# Patient Record
Sex: MALE | Race: WHITE | Employment: OTHER | URBAN - NONMETROPOLITAN AREA
[De-identification: names, ages, dates, MRNs, and addresses within clinical notes are randomized per-mention and may not be internally consistent; named-entity substitution may affect disease eponyms.]

---

## 2017-01-26 RX ORDER — OXYCODONE AND ACETAMINOPHEN 10; 325 MG/1; MG/1
1 TABLET ORAL 4 TIMES DAILY PRN
Qty: 120 TABLET | Refills: 0 | Status: SHIPPED | OUTPATIENT
Start: 2017-01-29 | End: 2017-02-21 | Stop reason: SDUPTHER

## 2017-02-22 RX ORDER — OXYCODONE AND ACETAMINOPHEN 10; 325 MG/1; MG/1
1 TABLET ORAL 4 TIMES DAILY PRN
Qty: 120 TABLET | Refills: 0 | Status: SHIPPED | OUTPATIENT
Start: 2017-02-28 | End: 2017-03-24 | Stop reason: SDUPTHER

## 2017-03-24 ENCOUNTER — HOSPITAL ENCOUNTER (OUTPATIENT)
Dept: PAIN MANAGEMENT | Age: 67
Discharge: HOME | End: 2017-03-24
Payer: MEDICARE

## 2017-03-24 VITALS
DIASTOLIC BLOOD PRESSURE: 81 MMHG | OXYGEN SATURATION: 94 % | BODY MASS INDEX: 37.38 KG/M2 | TEMPERATURE: 97.3 F | SYSTOLIC BLOOD PRESSURE: 135 MMHG | HEIGHT: 72 IN | HEART RATE: 65 BPM | RESPIRATION RATE: 20 BRPM | WEIGHT: 276 LBS

## 2017-03-24 DIAGNOSIS — G89.29 CHRONIC LEFT-SIDED LOW BACK PAIN WITHOUT SCIATICA: ICD-10-CM

## 2017-03-24 DIAGNOSIS — M54.50 CHRONIC LEFT-SIDED LOW BACK PAIN WITHOUT SCIATICA: ICD-10-CM

## 2017-03-24 PROCEDURE — 99214 OFFICE O/P EST MOD 30 MIN: CPT

## 2017-03-24 RX ORDER — OXYCODONE AND ACETAMINOPHEN 10; 325 MG/1; MG/1
1 TABLET ORAL 4 TIMES DAILY PRN
Qty: 120 TABLET | Refills: 0 | Status: SHIPPED | OUTPATIENT
Start: 2017-03-30 | End: 2017-04-26 | Stop reason: SDUPTHER

## 2017-03-24 RX ORDER — PREGABALIN 200 MG/1
200 CAPSULE ORAL 3 TIMES DAILY
Qty: 90 CAPSULE | Refills: 2 | Status: SHIPPED | OUTPATIENT
Start: 2017-04-13 | End: 2017-06-30 | Stop reason: SDUPTHER

## 2017-03-24 ASSESSMENT — PAIN DESCRIPTION - LOCATION: LOCATION: BACK

## 2017-03-24 ASSESSMENT — PAIN SCALES - GENERAL: PAINLEVEL_OUTOF10: 2

## 2017-03-24 ASSESSMENT — PAIN DESCRIPTION - PAIN TYPE: TYPE: CHRONIC PAIN

## 2017-03-24 ASSESSMENT — PAIN DESCRIPTION - DESCRIPTORS: DESCRIPTORS: ACHING;CONSTANT;THROBBING

## 2017-03-24 ASSESSMENT — PAIN DESCRIPTION - ONSET: ONSET: ON-GOING

## 2017-03-24 ASSESSMENT — PAIN DESCRIPTION - PROGRESSION: CLINICAL_PROGRESSION: NOT CHANGED

## 2017-03-24 ASSESSMENT — ACTIVITIES OF DAILY LIVING (ADL): EFFECT OF PAIN ON DAILY ACTIVITIES: DAILY ACTIVITY

## 2017-03-24 ASSESSMENT — PAIN DESCRIPTION - FREQUENCY: FREQUENCY: CONTINUOUS

## 2017-03-24 ASSESSMENT — PAIN DESCRIPTION - ORIENTATION: ORIENTATION: LOWER

## 2017-03-24 ASSESSMENT — PAIN DESCRIPTION - DIRECTION: RADIATING_TOWARDS: RADIATES INTO LEFT HIP

## 2017-03-29 ENCOUNTER — HOSPITAL ENCOUNTER (OUTPATIENT)
Dept: GENERAL RADIOLOGY | Age: 67
Discharge: HOME | End: 2017-03-29
Payer: MEDICARE

## 2017-03-29 DIAGNOSIS — M25.561 RIGHT KNEE PAIN, UNSPECIFIED CHRONICITY: ICD-10-CM

## 2017-03-29 PROCEDURE — 73562 X-RAY EXAM OF KNEE 3: CPT

## 2017-05-02 RX ORDER — OXYCODONE AND ACETAMINOPHEN 10; 325 MG/1; MG/1
1 TABLET ORAL 4 TIMES DAILY PRN
Qty: 120 TABLET | Refills: 0
Start: 2017-05-02 | End: 2017-05-25 | Stop reason: SDUPTHER

## 2017-05-25 RX ORDER — OXYCODONE AND ACETAMINOPHEN 10; 325 MG/1; MG/1
1 TABLET ORAL 4 TIMES DAILY PRN
Qty: 120 TABLET | Refills: 0 | Status: SHIPPED | OUTPATIENT
Start: 2017-06-01 | End: 2017-05-26 | Stop reason: SDUPTHER

## 2017-05-31 RX ORDER — OXYCODONE AND ACETAMINOPHEN 10; 325 MG/1; MG/1
1 TABLET ORAL 4 TIMES DAILY PRN
Qty: 120 TABLET | Refills: 0
Start: 2017-05-31 | End: 2017-06-23 | Stop reason: SDUPTHER

## 2017-06-26 RX ORDER — OXYCODONE AND ACETAMINOPHEN 10; 325 MG/1; MG/1
1 TABLET ORAL 4 TIMES DAILY PRN
Qty: 8 TABLET | Refills: 0 | Status: SHIPPED | OUTPATIENT
Start: 2017-06-28 | End: 2017-06-30 | Stop reason: SDUPTHER

## 2017-06-30 ENCOUNTER — HOSPITAL ENCOUNTER (OUTPATIENT)
Dept: PAIN MANAGEMENT | Age: 67
Discharge: HOME | End: 2017-06-30
Payer: MEDICARE

## 2017-06-30 VITALS
SYSTOLIC BLOOD PRESSURE: 139 MMHG | WEIGHT: 278 LBS | DIASTOLIC BLOOD PRESSURE: 80 MMHG | BODY MASS INDEX: 37.65 KG/M2 | HEART RATE: 68 BPM | HEIGHT: 72 IN | OXYGEN SATURATION: 95 % | TEMPERATURE: 97.4 F | RESPIRATION RATE: 18 BRPM

## 2017-06-30 DIAGNOSIS — M54.50 CHRONIC LEFT-SIDED LOW BACK PAIN WITHOUT SCIATICA: ICD-10-CM

## 2017-06-30 DIAGNOSIS — G89.29 CHRONIC LEFT-SIDED LOW BACK PAIN WITHOUT SCIATICA: ICD-10-CM

## 2017-06-30 PROCEDURE — 99213 OFFICE O/P EST LOW 20 MIN: CPT

## 2017-06-30 RX ORDER — OXYCODONE AND ACETAMINOPHEN 10; 325 MG/1; MG/1
1 TABLET ORAL 4 TIMES DAILY PRN
Qty: 120 TABLET | Refills: 0
Start: 2017-06-30 | End: 2017-07-26 | Stop reason: SDUPTHER

## 2017-06-30 RX ORDER — PREGABALIN 200 MG/1
200 CAPSULE ORAL 3 TIMES DAILY
Qty: 90 CAPSULE | Refills: 2 | Status: CANCELLED | OUTPATIENT
Start: 2017-06-30

## 2017-06-30 RX ORDER — PREGABALIN 200 MG/1
200 CAPSULE ORAL 3 TIMES DAILY
Qty: 90 CAPSULE | Refills: 2
Start: 2017-06-30 | End: 2017-09-29 | Stop reason: SDUPTHER

## 2017-06-30 ASSESSMENT — PAIN DESCRIPTION - PAIN TYPE: TYPE: CHRONIC PAIN

## 2017-06-30 ASSESSMENT — PAIN DESCRIPTION - FREQUENCY: FREQUENCY: CONTINUOUS

## 2017-06-30 ASSESSMENT — PAIN DESCRIPTION - LOCATION: LOCATION: BACK

## 2017-06-30 ASSESSMENT — PAIN DESCRIPTION - ORIENTATION: ORIENTATION: LOWER

## 2017-06-30 ASSESSMENT — PAIN SCALES - GENERAL: PAINLEVEL_OUTOF10: 1

## 2017-06-30 ASSESSMENT — PAIN DESCRIPTION - ONSET: ONSET: ON-GOING

## 2017-06-30 ASSESSMENT — ACTIVITIES OF DAILY LIVING (ADL): EFFECT OF PAIN ON DAILY ACTIVITIES: LIMITS ACTIVITY

## 2017-06-30 ASSESSMENT — PAIN DESCRIPTION - PROGRESSION: CLINICAL_PROGRESSION: NOT CHANGED

## 2017-06-30 ASSESSMENT — PAIN DESCRIPTION - DESCRIPTORS: DESCRIPTORS: ACHING;CONSTANT;THROBBING

## 2017-06-30 ASSESSMENT — PAIN DESCRIPTION - DIRECTION: RADIATING_TOWARDS: INTO LEFT HIP

## 2017-07-26 RX ORDER — OXYCODONE AND ACETAMINOPHEN 10; 325 MG/1; MG/1
1 TABLET ORAL 4 TIMES DAILY PRN
Qty: 120 TABLET | Refills: 0 | Status: SHIPPED | OUTPATIENT
Start: 2017-07-30 | End: 2017-08-23 | Stop reason: SDUPTHER

## 2017-08-24 RX ORDER — OXYCODONE AND ACETAMINOPHEN 10; 325 MG/1; MG/1
1 TABLET ORAL 4 TIMES DAILY PRN
Qty: 120 TABLET | Refills: 0 | Status: SHIPPED | OUTPATIENT
Start: 2017-08-29 | End: 2017-09-25 | Stop reason: SDUPTHER

## 2017-09-26 RX ORDER — OXYCODONE AND ACETAMINOPHEN 10; 325 MG/1; MG/1
1 TABLET ORAL 4 TIMES DAILY PRN
Qty: 120 TABLET | Refills: 0 | Status: SHIPPED | OUTPATIENT
Start: 2017-09-28 | End: 2017-10-04 | Stop reason: SDUPTHER

## 2017-09-29 RX ORDER — PREGABALIN 200 MG/1
200 CAPSULE ORAL 3 TIMES DAILY
Qty: 90 CAPSULE | Refills: 2 | Status: SHIPPED | OUTPATIENT
Start: 2017-09-29 | End: 2018-01-02 | Stop reason: SDUPTHER

## 2017-10-04 ENCOUNTER — HOSPITAL ENCOUNTER (OUTPATIENT)
Dept: PAIN MANAGEMENT | Age: 67
Discharge: HOME | End: 2017-10-04
Payer: MEDICARE

## 2017-10-04 VITALS
HEART RATE: 62 BPM | BODY MASS INDEX: 37.11 KG/M2 | HEIGHT: 72 IN | WEIGHT: 274 LBS | SYSTOLIC BLOOD PRESSURE: 129 MMHG | TEMPERATURE: 97 F | RESPIRATION RATE: 20 BRPM | DIASTOLIC BLOOD PRESSURE: 83 MMHG

## 2017-10-04 DIAGNOSIS — G89.29 CHRONIC LEFT-SIDED LOW BACK PAIN WITHOUT SCIATICA: ICD-10-CM

## 2017-10-04 DIAGNOSIS — M54.50 CHRONIC LEFT-SIDED LOW BACK PAIN WITHOUT SCIATICA: ICD-10-CM

## 2017-10-04 PROCEDURE — 80307 DRUG TEST PRSMV CHEM ANLYZR: CPT

## 2017-10-04 PROCEDURE — 99214 OFFICE O/P EST MOD 30 MIN: CPT

## 2017-10-04 RX ORDER — OXYCODONE AND ACETAMINOPHEN 10; 325 MG/1; MG/1
1 TABLET ORAL 4 TIMES DAILY PRN
Qty: 120 TABLET | Refills: 0 | Status: SHIPPED | OUTPATIENT
Start: 2017-10-28 | End: 2017-11-16 | Stop reason: SDUPTHER

## 2017-10-04 RX ORDER — DICLOFENAC SODIUM 75 MG/1
1 TABLET, DELAYED RELEASE ORAL 2 TIMES DAILY
Refills: 3 | COMMUNITY
Start: 2017-07-21 | End: 2018-01-05 | Stop reason: SDUPTHER

## 2017-10-04 ASSESSMENT — PAIN SCALES - GENERAL: PAINLEVEL_OUTOF10: 2

## 2017-10-04 ASSESSMENT — PAIN DESCRIPTION - PROGRESSION: CLINICAL_PROGRESSION: NOT CHANGED

## 2017-10-04 ASSESSMENT — PAIN DESCRIPTION - PAIN TYPE: TYPE: CHRONIC PAIN

## 2017-10-04 ASSESSMENT — PAIN DESCRIPTION - FREQUENCY: FREQUENCY: INTERMITTENT

## 2017-10-04 ASSESSMENT — ACTIVITIES OF DAILY LIVING (ADL): EFFECT OF PAIN ON DAILY ACTIVITIES: LIMITS ACTIVIITES

## 2017-10-04 ASSESSMENT — PAIN DESCRIPTION - ORIENTATION: ORIENTATION: LOWER;RIGHT;LEFT

## 2017-10-04 ASSESSMENT — PAIN DESCRIPTION - DESCRIPTORS: DESCRIPTORS: ACHING

## 2017-10-04 ASSESSMENT — PAIN DESCRIPTION - LOCATION: LOCATION: BACK;FOOT

## 2017-10-04 ASSESSMENT — PAIN DESCRIPTION - ONSET: ONSET: ON-GOING

## 2017-10-04 NOTE — PROGRESS NOTES
 zolpidem (AMBIEN CR) 12.5 MG CR tablet Take 12.5 mg by mouth nightly as needed. No current facility-administered medications for this encounter. UDS:    Lab Results   Component Value Date    AMPHETUR Negative 12/22/2016    BARBITURATES Negative 12/22/2016    BENZODIAZURI Negative 12/22/2016    CANNANBINURI Negative 12/22/2016    COCAINEMETUR Negative 12/22/2016    OPIAU Negative 12/22/2016    OXYCOOXYMO Positive * (Percocet per med list) 12/22/2016    PHENCYCU Negative 12/22/2016    LABMETH Negative 12/22/2016    PPXUR Negative 12/22/2016    MEPERIDU Negative 12/22/2016    FENTU Negative 12/22/2016    ETHUQN Negative 12/22/2016          Vitals:  /83  Pulse 62  Temp 97 °F (36.1 °C) (Temporal)   Resp 20  Ht 6' (1.829 m)  Wt 274 lb (124.3 kg)  BMI 37.16 kg/m2      Physical Exam:  General appearance: no acute distress  Head: NCAT, EOMI  Skin: Warm, Dry   Musculoskeletal: ambulatory per self, steady gait  Neurologic: alert and oriented X 3, speech clear  Mood and affect: appropriate, no SI or HI    Assessment:    *      Lumbar DDD  *      Lumbar Spinal Stenosis    Plan:   [x]  Patient is to call with any questions or concerns which may arise prior to the next office visit    [x]  Continue current medications per our office, see medication tabARTURO reviewed   []  Add   []  Imaging order given to patient   []  PT order given to patient   []  Procedure scheduled for next visit, see encounter details   [x]  Routine UDS done today   []  UDS next visit   []  . .. Controlled Substance Monitoring: Discussed with patient possible medication side effects, risk of tolerance and/or dependence, and alternative treatments. Discussed the effects of long term narcotic use. Patient encouraged to set daily goals of exercising and decreasing daily narcotic intake.       Electronically signed by BROOKE Givens on 10/4/2017

## 2017-10-13 LAB
AMPHETAMINES, URINE: NEGATIVE NG/ML
BARBITURATES, URINE: NEGATIVE NG/ML
BENZODIAZEPINES, URINE: NEGATIVE NG/ML
CANNABINOIDS, URINE: NEGATIVE NG/ML
COCAINE METABOLITE, URINE: NEGATIVE NG/ML
CREATININE, URINE: 187.1 MG/DL (ref 20–300)
ETHANOL U, QUAN: NEGATIVE %
FENTANYL URINE: NEGATIVE PG/ML
MEPERIDINE, UR: NEGATIVE NG/ML
METHADONE SCREEN, URINE: NEGATIVE NG/ML
OPIATES, URINE: ABNORMAL NG/ML
OPIATES, URINE: NEGATIVE NG/ML
OXYCODONE URINE: POSITIVE
OXYCODONE, UR GC/MS: 2076 NG/ML
OXYCODONE/OXYMORPH, UR: POSITIVE
OXYCODONE/OXYMORPHONE, UR: ABNORMAL NG/ML
OXYMORPHONE, UR GC/MS: 4174 NG/ML
OXYMORPHONE, URINE: POSITIVE
PH, URINE: 5.5 (ref 4.5–8.9)
PHENCYCLIDINE, URINE: NEGATIVE NG/ML
PROPOXYPHENE, URINE: NEGATIVE NG/ML

## 2017-11-06 ENCOUNTER — OFFICE VISIT (OUTPATIENT)
Dept: UROLOGY | Facility: CLINIC | Age: 67
End: 2017-11-06

## 2017-11-06 VITALS
DIASTOLIC BLOOD PRESSURE: 84 MMHG | HEIGHT: 72 IN | BODY MASS INDEX: 37 KG/M2 | WEIGHT: 273.2 LBS | SYSTOLIC BLOOD PRESSURE: 134 MMHG | TEMPERATURE: 97.4 F

## 2017-11-06 DIAGNOSIS — N43.3 HYDROCELE, UNSPECIFIED HYDROCELE TYPE: Primary | ICD-10-CM

## 2017-11-06 LAB
BILIRUB BLD-MCNC: NEGATIVE MG/DL
CLARITY, POC: CLEAR
COLOR UR: YELLOW
GLUCOSE UR STRIP-MCNC: ABNORMAL MG/DL
KETONES UR QL: NEGATIVE
LEUKOCYTE EST, POC: NEGATIVE
NITRITE UR-MCNC: NEGATIVE MG/ML
PH UR: 5 [PH] (ref 5–8)
PROT UR STRIP-MCNC: NEGATIVE MG/DL
RBC # UR STRIP: ABNORMAL /UL
SP GR UR: 1.02 (ref 1–1.03)
UROBILINOGEN UR QL: NORMAL

## 2017-11-06 PROCEDURE — 81003 URINALYSIS AUTO W/O SCOPE: CPT | Performed by: UROLOGY

## 2017-11-06 PROCEDURE — 99203 OFFICE O/P NEW LOW 30 MIN: CPT | Performed by: UROLOGY

## 2017-11-06 RX ORDER — DICLOFENAC SODIUM 75 MG/1
TABLET, DELAYED RELEASE ORAL
Refills: 3 | Status: ON HOLD | COMMUNITY
Start: 2017-10-16 | End: 2020-01-24 | Stop reason: SDDI

## 2017-11-06 RX ORDER — LOSARTAN POTASSIUM AND HYDROCHLOROTHIAZIDE 12.5; 5 MG/1; MG/1
TABLET ORAL
Refills: 3 | COMMUNITY
Start: 2017-08-11 | End: 2017-11-13 | Stop reason: DRUGHIGH

## 2017-11-06 RX ORDER — OXYCODONE AND ACETAMINOPHEN 10; 325 MG/1; MG/1
TABLET ORAL
Refills: 0 | Status: ON HOLD | COMMUNITY
Start: 2017-10-28 | End: 2020-01-24

## 2017-11-06 RX ORDER — ZOLPIDEM TARTRATE 10 MG/1
TABLET ORAL
Refills: 1 | Status: ON HOLD | COMMUNITY
Start: 2017-09-28 | End: 2020-01-24

## 2017-11-06 RX ORDER — CITALOPRAM 20 MG/1
TABLET ORAL
Refills: 3 | Status: ON HOLD | COMMUNITY
Start: 2017-09-15 | End: 2020-01-24

## 2017-11-06 RX ORDER — OMEPRAZOLE 20 MG/1
CAPSULE, DELAYED RELEASE ORAL
Refills: 3 | Status: ON HOLD | COMMUNITY
Start: 2017-10-10 | End: 2020-01-24

## 2017-11-06 RX ORDER — BUSPIRONE HYDROCHLORIDE 10 MG/1
10 TABLET ORAL 2 TIMES DAILY
Status: ON HOLD | COMMUNITY
Start: 2015-11-28 | End: 2021-12-16

## 2017-11-06 RX ORDER — SIMVASTATIN 40 MG
TABLET ORAL
Refills: 3 | Status: ON HOLD | COMMUNITY
Start: 2017-10-22 | End: 2020-01-24

## 2017-11-06 RX ORDER — SODIUM CHLORIDE 9 MG/ML
100 INJECTION, SOLUTION INTRAVENOUS CONTINUOUS
Status: CANCELLED | OUTPATIENT
Start: 2017-11-06

## 2017-11-06 NOTE — PROGRESS NOTES
Subjective    Mr. Rodriguez is 67 y.o. male    Chief Complaint: Scrotal Swelling    History of Present Illness     Genital Lesion  Patient is here for a genital lesion.  The location of the lesion is left scrotum.  The lesion has been present for 9month(s). The description is swelling.  The courseworsening.  Treatment response is none.  Associated symptoms include pain.     The following portions of the patient's history were reviewed and updated as appropriate: allergies, current medications, past family history, past medical history, past social history, past surgical history and problem list.    Review of Systems   Constitutional: Negative for appetite change and fever.   HENT: Negative for hearing loss and sore throat.    Eyes: Negative for pain and redness.   Respiratory: Negative for cough and shortness of breath.    Cardiovascular: Negative for chest pain and leg swelling.   Gastrointestinal: Negative for anal bleeding, nausea and vomiting.   Endocrine: Negative for cold intolerance and heat intolerance.   Genitourinary: Positive for scrotal swelling (left) and testicular pain (left). Negative for dysuria, flank pain and hematuria.   Musculoskeletal: Negative for joint swelling and myalgias.   Skin: Negative for color change and rash.   Allergic/Immunologic: Negative for immunocompromised state.   Neurological: Negative for dizziness and speech difficulty.   Hematological: Negative for adenopathy. Does not bruise/bleed easily.   Psychiatric/Behavioral: Negative for dysphoric mood and suicidal ideas.         Current Outpatient Prescriptions:   •  busPIRone (BUSPAR) 10 MG tablet, Take 10 mg by mouth., Disp: , Rfl:   •  citalopram (CeleXA) 20 MG tablet, TAKE 1 TABLET EVERY DAY, Disp: , Rfl: 3  •  diclofenac (VOLTAREN) 75 MG EC tablet, TAKE 1 TABLET BY MOUTH TWICE A DAY, Disp: , Rfl: 3  •  losartan-hydrochlorothiazide (HYZAAR) 50-12.5 MG per tablet, TAKE 1 TABLET EVERY DAY, Disp: , Rfl: 3  •  LYRICA 200 MG capsule,  "TAKE 1 CAPSULE BY MOUTH 3 TIMES DAILY, Disp: , Rfl: 2  •  metFORMIN (GLUCOPHAGE) 500 MG tablet, TAKE 1 TABLET BY MOUTH TWICE A DAY *12-15-16*, Disp: , Rfl: 5  •  omeprazole (priLOSEC) 20 MG capsule, TAKE 1 CAPSULE EVERY DAY, Disp: , Rfl: 3  •  oxyCODONE-acetaminophen (PERCOCET)  MG per tablet, TAKE 1 TABLET BY MOUTH 4 TIMES A DAY AS NEEDED FOR PAIN, Disp: , Rfl: 0  •  simvastatin (ZOCOR) 40 MG tablet, TAKE 1 TABLET BY MOUTH EVERY DAY, Disp: , Rfl: 3  •  zolpidem (AMBIEN) 10 MG tablet, TAKE 1 TABLET BY MOUTH AT BEDTIME, Disp: , Rfl: 1    Past Medical History:   Diagnosis Date   • Hyperlipidemia    • Hypertension        Past Surgical History:   Procedure Laterality Date   • HERNIA REPAIR         Social History     Social History   • Marital status: Unknown     Spouse name: N/A   • Number of children: N/A   • Years of education: N/A     Social History Main Topics   • Smoking status: Current Every Day Smoker     Types: Cigarettes   • Smokeless tobacco: Never Used   • Alcohol use No   • Drug use: None   • Sexual activity: Not Asked     Other Topics Concern   • None     Social History Narrative   • None       Family History   Problem Relation Age of Onset   • No Known Problems Father    • No Known Problems Mother        Objective    /84  Temp 97.4 °F (36.3 °C)  Ht 72\" (182.9 cm)  Wt 273 lb 3.2 oz (124 kg)  BMI 37.05 kg/m2    Physical Exam   Constitutional: He is oriented to person, place, and time. He appears well-developed and well-nourished. No distress.   HENT:   Nose: Nose normal.   Neck: Normal range of motion. Neck supple. No tracheal deviation present. No thyromegaly present.   Cardiovascular: Normal rate, regular rhythm and intact distal pulses.    No significant edema or tenderness    Pulmonary/Chest: Breath sounds normal. No accessory muscle usage. No respiratory distress.   Abdominal: Soft. Bowel sounds are normal. He exhibits no distension, no ascites and no mass. There is no " hepatosplenomegaly. There is no tenderness. There is no rebound, no guarding and no CVA tenderness. No hernia.   Stool specimen is not indicated for my portion of the exam   Genitourinary: Testes normal and penis normal. Rectal exam shows no mass, no tenderness, anal tone normal and guaiac negative stool. Tender: no nodules. Right testis shows no mass, no swelling and no tenderness. Left testis shows no mass, no swelling and no tenderness. No penile tenderness (no lesion or deformities).   Genitourinary Comments:  The urethral meatus normal in position without evidence of stricture. Epididymis without mass or tenderness. Vas Deferens is palpably normal. Large left sided hydrocele.    Lymphadenopathy:     He has no cervical adenopathy. No inguinal adenopathy noted on the right or left side.        Right: No inguinal adenopathy present.        Left: No inguinal adenopathy present.   Neurological: He is alert and oriented to person, place, and time.   Skin: Skin is warm and dry. No rash noted. He is not diaphoretic. No pallor.   Psychiatric: He has a normal mood and affect. His behavior is normal.   Vitals reviewed.          Results for orders placed or performed in visit on 11/06/17   POC Urinalysis Dipstick, Automated   Result Value Ref Range    Color Yellow Yellow, Straw, Dark Yellow, Lora    Clarity, UA Clear Clear    Glucose, UA >=1000 mg/dL (3+) (A) Negative, 1000 mg/dL (3+) mg/dL    Bilirubin Negative Negative    Ketones, UA Negative Negative    Specific Gravity  1.020 1.005 - 1.030    Blood, UA Trace (A) Negative    pH, Urine 5.0 5.0 - 8.0    Protein, POC Negative Negative mg/dL    Urobilinogen, UA Normal Normal    Leukocytes Negative Negative    Nitrite, UA Negative Negative     Assessment and Plan    Diagnoses and all orders for this visit:    Hydrocele, unspecified hydrocele type  -     POC Urinalysis Dipstick, Automated  -     sodium chloride 0.9 % infusion; Infuse 100 mL/hr into a venous catheter  Continuous.  -     ceFAZolin (ANCEF) 2 g in sodium chloride 0.9 % 100 mL IVPB; Infuse 2 g into a venous catheter 1 (One) Time.  -     Case Request; Standing  -     Case Request    Other orders  -     Follow Anesthesia Guidelines / Standing Orders; Future  -     Obtain informed consent  -     Follow Anesthesia Guidelines / Standing Orders; Standing  -     Verify NPO Status; Standing  -     RA hose- To be placed on patient in pre-op; Standing  -     SCD (sequential compression device)- to be placed on patient in Pre-op; Standing            Patient with left-sided hydrocele of moderate size.  This is causing symptoms of pain.  We will proceed with left sided hydrocelectomy.  I did discuss with him today with significant alternatives of the procedure as well as success rate of 85%.    This gentleman has a hydrocele of moderate to large size that warrants consideration for repair.  We discussed that if the hydrocele increases much in size, it will likely be more difficult to repair surgically with an increasing complication and recovery time.  It is explained that aspiration is an option with or without sclerosing therapy, but the risk of recurrence is quoted to be between 35-65% and most studies.  It is explained that this could lead to an infection also.  The surgical risks including recurrence of hydrocele, (about 15-20%), infection, scrotal abscess, hematoma, damage to testis particularly ischemic that could cause atrophy, infertility, postoperative discomfort that persists beyond the expected time, need for temporary internal drainage and persistent pain areexplained to the patient.  All questions are answered.  He wishes to proceed with the procedure.

## 2017-11-13 ENCOUNTER — APPOINTMENT (OUTPATIENT)
Dept: PREADMISSION TESTING | Facility: HOSPITAL | Age: 67
End: 2017-11-13

## 2017-11-13 VITALS
HEIGHT: 71 IN | WEIGHT: 270 LBS | BODY MASS INDEX: 37.8 KG/M2 | OXYGEN SATURATION: 96 % | DIASTOLIC BLOOD PRESSURE: 74 MMHG | SYSTOLIC BLOOD PRESSURE: 135 MMHG | RESPIRATION RATE: 18 BRPM | HEART RATE: 84 BPM

## 2017-11-13 LAB
ANION GAP SERPL CALCULATED.3IONS-SCNC: 8 MMOL/L (ref 4–13)
BILIRUB UR QL STRIP: NEGATIVE
BUN BLD-MCNC: 15 MG/DL (ref 5–21)
BUN/CREAT SERPL: 16.3 (ref 7–25)
CALCIUM SPEC-SCNC: 9.5 MG/DL (ref 8.4–10.4)
CHLORIDE SERPL-SCNC: 95 MMOL/L (ref 98–110)
CLARITY UR: CLEAR
CO2 SERPL-SCNC: 33 MMOL/L (ref 24–31)
COLOR UR: YELLOW
CREAT BLD-MCNC: 0.92 MG/DL (ref 0.5–1.4)
DEPRECATED RDW RBC AUTO: 42.3 FL (ref 40–54)
ERYTHROCYTE [DISTWIDTH] IN BLOOD BY AUTOMATED COUNT: 12.9 % (ref 12–15)
GFR SERPL CREATININE-BSD FRML MDRD: 82 ML/MIN/1.73
GLUCOSE BLD-MCNC: 528 MG/DL (ref 70–100)
GLUCOSE UR STRIP-MCNC: ABNORMAL MG/DL
HCT VFR BLD AUTO: 48.7 % (ref 40–52)
HGB BLD-MCNC: 16.5 G/DL (ref 14–18)
HGB UR QL STRIP.AUTO: NEGATIVE
KETONES UR QL STRIP: NEGATIVE
LEUKOCYTE ESTERASE UR QL STRIP.AUTO: NEGATIVE
MCH RBC QN AUTO: 30.8 PG (ref 28–32)
MCHC RBC AUTO-ENTMCNC: 33.9 G/DL (ref 33–36)
MCV RBC AUTO: 91 FL (ref 82–95)
NITRITE UR QL STRIP: NEGATIVE
PH UR STRIP.AUTO: 6.5 [PH] (ref 5–8)
PLATELET # BLD AUTO: 234 10*3/MM3 (ref 130–400)
PMV BLD AUTO: 11.5 FL (ref 6–12)
POTASSIUM BLD-SCNC: 5 MMOL/L (ref 3.5–5.3)
PROT UR QL STRIP: NEGATIVE
RBC # BLD AUTO: 5.35 10*6/MM3 (ref 4.8–5.9)
SODIUM BLD-SCNC: 136 MMOL/L (ref 135–145)
SP GR UR STRIP: 1.03 (ref 1–1.03)
UROBILINOGEN UR QL STRIP: ABNORMAL
WBC NRBC COR # BLD: 9.78 10*3/MM3 (ref 4.8–10.8)

## 2017-11-13 PROCEDURE — 85027 COMPLETE CBC AUTOMATED: CPT | Performed by: UROLOGY

## 2017-11-13 PROCEDURE — 80048 BASIC METABOLIC PNL TOTAL CA: CPT | Performed by: UROLOGY

## 2017-11-13 PROCEDURE — 93010 ELECTROCARDIOGRAM REPORT: CPT | Performed by: INTERNAL MEDICINE

## 2017-11-13 PROCEDURE — 93005 ELECTROCARDIOGRAM TRACING: CPT

## 2017-11-13 PROCEDURE — 81003 URINALYSIS AUTO W/O SCOPE: CPT | Performed by: UROLOGY

## 2017-11-13 RX ORDER — LOSARTAN POTASSIUM 25 MG/1
25 TABLET ORAL DAILY
COMMUNITY
End: 2019-12-11

## 2017-11-13 NOTE — DISCHARGE INSTRUCTIONS
DAY OF SURGERY INSTRUCTIONS        YOUR SURGEON: dr fernandez    PROCEDURE: ***HYDROCELECTOMY    DATE OF SURGERY: ***11/20/2017    ARRIVAL TIME: AS DIRECTED BY OFFICE    DAY OF SURGERY TAKE ONLY THESE MEDICATIONS: ***none            BEFORE YOU COME TO THE HOSPITAL  (Pre-op instructions)  • Do not eat, drink, smoke or chew gum after midnight the night before surgery.  This also includes no mints.  • Morning of surgery take only the medicines you have been instructed with a sip of water unless otherwise instructed  by your physician.  • Do not shave, wear makeup or dark nail polish.  • Remove all jewelry including rings.  • Leave anything you consider valuable at home.  • Leave your suitcase in the car until after your surgery.  • Bring the following with you if applicable:  o Picture ID and insurance, Medicare or Medicaid cards  o Co-pay/deductible required by insurance (cash, check, credit card)  o Copy of advance directive, living will or power-of- documents if not brought to PAT  o CPAP or BIPAP mask and tubing  o Relaxation aids (MP3 player, book, magazine)  • On the day of surgery check in at registration located at the main entrance of the hospital.       Outpatient Surgery Guidelines, Adult  Outpatient procedures are those for which the person having the procedure is allowed to go home the same day as the procedure. Various procedures are done on an outpatient basis. You should follow some general guidelines if you will be having an outpatient procedure.  LET YOUR HEALTH CARE PROVIDER KNOW ABOUT:  · Any allergies you have.  · All medicines you are taking, including vitamins, herbs, eye drops, creams, and over-the-counter medicines.  · Previous problems you or members of your family have had with the use of anesthetics.  · Any blood disorders you have.  · Previous surgeries you have had.  · Medical conditions you have.  RISKS AND COMPLICATIONS  Your health care provider will discuss possible risks and  complications with you before surgery. Common risks and complications include:    · Problems due to the use of anesthetics.  · Blood loss and replacement (does not apply to minor surgical procedures).  · Temporary increase in pain due to surgery.  · Uncorrected pain or problems that the surgery was meant to correct.  · Infection.  · New damage.  BEFORE THE PROCEDURE  · Ask your health care provider about changing or stopping your regular medicines. You may need to stop taking certain medicines in the days or weeks before the procedure.  · Stop smoking at least 2 weeks before surgery. This lowers your risk for complications during and after surgery. Ask your health care provider for help with this if needed.  · Eat your usual meals and a light supper the day before surgery. Continue fluid intake. Do not drink alcohol.  · Do not eat or drink after midnight the night before your surgery.   · Arrange for someone to take you home and to stay with you for 24 hours after the procedure. Medicine given for your procedure may affect your ability to drive or to care for yourself.  · Call your health care provider's office if you develop an illness or problem that may prevent you from safely having your procedure.  AFTER THE PROCEDURE  After surgery, you will be taken to a recovery area, where your progress will be monitored. If there are no complications, you will be allowed to go home when you are awake, stable, and taking fluids well. You may have numbness around the surgical site. Healing will take some time. You will have tenderness at the surgical site and may have some swelling and bruising. You may also have some nausea.  HOME CARE INSTRUCTIONS  · Do not drive for 24 hours, or as directed by your health care provider. Do not drive while taking prescription pain medicines.  · Do not drink alcohol for 24 hours.  · Do not make important decisions or sign legal documents for 24 hours.  · You may resume a normal diet and  activities as directed.  · Do not lift anything heavier than 10 pounds (4.5 kg) or play contact sports until your health care provider says it is okay.  · Change your bandages (dressings) as directed.  · Only take over-the-counter or prescription medicines as directed by your health care provider.  · Follow up with your health care provider as directed.  SEEK MEDICAL CARE IF:  · You have increased bleeding (more than a small spot) from the surgical site.  · You have redness, swelling, or increasing pain in the wound.  · You see pus coming from the wound.  · You have a fever.  · You notice a bad smell coming from the wound or dressing.  · You feel lightheaded or faint.  · You develop a rash.  · You have trouble breathing.  · You develop allergies.  MAKE SURE YOU:  · Understand these instructions.  · Will watch your condition.  · Will get help right away if you are not doing well or get worse.     This information is not intended to replace advice given to you by your health care provider. Make sure you discuss any questions you have with your health care provider.     Document Released: 09/12/2002 Document Revised: 05/03/2016 Document Reviewed: 05/22/2014  [employer] Interactive Patient Education ©2016 [employer]       Fall Prevention in Hospitals, Adult  As a hospital patient, your condition and the treatments you receive can increase your risk for falls. Some additional risk factors for falls in a hospital include:  · Being in an unfamiliar environment.  · Being on bed rest.  · Your surgery.  · Taking certain medicines.  · Your tubing requirements, such as intravenous (IV) therapy or catheters.  It is important that you learn how to decrease fall risks while at the hospital. Below are important tips that can help prevent falls.  SAFETY TIPS FOR PREVENTING FALLS  Talk about your risk of falling.  · Ask your health care provider why you are at risk for falling. Is it your medicine, illness, tubing placement, or  something else?  · Make a plan with your health care provider to keep you safe from falls.  · Ask your health care provider or pharmacist about side effects of your medicines. Some medicines can make you dizzy or affect your coordination.  Ask for help.  · Ask for help before getting out of bed. You may need to press your call button.  · Ask for assistance in getting safely to the toilet.  · Ask for a walker or cane to be put at your bedside. Ask that most of the side rails on your bed be placed up before your health care provider leaves the room.  · Ask family or friends to sit with you.  · Ask for things that are out of your reach, such as your glasses, hearing aids, telephone, bedside table, or call button.  Follow these tips to avoid falling:  · Stay lying or seated, rather than standing, while waiting for help.  · Wear rubber-soled slippers or shoes whenever you walk in the hospital.  · Avoid quick, sudden movements.  ¨ Change positions slowly.  ¨ Sit on the side of your bed before standing.  ¨ Stand up slowly and wait before you start to walk.  · Let your health care provider know if there is a spill on the floor.  · Pay careful attention to the medical equipment, electrical cords, and tubes around you.  · When you need help, use your call button by your bed or in the bathroom. Wait for one of your health care providers to help you.  · If you feel dizzy or unsure of your footing, return to bed and wait for assistance.  · Avoid being distracted by the TV, telephone, or another person in your room.  · Do not lean or support yourself on rolling objects, such as IV poles or bedside tables.     This information is not intended to replace advice given to you by your health care provider. Make sure you discuss any questions you have with your health care provider.     Document Released: 12/15/2001 Document Revised: 01/08/2016 Document Reviewed: 08/25/2013  [employer] Interactive Patient Education ©2016 [employer]       Surgical Site Infections FAQs  What is a Surgical Site Infection (SSI)?  A surgical site infection is an infection that occurs after surgery in the part of the body where the surgery took place. Most patients who have surgery do not develop an infection. However, infections develop in about 1 to 3 out of every 100 patients who have surgery.  Some of the common symptoms of a surgical site infection are:  · Redness and pain around the area where you had surgery  · Drainage of cloudy fluid from your surgical wound  · Fever  Can SSIs be treated?  Yes. Most surgical site infections can be treated with antibiotics. The antibiotic given to you depends on the bacteria (germs) causing the infection. Sometimes patients with SSIs also need another surgery to treat the infection.  What are some of the things that hospitals are doing to prevent SSIs?  To prevent SSIs, doctors, nurses, and other healthcare providers:  · Clean their hands and arms up to their elbows with an antiseptic agent just before the surgery.  · Clean their hands with soap and water or an alcohol-based hand rub before and after caring for each patient.  · May remove some of your hair immediately before your surgery using electric clippers if the hair is in the same area where the procedure will occur. They should not shave you with a razor.  · Wear special hair covers, masks, gowns, and gloves during surgery to keep the surgery area clean.  · Give you antibiotics before your surgery starts. In most cases, you should get antibiotics within 60 minutes before the surgery starts and the antibiotics should be stopped within 24 hours after surgery.  · Clean the skin at the site of your surgery with a special soap that kills germs.  What can I do to help prevent SSIs?  Before your surgery:  · Tell your doctor about other medical problems you may have. Health problems such as allergies, diabetes, and obesity could affect your surgery and your  treatment.  · Quit smoking. Patients who smoke get more infections. Talk to your doctor about how you can quit before your surgery.  · Do not shave near where you will have surgery. Shaving with a razor can irritate your skin and make it easier to develop an infection.  At the time of your surgery:  · Speak up if someone tries to shave you with a razor before surgery. Ask why you need to be shaved and talk with your surgeon if you have any concerns.  · Ask if you will get antibiotics before surgery.  After your surgery:  · Make sure that your healthcare providers clean their hands before examining you, either with soap and water or an alcohol-based hand rub.  · If you do not see your providers clean their hands, please ask them to do so.  · Family and friends who visit you should not touch the surgical wound or dressings.  · Family and friends should clean their hands with soap and water or an alcohol-based hand rub before and after visiting you. If you do not see them clean their hands, ask them to clean their hands.  What do I need to do when I go home from the hospital?  · Before you go home, your doctor or nurse should explain everything you need to know about taking care of your wound. Make sure you understand how to care for your wound before you leave the hospital.  · Always clean your hands before and after caring for your wound.  · Before you go home, make sure you know who to contact if you have questions or problems after you get home.  · If you have any symptoms of an infection, such as redness and pain at the surgery site, drainage, or fever, call your doctor immediately.  If you have additional questions, please ask your doctor or nurse.  Developed and co-sponsored by The Society for Healthcare Epidemiology of [place] (SHEA); Infectious Diseases Society of [place] (IDSA); American Hospital Association; Association for Professionals in Infection Control and Epidemiology (APIC); Centers for Disease  Control and Prevention (CDC); and The Joint Commission.     This information is not intended to replace advice given to you by your health care provider. Make sure you discuss any questions you have with your health care provider.     Document Released: 12/23/2014 Document Revised: 01/08/2016 Document Reviewed: 03/02/2016  [employer] Interactive Patient Education ©2016 [employer]     PATIENT/FAMILY/RESPONSIBLE PARTY VERBALIZES UNDERSTANDING OF ABOVE EDUCATION.  COPY OF PAIN SCALE GIVEN AND REVIEWED WITH VERBALIZED UNDERSTANDING.

## 2017-11-17 ENCOUNTER — TELEPHONE (OUTPATIENT)
Dept: UROLOGY | Facility: CLINIC | Age: 67
End: 2017-11-17

## 2017-11-17 NOTE — TELEPHONE ENCOUNTER
Spoke with Che whom spoke with Dr. Monroy's MA about his visit with Dr. Monroy on 11/15 about his glucose. She said they checked it at the visit 11/15 and it was 198. He is okay to have surgery. Dr. Monroy will see patient back in his office in 4 weeks, since they increased his Metformin.

## 2017-11-20 ENCOUNTER — HOSPITAL ENCOUNTER (OUTPATIENT)
Facility: HOSPITAL | Age: 67
Setting detail: HOSPITAL OUTPATIENT SURGERY
Discharge: HOME | End: 2017-11-20
Attending: UROLOGY | Admitting: UROLOGY

## 2017-11-20 ENCOUNTER — ANESTHESIA (OUTPATIENT)
Dept: PERIOP | Facility: HOSPITAL | Age: 67
End: 2017-11-20

## 2017-11-20 ENCOUNTER — ANESTHESIA EVENT (OUTPATIENT)
Dept: PERIOP | Facility: HOSPITAL | Age: 67
End: 2017-11-20

## 2017-11-20 VITALS
RESPIRATION RATE: 18 BRPM | WEIGHT: 270 LBS | HEART RATE: 75 BPM | HEIGHT: 71 IN | DIASTOLIC BLOOD PRESSURE: 75 MMHG | TEMPERATURE: 98.1 F | BODY MASS INDEX: 37.8 KG/M2 | SYSTOLIC BLOOD PRESSURE: 134 MMHG | OXYGEN SATURATION: 94 %

## 2017-11-20 DIAGNOSIS — N43.3 HYDROCELE, UNSPECIFIED HYDROCELE TYPE: ICD-10-CM

## 2017-11-20 LAB
GLUCOSE BLDC GLUCOMTR-MCNC: 186 MG/DL (ref 70–130)
GLUCOSE BLDC GLUCOMTR-MCNC: 196 MG/DL (ref 70–130)

## 2017-11-20 PROCEDURE — 82962 GLUCOSE BLOOD TEST: CPT

## 2017-11-20 PROCEDURE — 25010000002 PROPOFOL 10 MG/ML EMULSION: Performed by: NURSE ANESTHETIST, CERTIFIED REGISTERED

## 2017-11-20 PROCEDURE — 25010000002 HYDROMORPHONE PER 4 MG: Performed by: ANESTHESIOLOGY

## 2017-11-20 PROCEDURE — 25010000002 ONDANSETRON PER 1 MG: Performed by: NURSE ANESTHETIST, CERTIFIED REGISTERED

## 2017-11-20 PROCEDURE — 25010000002 DEXAMETHASONE PER 1 MG: Performed by: NURSE ANESTHETIST, CERTIFIED REGISTERED

## 2017-11-20 PROCEDURE — 25010000002 MIDAZOLAM PER 1 MG: Performed by: ANESTHESIOLOGY

## 2017-11-20 PROCEDURE — 25010000002 SUCCINYLCHOLINE PER 20 MG: Performed by: NURSE ANESTHETIST, CERTIFIED REGISTERED

## 2017-11-20 PROCEDURE — 55500 REMOVAL OF HYDROCELE: CPT | Performed by: UROLOGY

## 2017-11-20 PROCEDURE — 25010000003 CEFAZOLIN PER 500 MG: Performed by: UROLOGY

## 2017-11-20 PROCEDURE — 25010000002 FENTANYL CITRATE (PF) 100 MCG/2ML SOLUTION: Performed by: NURSE ANESTHETIST, CERTIFIED REGISTERED

## 2017-11-20 RX ORDER — METOCLOPRAMIDE HYDROCHLORIDE 5 MG/ML
5 INJECTION INTRAMUSCULAR; INTRAVENOUS
Status: DISCONTINUED | OUTPATIENT
Start: 2017-11-20 | End: 2017-11-20 | Stop reason: HOSPADM

## 2017-11-20 RX ORDER — LIDOCAINE HYDROCHLORIDE 40 MG/ML
SOLUTION TOPICAL AS NEEDED
Status: DISCONTINUED | OUTPATIENT
Start: 2017-11-20 | End: 2017-11-20 | Stop reason: SURG

## 2017-11-20 RX ORDER — NALOXONE HCL 0.4 MG/ML
0.04 VIAL (ML) INJECTION AS NEEDED
Status: DISCONTINUED | OUTPATIENT
Start: 2017-11-20 | End: 2017-11-20 | Stop reason: HOSPADM

## 2017-11-20 RX ORDER — ONDANSETRON 2 MG/ML
INJECTION INTRAMUSCULAR; INTRAVENOUS AS NEEDED
Status: DISCONTINUED | OUTPATIENT
Start: 2017-11-20 | End: 2017-11-20 | Stop reason: SURG

## 2017-11-20 RX ORDER — SODIUM CHLORIDE 0.9 % (FLUSH) 0.9 %
1-10 SYRINGE (ML) INJECTION AS NEEDED
Status: DISCONTINUED | OUTPATIENT
Start: 2017-11-20 | End: 2017-11-20 | Stop reason: HOSPADM

## 2017-11-20 RX ORDER — MIDAZOLAM HYDROCHLORIDE 1 MG/ML
2 INJECTION INTRAMUSCULAR; INTRAVENOUS
Status: DISCONTINUED | OUTPATIENT
Start: 2017-11-20 | End: 2017-11-20 | Stop reason: HOSPADM

## 2017-11-20 RX ORDER — MORPHINE SULFATE 2 MG/ML
2 INJECTION, SOLUTION INTRAMUSCULAR; INTRAVENOUS AS NEEDED
Status: DISCONTINUED | OUTPATIENT
Start: 2017-11-20 | End: 2017-11-20 | Stop reason: HOSPADM

## 2017-11-20 RX ORDER — OXYCODONE AND ACETAMINOPHEN 7.5; 325 MG/1; MG/1
1 TABLET ORAL ONCE AS NEEDED
Status: DISCONTINUED | OUTPATIENT
Start: 2017-11-20 | End: 2017-11-20 | Stop reason: HOSPADM

## 2017-11-20 RX ORDER — ONDANSETRON 4 MG/1
4 TABLET, FILM COATED ORAL ONCE AS NEEDED
Status: DISCONTINUED | OUTPATIENT
Start: 2017-11-20 | End: 2017-11-20 | Stop reason: HOSPADM

## 2017-11-20 RX ORDER — SUCCINYLCHOLINE CHLORIDE 20 MG/ML
INJECTION INTRAMUSCULAR; INTRAVENOUS AS NEEDED
Status: DISCONTINUED | OUTPATIENT
Start: 2017-11-20 | End: 2017-11-20 | Stop reason: SURG

## 2017-11-20 RX ORDER — SODIUM CHLORIDE 9 MG/ML
INJECTION, SOLUTION INTRAVENOUS AS NEEDED
Status: DISCONTINUED | OUTPATIENT
Start: 2017-11-20 | End: 2017-11-20 | Stop reason: HOSPADM

## 2017-11-20 RX ORDER — SODIUM CHLORIDE, SODIUM LACTATE, POTASSIUM CHLORIDE, CALCIUM CHLORIDE 600; 310; 30; 20 MG/100ML; MG/100ML; MG/100ML; MG/100ML
1000 INJECTION, SOLUTION INTRAVENOUS CONTINUOUS
Status: DISCONTINUED | OUTPATIENT
Start: 2017-11-20 | End: 2017-11-20 | Stop reason: HOSPADM

## 2017-11-20 RX ORDER — IPRATROPIUM BROMIDE AND ALBUTEROL SULFATE 2.5; .5 MG/3ML; MG/3ML
3 SOLUTION RESPIRATORY (INHALATION) ONCE
Status: COMPLETED | OUTPATIENT
Start: 2017-11-20 | End: 2017-11-20

## 2017-11-20 RX ORDER — LABETALOL HYDROCHLORIDE 5 MG/ML
5 INJECTION, SOLUTION INTRAVENOUS
Status: DISCONTINUED | OUTPATIENT
Start: 2017-11-20 | End: 2017-11-20 | Stop reason: HOSPADM

## 2017-11-20 RX ORDER — SODIUM CHLORIDE 0.9 % (FLUSH) 0.9 %
3 SYRINGE (ML) INJECTION AS NEEDED
Status: DISCONTINUED | OUTPATIENT
Start: 2017-11-20 | End: 2017-11-20 | Stop reason: HOSPADM

## 2017-11-20 RX ORDER — FLUMAZENIL 0.1 MG/ML
0.2 INJECTION INTRAVENOUS AS NEEDED
Status: DISCONTINUED | OUTPATIENT
Start: 2017-11-20 | End: 2017-11-20 | Stop reason: HOSPADM

## 2017-11-20 RX ORDER — SODIUM CHLORIDE, SODIUM LACTATE, POTASSIUM CHLORIDE, CALCIUM CHLORIDE 600; 310; 30; 20 MG/100ML; MG/100ML; MG/100ML; MG/100ML
100 INJECTION, SOLUTION INTRAVENOUS CONTINUOUS
Status: DISCONTINUED | OUTPATIENT
Start: 2017-11-20 | End: 2017-11-20 | Stop reason: HOSPADM

## 2017-11-20 RX ORDER — LIDOCAINE HYDROCHLORIDE 20 MG/ML
INJECTION, SOLUTION INFILTRATION; PERINEURAL AS NEEDED
Status: DISCONTINUED | OUTPATIENT
Start: 2017-11-20 | End: 2017-11-20 | Stop reason: SURG

## 2017-11-20 RX ORDER — MEPERIDINE HYDROCHLORIDE 25 MG/ML
12.5 INJECTION INTRAMUSCULAR; INTRAVENOUS; SUBCUTANEOUS
Status: DISCONTINUED | OUTPATIENT
Start: 2017-11-20 | End: 2017-11-20 | Stop reason: HOSPADM

## 2017-11-20 RX ORDER — MIDAZOLAM HYDROCHLORIDE 1 MG/ML
1 INJECTION INTRAMUSCULAR; INTRAVENOUS
Status: DISCONTINUED | OUTPATIENT
Start: 2017-11-20 | End: 2017-11-20 | Stop reason: HOSPADM

## 2017-11-20 RX ORDER — SODIUM CHLORIDE 9 MG/ML
100 INJECTION, SOLUTION INTRAVENOUS CONTINUOUS
Status: DISCONTINUED | OUTPATIENT
Start: 2017-11-20 | End: 2017-11-20 | Stop reason: HOSPADM

## 2017-11-20 RX ORDER — FENTANYL CITRATE 50 UG/ML
INJECTION, SOLUTION INTRAMUSCULAR; INTRAVENOUS AS NEEDED
Status: DISCONTINUED | OUTPATIENT
Start: 2017-11-20 | End: 2017-11-20 | Stop reason: SURG

## 2017-11-20 RX ORDER — OXYCODONE AND ACETAMINOPHEN 10; 325 MG/1; MG/1
1 TABLET ORAL 4 TIMES DAILY PRN
Qty: 120 TABLET | Refills: 0 | Status: SHIPPED | OUTPATIENT
Start: 2017-11-27 | End: 2017-12-15 | Stop reason: SDUPTHER

## 2017-11-20 RX ORDER — IBUPROFEN 200 MG
CAPSULE ORAL AS NEEDED
Status: DISCONTINUED | OUTPATIENT
Start: 2017-11-20 | End: 2017-11-20 | Stop reason: HOSPADM

## 2017-11-20 RX ORDER — PROPOFOL 10 MG/ML
VIAL (ML) INTRAVENOUS AS NEEDED
Status: DISCONTINUED | OUTPATIENT
Start: 2017-11-20 | End: 2017-11-20 | Stop reason: SURG

## 2017-11-20 RX ORDER — ONDANSETRON 2 MG/ML
4 INJECTION INTRAMUSCULAR; INTRAVENOUS AS NEEDED
Status: DISCONTINUED | OUTPATIENT
Start: 2017-11-20 | End: 2017-11-20 | Stop reason: HOSPADM

## 2017-11-20 RX ORDER — HYDRALAZINE HYDROCHLORIDE 20 MG/ML
5 INJECTION INTRAMUSCULAR; INTRAVENOUS
Status: DISCONTINUED | OUTPATIENT
Start: 2017-11-20 | End: 2017-11-20 | Stop reason: HOSPADM

## 2017-11-20 RX ORDER — DEXAMETHASONE SODIUM PHOSPHATE 4 MG/ML
INJECTION, SOLUTION INTRA-ARTICULAR; INTRALESIONAL; INTRAMUSCULAR; INTRAVENOUS; SOFT TISSUE AS NEEDED
Status: DISCONTINUED | OUTPATIENT
Start: 2017-11-20 | End: 2017-11-20 | Stop reason: SURG

## 2017-11-20 RX ORDER — IPRATROPIUM BROMIDE AND ALBUTEROL SULFATE 2.5; .5 MG/3ML; MG/3ML
3 SOLUTION RESPIRATORY (INHALATION) ONCE AS NEEDED
Status: DISCONTINUED | OUTPATIENT
Start: 2017-11-20 | End: 2017-11-20 | Stop reason: HOSPADM

## 2017-11-20 RX ADMIN — ONDANSETRON HYDROCHLORIDE 4 MG: 2 SOLUTION INTRAMUSCULAR; INTRAVENOUS at 09:30

## 2017-11-20 RX ADMIN — MIDAZOLAM HYDROCHLORIDE 2 MG: 1 INJECTION, SOLUTION INTRAMUSCULAR; INTRAVENOUS at 08:36

## 2017-11-20 RX ADMIN — SUCCINYLCHOLINE CHLORIDE 160 MG: 20 INJECTION, SOLUTION INTRAMUSCULAR; INTRAVENOUS at 09:09

## 2017-11-20 RX ADMIN — HYDROMORPHONE HYDROCHLORIDE 0.5 MG: 1 INJECTION, SOLUTION INTRAMUSCULAR; INTRAVENOUS; SUBCUTANEOUS at 10:14

## 2017-11-20 RX ADMIN — LIDOCAINE HYDROCHLORIDE 0.5 ML: 10 INJECTION, SOLUTION EPIDURAL; INFILTRATION; INTRACAUDAL; PERINEURAL at 07:02

## 2017-11-20 RX ADMIN — LIDOCAINE HYDROCHLORIDE 60 MG: 20 INJECTION, SOLUTION INFILTRATION; PERINEURAL at 09:09

## 2017-11-20 RX ADMIN — CEFAZOLIN SODIUM 2 G: 2 SOLUTION INTRAVENOUS at 09:13

## 2017-11-20 RX ADMIN — FENTANYL CITRATE 100 MCG: 50 INJECTION, SOLUTION INTRAMUSCULAR; INTRAVENOUS at 09:09

## 2017-11-20 RX ADMIN — LIDOCAINE HYDROCHLORIDE 1 EACH: 40 SOLUTION TOPICAL at 09:09

## 2017-11-20 RX ADMIN — HYDROMORPHONE HYDROCHLORIDE 0.5 MG: 1 INJECTION, SOLUTION INTRAMUSCULAR; INTRAVENOUS; SUBCUTANEOUS at 10:23

## 2017-11-20 RX ADMIN — OXYCODONE HYDROCHLORIDE AND ACETAMINOPHEN 1 TABLET: 7.5; 325 TABLET ORAL at 11:26

## 2017-11-20 RX ADMIN — IPRATROPIUM BROMIDE AND ALBUTEROL SULFATE 3 ML: 2.5; .5 SOLUTION RESPIRATORY (INHALATION) at 08:36

## 2017-11-20 RX ADMIN — DEXAMETHASONE SODIUM PHOSPHATE 8 MG: 4 INJECTION, SOLUTION INTRAMUSCULAR; INTRAVENOUS at 09:28

## 2017-11-20 RX ADMIN — PROPOFOL 200 MG: 10 INJECTION, EMULSION INTRAVENOUS at 09:09

## 2017-11-20 RX ADMIN — SODIUM CHLORIDE, POTASSIUM CHLORIDE, SODIUM LACTATE AND CALCIUM CHLORIDE 1000 ML: 600; 310; 30; 20 INJECTION, SOLUTION INTRAVENOUS at 07:03

## 2017-11-20 NOTE — ANESTHESIA PREPROCEDURE EVALUATION
Anesthesia Evaluation     Patient summary reviewed   no history of anesthetic complications:  NPO Solid Status: > 8 hours  NPO Liquid Status: > 8 hours     Airway   Mallampati: III  TM distance: >3 FB  Neck ROM: full  Dental - normal exam         Pulmonary - normal exam    breath sounds clear to auscultation  (+) a smoker (2 ppd) Current,   (-) COPD, asthma, recent URI, sleep apnea  Cardiovascular - normal exam  Exercise tolerance: good (4-7 METS)    Rhythm: regular  Rate: normal    (+) hypertension well controlled,   (-) pacemaker, past MI, angina, cardiac stents, CABG      Neuro/Psych  (-) seizures, TIA, CVA  GI/Hepatic/Renal/Endo    (+)  GERD well controlled, diabetes mellitus,   (-) liver disease, no renal disease, hypothyroidism, hyperthyroidism    Musculoskeletal     (-) neck pain, neck stiffness  Abdominal    Substance History      OB/GYN          Other                                        Anesthesia Plan    ASA 3     general     intravenous induction   Anesthetic plan and risks discussed with patient.

## 2017-11-20 NOTE — PLAN OF CARE
Problem: Patient Care Overview (Adult)  Goal: Plan of Care Review  Outcome: Outcome(s) achieved Date Met:  11/20/17 11/20/17 1204   Coping/Psychosocial Response Interventions   Plan Of Care Reviewed With patient;friend   Patient Care Overview   Progress improving   Outcome Evaluation   Outcome Summary/Follow up Plan Patient meets discharge criteria. Medicated for complaints of pain with good relief noted. Patient and friend verbalize understanding of discharge instructions.         Problem: Perioperative Period (Adult)  Goal: Signs and Symptoms of Listed Potential Problems Will be Absent or Manageable (Perioperative Period)  Outcome: Outcome(s) achieved Date Met:  11/20/17

## 2017-11-20 NOTE — ANESTHESIA POSTPROCEDURE EVALUATION
"Patient: John Rodriguez    Procedure Summary     Date Anesthesia Start Anesthesia Stop Room / Location    11/20/17 0904 1000 [facility] 09 / [facility]       Procedure Diagnosis Surgeon Provider    HYDROCELECTOMY (Left Scrotum) Hydrocele, unspecified hydrocele type  (Hydrocele, unspecified hydrocele type [N43.3]) MD Donnell Herring CRNA          Anesthesia Type: general  Last vitals  BP   131/72 (11/20/17 1113)   Temp   98.1 °F (36.7 °C) (11/20/17 1000)   Pulse   70 (11/20/17 1113)   Resp   16 (11/20/17 1113)     SpO2   94 % (11/20/17 1113)     Post Anesthesia Care and Evaluation      Comments: Patient discharged from PACU prior to anesthesia evaluation based on Evangelina Score.  For details, see RN note.     /72 (BP Location: Left arm, Patient Position: Lying)  Pulse 70  Temp 98.1 °F (36.7 °C) (Temporal Artery )   Resp 16  Ht 70.75\" (179.7 cm)  Wt 270 lb (122 kg)  SpO2 94%  BMI 37.92 kg/m2      "

## 2017-11-20 NOTE — PLAN OF CARE
Problem: Perioperative Period (Adult)  Goal: Signs and Symptoms of Listed Potential Problems Will be Absent or Manageable (Perioperative Period)  Outcome: Ongoing (interventions implemented as appropriate)    11/20/17 5097   Perioperative Period   Problems Assessed (Perioperative Period) pain;perioperative injury;infection;situational response   Problems Present (Perioperative Period) situational response

## 2017-11-20 NOTE — OP NOTE
HYDROCELECTOMY  Procedure Note    John Rodriguez  11/20/2017    Pre-op Diagnosis:   Hydrocele, unspecified hydrocele type [N43.3]    Post-op Diagnosis:     Post-Op Diagnosis Codes:     * Hydrocele, unspecified hydrocele type [N43.3]    Procedure/CPT® Codes:      Procedure(s):  HYDROCELECTOMY    Surgeon(s):  Neeraj Hurtado MD    Anesthesia: General    Staff:   Circulator: Maynor Winslow RN; Komal Schaefer RN  Scrub Person: Fabiola Philip; Aravind García  Assistant: Aidee Medellin    Estimated Blood Loss: minimal    Specimens:                None      Drains:           Findings: 150cc clear fluid, no testicular abnormalities    Complications: none    Indications: 67-year-old male with a moderate sized hydrocele he is having pain and discomfort from this options were discussed he opted for a left-sided hydrocelectomy.    Description of Procedure: After informed consent was obtained, patient brought the operating room where he underwent general endotracheal anesthesia in the supine position. Scrotal skin which shaved. He was prepped and draped in usual sterile fashion. Timeout was done to ensure the correct patient, procedure and site. He did receive preoperative antibiotics in the holding area.     A midline raphae scrotal incision was made with a 15 blade. This was carried down to the superficial and deep dartos fascia. I encountered the hydrocele sac. There was a mild amount of inflammation around the sac and had to dissect the surrounding tissues off of it. We then delivered the sac into the surgical field. The sac was incised with a 15 blade. Clear fluid was encountered. Fluid was removed and was approximately 150 mL. I then opened up the sac cephalad and caudad. I then inverted the sac from the testicle itself. The testicles was inspected and there was no lesions noted. I then performed a Jaboulay technique. I closed the hydrocele sac inverting it on the testicle with a 2-0 chromic in a running locking  fashion. The testicle was then placed back in his normal anatomic location within the scrotum. Hemostasis was achieved with Bovie cautery. The deep dartos fascia was closed with 3-0 chromic in a running locking fashion. The superficial dartos was also closed with a 3-0 chromic in a running locking fashion the skin was closed with a 4-0 chromic in a running fashion. Scrotal fluffs were applied scrotal support was placed patient was then awakened from anesthesia and transferred to the recovery room in satisfactory condition.    Neeraj Hurtado MD     Date: 11/20/2017  Time: 9:57 AM

## 2017-11-20 NOTE — ANESTHESIA PROCEDURE NOTES
Airway  Urgency: elective    Date/Time: 11/20/2017 9:12 AM    General Information and Staff    Patient location during procedure: OR  CRNA: SHANE REDD    Indications and Patient Condition  Indications for airway management: airway protection    Preoxygenated: yes  Mask difficulty assessment: 1 - vent by mask    Final Airway Details  Final airway type: endotracheal airway      Successful airway: ETT  Cuffed: yes   Successful intubation technique: direct laryngoscopy  Facilitating devices/methods: intubating stylet  Endotracheal tube insertion site: oral  Blade: Alfreda  Blade size: #3  ETT size: 7.5 mm  Cormack-Lehane Classification: grade IIb - view of arytenoids or posterior of glottis only  Placement verified by: chest auscultation and capnometry   Measured from: lips  ETT to lips (cm): 23  Number of attempts at approach: 1

## 2017-11-20 NOTE — DISCHARGE INSTRUCTIONS

## 2017-11-20 NOTE — PLAN OF CARE
Problem: Patient Care Overview (Adult)  Goal: Plan of Care Review  Outcome: Ongoing (interventions implemented as appropriate)    11/20/17 1104   Coping/Psychosocial Response Interventions   Plan Of Care Reviewed With patient   Patient Care Overview   Progress improving   Outcome Evaluation   Outcome Summary/Follow up Plan met dc criteria pacu         Problem: Perioperative Period (Adult)  Goal: Signs and Symptoms of Listed Potential Problems Will be Absent or Manageable (Perioperative Period)  Outcome: Ongoing (interventions implemented as appropriate)    11/20/17 1104   Perioperative Period   Problems Assessed (Perioperative Period) all   Problems Present (Perioperative Period) none

## 2017-11-20 NOTE — PLAN OF CARE
Problem: Patient Care Overview (Adult)  Goal: Plan of Care Review  Outcome: Ongoing (interventions implemented as appropriate)    11/20/17 0813   Coping/Psychosocial Response Interventions   Plan Of Care Reviewed With patient   Patient Care Overview   Progress no change

## 2017-12-18 RX ORDER — OXYCODONE AND ACETAMINOPHEN 10; 325 MG/1; MG/1
1 TABLET ORAL 4 TIMES DAILY PRN
Qty: 120 TABLET | Refills: 0 | Status: SHIPPED | OUTPATIENT
Start: 2017-12-27 | End: 2018-01-05 | Stop reason: SDUPTHER

## 2018-01-03 ENCOUNTER — OFFICE VISIT (OUTPATIENT)
Dept: UROLOGY | Facility: CLINIC | Age: 68
End: 2018-01-03

## 2018-01-03 VITALS — WEIGHT: 269 LBS | HEIGHT: 71 IN | BODY MASS INDEX: 37.66 KG/M2 | TEMPERATURE: 97.1 F

## 2018-01-03 DIAGNOSIS — N43.3 HYDROCELE, UNSPECIFIED HYDROCELE TYPE: Primary | ICD-10-CM

## 2018-01-03 PROCEDURE — 99024 POSTOP FOLLOW-UP VISIT: CPT | Performed by: UROLOGY

## 2018-01-03 NOTE — PROGRESS NOTES
Subjective    Mr. Rodriguez is 67 y.o. male    Chief Complaint: Hydrocele    History of Present Illness     Follow Up Surgery within [facility]  Patient her for follow up from surgery within the global period.  Pt. Underwent L Hydrocelectomy.  Surgery was done on 12/17.  Pt is complaining of nothing.  Complications experienced include nothing.     The following portions of the patient's history were reviewed and updated as appropriate: allergies, current medications, past family history, past medical history, past social history, past surgical history and problem list.    Review of Systems   Constitutional: Negative for chills and fever.   Gastrointestinal: Negative for abdominal pain, anal bleeding and blood in stool.   Genitourinary: Negative for flank pain and hematuria.         Current Outpatient Prescriptions:   •  busPIRone (BUSPAR) 10 MG tablet, Take 10 mg by mouth 2 (Two) Times a Day., Disp: , Rfl:   •  citalopram (CeleXA) 20 MG tablet, TAKE 1 TABLET EVERY DAY, Disp: , Rfl: 3  •  diclofenac (VOLTAREN) 75 MG EC tablet, TAKE 1 TABLET BY MOUTH TWICE A DAY, Disp: , Rfl: 3  •  losartan (COZAAR) 12.5 MG half tablet, Take 25 mg by mouth Daily., Disp: , Rfl:   •  LYRICA 200 MG capsule, TAKE 1 CAPSULE BY MOUTH 3 TIMES DAILY, Disp: , Rfl: 2  •  metFORMIN (GLUCOPHAGE) 500 MG tablet, TAKE 1 TABLET BY MOUTH TWICE A DAY *12-15-16*, Disp: , Rfl: 5  •  omeprazole (priLOSEC) 20 MG capsule, TAKE 1 CAPSULE EVERY DAY, Disp: , Rfl: 3  •  oxyCODONE-acetaminophen (PERCOCET)  MG per tablet, TAKE 1 TABLET BY MOUTH 4 TIMES A DAY AS NEEDED FOR PAIN, Disp: , Rfl: 0  •  simvastatin (ZOCOR) 40 MG tablet, TAKE 1 TABLET BY MOUTH EVERY DAY, Disp: , Rfl: 3  •  zolpidem (AMBIEN) 10 MG tablet, TAKE 1 TABLET BY MOUTH AT BEDTIME, Disp: , Rfl: 1    Past Medical History:   Diagnosis Date   • Acid reflux    • Arthritis    • Back pain    • Diabetes mellitus    • Hydrocele in adult    • Hyperlipidemia    • Hypertension    • Psoriasis    • Wears  "glasses        Past Surgical History:   Procedure Laterality Date   • BACK SURGERY     • HERNIA REPAIR     • HYDROCELECTOMY Left 11/20/2017    Procedure: HYDROCELECTOMY;  Surgeon: Neeraj Hurtado MD;  Location: [facility];  Service:        Social History     Social History   • Marital status:      Spouse name: N/A   • Number of children: N/A   • Years of education: N/A     Social History Main Topics   • Smoking status: Current Every Day Smoker     Packs/day: 1.50     Years: 50.00     Types: Cigarettes   • Smokeless tobacco: Never Used   • Alcohol use No   • Drug use: No   • Sexual activity: Defer     Other Topics Concern   • None     Social History Narrative       Family History   Problem Relation Age of Onset   • No Known Problems Father    • No Known Problems Mother        Objective    Temp 97.1 °F (36.2 °C)  Ht 180.3 cm (71\")  Wt 122 kg (269 lb)  BMI 37.52 kg/m2    Physical Exam   Genitourinary:   Genitourinary Comments: Well-healed surgical incision no evidence of recurrent hydrocele.           Results for orders placed or performed during the hospital encounter of 11/20/17   POC Glucose Fingerstick   Result Value Ref Range    Glucose 196 (H) 70 - 130 mg/dL   POC Glucose Fingerstick   Result Value Ref Range    Glucose 186 (H) 70 - 130 mg/dL     Assessment and Plan    Diagnoses and all orders for this visit:    Hydrocele, unspecified hydrocele type  -     Cancel: POC Urinalysis Dipstick, Automated          Patient with no recurrent hydrocele is done very well postoperatively follow-up when necessary.      "

## 2018-01-04 RX ORDER — PREGABALIN 200 MG/1
200 CAPSULE ORAL 3 TIMES DAILY
Qty: 90 CAPSULE | Refills: 2 | Status: SHIPPED | OUTPATIENT
Start: 2018-01-04 | End: 2018-04-02 | Stop reason: SDUPTHER

## 2018-01-05 ENCOUNTER — HOSPITAL ENCOUNTER (OUTPATIENT)
Dept: PAIN MANAGEMENT | Age: 68
Discharge: HOME | End: 2018-01-05
Payer: MEDICARE

## 2018-01-05 VITALS
OXYGEN SATURATION: 95 % | HEIGHT: 71 IN | TEMPERATURE: 97 F | HEART RATE: 67 BPM | DIASTOLIC BLOOD PRESSURE: 74 MMHG | RESPIRATION RATE: 20 BRPM | SYSTOLIC BLOOD PRESSURE: 136 MMHG | BODY MASS INDEX: 37.52 KG/M2 | WEIGHT: 268 LBS

## 2018-01-05 DIAGNOSIS — M54.50 CHRONIC LEFT-SIDED LOW BACK PAIN WITHOUT SCIATICA: ICD-10-CM

## 2018-01-05 DIAGNOSIS — G89.29 CHRONIC LEFT-SIDED LOW BACK PAIN WITHOUT SCIATICA: ICD-10-CM

## 2018-01-05 PROCEDURE — 99214 OFFICE O/P EST MOD 30 MIN: CPT

## 2018-01-05 RX ORDER — DICLOFENAC SODIUM 75 MG/1
75 TABLET, DELAYED RELEASE ORAL 2 TIMES DAILY
Qty: 60 TABLET | Refills: 3 | Status: ON HOLD | OUTPATIENT
Start: 2018-01-05 | End: 2020-01-27

## 2018-01-05 RX ORDER — OXYCODONE AND ACETAMINOPHEN 10; 325 MG/1; MG/1
1 TABLET ORAL 4 TIMES DAILY PRN
Qty: 120 TABLET | Refills: 0 | Status: SHIPPED | OUTPATIENT
Start: 2018-01-26 | End: 2018-02-20 | Stop reason: SDUPTHER

## 2018-01-05 ASSESSMENT — PAIN DESCRIPTION - DESCRIPTORS: DESCRIPTORS: ACHING;BURNING;NUMBNESS;TINGLING

## 2018-01-05 ASSESSMENT — PAIN DESCRIPTION - LOCATION: LOCATION: BACK;FOOT

## 2018-01-05 ASSESSMENT — PAIN SCALES - GENERAL: PAINLEVEL_OUTOF10: 2

## 2018-01-05 ASSESSMENT — ACTIVITIES OF DAILY LIVING (ADL): EFFECT OF PAIN ON DAILY ACTIVITIES: LIMITS ACTIVITIES

## 2018-01-05 ASSESSMENT — PAIN DESCRIPTION - ORIENTATION: ORIENTATION: RIGHT;LEFT;LOWER

## 2018-01-05 ASSESSMENT — PAIN DESCRIPTION - ONSET: ONSET: ON-GOING

## 2018-01-05 ASSESSMENT — PAIN DESCRIPTION - PAIN TYPE: TYPE: CHRONIC PAIN

## 2018-01-05 ASSESSMENT — PAIN DESCRIPTION - PROGRESSION: CLINICAL_PROGRESSION: NOT CHANGED

## 2018-01-05 ASSESSMENT — PAIN DESCRIPTION - FREQUENCY: FREQUENCY: INTERMITTENT

## 2018-01-08 NOTE — PROGRESS NOTES
mouth nightly.  zolpidem (AMBIEN CR) 12.5 MG CR tablet Take 12.5 mg by mouth nightly as needed. No current facility-administered medications for this encounter. UDS:    Lab Results   Component Value Date    AMPHETUR Negative 10/04/2017    BARBITURATES Negative 10/04/2017    BENZODIAZURI Negative 10/04/2017    CANNANBINURI Negative 10/04/2017    COCAINEMETUR Negative 10/04/2017    OPIAU Negative 10/04/2017    OXYCOOXYMO Positive * (Percocet per med list) 10/04/2017    PHENCYCU Negative 10/04/2017    LABMETH Negative 10/04/2017    PPXUR Negative 10/04/2017    MEPERIDU Negative 10/04/2017    FENTU Negative 10/04/2017    ETHUQN Negative 10/04/2017          Vitals:  /74   Pulse 67   Temp 97 °F (36.1 °C) (Temporal)   Resp 20   Ht 5' 11\" (1.803 m)   Wt 268 lb (121.6 kg)   SpO2 95%   BMI 37.38 kg/m²       Physical Exam:  General appearance: no acute distress  Head: NCAT, EOMI  Skin: Warm, Dry   Musculoskeletal: ambulatory per self, steady gait  Neurologic: alert and oriented X 3, speech clear  Mood and affect: appropriate, no SI or HI    Assessment:    *      Lumbar DDD  *      Lumbar Spinal Stenosis    Plan:   [x]  Patient is to call with any questions or concerns which may arise prior to the next office visit    [x]  Continue current medications per our office, see medication tab, ARTURO reviewed   []  Add   []  Imaging order given to patient   []  PT order given to patient   []  Procedure scheduled for next visit, see encounter details   []  UDS done today   []  UDS next visit   []  . .. Controlled Substance Monitoring: Discussed with patient possible medication side effects, risk of tolerance and/or dependence, and alternative treatments. Discussed the effects of long term narcotic use. Patient encouraged to set daily goals of exercising and decreasing daily narcotic intake.       Electronically signed by Beryle Piper, APRN

## 2018-02-20 DIAGNOSIS — G89.29 CHRONIC LEFT-SIDED LOW BACK PAIN WITHOUT SCIATICA: Primary | ICD-10-CM

## 2018-02-20 DIAGNOSIS — M54.50 CHRONIC LEFT-SIDED LOW BACK PAIN WITHOUT SCIATICA: Primary | ICD-10-CM

## 2018-02-21 RX ORDER — OXYCODONE AND ACETAMINOPHEN 10; 325 MG/1; MG/1
1 TABLET ORAL 4 TIMES DAILY PRN
Qty: 120 TABLET | Refills: 0 | Status: SHIPPED | OUTPATIENT
Start: 2018-02-25 | End: 2018-03-19 | Stop reason: SDUPTHER

## 2018-03-19 DIAGNOSIS — G89.29 CHRONIC LEFT-SIDED LOW BACK PAIN WITHOUT SCIATICA: ICD-10-CM

## 2018-03-19 DIAGNOSIS — M54.50 CHRONIC LEFT-SIDED LOW BACK PAIN WITHOUT SCIATICA: ICD-10-CM

## 2018-03-19 RX ORDER — OXYCODONE AND ACETAMINOPHEN 10; 325 MG/1; MG/1
1 TABLET ORAL 4 TIMES DAILY PRN
Qty: 120 TABLET | Refills: 0 | Status: SHIPPED | OUTPATIENT
Start: 2018-03-27 | End: 2018-04-02 | Stop reason: SDUPTHER

## 2018-04-02 ENCOUNTER — HOSPITAL ENCOUNTER (OUTPATIENT)
Dept: PAIN MANAGEMENT | Age: 68
Discharge: HOME | End: 2018-04-02
Payer: MEDICARE

## 2018-04-02 VITALS
RESPIRATION RATE: 20 BRPM | HEART RATE: 77 BPM | TEMPERATURE: 97 F | DIASTOLIC BLOOD PRESSURE: 71 MMHG | OXYGEN SATURATION: 96 % | SYSTOLIC BLOOD PRESSURE: 130 MMHG | BODY MASS INDEX: 37.94 KG/M2 | WEIGHT: 271 LBS | HEIGHT: 71 IN

## 2018-04-02 DIAGNOSIS — G89.29 CHRONIC LEFT-SIDED LOW BACK PAIN WITHOUT SCIATICA: ICD-10-CM

## 2018-04-02 DIAGNOSIS — M54.50 CHRONIC LEFT-SIDED LOW BACK PAIN WITHOUT SCIATICA: ICD-10-CM

## 2018-04-02 PROCEDURE — 99214 OFFICE O/P EST MOD 30 MIN: CPT

## 2018-04-02 RX ORDER — ZOLPIDEM TARTRATE 10 MG/1
1 TABLET ORAL NIGHTLY PRN
Refills: 3 | Status: ON HOLD | COMMUNITY
Start: 2018-03-29 | End: 2020-02-06 | Stop reason: SDUPTHER

## 2018-04-02 RX ORDER — OXYCODONE AND ACETAMINOPHEN 10; 325 MG/1; MG/1
1 TABLET ORAL 4 TIMES DAILY PRN
Qty: 120 TABLET | Refills: 0 | Status: SHIPPED | OUTPATIENT
Start: 2018-04-26 | End: 2018-05-21 | Stop reason: SDUPTHER

## 2018-04-02 RX ORDER — PREGABALIN 200 MG/1
200 CAPSULE ORAL 3 TIMES DAILY
Qty: 90 CAPSULE | Refills: 2 | Status: SHIPPED | OUTPATIENT
Start: 2018-04-04 | End: 2018-06-20 | Stop reason: SDUPTHER

## 2018-04-02 ASSESSMENT — PAIN DESCRIPTION - PAIN TYPE: TYPE: CHRONIC PAIN

## 2018-04-02 ASSESSMENT — PAIN DESCRIPTION - ORIENTATION: ORIENTATION: LOWER;RIGHT;LEFT

## 2018-04-02 ASSESSMENT — PAIN DESCRIPTION - ONSET: ONSET: ON-GOING

## 2018-04-02 ASSESSMENT — PAIN DESCRIPTION - PROGRESSION: CLINICAL_PROGRESSION: NOT CHANGED

## 2018-04-02 ASSESSMENT — ACTIVITIES OF DAILY LIVING (ADL): EFFECT OF PAIN ON DAILY ACTIVITIES: LIMITS ACTIVITIES

## 2018-04-02 ASSESSMENT — PAIN DESCRIPTION - LOCATION: LOCATION: BACK;FOOT

## 2018-04-02 ASSESSMENT — PAIN SCALES - GENERAL: PAINLEVEL_OUTOF10: 1

## 2018-04-02 ASSESSMENT — PAIN DESCRIPTION - DESCRIPTORS: DESCRIPTORS: ACHING;CONSTANT;NUMBNESS;TINGLING

## 2018-04-02 ASSESSMENT — PAIN DESCRIPTION - FREQUENCY: FREQUENCY: INTERMITTENT

## 2018-04-16 ENCOUNTER — HOSPITAL ENCOUNTER (OUTPATIENT)
Dept: GENERAL RADIOLOGY | Age: 68
Discharge: HOME | End: 2018-04-16
Payer: MEDICARE

## 2018-04-16 DIAGNOSIS — M25.561 ARTHRALGIA OF RIGHT LOWER LEG: ICD-10-CM

## 2018-04-16 PROCEDURE — 73562 X-RAY EXAM OF KNEE 3: CPT

## 2018-05-21 DIAGNOSIS — G89.29 CHRONIC LEFT-SIDED LOW BACK PAIN WITHOUT SCIATICA: ICD-10-CM

## 2018-05-21 DIAGNOSIS — M54.50 CHRONIC LEFT-SIDED LOW BACK PAIN WITHOUT SCIATICA: ICD-10-CM

## 2018-05-22 RX ORDER — OXYCODONE AND ACETAMINOPHEN 10; 325 MG/1; MG/1
1 TABLET ORAL 4 TIMES DAILY PRN
Qty: 120 TABLET | Refills: 0 | Status: SHIPPED | OUTPATIENT
Start: 2018-05-26 | End: 2018-06-20 | Stop reason: SDUPTHER

## 2018-06-20 DIAGNOSIS — M54.50 CHRONIC LEFT-SIDED LOW BACK PAIN WITHOUT SCIATICA: ICD-10-CM

## 2018-06-20 DIAGNOSIS — G89.29 CHRONIC LEFT-SIDED LOW BACK PAIN WITHOUT SCIATICA: ICD-10-CM

## 2018-06-20 RX ORDER — PREGABALIN 200 MG/1
200 CAPSULE ORAL 3 TIMES DAILY
Qty: 90 CAPSULE | Refills: 2 | Status: ON HOLD | OUTPATIENT
Start: 2018-07-03 | End: 2020-02-06

## 2018-06-20 RX ORDER — OXYCODONE AND ACETAMINOPHEN 10; 325 MG/1; MG/1
1 TABLET ORAL 4 TIMES DAILY PRN
Qty: 120 TABLET | Refills: 0 | Status: ON HOLD | OUTPATIENT
Start: 2018-06-25 | End: 2020-01-27

## 2019-03-15 ENCOUNTER — HOSPITAL ENCOUNTER (OUTPATIENT)
Dept: PHYSICAL THERAPY | Age: 69
Setting detail: THERAPIES SERIES
Discharge: HOME | End: 2019-03-15
Payer: MEDICARE

## 2019-03-15 PROCEDURE — 97110 THERAPEUTIC EXERCISES: CPT

## 2019-03-15 PROCEDURE — 97162 PT EVAL MOD COMPLEX 30 MIN: CPT

## 2019-03-15 ASSESSMENT — PAIN DESCRIPTION - DESCRIPTORS: DESCRIPTORS: ACHING;SORE

## 2019-03-15 ASSESSMENT — PAIN SCALES - GENERAL: PAINLEVEL_OUTOF10: 8

## 2019-03-15 ASSESSMENT — PAIN DESCRIPTION - ORIENTATION: ORIENTATION: RIGHT

## 2019-03-15 ASSESSMENT — PAIN DESCRIPTION - LOCATION: LOCATION: KNEE

## 2019-03-20 ENCOUNTER — HOSPITAL ENCOUNTER (OUTPATIENT)
Dept: PHYSICAL THERAPY | Age: 69
Setting detail: THERAPIES SERIES
Discharge: HOME | End: 2019-03-20
Payer: MEDICARE

## 2019-03-20 PROCEDURE — 97110 THERAPEUTIC EXERCISES: CPT

## 2019-03-20 ASSESSMENT — PAIN DESCRIPTION - ORIENTATION: ORIENTATION: RIGHT

## 2019-03-20 ASSESSMENT — PAIN SCALES - GENERAL: PAINLEVEL_OUTOF10: 4

## 2019-03-20 ASSESSMENT — PAIN DESCRIPTION - PAIN TYPE: TYPE: CHRONIC PAIN

## 2019-03-20 ASSESSMENT — PAIN DESCRIPTION - LOCATION: LOCATION: KNEE

## 2019-03-22 ENCOUNTER — HOSPITAL ENCOUNTER (OUTPATIENT)
Dept: PHYSICAL THERAPY | Age: 69
Setting detail: THERAPIES SERIES
Discharge: HOME | End: 2019-03-22
Payer: MEDICARE

## 2019-03-22 PROCEDURE — 97110 THERAPEUTIC EXERCISES: CPT

## 2019-03-22 ASSESSMENT — PAIN SCALES - GENERAL: PAINLEVEL_OUTOF10: 4

## 2019-03-22 ASSESSMENT — PAIN DESCRIPTION - PAIN TYPE: TYPE: CHRONIC PAIN

## 2019-03-22 ASSESSMENT — PAIN DESCRIPTION - LOCATION: LOCATION: KNEE

## 2019-03-22 ASSESSMENT — PAIN DESCRIPTION - ORIENTATION: ORIENTATION: RIGHT

## 2019-03-27 ENCOUNTER — HOSPITAL ENCOUNTER (OUTPATIENT)
Dept: PHYSICAL THERAPY | Age: 69
Setting detail: THERAPIES SERIES
Discharge: HOME | End: 2019-03-27
Payer: MEDICARE

## 2019-03-27 PROCEDURE — 97110 THERAPEUTIC EXERCISES: CPT

## 2019-03-27 PROCEDURE — 97112 NEUROMUSCULAR REEDUCATION: CPT

## 2019-03-27 ASSESSMENT — PAIN DESCRIPTION - LOCATION: LOCATION: KNEE

## 2019-03-27 ASSESSMENT — PAIN DESCRIPTION - DESCRIPTORS: DESCRIPTORS: ACHING

## 2019-03-27 ASSESSMENT — PAIN SCALES - GENERAL: PAINLEVEL_OUTOF10: 3

## 2019-03-27 ASSESSMENT — PAIN DESCRIPTION - ORIENTATION: ORIENTATION: RIGHT

## 2019-03-29 ENCOUNTER — HOSPITAL ENCOUNTER (OUTPATIENT)
Dept: PHYSICAL THERAPY | Age: 69
Setting detail: THERAPIES SERIES
Discharge: HOME | End: 2019-03-29
Payer: MEDICARE

## 2019-03-29 PROCEDURE — 97110 THERAPEUTIC EXERCISES: CPT

## 2019-03-29 ASSESSMENT — PAIN DESCRIPTION - ORIENTATION: ORIENTATION: RIGHT

## 2019-03-29 ASSESSMENT — PAIN SCALES - GENERAL: PAINLEVEL_OUTOF10: 2

## 2019-03-29 ASSESSMENT — PAIN DESCRIPTION - PAIN TYPE: TYPE: CHRONIC PAIN

## 2019-03-29 ASSESSMENT — PAIN DESCRIPTION - LOCATION: LOCATION: KNEE

## 2019-04-01 ENCOUNTER — HOSPITAL ENCOUNTER (OUTPATIENT)
Dept: PHYSICAL THERAPY | Age: 69
Setting detail: THERAPIES SERIES
Discharge: HOME | End: 2019-04-01
Payer: MEDICARE

## 2019-04-01 PROCEDURE — 97110 THERAPEUTIC EXERCISES: CPT

## 2019-04-01 ASSESSMENT — PAIN SCALES - GENERAL: PAINLEVEL_OUTOF10: 2

## 2019-04-01 NOTE — PROGRESS NOTES
Daily Treatment Note  Date: 2019  Patient Name: Brayan Gaston  MRN: [id number]     :   1950    Subjective:   General  Additional Pertinent Hx: 79 y/o M presents with R knee OA. PMH includes cervical sx, chronic back pain, neuropathy  Referring Practitioner: Adam Mcnamara DO  PT Visit Information  PT Insurance Information: Medicare (Primary) and [email or web address] (Secondary)  Total # of Visits Approved: (8-12)  Total # of Visits to Date: 6  Plan of Care/Certification Expiration Date: 19  Progress Note Due Date: 19  Subjective  Subjective: Patient staets he can tell the therapy is helping and he is doing a little better. Pain Screening  Patient Currently in Pain: Yes  Pain Assessment  Pain Assessment: 0-10  Pain Level: 2(2/10 pre tx, 4/10 post tx)  Vital Signs  Patient Currently in Pain: Yes       Treatment Activities:                                      Exercises  Exercise 1: Quad sets 3\" x 20  Exercise 2: VMO quad sets 3\" x 30  Exercise 3: SAQ (yellow bolster) x 25  Exercise 4: SLR x 10   Exercise 5: Heel slides  x 20----- Bilat contract/relax HS stretch  3 x 10\" ea------ Bilat knee extension stretch on 1/2 foam wedge  x 2'  Exercise 6: R hip flexor stretch (Adryan test position) 3 x 30\"  Exercise 7: LAQ x 20  Exercise 8: HS Curls (green t-band) x 20(green next visit)  Exercise 9: Hip abd (green t-band) x 20 /ball squeeze x 20(green next visit)  Exercise 10: Sitting marches x 20 ea  Exercise 11: Mini squats x 10  Exercise 12: Standing hip abd/ext x 10 ea  Exercise 13: Heel raises x 10  Exercise 14: Standing marches x 10  Exercise 15: TKE  (green) x 15(green next visit)  Exercise 16: Progress to step ups if able to tolerate 6\" x 10  fwd right   Exercise 17: Box steps x 5 each direction  Exercise 18:  Side-stepping x 2   Exercise 19: LBE  lvl 2 x 5'  Exercise 20: 3/22/19: HEP ISSUED                                   Assessment:   Conditions Requiring Skilled Therapeutic Intervention  Body structures, Functions, Activity limitations: Decreased functional mobility ; Decreased high-level IADLs;Decreased ROM; Decreased sensation;Decreased strength; Increased Pain  Assessment: Patient did well with ex's today and was able to tolerate increased resistance with several ex's demo'ing increased strength in right LE. Patient did have report of right LE \"cramping\" and pointed to right quad with standing activities. Patient was able to tolerate all ex with slight increase in pain reported. Treatment Diagnosis: R knee pain                                                         Goals:  Short term goals  Time Frame for Short term goals: 3-4 weeks  Short term goal 1: Independent with HEP  Short term goal 2: Improve R knee extension to 0 degrees. Short term goal 3: Perform 10 Good quality quad sets. Short term goal 4: Improve bilat LE strength to 5/5. Long term goals  Time Frame for Long term goals : 4-6 weeks  Long term goal 1: Report less feeling of knee buckling and giving way. Long term goal 2: Report less pain with ambulation. Long term goal 3: Report improved ability to navigate stairs. Long term goal 4: Improve ADLS score to less than 50% impairment.   Patient Goals   Patient goals : reduce R knee pain    Plan:    Plan  Times per week: 2x  Plan weeks: 4-6 weeks  Current Treatment Recommendations: Strengthening, Gait Training, Patient/Caregiver Education & Training, ROM, Equipment Evaluation, Education, & procurement, Neuromuscular Re-education, Home Exercise Program, Safety Education & Training  Timed Code Treatment Minutes: 38 Minutes     Therapy Time   Individual Concurrent Group Co-treatment   Time In 1322         Time Out 1113         Minutes 38         Timed Code Treatment Minutes: [address] Angus Perea PT    Electronically signed by King Flor PT on 4/1/2019 at 11:14 AM

## 2019-04-03 ENCOUNTER — APPOINTMENT (OUTPATIENT)
Dept: PHYSICAL THERAPY | Age: 69
End: 2019-04-03
Payer: MEDICARE

## 2019-04-05 ENCOUNTER — HOSPITAL ENCOUNTER (OUTPATIENT)
Dept: PHYSICAL THERAPY | Age: 69
Setting detail: THERAPIES SERIES
Discharge: HOME | End: 2019-04-05
Payer: MEDICARE

## 2019-04-05 PROCEDURE — 97110 THERAPEUTIC EXERCISES: CPT

## 2019-04-05 ASSESSMENT — PAIN SCALES - GENERAL: PAINLEVEL_OUTOF10: 4

## 2019-04-05 ASSESSMENT — PAIN DESCRIPTION - LOCATION: LOCATION: GROIN;KNEE

## 2019-04-05 ASSESSMENT — PAIN DESCRIPTION - PAIN TYPE: TYPE: CHRONIC PAIN

## 2019-04-05 ASSESSMENT — PAIN DESCRIPTION - ORIENTATION: ORIENTATION: RIGHT

## 2019-04-05 NOTE — PROGRESS NOTES
limitations: Decreased functional mobility ; Decreased high-level IADLs;Decreased ROM; Decreased sensation;Decreased strength; Increased Pain  Assessment: Patient continues to do well with exercise routine, increased reps with quad sets and LAQ and increased jordi with knee extension stretch as well. Able to perform sidestepping now w/o complaint of hip pain. Has less of an antalgic gait and states after session he feels better. Treatment Diagnosis: R knee pain  REQUIRES PT FOLLOW UP: Yes      G-Code:    Goals:  Short term goals  Time Frame for Short term goals: 3-4 weeks  Short term goal 1: Independent with HEP  Short term goal 2: Improve R knee extension to 0 degrees. Short term goal 3: Perform 10 Good quality quad sets. Short term goal 4: Improve bilat LE strength to 5/5. Long term goals  Time Frame for Long term goals : 4-6 weeks  Long term goal 1: Report less feeling of knee buckling and giving way. Long term goal 2: Report less pain with ambulation. Long term goal 3: Report improved ability to navigate stairs. Long term goal 4: Improve ADLS score to less than 50% impairment.   Patient Goals   Patient goals : reduce R knee pain    Plan:    Times per week: 2x  Plan weeks: 4-6 weeks  Current Treatment Recommendations: Strengthening, Gait Training, Patient/Caregiver Education & Training, ROM, Equipment Evaluation, Education, & procurement, Neuromuscular Re-education, Home Exercise Program, Safety Education & Training        Therapy Time   Individual Concurrent Group Co-treatment   Time In 21          Time Out 1059         Minutes 40                 Flores Bhat PTA    Electronically signed by Flores Bhat PTA on 4/5/2019 at 11:09 AM

## 2019-04-10 ENCOUNTER — HOSPITAL ENCOUNTER (OUTPATIENT)
Dept: PHYSICAL THERAPY | Age: 69
Setting detail: THERAPIES SERIES
Discharge: HOME | End: 2019-04-10
Payer: MEDICARE

## 2019-04-10 PROCEDURE — 97110 THERAPEUTIC EXERCISES: CPT

## 2019-04-10 ASSESSMENT — PAIN SCALES - GENERAL: PAINLEVEL_OUTOF10: 3

## 2019-04-12 ENCOUNTER — HOSPITAL ENCOUNTER (OUTPATIENT)
Dept: PHYSICAL THERAPY | Age: 69
Setting detail: THERAPIES SERIES
Discharge: HOME | End: 2019-04-12
Payer: MEDICARE

## 2019-04-12 PROCEDURE — 97110 THERAPEUTIC EXERCISES: CPT

## 2019-04-12 ASSESSMENT — PAIN DESCRIPTION - DESCRIPTORS: DESCRIPTORS: ACHING;SORE

## 2019-04-12 ASSESSMENT — PAIN DESCRIPTION - LOCATION: LOCATION: KNEE

## 2019-04-12 ASSESSMENT — PAIN SCALES - GENERAL: PAINLEVEL_OUTOF10: 3

## 2019-04-12 ASSESSMENT — PAIN DESCRIPTION - ORIENTATION: ORIENTATION: RIGHT

## 2019-04-17 ENCOUNTER — APPOINTMENT (OUTPATIENT)
Dept: PHYSICAL THERAPY | Age: 69
End: 2019-04-17
Payer: MEDICARE

## 2019-04-19 ENCOUNTER — HOSPITAL ENCOUNTER (OUTPATIENT)
Dept: PHYSICAL THERAPY | Age: 69
Setting detail: THERAPIES SERIES
Discharge: HOME | End: 2019-04-19
Payer: MEDICARE

## 2019-04-19 PROCEDURE — 97110 THERAPEUTIC EXERCISES: CPT

## 2019-04-19 ASSESSMENT — PAIN DESCRIPTION - LOCATION: LOCATION: KNEE;GROIN

## 2019-04-19 ASSESSMENT — PAIN DESCRIPTION - ORIENTATION: ORIENTATION: RIGHT

## 2019-04-19 ASSESSMENT — PAIN DESCRIPTION - PAIN TYPE: TYPE: CHRONIC PAIN

## 2019-04-19 ASSESSMENT — PAIN DESCRIPTION - FREQUENCY: FREQUENCY: INTERMITTENT

## 2019-04-19 ASSESSMENT — PAIN DESCRIPTION - DESCRIPTORS: DESCRIPTORS: ACHING;SORE

## 2019-04-19 ASSESSMENT — PAIN SCALES - GENERAL: PAINLEVEL_OUTOF10: 1

## 2019-04-19 NOTE — PROGRESS NOTES
Daily Treatment Note  Date: 2019  Patient Name: Gene Roth  MRN: [id number]     :   1950    Subjective:   General  Chart Reviewed: Yes  Additional Pertinent Hx: 79 y/o M presents with R knee OA. PMH includes cervical sx, chronic back pain, neuropathy  Response To Previous Treatment: Patient with no complaints from previous session. Family / Caregiver Present: No  Referring Practitioner: Jennifer Davis DO  PT Visit Information  PT Insurance Information: Medicare (Primary) and [employer] (Secondary)  Total # of Visits Approved: 18  Total # of Visits to Date: 9  Plan of Care/Certification Expiration Date: 19  Progress Note Due Date: 19  Subjective  Subjective: Doing okay. Definitely feel like therapy is helping me. Pain Screening  Patient Currently in Pain: Yes  Pain Assessment  Pain Assessment: 0-10  Pain Level: 1(2-3/10 post session)  Pain Type: Chronic pain  Pain Location: Knee;Groin  Pain Orientation: Right  Pain Descriptors: Aching; Sore  Pain Frequency: Intermittent       Treatment Activities:            Exercises  Exercise 1: Quad sets 3\" x 30 bilaterally   Exercise 2: VMO quad sets 3\" x 30 bilaterally  Exercise 3:  right SAQ (yellow bolster) x 30  Exercise 4: SLR x 10   Exercise 5: Heel slides  x 20----- Bilat contract/relax HS stretch  3 x 10\" ea--- Bilat knee extension stretch on 1/2 foam wedge  x 3'  Exercise 6: R hip flexor stretch (Adryan test position) 3 x 30\"  Exercise 7: right LAQ x 20  Exercise 8: right HS Curls (green t-band) x 20  Exercise 9: Hip abd (green t-band) x 20 /ball squeeze x 20  Exercise 10: Sitting marches x 20 ea  Exercise 11: Mini squats x 10  Exercise 12: Standing hip abd/ext x 10 ea  Exercise 13: Heel raises x 10  Exercise 14: Standing marches x 10  Exercise 15: TKE  (green) x 15  Exercise 16: Progress to step ups if able to tolerate 6\" x 11  fwd right   Exercise 17: Box steps x 5 each direction  Exercise 18:  Side-stepping x 2   Exercise 19: LBE  lvl 2 x 5'  Exercise 20: 3/22/19: HEP ISSUED            Assessment:   Conditions Requiring Skilled Therapeutic Intervention  Body structures, Functions, Activity limitations: Decreased functional mobility ; Decreased high-level IADLs;Decreased ROM; Decreased sensation;Decreased strength; Increased Pain  Assessment: Patient did well with session today. He required occ min verbal cues. He reports pain mostly with ambulation and after performing neur re-ed, bike in therapy. He reported 1/10 pain prior to session with increase to 2-3/10 post session. Treatment Diagnosis: R knee pain              Goals:  Short term goals  Time Frame for Short term goals: 3-4 weeks  Short term goal 1: Independent with HEP- met(4/12: Performing daily)  Short term goal 2: Improve R knee extension to 0 degrees. - met(4/12: 0 degrees extension in supine)  Short term goal 3: Perform 10 Good quality quad sets. - met  Short term goal 4: Improve bilat LE strength to 5/5. - partially met(4/12: R hip flex 5-/5, otherwise 5/5.)  Long term goals  Time Frame for Long term goals : 4-6 weeks  Long term goal 1: Report less feeling of knee buckling and giving way. - not met(4/12: Continues to have feelings of knee buckling, worse certain days than others.)  Long term goal 2: Report less pain with ambulation. - progress(4/12: Still painful, but improved.)  Long term goal 3: Report improved ability to navigate stairs. - progress(4/12: Improved, but the degree of improvement depends on the day.)  Long term goal 4: Improve ADLS score to less than 50% impairment. - met(4/12: 32.5% impairment)  Patient Goals   Patient goals : reduce R knee pain    Plan:    Plan  Times per week: 2x  Plan weeks: 6-8 weeks  Current Treatment Recommendations: Strengthening, Gait Training, Patient/Caregiver Education & Training, ROM, Equipment Evaluation, Education, & procurement, Neuromuscular Re-education, Home Exercise Program, Safety Education & Training  Timed Code Treatment Minutes: 55

## 2019-04-24 ENCOUNTER — HOSPITAL ENCOUNTER (OUTPATIENT)
Dept: PHYSICAL THERAPY | Age: 69
Setting detail: THERAPIES SERIES
Discharge: HOME | End: 2019-04-24
Payer: MEDICARE

## 2019-04-24 PROCEDURE — 97110 THERAPEUTIC EXERCISES: CPT

## 2019-04-24 ASSESSMENT — PAIN DESCRIPTION - DESCRIPTORS: DESCRIPTORS: ACHING;SORE

## 2019-04-24 ASSESSMENT — PAIN DESCRIPTION - ORIENTATION: ORIENTATION: RIGHT

## 2019-04-24 ASSESSMENT — PAIN SCALES - GENERAL: PAINLEVEL_OUTOF10: 2

## 2019-04-24 ASSESSMENT — PAIN DESCRIPTION - LOCATION: LOCATION: KNEE

## 2019-04-24 ASSESSMENT — PAIN DESCRIPTION - PAIN TYPE: TYPE: CHRONIC PAIN

## 2019-04-24 NOTE — PROGRESS NOTES
Daily Treatment Note  Date: 2019  Patient Name: Porfirio Malcolm  MRN: [id number]     :   1950    Subjective:   General  Chart Reviewed: Yes  Additional Pertinent Hx: 79 y/o M presents with R knee OA. PMH includes cervical sx, chronic back pain, neuropathy  Response To Previous Treatment: Patient with no complaints from previous session. Family / Caregiver Present: No  Referring Practitioner: Shon Magana DO  PT Visit Information  PT Insurance Information: Medicare (Primary) and [employer] (Secondary)  Total # of Visits Approved: 18  Total # of Visits to Date: 10  Plan of Care/Certification Expiration Date: 19  Progress Note Due Date: 19  Subjective  Subjective: Pt doing okay this am. States knees was pretty sore last night but decreased pain this am.  Pain Screening  Patient Currently in Pain: Yes  Pain Assessment  Pain Assessment: 0-10  Pain Level: 2  Pain Type: Chronic pain  Pain Location: Knee(less groin pain)  Pain Orientation: Right  Pain Descriptors: Aching; Sore  Vital Signs  Patient Currently in Pain: Yes       Treatment Activities:              Exercises  Exercise 1: Quad sets 3\" x 30 bilaterally   Exercise 2: VMO quad sets 3\" x 30 bilaterally  Exercise 3:  right SAQ (yellow bolster) x 30  Exercise 4: SLR x 10   Exercise 5: Heel slides  x 20----- Bilat contract/relax HS stretch  3 x 10\" ea--- Bilat knee extension stretch on 1/2 foam wedge  x 3'  Exercise 6: R hip flexor stretch (Adryan test position) 3 x 30\"  Exercise 7: right LAQ x 20  Exercise 8: right HS Curls (green t-band) x 20  Exercise 9: Hip abd (green t-band) x 20 /ball squeeze x 20  Exercise 10: Sitting marches x 20 ea  Exercise 11:  Mini squats x 10  Exercise 12: Standing hip abd/ext x 10 ea  Exercise 13: Heel raises x 10  Exercise 14: Standing marches x 10  Exercise 15: TKE  (green) x 15  Exercise 16: Progress to step ups if able to tolerate 6\" x 10 fwd/lateral  Exercise 17: Box steps x 5 each direction  Exercise 18: Side-stepping x 2   Exercise 19: LBE  lvl 2 x 5'  Exercise 20: 3/22/19: HEP ISSUED           Assessment:   Conditions Requiring Skilled Therapeutic Intervention  Body structures, Functions, Activity limitations: Decreased functional mobility ; Decreased high-level IADLs;Decreased ROM; Decreased sensation;Decreased strength; Increased Pain  Assessment: Pt does well with routine given min-occ cues; very tight with HS stretch. Initiated R step ups laterally without difficulty. Pt with fatigue post session and reports some increase in discomfort  though does not quantify and states he is doing better since starting therapy. Treatment Diagnosis: R knee pain      G-Code:     OutComes Score                                                  Goals:  Short term goals  Time Frame for Short term goals: 3-4 weeks  Short term goal 1: Independent with HEP- met(4/12: Performing daily)  Short term goal 2: Improve R knee extension to 0 degrees. - met(4/12: 0 degrees extension in supine)  Short term goal 3: Perform 10 Good quality quad sets. - met  Short term goal 4: Improve bilat LE strength to 5/5. - partially met(4/12: R hip flex 5-/5, otherwise 5/5.)  Long term goals  Time Frame for Long term goals : 4-6 weeks  Long term goal 1: Report less feeling of knee buckling and giving way. - not met(4/12: Continues to have feelings of knee buckling, worse certain days than others.)  Long term goal 2: Report less pain with ambulation. - progress(4/12: Still painful, but improved.)  Long term goal 3: Report improved ability to navigate stairs. - progress(4/12: Improved, but the degree of improvement depends on the day.)  Long term goal 4: Improve ADLS score to less than 50% impairment. - met(4/12: 32.5% impairment)  Patient Goals   Patient goals : reduce R knee pain    Plan:    Plan  Times per week: 2x  Plan weeks: 6-8 weeks  Current Treatment Recommendations: Strengthening, Gait Training, Patient/Caregiver Education & Training, ROM, Equipment Evaluation, Education, & procurement, Neuromuscular Re-education, Home Exercise Program, Safety Education & Training  Timed Code Treatment Minutes: 48 Minutes     Therapy Time   Individual Concurrent Group Co-treatment   Time In 1003         Time Out 1056         Minutes 53         Timed Code Treatment Minutes: Silvia Saldivar 82, PTA       Electronically signed by Edwin Mackenzie PTA on 4/24/2019 at 11:00 AM

## 2019-04-26 ENCOUNTER — HOSPITAL ENCOUNTER (OUTPATIENT)
Dept: PHYSICAL THERAPY | Age: 69
Setting detail: THERAPIES SERIES
Discharge: HOME | End: 2019-04-26
Payer: MEDICARE

## 2019-04-26 PROCEDURE — 97110 THERAPEUTIC EXERCISES: CPT

## 2019-05-01 ENCOUNTER — HOSPITAL ENCOUNTER (OUTPATIENT)
Dept: PHYSICAL THERAPY | Age: 69
Setting detail: THERAPIES SERIES
Discharge: HOME | End: 2019-05-01
Payer: MEDICARE

## 2019-05-01 PROCEDURE — 97110 THERAPEUTIC EXERCISES: CPT

## 2019-05-01 ASSESSMENT — PAIN DESCRIPTION - LOCATION: LOCATION: KNEE

## 2019-05-01 ASSESSMENT — PAIN SCALES - GENERAL: PAINLEVEL_OUTOF10: 3

## 2019-05-01 ASSESSMENT — PAIN DESCRIPTION - PAIN TYPE: TYPE: CHRONIC PAIN

## 2019-05-01 ASSESSMENT — PAIN DESCRIPTION - ORIENTATION: ORIENTATION: RIGHT

## 2019-05-01 NOTE — PROGRESS NOTES
high-level IADLs;Decreased ROM; Decreased sensation;Decreased strength; Increased Pain  Assessment: Patient did well with tx today with min v.c. for proper tech. Patient tolerated ex's well today and required 2 rest breaks, one after standing ex's and one after neuro ex's. Increased reps with LAQ  and TKE today. Treatment Diagnosis: R knee pain  REQUIRES PT FOLLOW UP: Yes      G-Code:    Goals:  Short term goals  Time Frame for Short term goals: 3-4 weeks  Short term goal 1: Independent with HEP- met(4/12: Performing daily)  Short term goal 2: Improve R knee extension to 0 degrees. - met(4/12: 0 degrees extension in supine)  Short term goal 3: Perform 10 Good quality quad sets. - met  Short term goal 4: Improve bilat LE strength to 5/5. - partially met(4/12: R hip flex 5-/5, otherwise 5/5.)  Long term goals  Time Frame for Long term goals : 4-6 weeks  Long term goal 1: Report less feeling of knee buckling and giving way. - not met(4/12: Continues to have feelings of knee buckling, worse certain days than others.)  Long term goal 2: Report less pain with ambulation. - progress(4/12: Still painful, but improved.)  Long term goal 3: Report improved ability to navigate stairs. - progress(4/12: Improved, but the degree of improvement depends on the day.)  Long term goal 4: Improve ADLS score to less than 50% impairment. - met(4/12: 32.5% impairment)  Patient Goals   Patient goals : reduce R knee pain    Plan:    Times per week: 2x  Plan weeks: 6-8 weeks  Current Treatment Recommendations: Strengthening, Gait Training, Patient/Caregiver Education & Training, ROM, Equipment Evaluation, Education, & procurement, Neuromuscular Re-education, Home Exercise Program, Safety Education & Training        Therapy Time   Individual Concurrent Group Co-treatment   Time In [phone]         Time Out 1105         Minutes 209 [facility], [facility]    Electronically signed by Laura Vann PTA on 5/1/2019 at 11:10 AM

## 2019-05-03 ENCOUNTER — HOSPITAL ENCOUNTER (OUTPATIENT)
Dept: PHYSICAL THERAPY | Age: 69
Setting detail: THERAPIES SERIES
Discharge: HOME | End: 2019-05-03
Payer: MEDICARE

## 2019-05-03 PROCEDURE — 97110 THERAPEUTIC EXERCISES: CPT

## 2019-05-03 ASSESSMENT — PAIN SCALES - GENERAL: PAINLEVEL_OUTOF10: 4

## 2019-05-03 NOTE — PROGRESS NOTES
Daily Treatment Note  Date: 5/3/2019  Patient Name: Hyun Ramsey  MRN: [id number]     :   1950    Subjective:   General  Additional Pertinent Hx: 79 y/o M presents with R knee OA. PMH includes cervical sx, chronic back pain, neuropathy  Referring Practitioner: Bushra Quintero DO  PT Visit Information  PT Insurance Information: Medicare (Primary) and [employer] (Secondary)  Total # of Visits Approved: 18  Total # of Visits to Date: 15  Plan of Care/Certification Expiration Date: 19  Progress Note Due Date: 19  Subjective  Subjective: Patient states his knee is hurting some today. Pain Screening  Patient Currently in Pain: Yes  Pain Assessment  Pain Assessment: 0-10  Pain Level: 4  Vital Signs  Patient Currently in Pain: Yes       Treatment Activities:                                      Exercises  Exercise 1: Quad sets 3\" x 30 bilaterally   Exercise 2: VMO quad sets 3\" x 30 bilaterally  Exercise 3:  right SAQ (yellow bolster) x 30  Exercise 4: SLR x 10   Exercise 5: Heel slides  x 20----- Bilat contract/relax HS stretch  3 x 10\" ea--- Bilat knee extension stretch on 1/2 foam wedge  x 3'  Exercise 6: R hip flexor stretch (Adryan test position) 3 x 30\"  Exercise 7: right LAQ x 30  Exercise 8: right HS Curls (green t-band) x 20  Exercise 9: Hip abd (green t-band) x 20 /ball squeeze x 20  Exercise 10: Sitting marches x 20 ea  Exercise 11: Mini squats x 10  Exercise 12: Standing hip abd/ext x 10 ea  Exercise 13: Heel raises x 10  Exercise 14: Standing marches x 10  Exercise 15: TKE  (green) x 20  Exercise 16:  step ups  6\" x 10 fwd/lateral  Exercise 17: Box steps x 5 each direction  Exercise 18: Side-stepping x 2   Exercise 19: LBE  lvl 2 x 5'  Exercise 20: 3/22/19: HEP ISSUED                                   Assessment:   Conditions Requiring Skilled Therapeutic Intervention  Body structures, Functions, Activity limitations: Decreased functional mobility ; Decreased high-level IADLs;Decreased ROM;Decreased sensation;Decreased strength; Increased Pain  Assessment: Patient did well with tx today with min v.c. for proper tech with ex's today. Patient reported increased right knee discomfort with standing ex's today, but he was able to tolerate all ex's and increase some reps. Patient required min to no rest breaks throughout tx today. Treatment Diagnosis: R knee pain                                                      Goals:  Short term goals  Time Frame for Short term goals: 3-4 weeks  Short term goal 1: Independent with HEP- met(4/12: Performing daily)  Short term goal 2: Improve R knee extension to 0 degrees. - met(4/12: 0 degrees extension in supine)  Short term goal 3: Perform 10 Good quality quad sets. - met  Short term goal 4: Improve bilat LE strength to 5/5. - partially met(4/12: R hip flex 5-/5, otherwise 5/5.)  Long term goals  Time Frame for Long term goals : 4-6 weeks  Long term goal 1: Report less feeling of knee buckling and giving way. - not met(4/12: Continues to have feelings of knee buckling, worse certain days than others.)  Long term goal 2: Report less pain with ambulation. - progress(4/12: Still painful, but improved.)  Long term goal 3: Report improved ability to navigate stairs. - progress(4/12: Improved, but the degree of improvement depends on the day.)  Long term goal 4: Improve ADLS score to less than 50% impairment. - met(4/12: 32.5% impairment)  Patient Goals   Patient goals : reduce R knee pain    Plan:    Plan  Times per week: 2x  Plan weeks: 6-8 weeks  Current Treatment Recommendations: Strengthening, Gait Training, Patient/Caregiver Education & Training, ROM, Equipment Evaluation, Education, & procurement, Neuromuscular Re-education, Home Exercise Program, Safety Education & Training  Timed Code Treatment Minutes: 38 Minutes     Therapy Time   Individual Concurrent Group Co-treatment   Time In 1022         Time Out 1100         Minutes 38         Timed Code Treatment Minutes: [address], PT     Electronically signed by Brittani Burns PT on 5/3/2019 at 11:01 AM

## 2019-05-08 ENCOUNTER — HOSPITAL ENCOUNTER (OUTPATIENT)
Dept: PHYSICAL THERAPY | Age: 69
Setting detail: THERAPIES SERIES
Discharge: HOME | End: 2019-05-08
Payer: MEDICARE

## 2019-05-08 PROCEDURE — 97110 THERAPEUTIC EXERCISES: CPT

## 2019-05-08 ASSESSMENT — PAIN SCALES - GENERAL: PAINLEVEL_OUTOF10: 1

## 2019-05-08 NOTE — PROGRESS NOTES
Physical Therapy  Daily Treatment Note  Date: 2019  Patient Name: Ryan Wynn  MRN: [id number]     :   1950    Subjective:   General  Additional Pertinent Hx: 81 y/o M presents with R knee OA. PMH includes cervical sx, chronic back pain, neuropathy  Referring Practitioner: Wenceslao Palomino DO  PT Visit Information  PT Insurance Information: Medicare (Primary) and [employer] (Secondary)  Total # of Visits Approved: 18  Total # of Visits to Date: 15  Plan of Care/Certification Expiration Date: 19  Progress Note Due Date: 19  Subjective  Subjective: Patient states pain is better this morning but was bad last night. He concedes he is making some progress with therapy. Pain Screening  Patient Currently in Pain: Yes  Pain Assessment  Pain Level: 1  Vital Signs  Patient Currently in Pain: Yes       Treatment Activities:                                      Exercises  Exercise 1: Quad sets 3\" x 30 bilaterally   Exercise 2: VMO quad sets 3\" x 30 bilaterally  Exercise 3:  right SAQ (yellow bolster) x 30 1# weight  Exercise 4: SLR x 10   Exercise 5: Heel slides  x 30 1# wt----- Bilat contract/relax HS stretch  3 x 10\" ea--- Bilat knee extension stretch on 1/2 foam wedge  x 3'  Exercise 6: R hip flexor stretch (Adryan test position) 3 x 30\"  Exercise 7: right LAQ x 30 1# wt  Exercise 8: right HS Curls (blue t-band) x 30  Exercise 9: Hip abd (blue t-band) x 30 /ball squeeze x 30  Exercise 10: Sitting marches x 20 ea  Exercise 11: Mini squats x 15  Exercise 12: Standing hip abd/ext x 10 ea  Exercise 13: Heel raises x 15  Exercise 14: Standing marches x 15  Exercise 15: TKE  (green) x 20  Exercise 16:  right leg step ups  6\" x 15 fwd/lateral  Exercise 17: Box steps x 5 each direction  Exercise 18:  Side-stepping x 2   Exercise 19: LBE  lvl 2 x 5'--not today  Exercise 20: 3/22/19: HEP ISSUED                                   Assessment:   Conditions Requiring Skilled Therapeutic Intervention  Body structures, Functions, Activity limitations: Decreased functional mobility ; Decreased high-level IADLs;Decreased ROM; Decreased sensation;Decreased strength; Increased Pain  Assessment: Mr. Lizbeth Muller was able to accept a number of changes to his program today for nearly all exercises. Bike not performed today due to 45 minute session. He complained of appropriate discomfort post treatment. Treatment Diagnosis: R knee pain  REQUIRES PT FOLLOW UP: Yes  Discharge Recommendations: Continue to assess pending progress      G-Code:     OutComes Score                                                  Goals:  Short term goals  Time Frame for Short term goals: 3-4 weeks  Short term goal 1: Independent with HEP- met(4/12: Performing daily)  Short term goal 2: Improve R knee extension to 0 degrees. - met(4/12: 0 degrees extension in supine)  Short term goal 3: Perform 10 Good quality quad sets. - met  Short term goal 4: Improve bilat LE strength to 5/5. - partially met(4/12: R hip flex 5-/5, otherwise 5/5.)  Long term goals  Time Frame for Long term goals : 4-6 weeks  Long term goal 1: Report less feeling of knee buckling and giving way. - not met(4/12: Continues to have feelings of knee buckling, worse certain days than others.)  Long term goal 2: Report less pain with ambulation. - progress(4/12: Still painful, but improved.)  Long term goal 3: Report improved ability to navigate stairs. - progress(4/12: Improved, but the degree of improvement depends on the day.)  Long term goal 4: Improve ADLS score to less than 50% impairment. - met(4/12: 32.5% impairment)  Patient Goals   Patient goals : reduce R knee pain    Plan:    Plan  Times per week: 2x  Plan weeks: 6-8 weeks  Current Treatment Recommendations: Strengthening, Gait Training, Patient/Caregiver Education & Training, ROM, Equipment Evaluation, Education, & procurement, Neuromuscular Re-education, Home Exercise Program, Safety Education & Training  Timed Code Treatment Minutes: 52 Minutes     Therapy Time   Individual Concurrent Group Co-treatment   Time In 1011         Time Out 1058         Minutes 47         Timed Code Treatment Minutes: 47 Minutes     Electronically signed by Ronni Rodriguez, PT on 5/8/2019 at Yazan Mcintyre, PT

## 2019-05-10 ENCOUNTER — HOSPITAL ENCOUNTER (OUTPATIENT)
Dept: PHYSICAL THERAPY | Age: 69
Setting detail: THERAPIES SERIES
Discharge: HOME | End: 2019-05-10
Payer: MEDICARE

## 2019-05-10 PROCEDURE — 97110 THERAPEUTIC EXERCISES: CPT

## 2019-05-10 ASSESSMENT — PAIN DESCRIPTION - LOCATION: LOCATION: KNEE

## 2019-05-10 ASSESSMENT — PAIN DESCRIPTION - PAIN TYPE: TYPE: CHRONIC PAIN

## 2019-05-10 ASSESSMENT — PAIN DESCRIPTION - ORIENTATION: ORIENTATION: RIGHT

## 2019-05-10 ASSESSMENT — PAIN SCALES - GENERAL: PAINLEVEL_OUTOF10: 5

## 2019-05-10 NOTE — PROGRESS NOTES
Physical Therapy  Daily Treatment Note  Date: 5/10/2019  Patient Name: Zach Barth  MRN: [id number]     :   1950    Subjective:   General  Chart Reviewed: Yes  Additional Pertinent Hx: 79 y/o M presents with R knee OA. PMH includes cervical sx, chronic back pain, neuropathy  Response To Previous Treatment: Patient with no complaints from previous session. Family / Caregiver Present: No  Referring Practitioner: Vanita Rodríguez DO  PT Visit Information  PT Insurance Information: Medicare (Primary) and [employer] (Secondary)  Total # of Visits Approved: 18  Total # of Visits to Date: 15  Plan of Care/Certification Expiration Date: 19  Progress Note Due Date: 19  Subjective  Subjective: Pain worse today. Pt enters limping and at times dragging RLE. Pain Screening  Patient Currently in Pain: Yes  Pain Assessment  Pain Assessment: 0-10  Pain Level: 5(5/10 pre, 3/10 post)  Pain Type: Chronic pain  Pain Location: Knee  Pain Orientation: Right  Vital Signs  Patient Currently in Pain: Yes       Treatment Activities:                                      Exercises  Exercise 1: Quad sets 3\" x 30 bilaterally   Exercise 2: VMO quad sets 3\" x 30 bilaterally  Exercise 3:  right SAQ (yellow bolster) x 30 1# weight  Exercise 4: SLR x 10   Exercise 5: Heel slides  x 30 1# wt----- Bilat contract/relax HS stretch  3 x 10\" ea--- Bilat knee extension stretch on 1/2 foam wedge  x 3'  Exercise 6: R hip flexor stretch (Adryan test position) 3 x 30\"  Exercise 7: right LAQ x 30 1# wt  Exercise 8: right HS Curls (blue t-band) x 30  Exercise 9: Hip abd (blue t-band) x 30 /ball squeeze x 30  Exercise 10: Sitting marches x 20 ea  Exercise 11: Mini squats x 15  Exercise 12: Standing hip abd/ext x 15 ea  Exercise 13: Heel raises x 15  Exercise 14: Standing marches x 15  Exercise 15: TKE  (green) x 20  Exercise 16:  right leg step ups  6\" x 15 fwd/lateral  Exercise 17: Box steps x 5 each direction  Exercise 18:  Side-stepping x 3  Exercise 19: LBE  lvl 2 x 5'--not today  Exercise 20: 3/22/19: HEP ISSUED                                   Assessment:   Conditions Requiring Skilled Therapeutic Intervention  Body structures, Functions, Activity limitations: Decreased functional mobility ; Decreased high-level IADLs;Decreased ROM; Decreased sensation;Decreased strength; Increased Pain  Assessment: Pt with increased pain today as compared to usual.  Moved through exercises very quickly today and needed cuing to slow pace, especially during balance ex. Did report lessened pain after session than before. Treatment Diagnosis: R knee pain  REQUIRES PT FOLLOW UP: Yes  Discharge Recommendations: Continue to assess pending progress      G-Code:     OutComes Score                                                  Goals:  Short term goals  Time Frame for Short term goals: 3-4 weeks  Short term goal 1: Independent with HEP- met(4/12: Performing daily)  Short term goal 2: Improve R knee extension to 0 degrees. - met(4/12: 0 degrees extension in supine)  Short term goal 3: Perform 10 Good quality quad sets. - met  Short term goal 4: Improve bilat LE strength to 5/5. - partially met(4/12: R hip flex 5-/5, otherwise 5/5.)  Long term goals  Time Frame for Long term goals : 4-6 weeks  Long term goal 1: Report less feeling of knee buckling and giving way. - not met(4/12: Continues to have feelings of knee buckling, worse certain days than others.)  Long term goal 2: Report less pain with ambulation. - progress(4/12: Still painful, but improved.)  Long term goal 3: Report improved ability to navigate stairs. - progress(4/12: Improved, but the degree of improvement depends on the day.)  Long term goal 4: Improve ADLS score to less than 50% impairment. - met(4/12: 32.5% impairment)  Patient Goals   Patient goals : reduce R knee pain    Plan:    Plan  Times per week: 2x  Plan weeks: 6-8 weeks  Current Treatment Recommendations: Strengthening, Gait Training, Patient/Caregiver Education & Training, ROM, Equipment Evaluation, Education, & procurement, Neuromuscular Re-education, Home Exercise Program, Safety Education & Training        Therapy Time   Individual Concurrent Group Co-treatment   Time In 1023         Time Out 1057         Minutes 29                 Hortencia Lyons PT   Electronically signed by Hortencia Lyons, PT on 5/10/2019 at 11:55 AM

## 2019-05-15 ENCOUNTER — HOSPITAL ENCOUNTER (OUTPATIENT)
Dept: PHYSICAL THERAPY | Age: 69
Setting detail: THERAPIES SERIES
Discharge: HOME | End: 2019-05-15
Payer: MEDICARE

## 2019-05-15 PROCEDURE — 97110 THERAPEUTIC EXERCISES: CPT

## 2019-05-15 ASSESSMENT — PAIN DESCRIPTION - ORIENTATION: ORIENTATION: RIGHT

## 2019-05-15 ASSESSMENT — PAIN DESCRIPTION - PAIN TYPE: TYPE: CHRONIC PAIN

## 2019-05-15 ASSESSMENT — PAIN DESCRIPTION - LOCATION: LOCATION: KNEE

## 2019-05-15 ASSESSMENT — PAIN SCALES - GENERAL: PAINLEVEL_OUTOF10: 3

## 2019-05-15 NOTE — PROGRESS NOTES
Physical Therapy  Daily Treatment Note/Re-assessment  Date: 5/15/2019  Patient Name: Crystal Anderson  MRN: [id number]     :   1950    Subjective:   General  Chart Reviewed: Yes  Additional Pertinent Hx: 81 y/o M presents with R knee OA. PMH includes cervical sx, chronic back pain, neuropathy  Response To Previous Treatment: Patient with no complaints from previous session. Family / Caregiver Present: No  Referring Practitioner: Sinai Rice DO  PT Visit Information  PT Insurance Information: Medicare (Primary) and [employer] (Secondary)  Total # of Visits Approved: 24  Total # of Visits to Date: 12  Plan of Care/Certification Expiration Date: 19  Progress Note Due Date: 19  Subjective  Subjective: \"This weekend was really bad. \"  Indicates he was having to use support to pull himself up steps. Pain Screening  Patient Currently in Pain: Yes  Pain Assessment  Pain Assessment: 0-10  Pain Level: 3  Pain Type: Chronic pain  Pain Location: Knee  Pain Orientation: Right  Vital Signs  Patient Currently in Pain: Yes       Treatment Activities:                                      Exercises  Exercise 1: Quad sets 3\" x 30 bilaterally   Exercise 2: VMO quad sets 3\" x 30 bilaterally  Exercise 3:  right SAQ (yellow bolster) x 30 1# weight  Exercise 4: SLR x 10 1#  Exercise 5: Heel slides  x 30 1# wt----- Bilat contract/relax HS stretch  3 x 10\" ea--- Bilat knee extension stretch on 1/2 foam wedge  x 3'  Exercise 6: R hip flexor stretch (Adryan test position) 3 x 30\"  Exercise 7: right LAQ x 30 1# wt  Exercise 8: right HS Curls (blue t-band) x 30  Exercise 9: Hip abd (blue t-band) x 30 /ball squeeze x 30  Exercise 10: Sitting marches x 20 ea  Exercise 11: Mini squats x 15  Exercise 12: Standing hip abd/ext x 15 ea  Exercise 13: Heel raises x 15  Exercise 14: Standing marches x 15- not today  Exercise 15: TKE  (green) x 20- not today  Exercise 16:  right leg step ups  6\" x 15 fwd/lateral  Exercise 17:  Box steps x 5 each direction- not today  Exercise 18: Side-stepping x 3- not today  Exercise 19: LBE  lvl 2 x 5'--not today  Exercise 20: 3/22/19: HEP ISSUED                                   Assessment:   Conditions Requiring Skilled Therapeutic Intervention  Body structures, Functions, Activity limitations: Decreased functional mobility ; Decreased high-level IADLs;Decreased ROM; Decreased sensation;Decreased strength; Increased Pain  Assessment: Re-assessment today. Pt continues to have increased knee pain, worse on some days than others. Most difficulty with stairs. Will continue with skilled PT with focus on standing ex and improving LE strength for ambulation and stairs. Add leg press and increase weight on other ex. Issued blue t-band for home use. Treatment Diagnosis: R knee pain  REQUIRES PT FOLLOW UP: Yes  Discharge Recommendations: Continue to assess pending progress      G-Code:     OutComes Score                                                  Goals:  Short term goals  Time Frame for Short term goals: 3-4 weeks  Short term goal 1: Independent with HEP- met(5/15: Performing consistently)  Short term goal 2: Improve R knee extension to 0 degrees. - met  Short term goal 3: Perform 10 Good quality quad sets. - met  Short term goal 4: Improve bilat LE strength to 5/5.- met(5/15: Bilat LE strength 5/5)  Long term goals  Time Frame for Long term goals : 4-6 weeks  Long term goal 1: Report less feeling of knee buckling and giving way. - progress(5/15: Less some days, and other days no change.)  Long term goal 2: Report less pain with ambulation. - progress(5/15: Depends on the day, but overall notes improvement)  Long term goal 3: Report improved ability to navigate stairs. - progress(5/15: Continues with difficulty with stairs. Having to pull himself up.)  Long term goal 4: Improve ADLS score to less than 50% impairment. - met(5/15: 29% impairment)  Patient Goals   Patient goals : reduce R knee pain    Plan:

## 2019-05-17 ENCOUNTER — HOSPITAL ENCOUNTER (OUTPATIENT)
Dept: PHYSICAL THERAPY | Age: 69
Setting detail: THERAPIES SERIES
Discharge: HOME | End: 2019-05-17
Payer: MEDICARE

## 2019-05-17 PROCEDURE — 97110 THERAPEUTIC EXERCISES: CPT

## 2019-05-17 ASSESSMENT — PAIN SCALES - GENERAL: PAINLEVEL_OUTOF10: 5

## 2019-05-17 ASSESSMENT — PAIN DESCRIPTION - DESCRIPTORS: DESCRIPTORS: ACHING;SORE

## 2019-05-17 ASSESSMENT — PAIN DESCRIPTION - LOCATION: LOCATION: KNEE

## 2019-05-17 ASSESSMENT — PAIN DESCRIPTION - PAIN TYPE: TYPE: CHRONIC PAIN

## 2019-05-17 ASSESSMENT — PAIN DESCRIPTION - ORIENTATION: ORIENTATION: RIGHT

## 2019-05-17 NOTE — PROGRESS NOTES
Physical Therapy  Daily Treatment Note  Date: 2019  Patient Name: Carissa Mueller  MRN: [id number]     :   1950    Subjective:   General  Chart Reviewed: Yes  Additional Pertinent Hx: 81 y/o M presents with R knee OA. PMH includes cervical sx, chronic back pain, neuropathy  Response To Previous Treatment: Patient with no complaints from previous session. Family / Caregiver Present: No  Referring Practitioner: Suan Claude, DO  PT Visit Information  PT Insurance Information: Medicare (Primary) and [employer] (Secondary)  Total # of Visits Approved: 24  Total # of Visits to Date:   Plan of Care/Certification Expiration Date: 19  Progress Note Due Date: 19  Subjective  Subjective: \"I have been busy this morning. Did a lot of walking around in the yard. The pain is worse. \"  Pain Screening  Patient Currently in Pain: Yes  Pain Assessment  Pain Assessment: 0-10  Pain Level: 5  Pain Type: Chronic pain  Pain Location: Knee  Pain Orientation: Right  Pain Descriptors: Aching; Sore  Vital Signs  Patient Currently in Pain: Yes       Treatment Activities:                                      Exercises  Exercise 1: Quad sets 3\" x 30 bilaterally   Exercise 2: VMO quad sets 3\" x 30 bilaterally  Exercise 3:  right SAQ (yellow bolster) x 30 2# weight  Exercise 4: SLR x 15 neutral, x 5 ea ER/IR  Exercise 5: Heel slides  x 30 1# wt----- Bilat contract/relax HS stretch  4 x 10\" ea--- Bilat knee extension stretch on 1/2 foam wedge  x 3'  Exercise 6: R hip flexor stretch (Adryan test position) 3 x 30\"  Exercise 7: right LAQ x 30 2# wt  Exercise 8: right HS Curls (blue t-band) x 30  Exercise 9: Hip abd (blue t-band) x 30 /ball squeeze x 30  Exercise 10: Sitting marches x 20 ea 2#  Exercise 11:  Mini squats x 15  Exercise 12: Standing hip abd/ext x 15 ea 2#  Exercise 13: Heel raises x 15 2#  Exercise 14: Standing marches x 15 2#  Exercise 15: TKE  (blue) x 20  Exercise 16:  right leg step ups  6\" x 15 fwd/lateral 2#  Exercise 17: Box steps x 5 each direction- not today  Exercise 18: Side-stepping x 3- not today  Exercise 19: LBE  lvl 2 x 5'--not today  Exercise 20: 3/22/19: HEP ISSUED                                   Assessment:   Conditions Requiring Skilled Therapeutic Intervention  Body structures, Functions, Activity limitations: Decreased functional mobility ; Decreased high-level IADLs;Decreased ROM; Decreased sensation;Decreased strength; Increased Pain  Assessment: Increased resistance on exercises without complaint. Pt stated he could feel a difference, but felt like he was working more. Abbreviated session d/t mild SOA and increasing antalgic gait, though he stated this was not from exercises, but his activity earlier this morning. Treatment Diagnosis: R knee pain  REQUIRES PT FOLLOW UP: Yes  Discharge Recommendations: Continue to assess pending progress      G-Code:     OutComes Score                                                  Goals:  Short term goals  Time Frame for Short term goals: 3-4 weeks  Short term goal 1: Independent with HEP- met(5/15: Performing consistently)  Short term goal 2: Improve R knee extension to 0 degrees. - met  Short term goal 3: Perform 10 Good quality quad sets. - met  Short term goal 4: Improve bilat LE strength to 5/5.- met(5/15: Bilat LE strength 5/5)  Long term goals  Time Frame for Long term goals : 4-6 weeks  Long term goal 1: Report less feeling of knee buckling and giving way. - progress(5/15: Less some days, and other days no change.)  Long term goal 2: Report less pain with ambulation. - progress(5/15: Depends on the day, but overall notes improvement)  Long term goal 3: Report improved ability to navigate stairs. - progress(5/15: Continues with difficulty with stairs. Having to pull himself up.)  Long term goal 4: Improve ADLS score to less than 50% impairment. - met(5/15: 29% impairment)  Patient Goals   Patient goals : reduce R knee pain    Plan:    Plan  Times per week: 2x  Plan weeks: 8-12 weeks  Current Treatment Recommendations: Strengthening, Gait Training, Patient/Caregiver Education & Training, ROM, Equipment Evaluation, Education, & procurement, Neuromuscular Re-education, Home Exercise Program, Safety Education & Training  Timed Code Treatment Minutes: 32 Minutes     Therapy Time   Individual Concurrent Group Co-treatment   Time In 1037         Time Out 1109         Minutes 32         Timed Code Treatment Minutes: [address] , PT   Electronically signed by Luís Alford PT on 5/17/2019 at 11:27 AM

## 2019-05-22 ENCOUNTER — HOSPITAL ENCOUNTER (OUTPATIENT)
Dept: PHYSICAL THERAPY | Age: 69
Setting detail: THERAPIES SERIES
Discharge: HOME | End: 2019-05-22
Payer: MEDICARE

## 2019-05-22 PROCEDURE — 97110 THERAPEUTIC EXERCISES: CPT

## 2019-05-22 ASSESSMENT — PAIN DESCRIPTION - ORIENTATION: ORIENTATION: RIGHT

## 2019-05-22 ASSESSMENT — PAIN DESCRIPTION - DESCRIPTORS: DESCRIPTORS: ACHING;SORE

## 2019-05-22 ASSESSMENT — PAIN DESCRIPTION - LOCATION: LOCATION: KNEE

## 2019-05-22 ASSESSMENT — PAIN SCALES - GENERAL: PAINLEVEL_OUTOF10: 3

## 2019-05-22 ASSESSMENT — PAIN DESCRIPTION - PAIN TYPE: TYPE: CHRONIC PAIN

## 2019-05-22 NOTE — PROGRESS NOTES
Daily Treatment Note  Date: 2019  Patient Name: Gypsy Benites  MRN: [id number]     :   1950    Subjective:   General  Chart Reviewed: Yes  Additional Pertinent Hx: 79 y/o M presents with R knee OA. PMH includes cervical sx, chronic back pain, neuropathy  Response To Previous Treatment: Patient with no complaints from previous session. Family / Caregiver Present: No  Referring Practitioner: Unknown Greet, DO  PT Visit Information  PT Insurance Information: Medicare (Primary) and [employer] (Secondary)  Total # of Visits Approved: 24  Total # of Visits to Date: 25  Plan of Care/Certification Expiration Date: 19  Progress Note Due Date: 19  Subjective  Subjective: the last couple of days have been really bad. It's been hard walking the last couple of days. The wife has me doing lots of errands after this. Pain Screening  Patient Currently in Pain: Yes  Pain Assessment  Pain Assessment: 0-10  Pain Level: 3(post no change)  Pain Type: Chronic pain  Pain Location: Knee  Pain Orientation: Right  Pain Descriptors: Aching; Sore  Vital Signs  Patient Currently in Pain: Yes       Treatment Activities:                                      Exercises  Exercise 1: Quad sets 3\" x 30 bilaterally   Exercise 2: VMO quad sets 3\" x 30 bilaterally  Exercise 3:  right SAQ (yellow bolster) x 30 2# weight  Exercise 4: SLR x 15 neutral, x 5 ea ER/IR  Exercise 5: Heel slides  x 30 1# wt----- Bilat contract/relax HS stretch  4 x 10\" ea--- Bilat knee extension stretch on 1/2 foam wedge  x 3'  Exercise 6: R hip flexor stretch (Adryan test position) 3 x 30\"  Exercise 7: right LAQ x 30 2# wt  Exercise 8: right HS Curls (blue t-band) x 30  Exercise 9: Hip abd (blue t-band) x 30 /ball squeeze x 30  Exercise 10: Sitting marches x 20 ea 2#  Exercise 11:  Mini squats x 15  Exercise 12: Standing hip abd/ext x 15 ea 2#  Exercise 13: Heel raises x 15 2#  Exercise 14: Standing marches x 15 2#  Exercise 15: TKE  (blue) x 20  Exercise 16:  right leg step ups  6\" x 15 fwd/lateral 2#  Exercise 17: Box steps x 5 each direction  Exercise 18: Side-stepping x 2  Exercise 19: LBE  lvl 2 x 5'--not today  Exercise 20: 3/22/19: HEP ISSUED                                   Assessment:   Conditions Requiring Skilled Therapeutic Intervention  Body structures, Functions, Activity limitations: Decreased functional mobility ; Decreased high-level IADLs;Decreased ROM; Decreased sensation;Decreased strength; Increased Pain  Assessment: Patient states that increased resistance was good. Exercises performed same as last visit. He was able to add in box step and side stepping today. He reports that pain is unchanged post session. Treatment Diagnosis: R knee pain  REQUIRES PT FOLLOW UP: Yes  Discharge Recommendations: Continue to assess pending progress      G-Code:     OutComes Score                                                  Goals:  Short term goals  Time Frame for Short term goals: 3-4 weeks  Short term goal 1: Independent with HEP- met(5/15: Performing consistently)  Short term goal 2: Improve R knee extension to 0 degrees. - met  Short term goal 3: Perform 10 Good quality quad sets. - met  Short term goal 4: Improve bilat LE strength to 5/5.- met(5/15: Bilat LE strength 5/5)  Long term goals  Time Frame for Long term goals : 4-6 weeks  Long term goal 1: Report less feeling of knee buckling and giving way. - progress(5/15: Less some days, and other days no change.)  Long term goal 2: Report less pain with ambulation. - progress(5/15: Depends on the day, but overall notes improvement)  Long term goal 3: Report improved ability to navigate stairs. - progress(5/15: Continues with difficulty with stairs. Having to pull himself up.)  Long term goal 4: Improve ADLS score to less than 50% impairment. - met(5/15: 29% impairment)  Patient Goals   Patient goals : reduce R knee pain    Plan:    Plan  Times per week: 2x  Plan weeks: 8-12 weeks  Current

## 2019-05-24 ENCOUNTER — HOSPITAL ENCOUNTER (OUTPATIENT)
Dept: PHYSICAL THERAPY | Age: 69
Setting detail: THERAPIES SERIES
Discharge: HOME | End: 2019-05-24
Payer: MEDICARE

## 2019-05-24 PROCEDURE — 97110 THERAPEUTIC EXERCISES: CPT

## 2019-05-24 ASSESSMENT — PAIN SCALES - GENERAL: PAINLEVEL_OUTOF10: 2

## 2019-05-24 ASSESSMENT — PAIN DESCRIPTION - PAIN TYPE: TYPE: CHRONIC PAIN

## 2019-05-24 ASSESSMENT — PAIN DESCRIPTION - ORIENTATION: ORIENTATION: RIGHT

## 2019-05-24 ASSESSMENT — PAIN DESCRIPTION - LOCATION: LOCATION: KNEE

## 2019-05-24 NOTE — PROGRESS NOTES
Physical Therapy  Daily Treatment Note  Date: 2019  Patient Name: Jennifer Carreon  MRN: [id number]     :   1950    Subjective:   General  Additional Pertinent Hx: 79 y/o M presents with R knee OA. PMH includes cervical sx, chronic back pain, neuropathy  Referring Practitioner: Zenia Camarillo DO  PT Visit Information  PT Insurance Information: Medicare (Primary) and [employer] (Secondary)  Total # of Visits Approved: 24  Total # of Visits to Date: 23  Plan of Care/Certification Expiration Date: 19  Progress Note Due Date: 19  Subjective: Doing better than i was the last time i was here  Patient Currently in Pain: Yes  Pain Level: 2  Pain Type: Chronic pain  Pain Location: Knee  Pain Orientation: Right       Treatment Activities:         Exercises  Exercise 1: Quad sets 3\" x 30 bilaterally   Exercise 2: VMO quad sets 3\" x 30 bilaterally  Exercise 3:  right SAQ (yellow bolster) x 30 2# weight  Exercise 4: SLR x 15 neutral, x 5 ea ER/IR  Exercise 5: Heel slides  x 30 2# wt----- Bilat contract/relax HS stretch  4 x 10\" ea--- Bilat knee extension stretch on 1/2 foam wedge  x 3'  Exercise 6: R hip flexor stretch (Adryan test position) 3 x 30\"  Exercise 7: right LAQ x 30 2# wt  Exercise 8: right HS Curls (blue t-band) x 30  Exercise 9: Hip abd (blue t-band) x 30 /ball squeeze x 30  Exercise 10: Sitting marches x 20 ea 2#  Exercise 11: Mini squats x 15  Exercise 12: Standing hip abd/ext x 15 ea 2#  Exercise 13: Heel raises x 15 2#  Exercise 14: Standing marches x 15 2#  Exercise 15: TKE  (blue) x 20  Exercise 16:  right leg step ups  6\" x 15 fwd/lateral 2#  Exercise 17: Box steps x 5 each direction  Exercise 18: Side-stepping x 2  Exercise 19: LBE  lvl 2 x 5'--not today          Assessment:   Conditions Requiring Skilled Therapeutic Intervention  Body structures, Functions, Activity limitations: Decreased functional mobility ; Decreased high-level IADLs;Decreased ROM; Decreased sensation;Decreased

## 2019-05-29 ENCOUNTER — HOSPITAL ENCOUNTER (OUTPATIENT)
Dept: PHYSICAL THERAPY | Age: 69
Setting detail: THERAPIES SERIES
Discharge: HOME | End: 2019-05-29
Payer: MEDICARE

## 2019-05-29 PROCEDURE — 97110 THERAPEUTIC EXERCISES: CPT

## 2019-05-29 ASSESSMENT — PAIN DESCRIPTION - PAIN TYPE: TYPE: CHRONIC PAIN

## 2019-05-29 ASSESSMENT — PAIN DESCRIPTION - ORIENTATION: ORIENTATION: RIGHT

## 2019-05-29 ASSESSMENT — PAIN DESCRIPTION - LOCATION: LOCATION: KNEE

## 2019-05-29 ASSESSMENT — PAIN SCALES - GENERAL: PAINLEVEL_OUTOF10: 1

## 2019-05-29 NOTE — PROGRESS NOTES
Daily Treatment Note  Date: 2019  Patient Name: Ryan Wynn  MRN: [id number]     :   1950    Subjective:   General  Additional Pertinent Hx: 81 y/o M presents with R knee OA. PMH includes cervical sx, chronic back pain, neuropathy  Response To Previous Treatment: Patient with no complaints from previous session. Referring Practitioner: Wenceslao Palomino DO  PT Visit Information  PT Insurance Information: Medicare (Primary) and [employer] (Secondary)  Total # of Visits Approved: 24  Total # of Visits to Date:   Plan of Care/Certification Expiration Date: 19  Progress Note Due Date: 19  Subjective  Subjective: States that today is pretty good. Has had a couple of bad days since last visit, but not bad today. Pain Screening  Patient Currently in Pain: Yes  Pain Assessment  Pain Assessment: 0-10  Pain Level: 1  Pain Type: Chronic pain  Pain Location: Knee  Pain Orientation: Right  Vital Signs  Patient Currently in Pain: Yes       Treatment Activities:           Exercises  Exercise 1: Quad sets 3\" x 30 bilaterally   Exercise 2: VMO quad sets 3\" x 30 bilaterally  Exercise 3:  right SAQ (yellow bolster) x 30 2# weight  Exercise 4: SLR x 15 neutral, x 5 ea ER/IR  Exercise 5: Heel slides  x 30 2# wt----- Bilat contract/relax HS stretch  4 x 10\" ea--- Bilat knee extension stretch on 1/2 foam wedge  x 3'  Exercise 6: R hip flexor stretch (Adryan test position) 3 x 30\"  Exercise 7: right LAQ x 30 2# wt  Exercise 8: right HS Curls (blue t-band) x 30  Exercise 9: Hip abd (blue t-band) x 30 /ball squeeze x 30  Exercise 10: Sitting marches x 20 ea 2#  Exercise 11: Mini squats x 15  Exercise 12: Standing hip abd/ext x 15 ea 2#  Exercise 13: Heel raises x 15 2#  Exercise 14: Standing marches x 15 2#  Exercise 15: TKE  (blue) x 20  Exercise 16:  right leg step ups  6\" x 15 fwd/lateral 2#  Exercise 17: Box steps x 5 each direction  Exercise 18:  Side-stepping x 2  Exercise 19: LBE  lvl 2 x 5'--not today  Exercise 20: 3/22/19: HEP ISSUED              Assessment:   Conditions Requiring Skilled Therapeutic Intervention  Body structures, Functions, Activity limitations: Decreased functional mobility ; Decreased high-level IADLs;Decreased ROM; Decreased sensation;Decreased strength; Increased Pain  Assessment: Pt tolerating routine fairly well. Does ex's at good pace and keeps count given minimal cues. Limited knee flexion noted with squats. Tightness in B HS make for uncomfortable stretching and pt encouraged to work on this in sitting position at home. Mild mm discomfort L thigh post box steps and sidestepping but otherwise without complaint. Treatment Diagnosis: R knee pain  REQUIRES PT FOLLOW UP: Yes      G-Code:     OutComes Score                                                  Goals:  Short term goals  Time Frame for Short term goals: 3-4 weeks  Short term goal 1: Independent with HEP- met(5/15: Performing consistently)  Short term goal 2: Improve R knee extension to 0 degrees. - met  Short term goal 3: Perform 10 Good quality quad sets. - met  Short term goal 4: Improve bilat LE strength to 5/5.- met(5/15: Bilat LE strength 5/5)  Long term goals  Time Frame for Long term goals : 4-6 weeks  Long term goal 1: Report less feeling of knee buckling and giving way. - progress(5/15: Less some days, and other days no change.)  Long term goal 2: Report less pain with ambulation. - progress(5/15: Depends on the day, but overall notes improvement)  Long term goal 3: Report improved ability to navigate stairs. - progress(5/15: Continues with difficulty with stairs. Having to pull himself up.)  Long term goal 4: Improve ADLS score to less than 50% impairment. - met(5/15: 29% impairment)  Patient Goals   Patient goals : reduce R knee pain    Plan:    Plan  Times per week: 2x  Plan weeks: 8-12 weeks  Current Treatment Recommendations: Strengthening, Gait Training, Patient/Caregiver Education & Training, ROM, Equipment Evaluation, Education, & procurement, Neuromuscular Re-education, Home Exercise Program, Safety Education & Training  Timed Code Treatment Minutes: 44 Minutes     Therapy Time   Individual Concurrent Group Co-treatment   Time In 3614         Time Out 1059         Minutes 44         Timed Code Treatment Minutes: [address] [facility], \[facility]\""       Electronically signed by Loly Novak PTA on 5/29/2019 at 2:11 PM

## 2019-05-31 ENCOUNTER — HOSPITAL ENCOUNTER (OUTPATIENT)
Dept: PHYSICAL THERAPY | Age: 69
Setting detail: THERAPIES SERIES
Discharge: HOME | End: 2019-05-31
Payer: MEDICARE

## 2019-05-31 PROCEDURE — 97110 THERAPEUTIC EXERCISES: CPT

## 2019-05-31 ASSESSMENT — PAIN DESCRIPTION - FREQUENCY: FREQUENCY: INTERMITTENT

## 2019-05-31 ASSESSMENT — PAIN DESCRIPTION - ORIENTATION: ORIENTATION: RIGHT

## 2019-05-31 ASSESSMENT — PAIN DESCRIPTION - PAIN TYPE: TYPE: CHRONIC PAIN

## 2019-05-31 ASSESSMENT — PAIN DESCRIPTION - LOCATION: LOCATION: KNEE

## 2019-05-31 ASSESSMENT — PAIN DESCRIPTION - DESCRIPTORS: DESCRIPTORS: ACHING;SORE

## 2019-05-31 ASSESSMENT — PAIN SCALES - GENERAL: PAINLEVEL_OUTOF10: 2

## 2019-05-31 NOTE — PROGRESS NOTES
Daily Treatment Note  Date: 2019  Patient Name: Shubham Noriega  MRN: [id number]     :   1950    Subjective:   General  Additional Pertinent Hx: 79 y/o M presents with R knee OA. PMH includes cervical sx, chronic back pain, neuropathy  Response To Previous Treatment: Patient with no complaints from previous session. Referring Practitioner: Elroy Pitts DO  PT Visit Information  PT Insurance Information: Medicare (Primary) and [employer] (Secondary)  Total # of Visits Approved: 24  Total # of Visits to Date: 24  Plan of Care/Certification Expiration Date: 19  Progress Note Due Date: 19  Subjective  Subjective: I am doing alright. I feel like the therapy is helping. Pain Screening  Patient Currently in Pain: Yes  Pain Assessment  Pain Assessment: 0-10  Pain Level: 2(10 post session)  Pain Type: Chronic pain  Pain Location: Knee  Pain Orientation: Right  Pain Descriptors: Aching; Sore  Pain Frequency: Intermittent       Treatment Activities:            Exercises  Exercise 1: Quad sets 3\" x 30 bilaterally        _----Right LE-----  Exercise 2: VMO quad sets 3\" x 30 bilaterally  Exercise 3:  right SAQ (yellow bolster) x 30 2# weight  Exercise 4: SLR x 15 neutral, x 5 ea ER/IR  Exercise 5: Heel slides  x 30 2# wt----- Bilat contract/relax HS stretch  4 x 10\" ea--- Bilat knee extension stretch on 1/2 foam wedge  x 3'  Exercise 6: R hip flexor stretch (Adryan test position) 3 x 30\"  Exercise 7: right LAQ x 30 2# wt  Exercise 8: right HS Curls (blue t-band) x 30  Exercise 9: Hip abd (blue t-band) x 30 /ball squeeze x 30  Exercise 10: Sitting marches x 20 ea 2#  Exercise 11: Mini squats x 15  Exercise 12: Standing hip abd/ext x 15 ea 2#  Exercise 13: Heel raises x 15 2#  Exercise 14: Standing marches x 15 2#  Exercise 15: TKE  (blue) x 20  Exercise 16:  right leg step ups  6\" x 15 fwd/lateral 2#  Exercise 17: Box steps x 5 each direction  Exercise 18:  Side-stepping x 2  Exercise 19: LBE  lvl 2 x 5'--not today  Exercise 20: 3/22/19: HEP ISSUED            Assessment:   Conditions Requiring Skilled Therapeutic Intervention  Body structures, Functions, Activity limitations: Decreased functional mobility ; Decreased high-level IADLs;Decreased ROM; Decreased sensation;Decreased strength; Increased Pain  Assessment: Patient did well with session. He requires only occ minimal cueing for ex. Patient relates he only has one more treatment session but thinks he needs more therapy. Patient reporting pain at 2/10 pre and post session. Treatment Diagnosis: R knee pain               Goals:  Short term goals  Time Frame for Short term goals: 3-4 weeks  Short term goal 1: Independent with HEP- met(5/15: Performing consistently)  Short term goal 2: Improve R knee extension to 0 degrees. - met  Short term goal 3: Perform 10 Good quality quad sets. - met  Short term goal 4: Improve bilat LE strength to 5/5.- met(5/15: Bilat LE strength 5/5)  Long term goals  Time Frame for Long term goals : 4-6 weeks  Long term goal 1: Report less feeling of knee buckling and giving way. - progress(5/15: Less some days, and other days no change.)  Long term goal 2: Report less pain with ambulation. - progress(5/15: Depends on the day, but overall notes improvement)  Long term goal 3: Report improved ability to navigate stairs. - progress(5/15: Continues with difficulty with stairs. Having to pull himself up.)  Long term goal 4: Improve ADLS score to less than 50% impairment. - met(5/15: 29% impairment)  Patient Goals   Patient goals : reduce R knee pain    Plan:    Plan  Times per week: 2x  Plan weeks: 8-12 weeks  Current Treatment Recommendations: Strengthening, Gait Training, Patient/Caregiver Education & Training, ROM, Equipment Evaluation, Education, & procurement, Neuromuscular Re-education, Home Exercise Program, Safety Education & Training  Timed Code Treatment Minutes: 45 Minutes     Therapy Time   Individual Concurrent Group Co-treatment Time In 1015         Time Out 1100         Minutes 45         Timed Code Treatment Minutes: [address] [place], [place]       Electronically signed by Leigha Bhatt PTA on 5/31/2019 at 1:26 PM

## 2019-06-04 ENCOUNTER — APPOINTMENT (OUTPATIENT)
Dept: PHYSICAL THERAPY | Age: 69
End: 2019-06-04
Payer: MEDICARE

## 2019-06-07 ENCOUNTER — HOSPITAL ENCOUNTER (OUTPATIENT)
Dept: PHYSICAL THERAPY | Age: 69
Setting detail: THERAPIES SERIES
Discharge: HOME | End: 2019-06-07
Payer: MEDICARE

## 2019-06-07 PROCEDURE — 97110 THERAPEUTIC EXERCISES: CPT

## 2019-06-07 NOTE — PROGRESS NOTES
Physical Therapy  Daily Treatment Note/Discharge  Date: 2019  Patient Name: Mahsa Lemons  MRN: [id number]     :   1950    Subjective:   General  Chart Reviewed: Yes  Additional Pertinent Hx: 81 y/o M presents with R knee OA. PMH includes cervical sx, chronic back pain, neuropathy  Response To Previous Treatment: Patient with no complaints from previous session. Family / Caregiver Present: No  Referring Practitioner: Tamara Chávez DO  PT Visit Information  PT Insurance Information: Medicare (Primary) and [employer] (Secondary)  Total # of Visits Approved: 24  Total # of Visits to Date: 25  Plan of Care/Certification Expiration Date: 19  Progress Note Due Date: 19  Subjective  Subjective: \"Everything is better, all around. \"  Pain in knees is better. He is walking much better and no longer has to use cane for mobility. Treatment Activities:                                      Exercises  Exercise 1: Quad sets 3\" x 30 bilaterally        _----Right LE-----  Exercise 2: VMO quad sets 3\" x 30 bilaterally  Exercise 3:  right SAQ (yellow bolster) x 30 2# weight  Exercise 4: SLR x 15 neutral, x 5 ea ER/IR  Exercise 5: Heel slides  x 30 2# wt----- Bilat contract/relax HS stretch  4 x 10\" ea--- Bilat knee extension stretch on 1/2 foam wedge  x 3'  Exercise 6: R hip flexor stretch (Adryan test position) 3 x 30\"  Exercise 7: right LAQ x 30 2# wt  Exercise 8: right HS Curls (blue t-band) x 30  Exercise 9: Hip abd (blue t-band) x 30 /ball squeeze x 30  Exercise 10: Sitting marches x 20 ea 2#  Exercise 11: Mini squats x 15  Exercise 12: Standing hip abd/ext x 15 ea 2#  Exercise 13: Heel raises x 15 2#  Exercise 14: Standing marches x 15 2#  Exercise 15: TKE  (blue) x 20  Exercise 16:  right leg step ups  6\" x 15 fwd/lateral 2#  Exercise 17: Box steps x 5 each direction  Exercise 18:  Side-stepping x 2  Exercise 19: LBE  lvl 2 x 5'--not today  Exercise 20: 3/22/19: HEP ISSUED

## 2019-12-11 ENCOUNTER — APPOINTMENT (OUTPATIENT)
Dept: GENERAL RADIOLOGY | Facility: HOSPITAL | Age: 69
End: 2019-12-11

## 2019-12-11 ENCOUNTER — APPOINTMENT (OUTPATIENT)
Dept: CT IMAGING | Facility: HOSPITAL | Age: 69
End: 2019-12-11

## 2019-12-11 ENCOUNTER — HOSPITAL ENCOUNTER (EMERGENCY)
Facility: HOSPITAL | Age: 69
Discharge: HOME | End: 2019-12-11
Admitting: EMERGENCY MEDICINE

## 2019-12-11 VITALS
SYSTOLIC BLOOD PRESSURE: 165 MMHG | OXYGEN SATURATION: 94 % | BODY MASS INDEX: 35.56 KG/M2 | TEMPERATURE: 97.9 F | WEIGHT: 254 LBS | HEIGHT: 71 IN | DIASTOLIC BLOOD PRESSURE: 84 MMHG | RESPIRATION RATE: 16 BRPM | HEART RATE: 80 BPM
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
## 2019-12-11 DIAGNOSIS — E11.65 TYPE 2 DIABETES MELLITUS WITH HYPERGLYCEMIA, WITHOUT LONG-TERM CURRENT USE OF INSULIN (HCC): ICD-10-CM

## 2019-12-11 DIAGNOSIS — S09.90XA INJURY OF HEAD, INITIAL ENCOUNTER: ICD-10-CM

## 2019-12-11 DIAGNOSIS — M54.2 NECK PAIN: ICD-10-CM

## 2019-12-11 LAB
ALBUMIN SERPL-MCNC: 4.1 G/DL (ref 3.5–5.2)
ALBUMIN/GLOB SERPL: 1.4 G/DL
ALP SERPL-CCNC: 106 U/L (ref 39–117)
ALT SERPL W P-5'-P-CCNC: 8 U/L (ref 1–41)
ANION GAP SERPL CALCULATED.3IONS-SCNC: 11 MMOL/L (ref 5–15)
APTT PPP: 34.9 SECONDS (ref 24.1–35)
ARTERIAL PATENCY WRIST A: ABNORMAL
AST SERPL-CCNC: 10 U/L (ref 1–40)
ATMOSPHERIC PRESS: 763 MMHG
BASE EXCESS BLDA CALC-SCNC: 5.5 MMOL/L (ref 0–2)
BASOPHILS # BLD AUTO: 0.07 10*3/MM3 (ref 0–0.2)
BASOPHILS NFR BLD AUTO: 0.7 % (ref 0–1.5)
BDY SITE: ABNORMAL
BILIRUB SERPL-MCNC: 0.6 MG/DL (ref 0.2–1.2)
BODY TEMPERATURE: 37 C
BUN BLD-MCNC: 19 MG/DL (ref 8–23)
BUN/CREAT SERPL: 24.7 (ref 7–25)
CALCIUM SPEC-SCNC: 10 MG/DL (ref 8.6–10.5)
CHLORIDE SERPL-SCNC: 94 MMOL/L (ref 98–107)
CO2 SERPL-SCNC: 31 MMOL/L (ref 22–29)
CREAT BLD-MCNC: 0.77 MG/DL (ref 0.76–1.27)
DEPRECATED RDW RBC AUTO: 39.8 FL (ref 37–54)
EOSINOPHIL # BLD AUTO: 0.11 10*3/MM3 (ref 0–0.4)
EOSINOPHIL NFR BLD AUTO: 1 % (ref 0.3–6.2)
ERYTHROCYTE [DISTWIDTH] IN BLOOD BY AUTOMATED COUNT: 12.3 % (ref 12.3–15.4)
GFR SERPL CREATININE-BSD FRML MDRD: 100 ML/MIN/1.73
GLOBULIN UR ELPH-MCNC: 3 GM/DL
GLUCOSE BLD-MCNC: 431 MG/DL (ref 65–99)
GLUCOSE BLDC GLUCOMTR-MCNC: 405 MG/DL (ref 70–130)
HCO3 BLDA-SCNC: 30.5 MMOL/L (ref 20–26)
HCT VFR BLD AUTO: 51.4 % (ref 37.5–51)
HGB BLD-MCNC: 17.9 G/DL (ref 13–17.7)
HOLD SPECIMEN: NORMAL
HOLD SPECIMEN: NORMAL
HOROWITZ INDEX BLD+IHG-RTO: 21 %
IMM GRANULOCYTES # BLD AUTO: 0.06 10*3/MM3 (ref 0–0.05)
IMM GRANULOCYTES NFR BLD AUTO: 0.6 % (ref 0–0.5)
INR PPP: 0.96 (ref 0.91–1.09)
LYMPHOCYTES # BLD AUTO: 2.47 10*3/MM3 (ref 0.7–3.1)
LYMPHOCYTES NFR BLD AUTO: 23.2 % (ref 19.6–45.3)
Lab: ABNORMAL
MCH RBC QN AUTO: 30.7 PG (ref 26.6–33)
MCHC RBC AUTO-ENTMCNC: 34.8 G/DL (ref 31.5–35.7)
MCV RBC AUTO: 88.2 FL (ref 79–97)
MODALITY: ABNORMAL
MONOCYTES # BLD AUTO: 0.79 10*3/MM3 (ref 0.1–0.9)
MONOCYTES NFR BLD AUTO: 7.4 % (ref 5–12)
NEUTROPHILS # BLD AUTO: 7.16 10*3/MM3 (ref 1.7–7)
NEUTROPHILS NFR BLD AUTO: 67.1 % (ref 42.7–76)
NRBC BLD AUTO-RTO: 0 /100 WBC (ref 0–0.2)
PCO2 BLDA: 44 MM HG (ref 35–45)
PH BLDA: 7.45 PH UNITS (ref 7.35–7.45)
PLATELET # BLD AUTO: 248 10*3/MM3 (ref 140–450)
PMV BLD AUTO: 11 FL (ref 6–12)
PO2 BLDA: 69.6 MM HG (ref 83–108)
POTASSIUM BLD-SCNC: 4.1 MMOL/L (ref 3.5–5.2)
PROT SERPL-MCNC: 7.1 G/DL (ref 6–8.5)
PROTHROMBIN TIME: 13.1 SECONDS (ref 11.9–14.6)
RBC # BLD AUTO: 5.83 10*6/MM3 (ref 4.14–5.8)
SAO2 % BLDCOA: 95.7 % (ref 94–99)
SODIUM BLD-SCNC: 136 MMOL/L (ref 136–145)
VENTILATOR MODE: ABNORMAL
WBC NRBC COR # BLD: 10.66 10*3/MM3 (ref 3.4–10.8)
WHOLE BLOOD HOLD SPECIMEN: NORMAL
WHOLE BLOOD HOLD SPECIMEN: NORMAL

## 2019-12-11 PROCEDURE — 96374 THER/PROPH/DIAG INJ IV PUSH: CPT

## 2019-12-11 PROCEDURE — 85610 PROTHROMBIN TIME: CPT | Performed by: NURSE PRACTITIONER

## 2019-12-11 PROCEDURE — 70450 CT HEAD/BRAIN W/O DYE: CPT

## 2019-12-11 PROCEDURE — 93010 ELECTROCARDIOGRAM REPORT: CPT | Performed by: INTERNAL MEDICINE

## 2019-12-11 PROCEDURE — 36600 WITHDRAWAL OF ARTERIAL BLOOD: CPT

## 2019-12-11 PROCEDURE — 80053 COMPREHEN METABOLIC PANEL: CPT | Performed by: NURSE PRACTITIONER

## 2019-12-11 PROCEDURE — 25010000002 MORPHINE SULFATE (PF) 2 MG/ML SOLUTION: Performed by: NURSE PRACTITIONER

## 2019-12-11 PROCEDURE — 99285 EMERGENCY DEPT VISIT HI MDM: CPT

## 2019-12-11 PROCEDURE — 85730 THROMBOPLASTIN TIME PARTIAL: CPT | Performed by: NURSE PRACTITIONER

## 2019-12-11 PROCEDURE — 82962 GLUCOSE BLOOD TEST: CPT

## 2019-12-11 PROCEDURE — 93005 ELECTROCARDIOGRAM TRACING: CPT | Performed by: NURSE PRACTITIONER

## 2019-12-11 PROCEDURE — 72125 CT NECK SPINE W/O DYE: CPT

## 2019-12-11 PROCEDURE — 73030 X-RAY EXAM OF SHOULDER: CPT

## 2019-12-11 PROCEDURE — 85025 COMPLETE CBC W/AUTO DIFF WBC: CPT | Performed by: NURSE PRACTITIONER

## 2019-12-11 PROCEDURE — 73060 X-RAY EXAM OF HUMERUS: CPT

## 2019-12-11 PROCEDURE — 82803 BLOOD GASES ANY COMBINATION: CPT

## 2019-12-11 RX ORDER — GLIPIZIDE 10 MG/1
10 TABLET ORAL
COMMUNITY
End: 2020-01-27 | Stop reason: HOSPADM

## 2019-12-11 RX ORDER — LOSARTAN POTASSIUM AND HYDROCHLOROTHIAZIDE 12.5; 5 MG/1; MG/1
1 TABLET ORAL DAILY
COMMUNITY
End: 2020-01-27 | Stop reason: HOSPADM

## 2019-12-11 RX ORDER — MORPHINE SULFATE 2 MG/ML
2 INJECTION, SOLUTION INTRAMUSCULAR; INTRAVENOUS ONCE
Status: COMPLETED | OUTPATIENT
Start: 2019-12-11 | End: 2019-12-11

## 2019-12-11 RX ORDER — NABUMETONE 750 MG/1
750 TABLET, FILM COATED ORAL 2 TIMES DAILY
COMMUNITY
End: 2020-01-27 | Stop reason: HOSPADM

## 2019-12-11 RX ORDER — ONDANSETRON 4 MG/1
4 TABLET, ORALLY DISINTEGRATING ORAL ONCE
Status: COMPLETED | OUTPATIENT
Start: 2019-12-11 | End: 2019-12-11

## 2019-12-11 RX ADMIN — SODIUM CHLORIDE 1000 ML: 9 INJECTION, SOLUTION INTRAVENOUS at 16:13

## 2019-12-11 RX ADMIN — ONDANSETRON 4 MG: 4 TABLET, ORALLY DISINTEGRATING ORAL at 15:28

## 2019-12-11 RX ADMIN — MORPHINE SULFATE 2 MG: 2 INJECTION, SOLUTION INTRAMUSCULAR; INTRAVENOUS at 15:28

## 2019-12-11 NOTE — ED NOTES
Pt returned from CT.  [facility] tech states pt could not sit still for imaging.  Pt states that he could not tolerated neck/bilateral shoulder pain while lying down on table.  Desirae MALLORY notified.     Tab Vizcarra RN  12/11/19 5450

## 2019-12-11 NOTE — ED NOTES
ACCU CHECK 405.  PT REPORTS HE TOOK HIS DIABETIC MEDS THIS AM.  PROVIDER AWARE.   C-COLLAR REMOVED PER VERBAL ORDER DEMOND BUSH.      Brianna Willard, RN  12/11/19 1857     Is This A New Presentation, Or A Follow-Up?: Growth What Type Of Note Output Would You Prefer (Optional)?: Bullet Format How Severe Is Your Skin Lesion?: mild Has Your Skin Lesion Been Treated?: been treated Which Family Member (Optional)?: Dad and sister

## 2019-12-11 NOTE — ED PROVIDER NOTES
Subjective   Patient is a 69-year-old male that presents the ER today with complaint of fall.  The patient states that he fell on either Friday or Saturday.  He states that he was picking up a heavy box in the box started to slip and he fell backwards and landed on a carpeted floor.  He states that he hit his neck on a carpeted step.  The patient states that he did not have any loss of consciousness.  He states that he did hit his head.  The patient reports that he is having pain to the neck as well as to the bilateral upper arm area.  He denies any bruising.  He is not currently on anticoagulants.  He presents here today for further evaluation.      History provided by:  Patient   used: No    Fall   Mechanism of injury: fall    Injury location:  Head/neck  Head/neck injury location:  Head  Incident location:  Home  Time since incident:  5 days  Arrived directly from scene: no    Fall:     Fall occurred:  Standing    Impact surface:  [place]    Point of impact:  Head and neck    Entrapped after fall: no    Prior to arrival data:     Bystander interventions:  None  Associated symptoms: neck pain    Associated symptoms: no abdominal pain, no back pain, no blindness, no chest pain, no difficulty breathing, no headaches, no hearing loss, no loss of consciousness, no nausea, no seizures and no vomiting    Risk factors: diabetes    Risk factors: no AICD, no anticoagulation therapy, no asthma, no beta blocker therapy, no CABG, no CAD, no CHF, no COPD, no dialysis, no hemophilia, no kidney disease, no pacemaker, no past MI, not pregnant and no steroid use        Review of Systems   HENT: Negative for hearing loss.    Eyes: Negative for blindness.   Cardiovascular: Negative for chest pain.   Gastrointestinal: Negative for abdominal pain, nausea and vomiting.   Musculoskeletal: Positive for neck pain. Negative for back pain.   Neurological: Negative for seizures, loss of consciousness and headaches.   All  other systems reviewed and are negative.      Past Medical History:   Diagnosis Date   • Acid reflux    • Arthritis    • Back pain    • Diabetes mellitus (CMS/HCC)    • Hydrocele in adult    • Hyperlipidemia    • Hypertension    • Psoriasis    • Wears glasses        Allergies   Allergen Reactions   • Sulfa Antibiotics Nausea Only       Past Surgical History:   Procedure Laterality Date   • BACK SURGERY     • HERNIA REPAIR     • HYDROCELECTOMY Left 11/20/2017    Procedure: HYDROCELECTOMY;  Surgeon: Neeraj Hurtado MD;  Location: [facility] OR;  Service:        Family History   Problem Relation Age of Onset   • No Known Problems Father    • No Known Problems Mother        Social History     Socioeconomic History   • Marital status:      Spouse name: Not on file   • Number of children: Not on file   • Years of education: Not on file   • Highest education level: Not on file   Tobacco Use   • Smoking status: Current Every Day Smoker     Packs/day: 1.50     Years: 50.00     Pack years: 75.00     Types: Cigarettes   • Smokeless tobacco: Never Used   Substance and Sexual Activity   • Alcohol use: No   • Drug use: No   • Sexual activity: Defer           Objective   Physical Exam   Constitutional: He is oriented to person, place, and time. He appears well-developed and well-nourished.   HENT:   Head: Normocephalic.   Right Ear: External ear normal.   Left Ear: External ear normal.   Nose: Nose normal.   Mouth/Throat: Oropharynx is clear and moist.   Eyes: Pupils are equal, round, and reactive to light.   Neck:       Cardiovascular: Normal rate, regular rhythm and normal heart sounds.   Pulmonary/Chest: Effort normal and breath sounds normal.   Musculoskeletal:        Arms:  Neurological: He is alert and oriented to person, place, and time.   Skin: Skin is warm and dry. Capillary refill takes less than 2 seconds.   Psychiatric: He has a normal mood and affect.   Nursing note and vitals reviewed.      Procedures            ED Course  ED Course as of Dec 11 1821   Wed Dec 11, 2019   1403 The patient went over to CAT scan but upon arrival to CAT scan told the CT tech that he cannot lay flat due to the pain in his neck.  He was brought back to the ER.  He now reports that he has weakness in his leg since the fall.  He had denied any of this previously.  The patient was placed in a c-collar.  Initially was told that the ER was out of C-collars upon arrival but the nurse has now been able to locate one. Pt has no numbness or tingling to ext, strength 5/5 stephany upper and lower ext. Denies numbness, weakness or tingling to upper ext.     [LF]   1541 Comprehensive Metabolic Panel(!!) [LF]   1541 Comprehensive Metabolic Panel(!!) [LF]   1818 The patient's x-rays were unremarkable.  CT scan of the head and neck showed no acute findings.  The patient's blood work showed a glucose of 431.  Patient was given fluids and a recheck was 405.  I did offer to treat this with insulin however the patient declines.  He actually has a Nutty Viktor candy bar at the bedside.    [LF]   1819 I did discuss the pt case with Dr. Batista who agrees with plan of care.  I did go ahead and order an MRI of the cervical and thoracic spine on the patient due to his complaint of leg weakness.  He declines this at this time.  He is aware of the risks of leaving the ER without having this done.  He is of sound mind and able to make this decision.  The patient states that it is getting late and he wants to go home.  He states that he wants to get something to eat and that he will come back if his symptoms get worse or do not resolve.    [LF]   1820 I did recommend that the patient again have an MRI of the cervical and thoracic spine but again he has declined this.  Spent a lengthy amount of time discussing this with the patient.  JENNIFER Starr was at bedside during most of this conversation.  Again the patient has declined treatment for his glucose as well.  He states he wants  to go home.  He is going to call a ride.  At this time will be discharged home in stable condition.  I advised the patient that should he change his mind we would be happy to further evaluate him.  I did also advise the patient to follow-up with his primary care provider tomorrow.    [LF]      ED Course User Index  [LF] Desirae Liz, APRN           CT Head Without Contrast   Final Result   1. No intracranial hemorrhage, edema or mass effect.   2. Mild atrophy with associated ventricular prominence.       The full report of this exam was immediately signed and available to the   emergency room. The patient is currently in the emergency room.       This report was finalized on 12/11/2019 15:53 by Dr. Black Lau MD.      CT Cervical Spine Without Contrast   Final Result   1. No evidence of cervical spine fracture.   2. Degenerative changes of the cervical spine, as described.   3. Carotid artery calcification in the neck.       The full report of this exam was immediately signed and available to the   emergency room. The patient is currently in the emergency room.       This report was finalized on 12/11/2019 16:00 by Dr. Black Lau MD.      XR Shoulder 2+ View Left   Final Result   Impression:    1. Unremarkable radiographs of the left shoulder.           This report was finalized on 12/11/2019 14:49 by Dr. Bob Hartmann MD.      XR Humerus 2 View Bilateral   Final Result   Negative right and left humerus   This report was finalized on 12/11/2019 11:31 by Dr. Bob Hartmann MD.      MRI Cervical Spine Without Contrast    (Results Pending)   MRI Thoracic Spine Without Contrast    (Results Pending)     Labs Reviewed   COMPREHENSIVE METABOLIC PANEL - Abnormal; Notable for the following components:       Result Value    Glucose 431 (*)     Chloride 94 (*)     CO2 31.0 (*)     All other components within normal limits    Narrative:     GFR Normal >60  Chronic Kidney Disease <60  Kidney Failure <15     CBC  WITH AUTO DIFFERENTIAL - Abnormal; Notable for the following components:    RBC 5.83 (*)     Hemoglobin 17.9 (*)     Hematocrit 51.4 (*)     Immature Grans % 0.6 (*)     Neutrophils, Absolute 7.16 (*)     Immature Grans, Absolute 0.06 (*)     All other components within normal limits   BLOOD GAS, ARTERIAL - Abnormal; Notable for the following components:    pO2, Arterial 69.6 (*)     HCO3, Arterial 30.5 (*)     Base Excess, Arterial 5.5 (*)     All other components within normal limits   POCT GLUCOSE FINGERSTICK - Abnormal; Notable for the following components:    Glucose 405 (*)     All other components within normal limits   PROTIME-INR - Normal   APTT - Normal   RAINBOW DRAW    Narrative:     The following orders were created for panel order [place] Draw.  Procedure                               Abnormality         Status                     ---------                               -----------         ------                     Light Blue Top[[id number]]                                   Final result               Green Top (Gel)[[id number]]                                  Final result               Lavender Top[[id number]]                                     Final result               Red Top[[id number]]                                          Final result                 Please view results for these tests on the individual orders.   BLOOD GAS, ARTERIAL   POCT GLUCOSE FINGERSTICK   CBC AND DIFFERENTIAL    Narrative:     The following orders were created for panel order CBC & Differential.  Procedure                               Abnormality         Status                     ---------                               -----------         ------                     CBC Auto Differential[[id number]]        Abnormal            Final result                 Please view results for these tests on the individual orders.   LIGHT BLUE TOP   GREEN TOP   LAVENDER TOP   RED TOP                No data recorded                         MDM  Number of Diagnoses or Management Options  Fall, initial encounter: new and requires workup  Injury of head, initial encounter: new and requires workup  Neck pain: new and requires workup  Type 2 diabetes mellitus with hyperglycemia, without long-term current use of insulin (CMS/[facility]): new and requires workup     Amount and/or Complexity of Data Reviewed  Clinical lab tests: ordered and reviewed  Tests in the radiology section of CPT®: ordered and reviewed  Tests in the medicine section of CPT®: ordered and reviewed  Decide to obtain previous medical records or to obtain history from someone other than the patient: yes  Discuss the patient with other providers: yes    Patient Progress  Patient progress: stable      Final diagnoses:   Fall, initial encounter   Injury of head, initial encounter   Neck pain   Type 2 diabetes mellitus with hyperglycemia, without long-term current use of insulin (CMS/[facility])              Desirae Liz, APRN  12/11/19 1827

## 2019-12-11 NOTE — ED NOTES
Matty RODRIGUEZ assisted pt with pericare.  Desirae MALLORY at bedside     Tab Vizcarra RN  12/11/19 [id number]

## 2019-12-12 NOTE — DISCHARGE INSTRUCTIONS
Please return to the ER if any new or worsening symptoms or if you would like further evaluation  Please follow up with your PCP tomorrow

## 2020-01-14 ENCOUNTER — TRANSCRIBE ORDERS (OUTPATIENT)
Dept: ADMINISTRATIVE | Facility: HOSPITAL | Age: 70
End: 2020-01-14

## 2020-01-14 DIAGNOSIS — M51.16 INTERVERTEBRAL DISC DISORDER WITH RADICULOPATHY OF LUMBAR REGION: Primary | ICD-10-CM

## 2020-01-22 ENCOUNTER — HOSPITAL ENCOUNTER (OUTPATIENT)
Dept: MRI IMAGING | Facility: HOSPITAL | Age: 70
Discharge: HOME | End: 2020-01-22
Admitting: ANESTHESIOLOGY

## 2020-01-22 ENCOUNTER — HOSPITAL ENCOUNTER (OUTPATIENT)
Dept: GENERAL RADIOLOGY | Facility: HOSPITAL | Age: 70
Discharge: HOME | End: 2020-01-22

## 2020-01-22 ENCOUNTER — TRANSCRIBE ORDERS (OUTPATIENT)
Dept: ADMINISTRATIVE | Facility: HOSPITAL | Age: 70
End: 2020-01-22

## 2020-01-22 DIAGNOSIS — M51.16 INTERVERTEBRAL DISC DISORDERS WITH RADICULOPATHY, LUMBAR REGION: ICD-10-CM

## 2020-01-22 DIAGNOSIS — M51.16 INTERVERTEBRAL DISC DISORDERS WITH RADICULOPATHY, LUMBAR REGION: Primary | ICD-10-CM

## 2020-01-22 DIAGNOSIS — M51.36 OTHER INTERVERTEBRAL DISC DEGENERATION, LUMBAR REGION: ICD-10-CM

## 2020-01-22 DIAGNOSIS — M51.16 INTERVERTEBRAL DISC DISORDER WITH RADICULOPATHY OF LUMBAR REGION: ICD-10-CM

## 2020-01-22 PROCEDURE — A9577 INJ MULTIHANCE: HCPCS | Performed by: ANESTHESIOLOGY

## 2020-01-22 PROCEDURE — 82565 ASSAY OF CREATININE: CPT

## 2020-01-22 PROCEDURE — 0 GADOBENATE DIMEGLUMINE 529 MG/ML SOLUTION: Performed by: ANESTHESIOLOGY

## 2020-01-22 PROCEDURE — 72114 X-RAY EXAM L-S SPINE BENDING: CPT

## 2020-01-22 PROCEDURE — 72158 MRI LUMBAR SPINE W/O & W/DYE: CPT

## 2020-01-22 RX ADMIN — GADOBENATE DIMEGLUMINE 20 ML: 529 INJECTION, SOLUTION INTRAVENOUS at 10:14

## 2020-01-23 ENCOUNTER — ANESTHESIA (OUTPATIENT)
Dept: PERIOP | Facility: HOSPITAL | Age: 70
End: 2020-01-23

## 2020-01-23 ENCOUNTER — ANESTHESIA EVENT (OUTPATIENT)
Dept: PERIOP | Facility: HOSPITAL | Age: 70
End: 2020-01-23

## 2020-01-23 ENCOUNTER — APPOINTMENT (OUTPATIENT)
Dept: GENERAL RADIOLOGY | Facility: HOSPITAL | Age: 70
End: 2020-01-23

## 2020-01-23 ENCOUNTER — APPOINTMENT (OUTPATIENT)
Dept: MRI IMAGING | Facility: HOSPITAL | Age: 70
End: 2020-01-23

## 2020-01-23 ENCOUNTER — PREP FOR SURGERY (OUTPATIENT)
Dept: OTHER | Facility: HOSPITAL | Age: 70
End: 2020-01-23

## 2020-01-23 ENCOUNTER — HOSPITAL ENCOUNTER (INPATIENT)
Facility: HOSPITAL | Age: 70
LOS: 4 days | Discharge: NURSING, REHAB OR LONG-TERM CARE | End: 2020-01-27
Attending: EMERGENCY MEDICINE | Admitting: NEUROLOGICAL SURGERY

## 2020-01-23 DIAGNOSIS — Z78.9 IMPAIRED MOBILITY AND ADLS: ICD-10-CM

## 2020-01-23 DIAGNOSIS — R73.9 HYPERGLYCEMIA: ICD-10-CM

## 2020-01-23 DIAGNOSIS — M47.12 CERVICAL SPONDYLOSIS WITH MYELOPATHY: ICD-10-CM

## 2020-01-23 DIAGNOSIS — M47.10 SPINAL CORD COMPRESSION DUE TO DEGENERATIVE DISORDER OF SPINAL COLUMN: Primary | ICD-10-CM

## 2020-01-23 DIAGNOSIS — E11.8 TYPE 2 DIABETES MELLITUS WITH COMPLICATION (HCC): ICD-10-CM

## 2020-01-23 DIAGNOSIS — Z74.09 IMPAIRED FUNCTIONAL MOBILITY, BALANCE, GAIT, AND ENDURANCE: ICD-10-CM

## 2020-01-23 DIAGNOSIS — E11.621 DIABETIC ULCER OF FOOT ASSOCIATED WITH TYPE 2 DIABETES MELLITUS, LIMITED TO BREAKDOWN OF SKIN, UNSPECIFIED LATERALITY, UNSPECIFIED PART OF FOOT (HCC): ICD-10-CM

## 2020-01-23 DIAGNOSIS — Z74.09 IMPAIRED MOBILITY AND ADLS: ICD-10-CM

## 2020-01-23 DIAGNOSIS — L97.501 DIABETIC ULCER OF FOOT ASSOCIATED WITH TYPE 2 DIABETES MELLITUS, LIMITED TO BREAKDOWN OF SKIN, UNSPECIFIED LATERALITY, UNSPECIFIED PART OF FOOT (HCC): ICD-10-CM

## 2020-01-23 DIAGNOSIS — M47.12 CERVICAL SPONDYLOSIS WITH MYELOPATHY: Primary | ICD-10-CM

## 2020-01-23 DIAGNOSIS — R29.6 FREQUENT FALLS: ICD-10-CM

## 2020-01-23 LAB
ABO GROUP BLD: NORMAL
ALBUMIN SERPL-MCNC: 3.5 G/DL (ref 3.5–5.2)
ALBUMIN/GLOB SERPL: 1.1 G/DL
ALP SERPL-CCNC: 185 U/L (ref 39–117)
ALT SERPL W P-5'-P-CCNC: 17 U/L (ref 1–41)
AMPHET+METHAMPHET UR QL: NEGATIVE
AMPHETAMINES UR QL: NEGATIVE
ANION GAP SERPL CALCULATED.3IONS-SCNC: 10 MMOL/L (ref 5–15)
APTT PPP: 34.2 SECONDS (ref 24.1–35)
AST SERPL-CCNC: 14 U/L (ref 1–40)
BARBITURATES UR QL SCN: NEGATIVE
BASOPHILS # BLD AUTO: 0.07 10*3/MM3 (ref 0–0.2)
BASOPHILS NFR BLD AUTO: 0.6 % (ref 0–1.5)
BENZODIAZ UR QL SCN: NEGATIVE
BILIRUB SERPL-MCNC: 0.6 MG/DL (ref 0.2–1.2)
BLD GP AB SCN SERPL QL: NEGATIVE
BUN BLD-MCNC: 21 MG/DL (ref 8–23)
BUN/CREAT SERPL: 21.6 (ref 7–25)
BUPRENORPHINE SERPL-MCNC: NEGATIVE NG/ML
CALCIUM SPEC-SCNC: 10 MG/DL (ref 8.6–10.5)
CANNABINOIDS SERPL QL: NEGATIVE
CHLORIDE SERPL-SCNC: 94 MMOL/L (ref 98–107)
CK SERPL-CCNC: 55 U/L (ref 20–200)
CO2 SERPL-SCNC: 31 MMOL/L (ref 22–29)
COCAINE UR QL: NEGATIVE
CREAT BLD-MCNC: 0.97 MG/DL (ref 0.76–1.27)
CREAT BLDA-MCNC: 1.2 MG/DL (ref 0.6–1.3)
DEPRECATED RDW RBC AUTO: 40.7 FL (ref 37–54)
EOSINOPHIL # BLD AUTO: 0.25 10*3/MM3 (ref 0–0.4)
EOSINOPHIL NFR BLD AUTO: 2.3 % (ref 0.3–6.2)
ERYTHROCYTE [DISTWIDTH] IN BLOOD BY AUTOMATED COUNT: 12.7 % (ref 12.3–15.4)
ETHANOL UR QL: 0.01 %
GFR SERPL CREATININE-BSD FRML MDRD: 77 ML/MIN/1.73
GLOBULIN UR ELPH-MCNC: 3.2 GM/DL
GLUCOSE BLD-MCNC: 453 MG/DL (ref 65–99)
GLUCOSE BLDC GLUCOMTR-MCNC: 162 MG/DL (ref 70–130)
GLUCOSE BLDC GLUCOMTR-MCNC: 173 MG/DL (ref 70–130)
GLUCOSE BLDC GLUCOMTR-MCNC: 208 MG/DL (ref 70–130)
GLUCOSE BLDC GLUCOMTR-MCNC: 227 MG/DL (ref 70–130)
GLUCOSE BLDC GLUCOMTR-MCNC: 341 MG/DL (ref 70–130)
GLUCOSE BLDC GLUCOMTR-MCNC: 345 MG/DL (ref 70–130)
GLUCOSE BLDC GLUCOMTR-MCNC: 471 MG/DL (ref 70–130)
HCT VFR BLD AUTO: 48.4 % (ref 37.5–51)
HGB BLD-MCNC: 17 G/DL (ref 13–17.7)
IMM GRANULOCYTES # BLD AUTO: 0.11 10*3/MM3 (ref 0–0.05)
IMM GRANULOCYTES NFR BLD AUTO: 1 % (ref 0–0.5)
INR PPP: 0.94 (ref 0.91–1.09)
LYMPHOCYTES # BLD AUTO: 1.9 10*3/MM3 (ref 0.7–3.1)
LYMPHOCYTES NFR BLD AUTO: 17.1 % (ref 19.6–45.3)
MCH RBC QN AUTO: 31.1 PG (ref 26.6–33)
MCHC RBC AUTO-ENTMCNC: 35.1 G/DL (ref 31.5–35.7)
MCV RBC AUTO: 88.6 FL (ref 79–97)
METHADONE UR QL SCN: NEGATIVE
MONOCYTES # BLD AUTO: 0.74 10*3/MM3 (ref 0.1–0.9)
MONOCYTES NFR BLD AUTO: 6.7 % (ref 5–12)
NEUTROPHILS # BLD AUTO: 8.02 10*3/MM3 (ref 1.7–7)
NEUTROPHILS NFR BLD AUTO: 72.3 % (ref 42.7–76)
NRBC BLD AUTO-RTO: 0 /100 WBC (ref 0–0.2)
OPIATES UR QL: NEGATIVE
OXYCODONE UR QL SCN: POSITIVE
PCP UR QL SCN: NEGATIVE
PLATELET # BLD AUTO: 257 10*3/MM3 (ref 140–450)
PMV BLD AUTO: 11.2 FL (ref 6–12)
POTASSIUM BLD-SCNC: 4 MMOL/L (ref 3.5–5.2)
PROPOXYPH UR QL: NEGATIVE
PROT SERPL-MCNC: 6.7 G/DL (ref 6–8.5)
PROTHROMBIN TIME: 12.8 SECONDS (ref 11.9–14.6)
RBC # BLD AUTO: 5.46 10*6/MM3 (ref 4.14–5.8)
RH BLD: POSITIVE
SODIUM BLD-SCNC: 135 MMOL/L (ref 136–145)
T&S EXPIRATION DATE: NORMAL
TRICYCLICS UR QL SCN: NEGATIVE
WBC NRBC COR # BLD: 11.09 10*3/MM3 (ref 3.4–10.8)

## 2020-01-23 PROCEDURE — 25010000002 PROPOFOL 10 MG/ML EMULSION: Performed by: NURSE ANESTHETIST, CERTIFIED REGISTERED

## 2020-01-23 PROCEDURE — 83036 HEMOGLOBIN GLYCOSYLATED A1C: CPT | Performed by: NEUROLOGICAL SURGERY

## 2020-01-23 PROCEDURE — 25010000002 SUCCINYLCHOLINE PER 20 MG: Performed by: NURSE ANESTHETIST, CERTIFIED REGISTERED

## 2020-01-23 PROCEDURE — 63710000001 INSULIN REGULAR HUMAN PER 5 UNITS

## 2020-01-23 PROCEDURE — 22551 ARTHRD ANT NTRBDY CERVICAL: CPT | Performed by: NEUROLOGICAL SURGERY

## 2020-01-23 PROCEDURE — C1713 ANCHOR/SCREW BN/BN,TIS/BN: HCPCS | Performed by: NEUROLOGICAL SURGERY

## 2020-01-23 PROCEDURE — 76000 FLUOROSCOPY <1 HR PHYS/QHP: CPT

## 2020-01-23 PROCEDURE — 22853 INSJ BIOMECHANICAL DEVICE: CPT | Performed by: NEUROLOGICAL SURGERY

## 2020-01-23 PROCEDURE — 72141 MRI NECK SPINE W/O DYE: CPT

## 2020-01-23 PROCEDURE — 85025 COMPLETE CBC W/AUTO DIFF WBC: CPT | Performed by: EMERGENCY MEDICINE

## 2020-01-23 PROCEDURE — 82962 GLUCOSE BLOOD TEST: CPT

## 2020-01-23 PROCEDURE — 25010000002 PHENYLEPHRINE 10 MG/ML SOLUTION 1 ML VIAL: Performed by: NURSE ANESTHETIST, CERTIFIED REGISTERED

## 2020-01-23 PROCEDURE — 71045 X-RAY EXAM CHEST 1 VIEW: CPT

## 2020-01-23 PROCEDURE — 63710000001 INSULIN LISPRO (HUMAN) PER 5 UNITS: Performed by: NEUROLOGICAL SURGERY

## 2020-01-23 PROCEDURE — 63710000001 INSULIN LISPRO (HUMAN) PER 5 UNITS: Performed by: NURSE PRACTITIONER

## 2020-01-23 PROCEDURE — 0RG10A0 FUSION OF CERVICAL VERTEBRAL JOINT WITH INTERBODY FUSION DEVICE, ANTERIOR APPROACH, ANTERIOR COLUMN, OPEN APPROACH: ICD-10-PCS | Performed by: NEUROLOGICAL SURGERY

## 2020-01-23 PROCEDURE — 86850 RBC ANTIBODY SCREEN: CPT | Performed by: EMERGENCY MEDICINE

## 2020-01-23 PROCEDURE — 0RB30ZZ EXCISION OF CERVICAL VERTEBRAL DISC, OPEN APPROACH: ICD-10-PCS | Performed by: NEUROLOGICAL SURGERY

## 2020-01-23 PROCEDURE — 63710000001 INSULIN REGULAR HUMAN PER 5 UNITS: Performed by: ANESTHESIOLOGY

## 2020-01-23 PROCEDURE — 99285 EMERGENCY DEPT VISIT HI MDM: CPT

## 2020-01-23 PROCEDURE — 94799 UNLISTED PULMONARY SVC/PX: CPT

## 2020-01-23 PROCEDURE — 36415 COLL VENOUS BLD VENIPUNCTURE: CPT

## 2020-01-23 PROCEDURE — 97162 PT EVAL MOD COMPLEX 30 MIN: CPT

## 2020-01-23 PROCEDURE — 82550 ASSAY OF CK (CPK): CPT | Performed by: EMERGENCY MEDICINE

## 2020-01-23 PROCEDURE — 80307 DRUG TEST PRSMV CHEM ANLYZR: CPT | Performed by: EMERGENCY MEDICINE

## 2020-01-23 PROCEDURE — 97166 OT EVAL MOD COMPLEX 45 MIN: CPT | Performed by: OCCUPATIONAL THERAPIST

## 2020-01-23 PROCEDURE — 22845 INSERT SPINE FIXATION DEVICE: CPT | Performed by: NEUROLOGICAL SURGERY

## 2020-01-23 PROCEDURE — 70551 MRI BRAIN STEM W/O DYE: CPT

## 2020-01-23 PROCEDURE — 85730 THROMBOPLASTIN TIME PARTIAL: CPT | Performed by: NEUROLOGICAL SURGERY

## 2020-01-23 PROCEDURE — 86901 BLOOD TYPING SEROLOGIC RH(D): CPT | Performed by: EMERGENCY MEDICINE

## 2020-01-23 PROCEDURE — 72040 X-RAY EXAM NECK SPINE 2-3 VW: CPT

## 2020-01-23 PROCEDURE — 86900 BLOOD TYPING SEROLOGIC ABO: CPT | Performed by: EMERGENCY MEDICINE

## 2020-01-23 PROCEDURE — 85610 PROTHROMBIN TIME: CPT | Performed by: NEUROLOGICAL SURGERY

## 2020-01-23 PROCEDURE — 99223 1ST HOSP IP/OBS HIGH 75: CPT | Performed by: NURSE PRACTITIONER

## 2020-01-23 PROCEDURE — 25010000002 MIDAZOLAM PER 1 MG: Performed by: ANESTHESIOLOGY

## 2020-01-23 PROCEDURE — 80053 COMPREHEN METABOLIC PANEL: CPT | Performed by: EMERGENCY MEDICINE

## 2020-01-23 DEVICE — DBM T43103 2.5CC GRAFTON PUTTY
Type: IMPLANTABLE DEVICE | Status: FUNCTIONAL
Brand: GRAFTON®AND GRAFTON PLUS®DEMINERALIZED BONE MATRIX (DBM)

## 2020-01-23 DEVICE — PLATE 3001021 ZEVO 21MM 1 LVL
Type: IMPLANTABLE DEVICE | Site: SPINE CERVICAL | Status: FUNCTIONAL
Brand: ZEVO™ ANTERIOR CERVICAL PLATE SYSTEM

## 2020-01-23 DEVICE — SCREW PK2 G6623515 SA S-D 3.5MM X 15MM
Type: IMPLANTABLE DEVICE | Site: SPINE CERVICAL | Status: FUNCTIONAL
Brand: DIVERGENCE® ANTERIOR CERVICAL FUSION SYSTEM

## 2020-01-23 RX ORDER — DEXTROSE MONOHYDRATE 25 G/50ML
25 INJECTION, SOLUTION INTRAVENOUS
Status: DISCONTINUED | OUTPATIENT
Start: 2020-01-23 | End: 2020-01-25 | Stop reason: SDUPTHER

## 2020-01-23 RX ORDER — BACITRACIN 50000 [IU]/1
INJECTION, POWDER, FOR SOLUTION INTRAMUSCULAR
Status: DISPENSED
Start: 2020-01-23 | End: 2020-01-24

## 2020-01-23 RX ORDER — SUFENTANIL CITRATE 50 UG/ML
INJECTION EPIDURAL; INTRAVENOUS AS NEEDED
Status: DISCONTINUED | OUTPATIENT
Start: 2020-01-23 | End: 2020-01-23 | Stop reason: SURG

## 2020-01-23 RX ORDER — SODIUM CHLORIDE, SODIUM LACTATE, POTASSIUM CHLORIDE, CALCIUM CHLORIDE 600; 310; 30; 20 MG/100ML; MG/100ML; MG/100ML; MG/100ML
20 INJECTION, SOLUTION INTRAVENOUS CONTINUOUS
Status: DISCONTINUED | OUTPATIENT
Start: 2020-01-23 | End: 2020-01-25

## 2020-01-23 RX ORDER — 0.9 % SODIUM CHLORIDE 0.9 %
VIAL (ML) INJECTION AS NEEDED
Status: DISCONTINUED | OUTPATIENT
Start: 2020-01-23 | End: 2020-01-23 | Stop reason: HOSPADM

## 2020-01-23 RX ORDER — SODIUM CHLORIDE 0.9 % (FLUSH) 0.9 %
10 SYRINGE (ML) INJECTION EVERY 12 HOURS SCHEDULED
Status: DISCONTINUED | OUTPATIENT
Start: 2020-01-23 | End: 2020-01-23 | Stop reason: HOSPADM

## 2020-01-23 RX ORDER — DEXTROSE MONOHYDRATE 25 G/50ML
25 INJECTION, SOLUTION INTRAVENOUS
Status: DISCONTINUED | OUTPATIENT
Start: 2020-01-23 | End: 2020-01-27 | Stop reason: HOSPADM

## 2020-01-23 RX ORDER — CEFAZOLIN SODIUM 1 G/50ML
1 INJECTION, SOLUTION INTRAVENOUS EVERY 8 HOURS
Status: COMPLETED | OUTPATIENT
Start: 2020-01-24 | End: 2020-01-24

## 2020-01-23 RX ORDER — MIDAZOLAM HYDROCHLORIDE 1 MG/ML
2 INJECTION INTRAMUSCULAR; INTRAVENOUS
Status: DISCONTINUED | OUTPATIENT
Start: 2020-01-23 | End: 2020-01-23 | Stop reason: HOSPADM

## 2020-01-23 RX ORDER — SODIUM CHLORIDE 0.9 % (FLUSH) 0.9 %
3 SYRINGE (ML) INJECTION EVERY 12 HOURS SCHEDULED
Status: DISCONTINUED | OUTPATIENT
Start: 2020-01-23 | End: 2020-01-23 | Stop reason: HOSPADM

## 2020-01-23 RX ORDER — SODIUM CHLORIDE 0.9 % (FLUSH) 0.9 %
10 SYRINGE (ML) INJECTION AS NEEDED
Status: DISCONTINUED | OUTPATIENT
Start: 2020-01-23 | End: 2020-01-27 | Stop reason: HOSPADM

## 2020-01-23 RX ORDER — MAGNESIUM HYDROXIDE 1200 MG/15ML
LIQUID ORAL AS NEEDED
Status: DISCONTINUED | OUTPATIENT
Start: 2020-01-23 | End: 2020-01-23 | Stop reason: HOSPADM

## 2020-01-23 RX ORDER — SODIUM CHLORIDE 0.9 % (FLUSH) 0.9 %
3-10 SYRINGE (ML) INJECTION AS NEEDED
Status: DISCONTINUED | OUTPATIENT
Start: 2020-01-23 | End: 2020-01-23 | Stop reason: HOSPADM

## 2020-01-23 RX ORDER — ACETAMINOPHEN 325 MG/1
650 TABLET ORAL EVERY 4 HOURS PRN
Status: DISCONTINUED | OUTPATIENT
Start: 2020-01-23 | End: 2020-01-27 | Stop reason: HOSPADM

## 2020-01-23 RX ORDER — BUPIVACAINE HCL/0.9 % NACL/PF 0.1 %
2 PLASTIC BAG, INJECTION (ML) EPIDURAL ONCE
Status: COMPLETED | OUTPATIENT
Start: 2020-01-23 | End: 2020-01-23

## 2020-01-23 RX ORDER — AMOXICILLIN 250 MG
2 CAPSULE ORAL 2 TIMES DAILY PRN
Status: DISCONTINUED | OUTPATIENT
Start: 2020-01-23 | End: 2020-01-27 | Stop reason: HOSPADM

## 2020-01-23 RX ORDER — IPRATROPIUM BROMIDE AND ALBUTEROL SULFATE 2.5; .5 MG/3ML; MG/3ML
3 SOLUTION RESPIRATORY (INHALATION) ONCE AS NEEDED
Status: DISCONTINUED | OUTPATIENT
Start: 2020-01-23 | End: 2020-01-23 | Stop reason: HOSPADM

## 2020-01-23 RX ORDER — ONDANSETRON 2 MG/ML
4 INJECTION INTRAMUSCULAR; INTRAVENOUS AS NEEDED
Status: DISCONTINUED | OUTPATIENT
Start: 2020-01-23 | End: 2020-01-23 | Stop reason: HOSPADM

## 2020-01-23 RX ORDER — ONDANSETRON 2 MG/ML
4 INJECTION INTRAMUSCULAR; INTRAVENOUS EVERY 6 HOURS PRN
Status: DISCONTINUED | OUTPATIENT
Start: 2020-01-23 | End: 2020-01-27 | Stop reason: HOSPADM

## 2020-01-23 RX ORDER — SODIUM CHLORIDE 0.9 % (FLUSH) 0.9 %
10 SYRINGE (ML) INJECTION AS NEEDED
Status: DISCONTINUED | OUTPATIENT
Start: 2020-01-23 | End: 2020-01-23 | Stop reason: HOSPADM

## 2020-01-23 RX ORDER — HYDRALAZINE HYDROCHLORIDE 20 MG/ML
5 INJECTION INTRAMUSCULAR; INTRAVENOUS
Status: DISCONTINUED | OUTPATIENT
Start: 2020-01-23 | End: 2020-01-23 | Stop reason: HOSPADM

## 2020-01-23 RX ORDER — PROPOFOL 10 MG/ML
VIAL (ML) INTRAVENOUS AS NEEDED
Status: DISCONTINUED | OUTPATIENT
Start: 2020-01-23 | End: 2020-01-23 | Stop reason: SURG

## 2020-01-23 RX ORDER — LABETALOL HYDROCHLORIDE 5 MG/ML
5 INJECTION, SOLUTION INTRAVENOUS
Status: DISCONTINUED | OUTPATIENT
Start: 2020-01-23 | End: 2020-01-23 | Stop reason: HOSPADM

## 2020-01-23 RX ORDER — KETAMINE HYDROCHLORIDE 50 MG/ML
INJECTION, SOLUTION, CONCENTRATE INTRAMUSCULAR; INTRAVENOUS AS NEEDED
Status: DISCONTINUED | OUTPATIENT
Start: 2020-01-23 | End: 2020-01-23 | Stop reason: SURG

## 2020-01-23 RX ORDER — NALOXONE HCL 0.4 MG/ML
0.4 VIAL (ML) INJECTION
Status: DISCONTINUED | OUTPATIENT
Start: 2020-01-23 | End: 2020-01-25 | Stop reason: ALTCHOICE

## 2020-01-23 RX ORDER — PHENYLEPHRINE HCL IN 0.9% NACL 0.8MG/10ML
SYRINGE (ML) INTRAVENOUS AS NEEDED
Status: DISCONTINUED | OUTPATIENT
Start: 2020-01-23 | End: 2020-01-23 | Stop reason: SURG

## 2020-01-23 RX ORDER — SODIUM CHLORIDE 0.9 % (FLUSH) 0.9 %
10 SYRINGE (ML) INJECTION EVERY 12 HOURS SCHEDULED
Status: DISCONTINUED | OUTPATIENT
Start: 2020-01-23 | End: 2020-01-27 | Stop reason: HOSPADM

## 2020-01-23 RX ORDER — FLUMAZENIL 0.1 MG/ML
0.2 INJECTION INTRAVENOUS AS NEEDED
Status: DISCONTINUED | OUTPATIENT
Start: 2020-01-23 | End: 2020-01-23 | Stop reason: HOSPADM

## 2020-01-23 RX ORDER — BACITRACIN 50000 [IU]/1
INJECTION, POWDER, FOR SOLUTION INTRAMUSCULAR AS NEEDED
Status: DISCONTINUED | OUTPATIENT
Start: 2020-01-23 | End: 2020-01-23 | Stop reason: HOSPADM

## 2020-01-23 RX ORDER — NALOXONE HCL 0.4 MG/ML
0.04 VIAL (ML) INJECTION AS NEEDED
Status: DISCONTINUED | OUTPATIENT
Start: 2020-01-23 | End: 2020-01-23 | Stop reason: HOSPADM

## 2020-01-23 RX ORDER — BUPIVACAINE HCL/0.9 % NACL/PF 0.1 %
2 PLASTIC BAG, INJECTION (ML) EPIDURAL ONCE
Status: CANCELLED | OUTPATIENT
Start: 2020-01-23 | End: 2020-01-23

## 2020-01-23 RX ORDER — LIDOCAINE HYDROCHLORIDE 10 MG/ML
0.5 INJECTION, SOLUTION EPIDURAL; INFILTRATION; INTRACAUDAL; PERINEURAL ONCE AS NEEDED
Status: DISCONTINUED | OUTPATIENT
Start: 2020-01-23 | End: 2020-01-23 | Stop reason: HOSPADM

## 2020-01-23 RX ORDER — SODIUM CHLORIDE, SODIUM LACTATE, POTASSIUM CHLORIDE, CALCIUM CHLORIDE 600; 310; 30; 20 MG/100ML; MG/100ML; MG/100ML; MG/100ML
100 INJECTION, SOLUTION INTRAVENOUS CONTINUOUS
Status: DISCONTINUED | OUTPATIENT
Start: 2020-01-23 | End: 2020-01-25

## 2020-01-23 RX ORDER — MORPHINE SULFATE 2 MG/ML
2 INJECTION, SOLUTION INTRAMUSCULAR; INTRAVENOUS
Status: DISCONTINUED | OUTPATIENT
Start: 2020-01-23 | End: 2020-01-23 | Stop reason: HOSPADM

## 2020-01-23 RX ORDER — NICOTINE POLACRILEX 4 MG
15 LOZENGE BUCCAL
Status: DISCONTINUED | OUTPATIENT
Start: 2020-01-23 | End: 2020-01-27 | Stop reason: HOSPADM

## 2020-01-23 RX ORDER — ACETAMINOPHEN 160 MG/5ML
650 SOLUTION ORAL EVERY 4 HOURS PRN
Status: DISCONTINUED | OUTPATIENT
Start: 2020-01-23 | End: 2020-01-27 | Stop reason: HOSPADM

## 2020-01-23 RX ORDER — FENTANYL CITRATE 50 UG/ML
25 INJECTION, SOLUTION INTRAMUSCULAR; INTRAVENOUS
Status: DISCONTINUED | OUTPATIENT
Start: 2020-01-23 | End: 2020-01-23 | Stop reason: SDUPTHER

## 2020-01-23 RX ORDER — METOCLOPRAMIDE HYDROCHLORIDE 5 MG/ML
5 INJECTION INTRAMUSCULAR; INTRAVENOUS
Status: DISCONTINUED | OUTPATIENT
Start: 2020-01-23 | End: 2020-01-23 | Stop reason: HOSPADM

## 2020-01-23 RX ORDER — MORPHINE SULFATE 2 MG/ML
2 INJECTION, SOLUTION INTRAMUSCULAR; INTRAVENOUS
Status: DISCONTINUED | OUTPATIENT
Start: 2020-01-23 | End: 2020-01-25 | Stop reason: ALTCHOICE

## 2020-01-23 RX ORDER — NICOTINE POLACRILEX 4 MG
15 LOZENGE BUCCAL
Status: DISCONTINUED | OUTPATIENT
Start: 2020-01-23 | End: 2020-01-25 | Stop reason: SDUPTHER

## 2020-01-23 RX ORDER — FENTANYL CITRATE 50 UG/ML
25 INJECTION, SOLUTION INTRAMUSCULAR; INTRAVENOUS AS NEEDED
Status: DISCONTINUED | OUTPATIENT
Start: 2020-01-23 | End: 2020-01-23 | Stop reason: HOSPADM

## 2020-01-23 RX ORDER — SUCCINYLCHOLINE CHLORIDE 20 MG/ML
INJECTION INTRAMUSCULAR; INTRAVENOUS AS NEEDED
Status: DISCONTINUED | OUTPATIENT
Start: 2020-01-23 | End: 2020-01-23 | Stop reason: SURG

## 2020-01-23 RX ORDER — ACETAMINOPHEN 650 MG/1
650 SUPPOSITORY RECTAL EVERY 4 HOURS PRN
Status: DISCONTINUED | OUTPATIENT
Start: 2020-01-23 | End: 2020-01-27 | Stop reason: HOSPADM

## 2020-01-23 RX ORDER — LIDOCAINE HYDROCHLORIDE 20 MG/ML
INJECTION, SOLUTION INFILTRATION; PERINEURAL AS NEEDED
Status: DISCONTINUED | OUTPATIENT
Start: 2020-01-23 | End: 2020-01-23 | Stop reason: SURG

## 2020-01-23 RX ORDER — ROCURONIUM BROMIDE 10 MG/ML
INJECTION, SOLUTION INTRAVENOUS AS NEEDED
Status: DISCONTINUED | OUTPATIENT
Start: 2020-01-23 | End: 2020-01-23 | Stop reason: SURG

## 2020-01-23 RX ORDER — MIDAZOLAM HYDROCHLORIDE 1 MG/ML
1 INJECTION INTRAMUSCULAR; INTRAVENOUS
Status: DISCONTINUED | OUTPATIENT
Start: 2020-01-23 | End: 2020-01-23 | Stop reason: HOSPADM

## 2020-01-23 RX ORDER — LIDOCAINE HYDROCHLORIDE 40 MG/ML
SOLUTION TOPICAL AS NEEDED
Status: DISCONTINUED | OUTPATIENT
Start: 2020-01-23 | End: 2020-01-23 | Stop reason: SURG

## 2020-01-23 RX ORDER — OXYCODONE AND ACETAMINOPHEN 10; 325 MG/1; MG/1
1 TABLET ORAL ONCE AS NEEDED
Status: COMPLETED | OUTPATIENT
Start: 2020-01-23 | End: 2020-01-23

## 2020-01-23 RX ORDER — OXYCODONE AND ACETAMINOPHEN 7.5; 325 MG/1; MG/1
1 TABLET ORAL EVERY 4 HOURS PRN
Status: DISCONTINUED | OUTPATIENT
Start: 2020-01-23 | End: 2020-01-27 | Stop reason: HOSPADM

## 2020-01-23 RX ORDER — ACETAMINOPHEN 500 MG
1000 TABLET ORAL ONCE
Status: COMPLETED | OUTPATIENT
Start: 2020-01-23 | End: 2020-01-23

## 2020-01-23 RX ORDER — BISACODYL 10 MG
10 SUPPOSITORY, RECTAL RECTAL DAILY PRN
Status: DISCONTINUED | OUTPATIENT
Start: 2020-01-23 | End: 2020-01-27 | Stop reason: HOSPADM

## 2020-01-23 RX ADMIN — INSULIN HUMAN 6 UNITS: 100 INJECTION, SOLUTION PARENTERAL at 09:48

## 2020-01-23 RX ADMIN — MIDAZOLAM 2 MG: 1 INJECTION INTRAMUSCULAR; INTRAVENOUS at 17:17

## 2020-01-23 RX ADMIN — PROPOFOL 130 MG: 10 INJECTION, EMULSION INTRAVENOUS at 18:05

## 2020-01-23 RX ADMIN — LIDOCAINE HYDROCHLORIDE 100 MG: 20 INJECTION, SOLUTION INFILTRATION; PERINEURAL at 18:05

## 2020-01-23 RX ADMIN — PHENYLEPHRINE HYDROCHLORIDE 1 MCG/KG/MIN: 10 INJECTION INTRAVENOUS at 18:13

## 2020-01-23 RX ADMIN — Medication 160 MCG: at 18:29

## 2020-01-23 RX ADMIN — Medication 2 G: at 18:11

## 2020-01-23 RX ADMIN — INSULIN LISPRO 4 UNITS: 100 INJECTION, SOLUTION INTRAVENOUS; SUBCUTANEOUS at 22:43

## 2020-01-23 RX ADMIN — SODIUM CHLORIDE, POTASSIUM CHLORIDE, SODIUM LACTATE AND CALCIUM CHLORIDE 100 ML/HR: 600; 310; 30; 20 INJECTION, SOLUTION INTRAVENOUS at 22:43

## 2020-01-23 RX ADMIN — LIDOCAINE HYDROCHLORIDE 1 EACH: 40 SOLUTION TOPICAL at 18:18

## 2020-01-23 RX ADMIN — INSULIN HUMAN 8 UNITS: 100 INJECTION, SOLUTION PARENTERAL at 17:02

## 2020-01-23 RX ADMIN — KETAMINE HYDROCHLORIDE 25 MG: 50 INJECTION, SOLUTION INTRAMUSCULAR; INTRAVENOUS at 19:05

## 2020-01-23 RX ADMIN — SUCCINYLCHOLINE CHLORIDE 140 MG: 20 INJECTION, SOLUTION INTRAMUSCULAR; INTRAVENOUS at 18:06

## 2020-01-23 RX ADMIN — SODIUM CHLORIDE, POTASSIUM CHLORIDE, SODIUM LACTATE AND CALCIUM CHLORIDE 20 ML/HR: 600; 310; 30; 20 INJECTION, SOLUTION INTRAVENOUS at 17:23

## 2020-01-23 RX ADMIN — ACETAMINOPHEN 1000 MG: 500 TABLET, FILM COATED ORAL at 17:19

## 2020-01-23 RX ADMIN — SUFENTANIL CITRATE 20 MCG: 50 INJECTION EPIDURAL; INTRAVENOUS at 18:05

## 2020-01-23 RX ADMIN — OXYCODONE HYDROCHLORIDE AND ACETAMINOPHEN 1 TABLET: 10; 325 TABLET ORAL at 21:01

## 2020-01-23 RX ADMIN — ROCURONIUM BROMIDE 5 MG: 10 INJECTION INTRAVENOUS at 18:05

## 2020-01-23 RX ADMIN — INSULIN LISPRO 10 UNITS: 100 INJECTION, SOLUTION INTRAVENOUS; SUBCUTANEOUS at 15:26

## 2020-01-23 RX ADMIN — SODIUM CHLORIDE, POTASSIUM CHLORIDE, SODIUM LACTATE AND CALCIUM CHLORIDE 100 ML/HR: 600; 310; 30; 20 INJECTION, SOLUTION INTRAVENOUS at 17:02

## 2020-01-23 RX ADMIN — NYSTATIN: 100000 CREAM TOPICAL at 22:06

## 2020-01-23 RX ADMIN — SUFENTANIL CITRATE 30 MCG: 50 INJECTION EPIDURAL; INTRAVENOUS at 18:16

## 2020-01-23 RX ADMIN — KETAMINE HYDROCHLORIDE 25 MG: 50 INJECTION, SOLUTION INTRAMUSCULAR; INTRAVENOUS at 18:41

## 2020-01-23 RX ADMIN — KETAMINE HYDROCHLORIDE 25 MG: 50 INJECTION, SOLUTION INTRAMUSCULAR; INTRAVENOUS at 18:05

## 2020-01-23 RX ADMIN — Medication 80 MCG: at 18:11

## 2020-01-23 RX ADMIN — KETAMINE HYDROCHLORIDE 25 MG: 50 INJECTION, SOLUTION INTRAMUSCULAR; INTRAVENOUS at 19:19

## 2020-01-23 RX ADMIN — Medication 80 MCG: at 18:07

## 2020-01-23 RX ADMIN — Medication 160 MCG: at 18:14

## 2020-01-23 NOTE — ANESTHESIA PREPROCEDURE EVALUATION
Anesthesia Evaluation     Patient summary reviewed   no history of anesthetic complications:  NPO Solid Status: > 8 hours  NPO Liquid Status: > 8 hours           Airway   Mallampati: III  TM distance: >3 FB  Neck ROM: limited  Possible difficult intubation and Small opening  Dental - normal exam     Pulmonary    (+) a smoker Current Smoked day of surgery, COPD,   Cardiovascular   Exercise tolerance: good (4-7 METS)    ECG reviewed    (+) hypertension, hyperlipidemia,       Neuro/Psych  (+) weakness,       ROS Comment: Cervical disc herniation with myelopathy and cord compression post fall  GI/Hepatic/Renal/Endo    (+)  GERD,  diabetes mellitus poorly controlled,   (-) liver disease, no renal disease    Musculoskeletal     (+) back pain, chronic pain, neck pain,   Abdominal    Substance History      OB/GYN          Other   arthritis,                    Anesthesia Plan    ASA 3 - emergent     general   (Poorly controlled DM, not previously on insulin. Will give IV insulin bolus. Have discussed possible insulin drip requirement and need for ICU admission. Discussed risk of post operative infection with poorly controlled DM as well as long term effects on cardiovascular and renal. Kimberly with map managment)  intravenous induction     Anesthetic plan, all risks, benefits, and alternatives have been provided, discussed and informed consent has been obtained with: patient.

## 2020-01-24 PROBLEM — Z91.199 MEDICALLY NONCOMPLIANT: Status: ACTIVE | Noted: 2020-01-24

## 2020-01-24 PROBLEM — E78.5 HYPERLIPIDEMIA: Status: ACTIVE | Noted: 2020-01-24

## 2020-01-24 PROBLEM — I10 ESSENTIAL HYPERTENSION: Status: ACTIVE | Noted: 2020-01-24

## 2020-01-24 PROBLEM — G62.9 PERIPHERAL NEUROPATHY: Status: ACTIVE | Noted: 2020-01-24

## 2020-01-24 PROBLEM — Z72.0 TOBACCO ABUSE: Status: ACTIVE | Noted: 2020-01-24

## 2020-01-24 PROBLEM — K21.9 GERD WITHOUT ESOPHAGITIS: Status: ACTIVE | Noted: 2020-01-24

## 2020-01-24 PROBLEM — L97.501 DIABETIC ULCER OF FOOT ASSOCIATED WITH TYPE 2 DIABETES MELLITUS, LIMITED TO BREAKDOWN OF SKIN (HCC): Status: ACTIVE | Noted: 2020-01-24

## 2020-01-24 PROBLEM — E11.8 TYPE 2 DIABETES MELLITUS WITH COMPLICATION: Status: ACTIVE | Noted: 2020-01-24

## 2020-01-24 PROBLEM — E11.621 DIABETIC ULCER OF FOOT ASSOCIATED WITH TYPE 2 DIABETES MELLITUS, LIMITED TO BREAKDOWN OF SKIN (HCC): Status: ACTIVE | Noted: 2020-01-24

## 2020-01-24 LAB
GLUCOSE BLDC GLUCOMTR-MCNC: 191 MG/DL (ref 70–130)
GLUCOSE BLDC GLUCOMTR-MCNC: 204 MG/DL (ref 70–130)
GLUCOSE BLDC GLUCOMTR-MCNC: 237 MG/DL (ref 70–130)
GLUCOSE BLDC GLUCOMTR-MCNC: 333 MG/DL (ref 70–130)
HBA1C MFR BLD: 14.7 % (ref 4.8–5.6)

## 2020-01-24 PROCEDURE — 97164 PT RE-EVAL EST PLAN CARE: CPT

## 2020-01-24 PROCEDURE — 99024 POSTOP FOLLOW-UP VISIT: CPT | Performed by: NURSE PRACTITIONER

## 2020-01-24 PROCEDURE — 97535 SELF CARE MNGMENT TRAINING: CPT | Performed by: OCCUPATIONAL THERAPIST

## 2020-01-24 PROCEDURE — 99406 BEHAV CHNG SMOKING 3-10 MIN: CPT

## 2020-01-24 PROCEDURE — 82962 GLUCOSE BLOOD TEST: CPT

## 2020-01-24 PROCEDURE — 97110 THERAPEUTIC EXERCISES: CPT

## 2020-01-24 PROCEDURE — 97116 GAIT TRAINING THERAPY: CPT

## 2020-01-24 PROCEDURE — 25010000003 CEFAZOLIN 1-4 GM/50ML-% SOLUTION: Performed by: NEUROLOGICAL SURGERY

## 2020-01-24 PROCEDURE — 63710000001 INSULIN LISPRO (HUMAN) PER 5 UNITS: Performed by: NEUROLOGICAL SURGERY

## 2020-01-24 PROCEDURE — 25010000002 MORPHINE SULFATE (PF) 2 MG/ML SOLUTION: Performed by: NEUROLOGICAL SURGERY

## 2020-01-24 PROCEDURE — 97168 OT RE-EVAL EST PLAN CARE: CPT | Performed by: OCCUPATIONAL THERAPIST

## 2020-01-24 PROCEDURE — 63710000001 INSULIN DETEMIR PER 5 UNITS: Performed by: NURSE PRACTITIONER

## 2020-01-24 RX ORDER — CITALOPRAM 20 MG/1
20 TABLET ORAL DAILY
COMMUNITY
End: 2021-08-20

## 2020-01-24 RX ORDER — ATORVASTATIN CALCIUM 10 MG/1
20 TABLET, FILM COATED ORAL NIGHTLY
Status: DISCONTINUED | OUTPATIENT
Start: 2020-01-24 | End: 2020-01-27 | Stop reason: HOSPADM

## 2020-01-24 RX ORDER — PREGABALIN 100 MG/1
200 CAPSULE ORAL 3 TIMES DAILY
Status: DISCONTINUED | OUTPATIENT
Start: 2020-01-24 | End: 2020-01-27 | Stop reason: HOSPADM

## 2020-01-24 RX ORDER — CYCLOBENZAPRINE HCL 10 MG
10 TABLET ORAL 3 TIMES DAILY PRN
COMMUNITY
End: 2020-06-18

## 2020-01-24 RX ORDER — OXYCODONE AND ACETAMINOPHEN 10; 325 MG/1; MG/1
1 TABLET ORAL 4 TIMES DAILY PRN
COMMUNITY
End: 2020-01-27 | Stop reason: HOSPADM

## 2020-01-24 RX ORDER — ZOLPIDEM TARTRATE 10 MG/1
10 TABLET ORAL NIGHTLY PRN
Status: ON HOLD | COMMUNITY
End: 2021-12-16

## 2020-01-24 RX ORDER — SIMVASTATIN 40 MG
40 TABLET ORAL NIGHTLY
Status: ON HOLD | COMMUNITY
End: 2021-12-16

## 2020-01-24 RX ORDER — PREGABALIN 200 MG/1
200 CAPSULE ORAL 3 TIMES DAILY
Status: ON HOLD | COMMUNITY
End: 2020-01-27 | Stop reason: SDUPTHER

## 2020-01-24 RX ORDER — OMEPRAZOLE 20 MG/1
20 CAPSULE, DELAYED RELEASE ORAL DAILY
COMMUNITY

## 2020-01-24 RX ORDER — CITALOPRAM 20 MG/1
20 TABLET ORAL DAILY
Status: DISCONTINUED | OUTPATIENT
Start: 2020-01-24 | End: 2020-01-27 | Stop reason: HOSPADM

## 2020-01-24 RX ORDER — PANTOPRAZOLE SODIUM 40 MG/1
40 TABLET, DELAYED RELEASE ORAL EVERY MORNING
Status: DISCONTINUED | OUTPATIENT
Start: 2020-01-25 | End: 2020-01-27 | Stop reason: HOSPADM

## 2020-01-24 RX ORDER — BUSPIRONE HYDROCHLORIDE 10 MG/1
10 TABLET ORAL EVERY 8 HOURS SCHEDULED
Status: DISCONTINUED | OUTPATIENT
Start: 2020-01-24 | End: 2020-01-27 | Stop reason: HOSPADM

## 2020-01-24 RX ADMIN — MORPHINE SULFATE 2 MG: 2 INJECTION, SOLUTION INTRAMUSCULAR; INTRAVENOUS at 16:11

## 2020-01-24 RX ADMIN — MORPHINE SULFATE 2 MG: 2 INJECTION, SOLUTION INTRAMUSCULAR; INTRAVENOUS at 12:02

## 2020-01-24 RX ADMIN — CEFAZOLIN SODIUM 1 G: 1 INJECTION, SOLUTION INTRAVENOUS at 01:59

## 2020-01-24 RX ADMIN — OXYCODONE HYDROCHLORIDE AND ACETAMINOPHEN 1 TABLET: 7.5; 325 TABLET ORAL at 08:09

## 2020-01-24 RX ADMIN — NYSTATIN: 100000 CREAM TOPICAL at 08:11

## 2020-01-24 RX ADMIN — INSULIN LISPRO 2 UNITS: 100 INJECTION, SOLUTION INTRAVENOUS; SUBCUTANEOUS at 21:03

## 2020-01-24 RX ADMIN — INSULIN DETEMIR 10 UNITS: 100 INJECTION, SOLUTION SUBCUTANEOUS at 21:04

## 2020-01-24 RX ADMIN — INSULIN LISPRO 4 UNITS: 100 INJECTION, SOLUTION INTRAVENOUS; SUBCUTANEOUS at 17:12

## 2020-01-24 RX ADMIN — CEFAZOLIN SODIUM 1 G: 1 INJECTION, SOLUTION INTRAVENOUS at 17:13

## 2020-01-24 RX ADMIN — OXYCODONE HYDROCHLORIDE AND ACETAMINOPHEN 1 TABLET: 7.5; 325 TABLET ORAL at 18:33

## 2020-01-24 RX ADMIN — BUSPIRONE HYDROCHLORIDE 10 MG: 10 TABLET ORAL at 21:03

## 2020-01-24 RX ADMIN — INSULIN LISPRO 7 UNITS: 100 INJECTION, SOLUTION INTRAVENOUS; SUBCUTANEOUS at 12:47

## 2020-01-24 RX ADMIN — CITALOPRAM 20 MG: 20 TABLET, FILM COATED ORAL at 14:34

## 2020-01-24 RX ADMIN — MORPHINE SULFATE 2 MG: 2 INJECTION, SOLUTION INTRAMUSCULAR; INTRAVENOUS at 05:11

## 2020-01-24 RX ADMIN — OXYCODONE HYDROCHLORIDE AND ACETAMINOPHEN 1 TABLET: 7.5; 325 TABLET ORAL at 14:33

## 2020-01-24 RX ADMIN — CEFAZOLIN SODIUM 1 G: 1 INJECTION, SOLUTION INTRAVENOUS at 09:55

## 2020-01-24 RX ADMIN — SODIUM CHLORIDE, PRESERVATIVE FREE 10 ML: 5 INJECTION INTRAVENOUS at 21:04

## 2020-01-24 RX ADMIN — MORPHINE SULFATE 2 MG: 2 INJECTION, SOLUTION INTRAMUSCULAR; INTRAVENOUS at 21:04

## 2020-01-24 RX ADMIN — PREGABALIN 200 MG: 100 CAPSULE ORAL at 21:03

## 2020-01-24 RX ADMIN — BUSPIRONE HYDROCHLORIDE 10 MG: 10 TABLET ORAL at 14:34

## 2020-01-24 RX ADMIN — NYSTATIN: 100000 CREAM TOPICAL at 21:07

## 2020-01-24 RX ADMIN — PREGABALIN 200 MG: 100 CAPSULE ORAL at 14:33

## 2020-01-24 RX ADMIN — INSULIN LISPRO 4 UNITS: 100 INJECTION, SOLUTION INTRAVENOUS; SUBCUTANEOUS at 08:09

## 2020-01-24 RX ADMIN — SODIUM CHLORIDE, PRESERVATIVE FREE 10 ML: 5 INJECTION INTRAVENOUS at 08:11

## 2020-01-24 RX ADMIN — ATORVASTATIN CALCIUM 20 MG: 10 TABLET, FILM COATED ORAL at 21:03

## 2020-01-24 RX ADMIN — LOSARTAN POTASSIUM: 50 TABLET, FILM COATED ORAL at 14:33

## 2020-01-24 NOTE — ANESTHESIA PROCEDURE NOTES
Arterial Line      Patient location during procedure: OR   Line placed for hemodynamic monitoring, ABGs/Labs/ISTAT and MD/Surgeon request.  Performed By   ULISSES: Landen Yousif CRNA  Preanesthetic Checklist  Completed: patient identified, site marked, surgical consent, pre-op evaluation, timeout performed, IV checked, risks and benefits discussed and monitors and equipment checked  Arterial Line Prep   Prep: alcohol swabs  Arterial Line Procedure   Laterality:left  Location:  radial artery  Catheter size: 20 G   Guidance: ultrasound guided and palpation technique  Number of attempts: 1  Successful placement: yes  Post Assessment   Dressing Type: secured with tape.   Complications no  Circ/Move/Sens Assessment: normal.   Patient Tolerance: patient tolerated the procedure well with no apparent complications

## 2020-01-24 NOTE — ANESTHESIA POSTPROCEDURE EVALUATION
"Patient: John Rodriguez    Procedure Summary     Date:  01/23/20 Room / Location:   [facility] OR  /  [facility] OR    Anesthesia Start:  1758 Anesthesia Stop:  2022    Procedure:  CERVICAL DISCECTOMY ANTERIOR WITH FUSION C5-6 (N/A Spine Cervical) Diagnosis:       Cervical spondylosis with myelopathy      (Cervical spondylosis with myelopathy [M47.12])    Surgeon:  Santino Montano MD Provider:  Landen Yousif CRNA    Anesthesia Type:  general ASA Status:  3 - Emergent          Anesthesia Type: general    Vitals  Vitals Value Taken Time   /74 1/23/2020  8:41 PM   Temp 97.7 °F (36.5 °C) 1/23/2020  8:20 PM   Pulse 88 1/23/2020  8:46 PM   Resp 13 1/23/2020  8:35 PM   SpO2 100 % 1/23/2020  8:46 PM   Vitals shown include unvalidated device data.        Post Anesthesia Care and Evaluation    Patient location during evaluation: PACU  Patient participation: complete - patient participated  Level of consciousness: awake and alert  Pain management: adequate  Airway patency: patent  Anesthetic complications: No anesthetic complications    Cardiovascular status: acceptable  Respiratory status: acceptable  Hydration status: acceptable    Comments: Blood pressure 127/66, pulse 88, temperature 97.7 °F (36.5 °C), temperature source Temporal, resp. rate 13, height 180.3 cm (71\"), weight 99.4 kg (219 lb 3.2 oz), SpO2 98 %.    Pt discharged from PACU based on ron score >8      "

## 2020-01-24 NOTE — ANESTHESIA PROCEDURE NOTES
Airway  Urgency: elective    Date/Time: 1/23/2020 6:07 PM  Airway not difficult    General Information and Staff    Patient location during procedure: OR  CRNA: Landen Yousif CRNA    Indications and Patient Condition  Indications for airway management: airway protection    Preoxygenated: yes  MILS maintained throughout  Mask difficulty assessment: 1 - vent by mask    Final Airway Details  Final airway type: endotracheal airway      Successful airway: ETT and NIM tube  Cuffed: yes   Successful intubation technique: direct laryngoscopy  Endotracheal tube insertion site: oral  Blade: CMAC  Blade size: D  ETT size (mm): 8.0  Cormack-Lehane Classification: grade I - full view of glottis  Placement verified by: chest auscultation and capnometry   Cuff volume (mL): 5  Measured from: lips  ETT/EBT  to lips (cm): 22  Number of attempts at approach: 1  Assessment: lips, teeth, and gum same as pre-op and atraumatic intubation    Additional Comments  Cmac used without manipulation of head or neck

## 2020-01-25 LAB
ANION GAP SERPL CALCULATED.3IONS-SCNC: 11 MMOL/L (ref 5–15)
BUN BLD-MCNC: 15 MG/DL (ref 8–23)
BUN/CREAT SERPL: 22.7 (ref 7–25)
CALCIUM SPEC-SCNC: 9.5 MG/DL (ref 8.6–10.5)
CHLORIDE SERPL-SCNC: 98 MMOL/L (ref 98–107)
CO2 SERPL-SCNC: 29 MMOL/L (ref 22–29)
CREAT BLD-MCNC: 0.66 MG/DL (ref 0.76–1.27)
GFR SERPL CREATININE-BSD FRML MDRD: 120 ML/MIN/1.73
GLUCOSE BLD-MCNC: 212 MG/DL (ref 65–99)
GLUCOSE BLDC GLUCOMTR-MCNC: 203 MG/DL (ref 70–130)
GLUCOSE BLDC GLUCOMTR-MCNC: 280 MG/DL (ref 70–130)
GLUCOSE BLDC GLUCOMTR-MCNC: 284 MG/DL (ref 70–130)
GLUCOSE BLDC GLUCOMTR-MCNC: 290 MG/DL (ref 70–130)
POTASSIUM BLD-SCNC: 3.5 MMOL/L (ref 3.5–5.2)
SODIUM BLD-SCNC: 138 MMOL/L (ref 136–145)
WHOLE BLOOD HOLD SPECIMEN: NORMAL

## 2020-01-25 PROCEDURE — 97116 GAIT TRAINING THERAPY: CPT

## 2020-01-25 PROCEDURE — 63710000001 INSULIN REGULAR HUMAN PER 5 UNITS: Performed by: NURSE PRACTITIONER

## 2020-01-25 PROCEDURE — 63710000001 INSULIN DETEMIR PER 5 UNITS: Performed by: NURSE PRACTITIONER

## 2020-01-25 PROCEDURE — 80048 BASIC METABOLIC PNL TOTAL CA: CPT | Performed by: NURSE PRACTITIONER

## 2020-01-25 PROCEDURE — 99024 POSTOP FOLLOW-UP VISIT: CPT | Performed by: NEUROLOGICAL SURGERY

## 2020-01-25 PROCEDURE — 82962 GLUCOSE BLOOD TEST: CPT

## 2020-01-25 PROCEDURE — 97110 THERAPEUTIC EXERCISES: CPT

## 2020-01-25 PROCEDURE — 63710000001 INSULIN LISPRO (HUMAN) PER 5 UNITS: Performed by: NEUROLOGICAL SURGERY

## 2020-01-25 RX ORDER — ZOLPIDEM TARTRATE 5 MG/1
10 TABLET ORAL NIGHTLY PRN
Status: DISCONTINUED | OUTPATIENT
Start: 2020-01-25 | End: 2020-01-27 | Stop reason: HOSPADM

## 2020-01-25 RX ADMIN — SODIUM CHLORIDE, PRESERVATIVE FREE 10 ML: 5 INJECTION INTRAVENOUS at 09:33

## 2020-01-25 RX ADMIN — SODIUM CHLORIDE, PRESERVATIVE FREE 10 ML: 5 INJECTION INTRAVENOUS at 20:38

## 2020-01-25 RX ADMIN — BUSPIRONE HYDROCHLORIDE 10 MG: 10 TABLET ORAL at 14:43

## 2020-01-25 RX ADMIN — OXYCODONE HYDROCHLORIDE AND ACETAMINOPHEN 1 TABLET: 7.5; 325 TABLET ORAL at 12:33

## 2020-01-25 RX ADMIN — PANTOPRAZOLE SODIUM 40 MG: 40 TABLET, DELAYED RELEASE ORAL at 06:44

## 2020-01-25 RX ADMIN — PREGABALIN 200 MG: 100 CAPSULE ORAL at 16:31

## 2020-01-25 RX ADMIN — METFORMIN HYDROCHLORIDE 500 MG: 500 TABLET ORAL at 17:25

## 2020-01-25 RX ADMIN — INSULIN HUMAN 3 UNITS: 100 INJECTION, SOLUTION PARENTERAL at 12:33

## 2020-01-25 RX ADMIN — OXYCODONE HYDROCHLORIDE AND ACETAMINOPHEN 1 TABLET: 7.5; 325 TABLET ORAL at 08:45

## 2020-01-25 RX ADMIN — OXYCODONE HYDROCHLORIDE AND ACETAMINOPHEN 1 TABLET: 7.5; 325 TABLET ORAL at 02:58

## 2020-01-25 RX ADMIN — PREGABALIN 200 MG: 100 CAPSULE ORAL at 08:45

## 2020-01-25 RX ADMIN — PREGABALIN 200 MG: 100 CAPSULE ORAL at 20:40

## 2020-01-25 RX ADMIN — INSULIN HUMAN 3 UNITS: 100 INJECTION, SOLUTION PARENTERAL at 17:25

## 2020-01-25 RX ADMIN — BUSPIRONE HYDROCHLORIDE 10 MG: 10 TABLET ORAL at 21:38

## 2020-01-25 RX ADMIN — ZOLPIDEM TARTRATE 10 MG: 5 TABLET ORAL at 20:41

## 2020-01-25 RX ADMIN — ATORVASTATIN CALCIUM 20 MG: 10 TABLET, FILM COATED ORAL at 20:38

## 2020-01-25 RX ADMIN — INSULIN LISPRO 6 UNITS: 100 INJECTION, SOLUTION INTRAVENOUS; SUBCUTANEOUS at 20:37

## 2020-01-25 RX ADMIN — INSULIN LISPRO 6 UNITS: 100 INJECTION, SOLUTION INTRAVENOUS; SUBCUTANEOUS at 12:33

## 2020-01-25 RX ADMIN — NYSTATIN: 100000 CREAM TOPICAL at 20:43

## 2020-01-25 RX ADMIN — INSULIN DETEMIR 15 UNITS: 100 INJECTION, SOLUTION SUBCUTANEOUS at 20:38

## 2020-01-25 RX ADMIN — CITALOPRAM 20 MG: 20 TABLET, FILM COATED ORAL at 08:45

## 2020-01-25 RX ADMIN — BUSPIRONE HYDROCHLORIDE 10 MG: 10 TABLET ORAL at 06:44

## 2020-01-25 RX ADMIN — INSULIN LISPRO 6 UNITS: 100 INJECTION, SOLUTION INTRAVENOUS; SUBCUTANEOUS at 17:25

## 2020-01-25 RX ADMIN — OXYCODONE HYDROCHLORIDE AND ACETAMINOPHEN 1 TABLET: 7.5; 325 TABLET ORAL at 20:41

## 2020-01-25 RX ADMIN — INSULIN LISPRO 4 UNITS: 100 INJECTION, SOLUTION INTRAVENOUS; SUBCUTANEOUS at 08:45

## 2020-01-25 RX ADMIN — LOSARTAN POTASSIUM: 50 TABLET, FILM COATED ORAL at 08:45

## 2020-01-25 RX ADMIN — OXYCODONE HYDROCHLORIDE AND ACETAMINOPHEN 1 TABLET: 7.5; 325 TABLET ORAL at 16:31

## 2020-01-26 LAB
ANION GAP SERPL CALCULATED.3IONS-SCNC: 9 MMOL/L (ref 5–15)
BUN BLD-MCNC: 14 MG/DL (ref 8–23)
BUN/CREAT SERPL: 24.6 (ref 7–25)
CALCIUM SPEC-SCNC: 9.5 MG/DL (ref 8.6–10.5)
CHLORIDE SERPL-SCNC: 97 MMOL/L (ref 98–107)
CHOLEST SERPL-MCNC: 97 MG/DL (ref 0–200)
CO2 SERPL-SCNC: 32 MMOL/L (ref 22–29)
CREAT BLD-MCNC: 0.57 MG/DL (ref 0.76–1.27)
GFR SERPL CREATININE-BSD FRML MDRD: 142 ML/MIN/1.73
GLUCOSE BLD-MCNC: 200 MG/DL (ref 65–99)
GLUCOSE BLDC GLUCOMTR-MCNC: 186 MG/DL (ref 70–130)
GLUCOSE BLDC GLUCOMTR-MCNC: 267 MG/DL (ref 70–130)
GLUCOSE BLDC GLUCOMTR-MCNC: 310 MG/DL (ref 70–130)
GLUCOSE BLDC GLUCOMTR-MCNC: 311 MG/DL (ref 70–130)
HDLC SERPL-MCNC: 29 MG/DL (ref 40–60)
LDLC SERPL CALC-MCNC: 29 MG/DL (ref 0–100)
LDLC/HDLC SERPL: 1 {RATIO}
POTASSIUM BLD-SCNC: 3.6 MMOL/L (ref 3.5–5.2)
SODIUM BLD-SCNC: 138 MMOL/L (ref 136–145)
TRIGL SERPL-MCNC: 195 MG/DL (ref 0–150)
VLDLC SERPL-MCNC: 39 MG/DL
WHOLE BLOOD HOLD SPECIMEN: NORMAL

## 2020-01-26 PROCEDURE — 97110 THERAPEUTIC EXERCISES: CPT

## 2020-01-26 PROCEDURE — 63710000001 INSULIN LISPRO (HUMAN) PER 5 UNITS: Performed by: NEUROLOGICAL SURGERY

## 2020-01-26 PROCEDURE — 82962 GLUCOSE BLOOD TEST: CPT

## 2020-01-26 PROCEDURE — 99024 POSTOP FOLLOW-UP VISIT: CPT | Performed by: NEUROLOGICAL SURGERY

## 2020-01-26 PROCEDURE — 97535 SELF CARE MNGMENT TRAINING: CPT

## 2020-01-26 PROCEDURE — 80048 BASIC METABOLIC PNL TOTAL CA: CPT | Performed by: NURSE PRACTITIONER

## 2020-01-26 PROCEDURE — 80061 LIPID PANEL: CPT | Performed by: NURSE PRACTITIONER

## 2020-01-26 PROCEDURE — 97530 THERAPEUTIC ACTIVITIES: CPT

## 2020-01-26 PROCEDURE — 97116 GAIT TRAINING THERAPY: CPT

## 2020-01-26 PROCEDURE — 63710000001 INSULIN REGULAR HUMAN PER 5 UNITS: Performed by: INTERNAL MEDICINE

## 2020-01-26 PROCEDURE — 63710000001 INSULIN DETEMIR PER 5 UNITS: Performed by: INTERNAL MEDICINE

## 2020-01-26 RX ADMIN — INSULIN HUMAN 5 UNITS: 100 INJECTION, SOLUTION PARENTERAL at 08:17

## 2020-01-26 RX ADMIN — ATORVASTATIN CALCIUM 20 MG: 10 TABLET, FILM COATED ORAL at 21:08

## 2020-01-26 RX ADMIN — SODIUM CHLORIDE, PRESERVATIVE FREE 10 ML: 5 INJECTION INTRAVENOUS at 09:00

## 2020-01-26 RX ADMIN — OXYCODONE HYDROCHLORIDE AND ACETAMINOPHEN 1 TABLET: 7.5; 325 TABLET ORAL at 11:46

## 2020-01-26 RX ADMIN — INSULIN LISPRO 7 UNITS: 100 INJECTION, SOLUTION INTRAVENOUS; SUBCUTANEOUS at 12:36

## 2020-01-26 RX ADMIN — PREGABALIN 200 MG: 100 CAPSULE ORAL at 21:08

## 2020-01-26 RX ADMIN — BUSPIRONE HYDROCHLORIDE 10 MG: 10 TABLET ORAL at 14:11

## 2020-01-26 RX ADMIN — PREGABALIN 200 MG: 100 CAPSULE ORAL at 15:45

## 2020-01-26 RX ADMIN — BUSPIRONE HYDROCHLORIDE 10 MG: 10 TABLET ORAL at 06:30

## 2020-01-26 RX ADMIN — INSULIN DETEMIR 30 UNITS: 100 INJECTION, SOLUTION SUBCUTANEOUS at 21:09

## 2020-01-26 RX ADMIN — METFORMIN HYDROCHLORIDE 500 MG: 500 TABLET ORAL at 08:18

## 2020-01-26 RX ADMIN — INSULIN LISPRO 2 UNITS: 100 INJECTION, SOLUTION INTRAVENOUS; SUBCUTANEOUS at 08:59

## 2020-01-26 RX ADMIN — PREGABALIN 200 MG: 100 CAPSULE ORAL at 08:17

## 2020-01-26 RX ADMIN — ZOLPIDEM TARTRATE 10 MG: 5 TABLET ORAL at 21:08

## 2020-01-26 RX ADMIN — OXYCODONE HYDROCHLORIDE AND ACETAMINOPHEN 1 TABLET: 7.5; 325 TABLET ORAL at 19:50

## 2020-01-26 RX ADMIN — INSULIN LISPRO 7 UNITS: 100 INJECTION, SOLUTION INTRAVENOUS; SUBCUTANEOUS at 21:07

## 2020-01-26 RX ADMIN — INSULIN LISPRO 6 UNITS: 100 INJECTION, SOLUTION INTRAVENOUS; SUBCUTANEOUS at 17:03

## 2020-01-26 RX ADMIN — OXYCODONE HYDROCHLORIDE AND ACETAMINOPHEN 1 TABLET: 7.5; 325 TABLET ORAL at 07:16

## 2020-01-26 RX ADMIN — LOSARTAN POTASSIUM: 50 TABLET, FILM COATED ORAL at 08:18

## 2020-01-26 RX ADMIN — SODIUM CHLORIDE, PRESERVATIVE FREE 10 ML: 5 INJECTION INTRAVENOUS at 21:08

## 2020-01-26 RX ADMIN — NYSTATIN: 100000 CREAM TOPICAL at 21:11

## 2020-01-26 RX ADMIN — CITALOPRAM 20 MG: 20 TABLET, FILM COATED ORAL at 08:18

## 2020-01-26 RX ADMIN — BUSPIRONE HYDROCHLORIDE 10 MG: 10 TABLET ORAL at 21:08

## 2020-01-26 RX ADMIN — OXYCODONE HYDROCHLORIDE AND ACETAMINOPHEN 1 TABLET: 7.5; 325 TABLET ORAL at 15:45

## 2020-01-26 RX ADMIN — PANTOPRAZOLE SODIUM 40 MG: 40 TABLET, DELAYED RELEASE ORAL at 06:30

## 2020-01-26 RX ADMIN — METFORMIN HYDROCHLORIDE 1000 MG: 500 TABLET ORAL at 17:00

## 2020-01-26 RX ADMIN — INSULIN HUMAN 5 UNITS: 100 INJECTION, SOLUTION PARENTERAL at 12:37

## 2020-01-27 ENCOUNTER — HOSPITAL ENCOUNTER (INPATIENT)
Age: 70
LOS: 11 days | Discharge: HOME | DRG: 560 | End: 2020-02-07
Attending: PSYCHIATRY & NEUROLOGY | Admitting: PSYCHIATRY & NEUROLOGY
Payer: MEDICARE

## 2020-01-27 VITALS
DIASTOLIC BLOOD PRESSURE: 60 MMHG | HEART RATE: 74 BPM | BODY MASS INDEX: 30.68 KG/M2 | TEMPERATURE: 98.2 F | RESPIRATION RATE: 18 BRPM | SYSTOLIC BLOOD PRESSURE: 113 MMHG | WEIGHT: 219.14 LBS | OXYGEN SATURATION: 94 % | HEIGHT: 71 IN

## 2020-01-27 PROBLEM — Z98.1 S/P CERVICAL SPINAL FUSION: Status: ACTIVE | Noted: 2020-01-27

## 2020-01-27 PROBLEM — M54.2 PAIN, NECK: Status: ACTIVE | Noted: 2020-01-27

## 2020-01-27 PROBLEM — R53.1 WEAKNESS: Status: ACTIVE | Noted: 2020-01-27

## 2020-01-27 PROBLEM — G95.20 SPINAL CORD COMPRESSION (HCC): Status: ACTIVE | Noted: 2020-01-27

## 2020-01-27 LAB
GLUCOSE BLD-MCNC: 327 MG/DL (ref 70–99)
GLUCOSE BLDC GLUCOMTR-MCNC: 220 MG/DL (ref 70–130)
GLUCOSE BLDC GLUCOMTR-MCNC: 227 MG/DL (ref 70–130)
PERFORMED ON: ABNORMAL

## 2020-01-27 PROCEDURE — 6370000000 HC RX 637 (ALT 250 FOR IP): Performed by: PSYCHIATRY & NEUROLOGY

## 2020-01-27 PROCEDURE — 82948 REAGENT STRIP/BLOOD GLUCOSE: CPT

## 2020-01-27 PROCEDURE — 97110 THERAPEUTIC EXERCISES: CPT

## 2020-01-27 PROCEDURE — 82962 GLUCOSE BLOOD TEST: CPT

## 2020-01-27 PROCEDURE — 97116 GAIT TRAINING THERAPY: CPT

## 2020-01-27 PROCEDURE — 63710000001 INSULIN LISPRO (HUMAN) PER 5 UNITS: Performed by: NEUROLOGICAL SURGERY

## 2020-01-27 PROCEDURE — 1180000000 HC REHAB R&B

## 2020-01-27 PROCEDURE — 99024 POSTOP FOLLOW-UP VISIT: CPT | Performed by: NURSE PRACTITIONER

## 2020-01-27 RX ORDER — LOSARTAN POTASSIUM AND HYDROCHLOROTHIAZIDE 25; 100 MG/1; MG/1
1 TABLET ORAL DAILY
COMMUNITY

## 2020-01-27 RX ORDER — OXYCODONE AND ACETAMINOPHEN 7.5; 325 MG/1; MG/1
1 TABLET ORAL EVERY 6 HOURS PRN
Status: ON HOLD | COMMUNITY
End: 2020-02-06 | Stop reason: HOSPADM

## 2020-01-27 RX ORDER — SIMVASTATIN 40 MG
40 TABLET ORAL NIGHTLY
Status: DISCONTINUED | OUTPATIENT
Start: 2020-01-27 | End: 2020-02-07 | Stop reason: HOSPADM

## 2020-01-27 RX ORDER — DEXTROSE MONOHYDRATE 50 MG/ML
100 INJECTION, SOLUTION INTRAVENOUS PRN
Status: DISCONTINUED | OUTPATIENT
Start: 2020-01-27 | End: 2020-02-07 | Stop reason: HOSPADM

## 2020-01-27 RX ORDER — LOSARTAN POTASSIUM AND HYDROCHLOROTHIAZIDE 25; 100 MG/1; MG/1
1 TABLET ORAL DAILY
Status: DISCONTINUED | OUTPATIENT
Start: 2020-01-27 | End: 2020-01-27 | Stop reason: CLARIF

## 2020-01-27 RX ORDER — DEXTROSE MONOHYDRATE 25 G/50ML
12.5 INJECTION, SOLUTION INTRAVENOUS PRN
Status: DISCONTINUED | OUTPATIENT
Start: 2020-01-27 | End: 2020-02-07 | Stop reason: HOSPADM

## 2020-01-27 RX ORDER — INSULIN GLARGINE 100 [IU]/ML
40 INJECTION, SOLUTION SUBCUTANEOUS NIGHTLY
Status: DISCONTINUED | OUTPATIENT
Start: 2020-01-27 | End: 2020-02-07 | Stop reason: HOSPADM

## 2020-01-27 RX ORDER — CYCLOBENZAPRINE HCL 10 MG
10 TABLET ORAL 3 TIMES DAILY PRN
Status: ON HOLD | COMMUNITY
End: 2020-02-06 | Stop reason: SDUPTHER

## 2020-01-27 RX ORDER — OXYCODONE AND ACETAMINOPHEN 7.5; 325 MG/1; MG/1
1 TABLET ORAL EVERY 6 HOURS PRN
Status: DISCONTINUED | OUTPATIENT
Start: 2020-01-27 | End: 2020-01-30

## 2020-01-27 RX ORDER — PREGABALIN 200 MG/1
200 CAPSULE ORAL 3 TIMES DAILY
Qty: 90 CAPSULE | Refills: 0 | Status: ON HOLD | OUTPATIENT
Start: 2020-01-27 | End: 2021-12-16

## 2020-01-27 RX ORDER — OXYCODONE AND ACETAMINOPHEN 7.5; 325 MG/1; MG/1
1 TABLET ORAL EVERY 6 HOURS PRN
Qty: 120 TABLET | Refills: 0 | Status: ON HOLD | OUTPATIENT
Start: 2020-01-27 | End: 2021-12-16

## 2020-01-27 RX ORDER — CITALOPRAM 10 MG/1
20 TABLET ORAL DAILY
Status: DISCONTINUED | OUTPATIENT
Start: 2020-01-27 | End: 2020-02-07 | Stop reason: HOSPADM

## 2020-01-27 RX ORDER — ZOLPIDEM TARTRATE 5 MG/1
10 TABLET ORAL NIGHTLY PRN
Status: DISCONTINUED | OUTPATIENT
Start: 2020-01-27 | End: 2020-02-07 | Stop reason: HOSPADM

## 2020-01-27 RX ORDER — CYCLOBENZAPRINE HCL 10 MG
10 TABLET ORAL 3 TIMES DAILY PRN
Status: DISCONTINUED | OUTPATIENT
Start: 2020-01-27 | End: 2020-02-07 | Stop reason: HOSPADM

## 2020-01-27 RX ORDER — BUSPIRONE HYDROCHLORIDE 10 MG/1
10 TABLET ORAL 2 TIMES DAILY
Status: DISCONTINUED | OUTPATIENT
Start: 2020-01-27 | End: 2020-02-07 | Stop reason: HOSPADM

## 2020-01-27 RX ORDER — NICOTINE POLACRILEX 4 MG
15 LOZENGE BUCCAL PRN
Status: DISCONTINUED | OUTPATIENT
Start: 2020-01-27 | End: 2020-02-07 | Stop reason: HOSPADM

## 2020-01-27 RX ORDER — OXYCODONE AND ACETAMINOPHEN 7.5; 325 MG/1; MG/1
1 TABLET ORAL EVERY 6 HOURS PRN
Status: DISCONTINUED | OUTPATIENT
Start: 2020-01-27 | End: 2020-02-07 | Stop reason: HOSPADM

## 2020-01-27 RX ORDER — ACETAMINOPHEN 325 MG/1
650 TABLET ORAL EVERY 4 HOURS PRN
Status: DISCONTINUED | OUTPATIENT
Start: 2020-01-27 | End: 2020-02-07 | Stop reason: HOSPADM

## 2020-01-27 RX ORDER — LOSARTAN POTASSIUM 100 MG/1
100 TABLET ORAL DAILY
Status: DISCONTINUED | OUTPATIENT
Start: 2020-01-27 | End: 2020-02-07 | Stop reason: HOSPADM

## 2020-01-27 RX ORDER — PANTOPRAZOLE SODIUM 40 MG/1
40 TABLET, DELAYED RELEASE ORAL
Status: DISCONTINUED | OUTPATIENT
Start: 2020-01-28 | End: 2020-02-07 | Stop reason: HOSPADM

## 2020-01-27 RX ORDER — HYDROCHLOROTHIAZIDE 25 MG/1
25 TABLET ORAL DAILY
Status: DISCONTINUED | OUTPATIENT
Start: 2020-01-27 | End: 2020-02-07 | Stop reason: HOSPADM

## 2020-01-27 RX ORDER — POLYETHYLENE GLYCOL 3350 17 G/17G
17 POWDER, FOR SOLUTION ORAL DAILY PRN
Status: DISCONTINUED | OUTPATIENT
Start: 2020-01-27 | End: 2020-02-07 | Stop reason: HOSPADM

## 2020-01-27 RX ADMIN — OXYCODONE HYDROCHLORIDE AND ACETAMINOPHEN 1 TABLET: 7.5; 325 TABLET ORAL at 22:06

## 2020-01-27 RX ADMIN — SIMVASTATIN 40 MG: 40 TABLET, FILM COATED ORAL at 22:07

## 2020-01-27 RX ADMIN — INSULIN LISPRO 4 UNITS: 100 INJECTION, SOLUTION INTRAVENOUS; SUBCUTANEOUS at 12:05

## 2020-01-27 RX ADMIN — OXYCODONE HYDROCHLORIDE AND ACETAMINOPHEN 1 TABLET: 7.5; 325 TABLET ORAL at 08:34

## 2020-01-27 RX ADMIN — INSULIN LISPRO 4 UNITS: 100 INJECTION, SOLUTION INTRAVENOUS; SUBCUTANEOUS at 08:34

## 2020-01-27 RX ADMIN — NYSTATIN: 100000 CREAM TOPICAL at 05:16

## 2020-01-27 RX ADMIN — BUSPIRONE HYDROCHLORIDE 10 MG: 10 TABLET ORAL at 15:28

## 2020-01-27 RX ADMIN — CITALOPRAM 20 MG: 20 TABLET, FILM COATED ORAL at 08:35

## 2020-01-27 RX ADMIN — PANTOPRAZOLE SODIUM 40 MG: 40 TABLET, DELAYED RELEASE ORAL at 06:44

## 2020-01-27 RX ADMIN — OXYCODONE HYDROCHLORIDE AND ACETAMINOPHEN 1 TABLET: 7.5; 325 TABLET ORAL at 18:42

## 2020-01-27 RX ADMIN — SODIUM CHLORIDE, PRESERVATIVE FREE 10 ML: 5 INJECTION INTRAVENOUS at 08:40

## 2020-01-27 RX ADMIN — ZOLPIDEM TARTRATE 10 MG: 5 TABLET ORAL at 22:07

## 2020-01-27 RX ADMIN — LOSARTAN POTASSIUM: 50 TABLET, FILM COATED ORAL at 08:35

## 2020-01-27 RX ADMIN — PREGABALIN 200 MG: 100 CAPSULE ORAL at 15:28

## 2020-01-27 RX ADMIN — OXYCODONE HYDROCHLORIDE AND ACETAMINOPHEN 1 TABLET: 7.5; 325 TABLET ORAL at 13:05

## 2020-01-27 RX ADMIN — INSULIN LISPRO 3 UNITS: 100 INJECTION, SOLUTION INTRAVENOUS; SUBCUTANEOUS at 22:07

## 2020-01-27 RX ADMIN — OXYCODONE HYDROCHLORIDE AND ACETAMINOPHEN 1 TABLET: 7.5; 325 TABLET ORAL at 01:19

## 2020-01-27 RX ADMIN — METFORMIN HYDROCHLORIDE 1000 MG: 500 TABLET ORAL at 08:35

## 2020-01-27 RX ADMIN — BUSPIRONE HYDROCHLORIDE 10 MG: 10 TABLET ORAL at 22:06

## 2020-01-27 RX ADMIN — BUSPIRONE HYDROCHLORIDE 10 MG: 10 TABLET ORAL at 05:16

## 2020-01-27 RX ADMIN — Medication 40 UNITS: at 22:08

## 2020-01-27 RX ADMIN — PREGABALIN 200 MG: 100 CAPSULE ORAL at 08:35

## 2020-01-27 ASSESSMENT — PAIN - FUNCTIONAL ASSESSMENT: PAIN_FUNCTIONAL_ASSESSMENT: PREVENTS OR INTERFERES SOME ACTIVE ACTIVITIES AND ADLS

## 2020-01-27 ASSESSMENT — PAIN SCALES - GENERAL
PAINLEVEL_OUTOF10: 6
PAINLEVEL_OUTOF10: 3
PAINLEVEL_OUTOF10: 8

## 2020-01-27 ASSESSMENT — PAIN DESCRIPTION - LOCATION
LOCATION: NECK
LOCATION: NECK

## 2020-01-27 ASSESSMENT — PAIN DESCRIPTION - ONSET
ONSET: ON-GOING
ONSET: ON-GOING

## 2020-01-27 ASSESSMENT — PAIN DESCRIPTION - DESCRIPTORS
DESCRIPTORS: ACHING
DESCRIPTORS: ACHING

## 2020-01-27 ASSESSMENT — PAIN DESCRIPTION - PAIN TYPE: TYPE: SURGICAL PAIN

## 2020-01-27 ASSESSMENT — PAIN DESCRIPTION - PROGRESSION
CLINICAL_PROGRESSION: NOT CHANGED
CLINICAL_PROGRESSION: NOT CHANGED

## 2020-01-27 ASSESSMENT — PAIN DESCRIPTION - FREQUENCY
FREQUENCY: CONTINUOUS
FREQUENCY: CONTINUOUS

## 2020-01-28 PROBLEM — R20.0 NUMBNESS: Status: ACTIVE | Noted: 2020-01-28

## 2020-01-28 PROBLEM — I10 ESSENTIAL HYPERTENSION: Status: ACTIVE | Noted: 2020-01-28

## 2020-01-28 PROBLEM — L98.421 SKIN ULCER OF SACRUM, LIMITED TO BREAKDOWN OF SKIN (HCC): Status: ACTIVE | Noted: 2020-01-28

## 2020-01-28 PROBLEM — E78.5 HYPERLIPIDEMIA: Status: ACTIVE | Noted: 2020-01-28

## 2020-01-28 PROBLEM — E11.8 TYPE 2 DIABETES MELLITUS WITH COMPLICATION, WITHOUT LONG-TERM CURRENT USE OF INSULIN (HCC): Status: ACTIVE | Noted: 2020-01-28

## 2020-01-28 PROBLEM — G62.9 NEUROPATHY: Status: ACTIVE | Noted: 2020-01-28

## 2020-01-28 LAB
ALBUMIN SERPL-MCNC: 2.9 G/DL (ref 3.5–5.2)
ALP BLD-CCNC: 172 U/L (ref 40–130)
ALT SERPL-CCNC: 16 U/L (ref 5–41)
ANION GAP SERPL CALCULATED.3IONS-SCNC: 13 MMOL/L (ref 7–19)
AST SERPL-CCNC: 20 U/L (ref 5–40)
BILIRUB SERPL-MCNC: 0.6 MG/DL (ref 0.2–1.2)
BUN BLDV-MCNC: 17 MG/DL (ref 8–23)
CALCIUM SERPL-MCNC: 9.8 MG/DL (ref 8.8–10.2)
CHLORIDE BLD-SCNC: 96 MMOL/L (ref 98–111)
CO2: 28 MMOL/L (ref 22–29)
CREAT SERPL-MCNC: 0.6 MG/DL (ref 0.5–1.2)
GFR NON-AFRICAN AMERICAN: >60
GLUCOSE BLD-MCNC: 204 MG/DL (ref 70–99)
GLUCOSE BLD-MCNC: 221 MG/DL (ref 74–109)
GLUCOSE BLD-MCNC: 235 MG/DL (ref 70–99)
GLUCOSE BLD-MCNC: 244 MG/DL (ref 70–99)
GLUCOSE BLD-MCNC: 261 MG/DL (ref 70–99)
HCT VFR BLD CALC: 45.7 % (ref 42–52)
HEMOGLOBIN: 15.2 G/DL (ref 14–18)
MCH RBC QN AUTO: 30.6 PG (ref 27–31)
MCHC RBC AUTO-ENTMCNC: 33.3 G/DL (ref 33–37)
MCV RBC AUTO: 92.1 FL (ref 80–94)
PDW BLD-RTO: 12.5 % (ref 11.5–14.5)
PERFORMED ON: ABNORMAL
PLATELET # BLD: 260 K/UL (ref 130–400)
PMV BLD AUTO: 11.1 FL (ref 9.4–12.4)
POTASSIUM SERPL-SCNC: 3.5 MMOL/L (ref 3.5–5)
PREALBUMIN: 20 MG/DL (ref 20–40)
PREALBUMIN: 20 MG/DL (ref 20–40)
RBC # BLD: 4.96 M/UL (ref 4.7–6.1)
SODIUM BLD-SCNC: 137 MMOL/L (ref 136–145)
T4 FREE: 1.42 NG/DL (ref 0.93–1.7)
TOTAL PROTEIN: 6.1 G/DL (ref 6.6–8.7)
TSH SERPL DL<=0.05 MIU/L-ACNC: 1.87 UIU/ML (ref 0.27–4.2)
VITAMIN B-12: 584 PG/ML (ref 211–946)
WBC # BLD: 9.1 K/UL (ref 4.8–10.8)

## 2020-01-28 PROCEDURE — 1180000000 HC REHAB R&B

## 2020-01-28 PROCEDURE — 97535 SELF CARE MNGMENT TRAINING: CPT

## 2020-01-28 PROCEDURE — 84134 ASSAY OF PREALBUMIN: CPT

## 2020-01-28 PROCEDURE — 6370000000 HC RX 637 (ALT 250 FOR IP): Performed by: PSYCHIATRY & NEUROLOGY

## 2020-01-28 PROCEDURE — 97530 THERAPEUTIC ACTIVITIES: CPT

## 2020-01-28 PROCEDURE — 36415 COLL VENOUS BLD VENIPUNCTURE: CPT

## 2020-01-28 PROCEDURE — 97116 GAIT TRAINING THERAPY: CPT

## 2020-01-28 PROCEDURE — 99223 1ST HOSP IP/OBS HIGH 75: CPT | Performed by: PSYCHIATRY & NEUROLOGY

## 2020-01-28 PROCEDURE — 82948 REAGENT STRIP/BLOOD GLUCOSE: CPT

## 2020-01-28 PROCEDURE — 97165 OT EVAL LOW COMPLEX 30 MIN: CPT

## 2020-01-28 PROCEDURE — 97161 PT EVAL LOW COMPLEX 20 MIN: CPT

## 2020-01-28 PROCEDURE — 97110 THERAPEUTIC EXERCISES: CPT

## 2020-01-28 PROCEDURE — 82607 VITAMIN B-12: CPT

## 2020-01-28 PROCEDURE — 85027 COMPLETE CBC AUTOMATED: CPT

## 2020-01-28 PROCEDURE — 84439 ASSAY OF FREE THYROXINE: CPT

## 2020-01-28 PROCEDURE — 84443 ASSAY THYROID STIM HORMONE: CPT

## 2020-01-28 PROCEDURE — 80053 COMPREHEN METABOLIC PANEL: CPT

## 2020-01-28 RX ORDER — NICOTINE 21 MG/24HR
1 PATCH, TRANSDERMAL 24 HOURS TRANSDERMAL DAILY
Status: DISCONTINUED | OUTPATIENT
Start: 2020-01-28 | End: 2020-02-07 | Stop reason: HOSPADM

## 2020-01-28 RX ORDER — PREGABALIN 50 MG/1
100 CAPSULE ORAL 3 TIMES DAILY
Status: DISCONTINUED | OUTPATIENT
Start: 2020-01-28 | End: 2020-01-29

## 2020-01-28 RX ORDER — PREGABALIN 50 MG/1
100 CAPSULE ORAL 2 TIMES DAILY
Status: DISCONTINUED | OUTPATIENT
Start: 2020-01-28 | End: 2020-01-28

## 2020-01-28 RX ADMIN — CITALOPRAM HYDROBROMIDE 20 MG: 10 TABLET ORAL at 08:09

## 2020-01-28 RX ADMIN — PANTOPRAZOLE SODIUM 40 MG: 40 TABLET, DELAYED RELEASE ORAL at 06:20

## 2020-01-28 RX ADMIN — INSULIN LISPRO 3 UNITS: 100 INJECTION, SOLUTION INTRAVENOUS; SUBCUTANEOUS at 12:37

## 2020-01-28 RX ADMIN — INSULIN LISPRO 2 UNITS: 100 INJECTION, SOLUTION INTRAVENOUS; SUBCUTANEOUS at 08:10

## 2020-01-28 RX ADMIN — LOSARTAN POTASSIUM 100 MG: 100 TABLET, FILM COATED ORAL at 08:09

## 2020-01-28 RX ADMIN — METFORMIN HYDROCHLORIDE 1000 MG: 500 TABLET ORAL at 08:09

## 2020-01-28 RX ADMIN — Medication 40 UNITS: at 22:03

## 2020-01-28 RX ADMIN — OXYCODONE HYDROCHLORIDE AND ACETAMINOPHEN 1 TABLET: 7.5; 325 TABLET ORAL at 06:20

## 2020-01-28 RX ADMIN — PREGABALIN 100 MG: 50 CAPSULE ORAL at 08:16

## 2020-01-28 RX ADMIN — METFORMIN HYDROCHLORIDE 1000 MG: 500 TABLET ORAL at 17:12

## 2020-01-28 RX ADMIN — OXYCODONE HYDROCHLORIDE AND ACETAMINOPHEN 1 TABLET: 7.5; 325 TABLET ORAL at 19:08

## 2020-01-28 RX ADMIN — HYDROCHLOROTHIAZIDE 25 MG: 25 TABLET ORAL at 08:09

## 2020-01-28 RX ADMIN — BUSPIRONE HYDROCHLORIDE 10 MG: 10 TABLET ORAL at 20:51

## 2020-01-28 RX ADMIN — PREGABALIN 100 MG: 50 CAPSULE ORAL at 20:51

## 2020-01-28 RX ADMIN — INSULIN LISPRO 2 UNITS: 100 INJECTION, SOLUTION INTRAVENOUS; SUBCUTANEOUS at 17:12

## 2020-01-28 RX ADMIN — BUSPIRONE HYDROCHLORIDE 10 MG: 10 TABLET ORAL at 08:09

## 2020-01-28 RX ADMIN — ZOLPIDEM TARTRATE 10 MG: 5 TABLET ORAL at 20:51

## 2020-01-28 RX ADMIN — OXYCODONE HYDROCHLORIDE AND ACETAMINOPHEN 1 TABLET: 7.5; 325 TABLET ORAL at 12:37

## 2020-01-28 RX ADMIN — SIMVASTATIN 40 MG: 40 TABLET, FILM COATED ORAL at 20:51

## 2020-01-28 ASSESSMENT — PAIN DESCRIPTION - LOCATION
LOCATION: LEG
LOCATION: FOOT
LOCATION: FOOT
LOCATION: SHOULDER

## 2020-01-28 ASSESSMENT — PAIN SCALES - GENERAL
PAINLEVEL_OUTOF10: 8
PAINLEVEL_OUTOF10: 3
PAINLEVEL_OUTOF10: 7
PAINLEVEL_OUTOF10: 8
PAINLEVEL_OUTOF10: 5
PAINLEVEL_OUTOF10: 2

## 2020-01-28 ASSESSMENT — PAIN DESCRIPTION - FREQUENCY
FREQUENCY: INTERMITTENT

## 2020-01-28 ASSESSMENT — PAIN DESCRIPTION - ORIENTATION
ORIENTATION: RIGHT;LEFT
ORIENTATION: LEFT
ORIENTATION: RIGHT;LEFT
ORIENTATION: RIGHT;LEFT

## 2020-01-28 ASSESSMENT — PAIN DESCRIPTION - PAIN TYPE
TYPE: ACUTE PAIN
TYPE: ACUTE PAIN

## 2020-01-28 ASSESSMENT — PAIN DESCRIPTION - ONSET: ONSET: ON-GOING

## 2020-01-28 ASSESSMENT — PAIN DESCRIPTION - PROGRESSION
CLINICAL_PROGRESSION: NOT CHANGED
CLINICAL_PROGRESSION: NOT CHANGED

## 2020-01-28 ASSESSMENT — PAIN DESCRIPTION - DESCRIPTORS
DESCRIPTORS: ACHING
DESCRIPTORS: SHARP
DESCRIPTORS: ACHING;CONSTANT

## 2020-01-28 ASSESSMENT — 9 HOLE PEG TEST
TESTTIME_SECONDS: 38
TESTTIME_SECONDS: 50

## 2020-01-28 ASSESSMENT — PAIN - FUNCTIONAL ASSESSMENT: PAIN_FUNCTIONAL_ASSESSMENT: PREVENTS OR INTERFERES SOME ACTIVE ACTIVITIES AND ADLS

## 2020-01-28 NOTE — PROGRESS NOTES
Occupational Therapy  Facility/Department: [facility] REHAB UNIT  Daily Treatment Note  NAME: Zion Gale  : 1950  MRN: [id number]    Date of Service: 2020    Discharge Recommendations:  Home with Home health OT       Assessment   Performance deficits / Impairments: Decreased functional mobility ; Decreased ADL status; Decreased strength;Decreased sensation;Decreased fine motor control;Decreased high-level IADLs;Decreased balance  Assessment: Patient requires increased assistance with ADLs due to B numbness in hands and decreased balance with functional mobility. He also has diabetes with numbness in his feet with multiple wounds on B feet. Will need to make sure and elevate heels in bed. Patient requires skilled OT to return the patient home independently with Home Health OT. Treatment Diagnosis: C5-C6 cervical discectomy   Prognosis: Good  Decision Making: Low Complexity  OT Education: OT Role;Plan of Care;ADL Adaptive Strategies;Transfer Training  Activity Tolerance  Activity Tolerance: Patient Tolerated treatment well  Safety Devices  Safety Devices in place: Yes(Left with PT )  Type of devices: Left in chair         Patient Diagnosis(es): There were no encounter diagnoses. has a past medical history of Chronic back pain, Essential hypertension, GERD (gastroesophageal reflux disease), Hyperlipidemia, Low back pain, Nail fungus, Neuropathy, and Osteoarthritis. has a past surgical history that includes Cervical spine surgery; Cholecystectomy; and hernia repair.     Restrictions  Restrictions/Precautions  Restrictions/Precautions: Fall Risk, Surgical Protocols  Required Braces or Orthoses?: Yes  Required Braces or Orthoses  Cervical: c-collar  Position Activity Restriction  Spinal Precautions: No Bending, No Lifting, No Twisting  Subjective   General  Chart Reviewed: Yes  Patient assessed for rehabilitation services?: Yes  Diagnosis: C5-C6 cervical discectomy   Pain Assessment  Pain Assessment: 0-10  Pain Level: 5  Pain Location: Shoulder  Pain Orientation: Right;Left  Pain Descriptors: Aching  Pain Frequency: Intermittent   Orientation  Orientation  Overall Orientation Status: Within Normal Limits  Objective             Balance  Sitting Balance: Supervision  Standing Balance: Contact guard assistance  Functional Mobility  Functional - Mobility Device: Rolling Walker  Activity: To/from bathroom  Assist Level: Contact guard assistance     Transfers  Stand Step Transfers: Contact guard assistance  Sit to stand: Contact guard assistance  Stand to sit: Contact guard assistance      LUE AROM (degrees)  LUE AROM : WNL  Left Hand AROM (degrees)  Left Hand AROM: WNL  RUE AROM (degrees)  RUE AROM : WNL  Right Hand AROM (degrees)  Right Hand AROM: WNL     Hand Dominance  Hand Dominance: Right  Left Hand Strength -  (lbs)  Handle Setting 2: 53.1  Right Hand Strength -  (lbs)  Handle Setting 2: 50.1  Fine Motor Skills  Left 9 Hole Peg Test Time (secs): 38  Right 9 Hole Peg Test Time (secs): 50           Plan   Plan  Current Treatment Recommendations: Strengthening, Functional Mobility Training, Safety Education & Training, Patient/Caregiver Education & Training, Equipment Evaluation, Education, & procurement, Self-Care / ADL, Home Management Training       OutComes Score       01/28/20 1515   Lower Body Dressing   Assistance Needed Supervision or touching assistance   Comment CGA   CARE Score 4   Putting On/Taking Off Footwear   Assistance Needed Supervision or touching assistance   CARE Score 4                                                Goals  Short term goals  Time Frame for Short term goals: 1 week   Short term goal 1: Supervision with bathing hygiene. Short term goal 2: Supervision with LB dressing. Short term goal 3: Supervision with clothing management/hygiene for toileting. Short term goal 4: Supervision with toilet transfers. Short term goal 5: Supervision with ambulatory home making tasks. Short term goal 6: Patient will complete 1-2 handed static standing task for 4 minutes, requiring Supervision. Short term goal 7: Patient will increase B  strength by 2# to increase independence with ADLs. Long term goals  Time Frame for Long term goals : 2 weeks   Long term goal 1: Modified independent with bathing hygiene. Long term goal 2: Modified independent with overall dressing. Long term goal 3: Modified independent with toileting and toilet transfers. Long term goal 4: Independent with HEP  Long term goal 5: Patient verbalize DME needs. Patient Goals   Patient goals : Return home independently to assist his wife.         Therapy Time   Individual Concurrent Group Co-treatment   Time In 9140         Time Out 1553         Minutes 38         Timed Code Treatment Minutes: [address], OT

## 2020-01-28 NOTE — H&P
[facility]   History and Physical        Patient:   Jasper Duran  MR#:    [id number]  Account Number:                   [id number]      Room:    [address] [place] [address]   YOB: 1950  Date of Progress Note: 1/28/2020  Time of Note                           7:56 AM  Attending Physician:  Sissy Galvin MD        Admitting diagnosis:Other cord compression,Other spondylosis with myelopathy, cervical region,Other specified degenerative diseases of nervous system    Secondary diagnoses:Type 2 diabetes mellitus with unspecified complications,Hyperlipidemia, unspecified,Essential (primary) hypertension,Gastro-esophageal reflux disease without esophagitis,Patient's noncompliance with other medical treatment and regimen,Polyneuropathy, unspecified,Type 2 diabetes mellitus with foot ulcer,Non-pressure chronic ulcer of other part of unspecified foot limited to breakdown of skin,Type 2 diabetes mellitus with diabetic neuropathy, unspecified,Nicotine dependence, unspecified, uncomplicated    CHIEF COMPLAINT:  Cervical myelopathy with cervical fusion       HISTORY OF PRESENT ILLNESS:   This 77 y.o. male  admitted to [facility] on 1/23/20 w/spinal cord compression due to degenerative disorder of spinal column. The pt presented to the ED for continued falls and worsening neck pain along w/numbness and tingling in his arms and legs and difficulty w/dexterity issues. He was found to have a large cervical disc displacement at C5-6 that was cuasing cord compression. On 1/23/20 he was taken to OR by Dr. Rox Valente for a cervical disectomy anterior w/fusion C5-6. He tolerated the procedure well. He is still having a myelpathic gait and is having difficlutly w/LOB. He continues to have problems w/urinary and fecal incontinence. He will have cervical collar for 14 days. Pt has uncontrolled DM w/A1C of 14.7. He was seen by wound care nurse at [facility] and was found to have a stage 2 pressure injury on his left gluteal area.  He also has several diabetic foot ulcers on his L & R foot, as well as a R gluteal & Bilateral groin area moisture associated skin damage. Pictures are in chart. His blood sugars are running between 300-400. He is medically stable and is particiapting w/therapy. He is ready to begin rehab program w/plan of returning home after rehab stay. REVIEW OF SYSTEMS:  Constitutional - No fever or chills. No diaphoresis or significant fatigue. HENT -  No tinnitus or significant hearing loss. Eyes - no sudden vision change or eye pain  Respiratory - no significant shortness of breath or cough  Cardiovascular - no chest pain No palpitations or significant leg swelling  Gastrointestinal - no abdominal swelling or pain. Genitourinary - No difficulty urinating, dysuria  Musculoskeletal - no back pain or myalgia. Skin - no color change or rash  Neurologic - No seizures. No lateralizing weakness. Hematologic - no easy bruising or excessive bleeding. Psychiatric - no severe anxiety or nervousness. All other review of systems are negative.       Past Medical History:      Diagnosis Date    Chronic back pain     GERD (gastroesophageal reflux disease)     Hyperlipidemia     Low back pain 1/29/2016    Nail fungus     Neuropathy     Osteoarthritis        Past Surgical History:      Procedure Laterality Date    Nash Mac - done 2-3 yrs ago @ [facility], pt unaware of dates   [facility]         Medications in Hospital:      Current Facility-Administered Medications:     pregabalin (LYRICA) capsule 100 mg, 100 mg, Oral, BID, Idalia Bower MD    oxyCODONE-acetaminophen (PERCOCET) 7.5-325 MG per tablet 1 tablet, 1 tablet, Oral, Q6H PRN, Idalia Bower MD, 1 tablet at 01/28/20 0620    busPIRone (BUSPAR) tablet 10 mg, 10 mg, Oral, BID, Idalia Bower MD, 10 mg at 01/27/20 2206    citalopram (CELEXA) tablet 20 mg, 20 mg, Oral, Daily, Idalia Bower MD    cyclobenzaprine (FLEXERIL) tablet 10 mg, 10 mg, Oral, TID PRN, Marcella Pathak MD    metFORMIN (GLUCOPHAGE) tablet 1,000 mg, 1,000 mg, Oral, BID , Marcella Pathak MD    pantoprazole (PROTONIX) tablet 40 mg, 40 mg, Oral, QAM , Marcella Pathak MD, 40 mg at 01/28/20 0620    oxyCODONE-acetaminophen (PERCOCET) 7.5-325 MG per tablet 1 tablet, 1 tablet, Oral, Q6H PRN, Marcella Pathak MD    simvastatin (ZOCOR) tablet 40 mg, 40 mg, Oral, Nightly, Marcella Pathak MD, 40 mg at 01/27/20 2207    zolpidem (AMBIEN) tablet 10 mg, 10 mg, Oral, Nightly PRN, Marcella Pathak MD, 10 mg at 01/27/20 2207    acetaminophen (TYLENOL) tablet 650 mg, 650 mg, Oral, Q4H PRN, Marcella Pathak MD    polyethylene glycol (GLYCOLAX) packet 17 g, 17 g, Oral, Daily PRN, Marcella Pathak MD    glucose (GLUTOSE) 40 % oral gel 15 g, 15 g, Oral, PRN, Marcella Pathak MD    dextrose 50 % IV solution, 12.5 g, Intravenous, PRN, Marcella Pathak MD    glucagon (rDNA) injection 1 mg, 1 mg, Intramuscular, PRN, Marcella Pathak MD    dextrose 5 % solution, 100 mL/hr, Intravenous, PRN, Marcella Pathak MD    insulin lispro (HUMALOG) injection vial 0-6 Units, 0-6 Units, Subcutaneous, TID Marcella MD    insulin lispro (HUMALOG) injection vial 0-3 Units, 0-3 Units, Subcutaneous, Nightly, Marcella Pathak MD, 3 Units at 01/27/20 2207    insulin glargine (LANTUS) injection vial 40 Units, 40 Units, Subcutaneous, Nightly, Marcella Pathak MD, 40 Units at 01/27/20 2208    losartan (COZAAR) tablet 100 mg, 100 mg, Oral, Daily **AND** hydrochlorothiazide (HYDRODIURIL) tablet 25 mg, 25 mg, Oral, Daily, Marcella Pathak MD    Allergies:  Sulfa antibiotics    Social History:   TOBACCO:   reports that he has been smoking. He has been smoking about 2.00 packs per day. He has never used smokeless tobacco.  ETOH:   reports no history of alcohol use.     Family History:       Problem Relation Age of Onset    High Blood Pressure Father            Physical Exam:    Vitals: /76   Pulse 69 Temp 97 °F (36.1 °C) (Temporal)   Resp 18   Ht 5' 11\" (1.803 m)   Wt 217 lb 9.6 oz (98.7 kg)   SpO2 94%   BMI 30.35 kg/m²     Constitutional - well developed, well nourished. Eyes - conjunctiva normal.  Pupils react to light  Ear, nose, throat -hearing intact to finger rub No scars, masses, or lesions over external nose or ears, no atrophy of tongue  Neck-symmetric, no masses noted, no jugular vein distension  Respiration- chest wall appears symmetric, good expansion,   normal effort without use of accessory muscles  Musculoskeletal - no significant wasting of muscles noted, no bony deformities  Extremities-no clubbing, cyanosis or edema  Skin - warm, dry, and intact. No rash, erythema, or pallor.   Psychiatric - mood, affect, and behavior appear normal.      Neurological exam  Awake, alert, fluent oriented x 3 appropriate affect  Attention and concentration appear appropriate  Recent and remote memory appears unremarkable  Speech normal without dysarthria  No clear issues with language of fund of knowledge    Cranial Nerve Exam   CN II- Visual fields grossly unremarkable  CN III, IV,VI-EOMI, No nystagmus, conjugate eye movements, no ptosis  CN V-sensation intact to LT over face  CN VII-no facial assymetry  CN VIII-Hearing intact to finger rub  CN IX and X- Palate elevates in midline  CN XI-good shoulder shrug  CN XII-Tongue midline with no fasciculations or fibrillations    Motor Exam  antigravity throughout upper and lower extremities bilaterally, no cogwheeling, normal tone    Sensory Exam  Sensation reduced over feet    Reflexes   1+ biceps bilaterally  1+ brachioradialis  1+patella  0+ ankle jerks  No clonus ankles  No Negro's sign bilateral hands    Tremors- no tremors in hands or head noted    Gait  Not tested    Coordination  Finger to nose-unremarkable        CBC:   Recent Labs     01/28/20  0450   WBC 9.1   HGB 15.2          BMP:    Recent Labs     01/28/20  0450      K 3.5   CL therapy needed: The patient requires the active and ongoing therapeutic intervention of at least 2 therapeutic disciplines, one of which must be physical or occupational therapy and/or speech therapy    Intensive therapy: The patient requires and is reasonably expected to actively participate in at least 3 hours of therapy per day at least 5 days per week, and expected to make measurable improvements that will be of practical value to improve the patient's functional capacity or adaptation to impairments. In addition, therapy treatments will begin within 36 hours from midnight of the day of the patient's admission to the inpatient rehabilitation facility    Expected duration and frequency therapy: 180 minutes per day, 5 days per week    Interdisciplinary team: The patient demonstrates the need for an interdisciplinary team for active management of the following medical issues including ataxia, motor planning, balance, disease management, elimination, endurance, family training, education, independent ADLs, pain management, precautions, range of motion, safety, strength, and transfers    I have reviewed the preadmission screening documents and concur with the findings. I believe the patient meets criteria and is sufficiently stable to allow participation in the program. This requires an intensive level of therapy, close medical supervision, and an interdisciplinary team approach provided through an individualized plan of care. I approve admitting this patient for an intensive inpatient rehabilitation program.    Please feel free to call with any questions   [phone] (cell phone)    Dr Sandra Méndez certified in Neurology  Board Certified in Clinical Neurophysiology  Fellowship Trained in [place] [address] Neurophysiology      EMR Dragon/transcription disclaimer:Significant part of this  encounter note is electronic transcription/translation of spoken language to printed text.  The electronic translation of spoken

## 2020-01-28 NOTE — PLAN OF CARE
[facility] TREATMENT PLAN      Magdalen Liner    : 1950  Acct #: [de-identified]  MRN: [id number]   PHYSICIAN:  Sissy Galvin MD  Primary Problem    Patient Active Problem List   Diagnosis    Lumbago    Degeneration of lumbar or lumbosacral intervertebral disc    Spinal stenosis, lumbar region, without neurogenic claudication    Low back pain    Spinal cord compression ([address]    S/P cervical spinal fusion    Weakness    Pain, neck    Numbness    Neuropathy    Skin ulcer of sacrum, limited to breakdown of skin ([address]    Essential hypertension    Type 2 diabetes mellitus with complication, without long-term current use of insulin ([address]    Hyperlipidemia       Rehabilitation Diagnosis:     Other cord compression [G95.29]  Other specified degenerative diseases of nervous system [G31.89]  S/P cervical spinal fusion [Z98.1]       ADMIT DATE:2020   CARE PLAN     NURSING:  Magdalen Liner while on this unit will:     [] Be continent of bowel and bladder      [x] Have an adequate number of bowel movements   [x] Urinate with no urinary retention >300ml in bladder   [] Complete bladder protocol with peterson removal   [] Maintain O2 SATs at ___%   [x] Have pain managed while on ARU        [] Be pain free by discharge    [x] Have no skin breakdown while on ARU   [x] Have improved skin integrity via wound measurements   [x] Have no signs/symptoms of infection at the wound site  [x] Be free from injury during hospitalization   [] Complete education with patient/family with understanding demonstrated for:  [] Adjustment   [x] Other:   Nursing interventions may include bowel/bladder training, education for medical assistive devices, medication education, O2 saturation management, energy conservation, stress management techniques, fall prevention, alarms protocol, seating and positioning, skin/wound care, pressure relief instruction,dressing changes,  infection protection, DVT prophylaxis, and/or to Sitting on Side of Bed  Assistance Needed: Supervision or touching assistance  CARE Score: 4  Discharge Goal: Independent  Sit to Stand  Assistance Needed: Supervision or touching assistance  CARE Score: 4  Discharge Goal: Independent  Chair/Bed-to-Chair Transfer  Assistance Needed: Supervision or touching assistance  CARE Score: 4  Discharge Goal: Independent  Car Transfer  Assistance Needed: Supervision or touching assistance  CARE Score: 4  Discharge Goal: Independent  Walk 10 Feet  Assistance Needed: Supervision or touching assistance  CARE Score: 4  Discharge Goal: Independent  Walk 50 Feet with Two Turns  Assistance Needed: Supervision or touching assistance  CARE Score: 4  Discharge Goal: Independent  Walk 150 Feet  Assistance Needed: Supervision or touching assistance  CARE Score: 4  Discharge Goal: Independent  Walking 10 Feet on Uneven Surfaces  Assistance Needed: Partial/moderate assistance  CARE Score: 3  Discharge Goal: Independent  1 Step (Curb)  Assistance Needed: Partial/moderate assistance  CARE Score: 3  Discharge Goal: Independent  4 Steps  Reason if not Attempted: Not attempted due to medical condition or safety concerns  CARE Score: 88  Discharge Goal: Not Attempted  12 Steps  Reason if not Attempted: Not attempted due to medical condition or safety concerns  CARE Score: 88  Discharge Goal: Not Attempted  Wheel 50 Feet with Two Turns  Assistance Needed: Supervision or touching assistance  CARE Score: 4  Discharge Goal: Independent  Wheel 150 Feet  Assistance Needed: Supervision or touching assistance  CARE Score: 4  Discharge Goal: Independent    OCCUPATIONAL THERAPY:  Goals:             Short term goals  Time Frame for Short term goals: 1 week   Short term goal 1: Supervision with bathing hygiene. Short term goal 2: Supervision with LB dressing. Short term goal 3: Supervision with clothing management/hygiene for toileting. Short term goal 4: Supervision with toilet transfers.    Short term goal 5: Supervision with ambulatory home making tasks. Short term goal 6: Patient will complete 1-2 handed static standing task for 4 minutes, requiring Supervision.  :  Long term goals  Time Frame for Long term goals : 2 weeks   Long term goal 1: Modified independent with bathing hygiene. Long term goal 2: Modified independent with overall dressing. Long term goal 3: Modified independent with toileting and toilet transfers. Long term goal 4:  Independent with HEP  Long term goal 5: Patient verbalize DME needs.  :    These goals were reviewed with this patient at the time of assessment and Sapphire Kay is in agreement    Plan of Care: Frequency:   [x] 5 days per week, 90 minutes per day     [] 5 days per week, 60 minutes per day                             IRF-SENA  Eating  Assistance Needed: Setup or clean-up assistance  CARE Score: 5  Discharge Goal: Independent  Oral Hygiene  Assistance Needed: Setup or clean-up assistance  CARE Score: 5  Discharge Goal: Independent  Toileting Hygiene  Assistance Needed: Supervision or touching assistance  CARE Score: 4  Discharge Goal: Independent  Shower/Bathe Self  Assistance Needed: Partial/moderate assistance  Comment: Min A   CARE Score: 3  Discharge Goal: Independent  Upper Body Dressing  Assistance Needed: Setup or clean-up assistance  CARE Score: 5  Discharge Goal: Independent  Lower Body Dressing  Assistance Needed: Supervision or touching assistance  Comment: CGA   CARE Score: 4  Discharge Goal: Independent  Putting On/Taking Off Footwear  Assistance Needed: Supervision or touching assistance  CARE Score: 4  Discharge Goal: Independent  Toilet Transfer  Assistance Needed: Supervision or touching assistance  Comment: CGA  CARE Score: 4  Discharge Goal: Independent  Picking Up Object  Assistance Needed: Dependent  CARE Score: 1  Discharge Goal: Substantial/maximal assistance  Treatments may include IADL retraining, strengthening, safety education and training, patient/caregiver education and training, equipment evaluation/ training/procurement, neuromuscular reeducation, wheelchair mobility training. SPEECH THERAPY:   Plan of Care and Goals:   LTG    FIM Goals: Comprehension:    Expression:    Social:    Problem Solving:    Memory:                                                  IRF-SENA  Hearing, Speech, and Vision  Expression of Ideas and Wants: Without difficulty  Understanding Verbal and Non-Verbal Content: Understands             Plan of Care:  Frequency:   [] 5 days per week, 60 minutes per day                            [x] Not appropriate for Speech Therapy  Treatments may include speech/language/communication therapy, cognitive training, group therapy, education, and/or dysphagia therapy based on the above goals. These goals were reviewed with this patient at the time of assessment and July Varela is in agreement. CASE MANAGEMENT:  Goals:   Assist patient/family with discharge planning, patient/family counseling,  and coordination with insurance during ARU stay. Patient Goals: heal up as much as I can, no more falling, walk and take care of myself               Activities Prior to Admit:      Active : Yes  Mode of Transportation: Car  Occupation: Retired            July Varela will be seen a minimum of 3 hours of therapy per day/a minimum of 5 out of 7 days per week. [] In this rare instance due to the nature of this patient's medical involvement, this patient will be seen 15 hours per week (900 minutes within a 7 day period). Treatments may include therapeutic exercises, gait training, neuromuscular re-ed, transfer training, community reintegration, bed mobility, w/c mobility and training, self care, home mgmt, cognitive training, energy conservation,dysphagia tx, speech/language/communication therapy, group therapy, and patient/family education.  In addition, dietician/nutritionist may monitor calorie count as well as intake and collaboratively work with SLP on dietary upgrades. Neuropsychology/Psychology may evaluate and provide necessary support. Medical issues being managed closely and that require 24 hour availability of a physician:   [] Swallowing Precautions  [x] Bowel/Bladder Fx  [] Weight bearing precautions   [x] Wound Care    [x] Pain Mgmt   [x] Infection Protection   [x] DVT Prophylaxis   [x] Fall Precautions  [] Fluid/Electrolyte/Nutrition Balance   [] Voice Protection   [] Respiratory  [] Other:    Medical Prognosis: [] Good  [x] Fair    [] Guarded   Total expected IRF days:  14  Anticipated discharge destination:    [] Home Independently   [x] Home with supervision    []SNF     [] Other                                           Physician anticipated functional outcomes:  Ambulate household distances independently with assistive device. IPOC brief synthesis: Acute inpatient rehabilitation with occupational   physical therapy 180 minutes, 5 every 7   days will address basic and advancing mobility with self-care   instruction and adaptive equipment training. Caregiver education will   be offered. Expected length of stay prior to the supervised level of   functional discharge to home with home care is 14 days. Assessment and Plan:  1. Cervical myelopathy-s/p fusion  2. Mood disorder-on meds  3. DM-on meds monitor blood sugar  4. HTN-on meds monitor  5. GI-PPI/bowel regimen  6. Neuropathy-on Lyrica  7. Hyperlipidemia-on statin  8. Pain control-Percocet PRN  9. PT/OT   10.  Sacral ulcer wound care-            Case Mgmt: Electronically signed by MEETA Hawley on 1/28/2020 at 2:10 PM      OT: Electronically signed by Angelita Maharaj OT on 1/28/20 at 4:09 PM    PT:Electronically signed by Carina Dukes PT on 1/28/2020 at 4:21 PM      RN: Electronically signed by Cristina Estes RN on 1/27/20 at 6:19 PM      ST: Electronically signed by JANEL Cavazos on 1/28/2020 at 3:45 PM

## 2020-01-28 NOTE — PROGRESS NOTES
Physical Therapy EVAL Note  DATE:  2020  NAME:  Abbe Rogers  :  1950  (77 y.o.,male)  MRN:  [id number]    HEIGHT:  Height: 5' 11\" (180.3 cm)  WEIGHT:  Weight: 217 lb 9.6 oz (98.7 kg)    PATIENT DIAGNOSIS(ES):    Diagnosis: nontraumatic spinal dysfunction   Additional Pertinent Hx: anterior cervical discectomy w/ fusion of C5-C6, DMII, hyperlipidemia, HTN, GERD, arthritis psoriasis, tobacco abuse, pressure ulcers, diabetic foot ulcers   RESTRICTIONS/PRECAUTIONS:    Restrictions/Precautions  Restrictions/Precautions: Fall Risk, Surgical Protocols  Required Braces or Orthoses?: Yes     OVERALL  ORIENTATION STATUS:     PAIN:  Pain Level: 7  Pain Type: Acute pain    Pain Location: Leg     Pain Orientation: Left      NEUROLOGICAL                          STRENGTH  Strength RLE  Strength RLE: Exception  Comment: grossly 4/5  Strength LLE  Strength LLE: Exception  Comment: grossly 4/5  ROM  AROM RLE (degrees)  RLE AROM: WFL  AROM LLE (degrees)  LLE AROM : WFL  POSTURE/BALANCE  Balance  Posture: Good  Sitting - Static: Good  Sitting - Dynamic: Good  Standing - Static: Fair  Standing - Dynamic: Fair       ACTIVITY TOLERANCE  Activity Tolerance  Activity Tolerance: Patient Tolerated treatment well      BED MOBILITY  Bed Mobility  Rolling: Stand by assistance(both directions)  Supine to Sit: Stand by assistance  Sit to Supine: Stand by assistance  TRANSFERS  Sit to Stand: Contact guard assistance, Stand by assistance      Bed to Chair: Contact guard assistance  Car Transfer: Contact guard assistance, Stand by assistance     WHEELCHAIR PROPULSION 1  Propulsion 1  Propulsion: Manual  Level: Level Tile  Method: FLAVIO QUINN  Level of Assistance: Stand by assistance  Description/ Details: 2 turns  Distance: 50ft  WHEELCHAIR PROPULSION 2  Propulsion 2  Propulsion: Manual  Level: Level Tile  Method: FLAVIO QUINN  Level of Assistance: Stand by assistance  Description/ Details: good WC propulsion   Distance: 150ft  AMBULATION Training, Gait Training, Stair training, Home Exercise Program, Safety Education & Training, Patient/Caregiver Education & Training, Equipment Evaluation, Education, & procurement     PATIENT REQUIRES AND IS REASONABLY EXPECTED TO ACTIVELY PARTICIPATE IN AT LEAST 3 HOURS OF INTENSIVE THERAPY PER DAY AT LEAST 5 DAYS PER WEEK, AND BE EXPECTED TO MAKE MEASURABLE IMPROVEMENT THAT WILL BE OF PRACTICAL VALUE TO IMPROVE THE PATIENT'S FUNCTIONAL CAPACITY OR ADAPTATION TO IMPAIRMENTS.    PATIENT GOAL FOR REHAB:  RETURN TO PRIOR LEVEL OF FUNCTION       IRF/SENA  Roll Left and Right  Assistance Needed: Supervision or touching assistance  CARE Score: 4  Discharge Goal: Independent    Sit to Lying  Assistance Needed: Supervision or touching assistance  CARE Score: 4  Discharge Goal: Independent    Lying to Sitting on Side of Bed  Assistance Needed: Supervision or touching assistance  CARE Score: 4  Discharge Goal: Independent    Sit to Stand  Assistance Needed: Supervision or touching assistance  CARE Score: 4  Discharge Goal: Independent    Chair/Bed-to-Chair Transfer  Assistance Needed: Supervision or touching assistance  CARE Score: 4  Discharge Goal: Independent    Car Transfer  Assistance Needed: Supervision or touching assistance  CARE Score: 4  Discharge Goal: Independent    Walk 10 Feet  Assistance Needed: Supervision or touching assistance  CARE Score: 4  Discharge Goal: Independent    Walk 50 Feet with Two Turns  Assistance Needed: Supervision or touching assistance  CARE Score: 4  Discharge Goal: Independent    Walk 150 Feet  Assistance Needed: Supervision or touching assistance  CARE Score: 4  Discharge Goal: Independent    Walking 10 Feet on Uneven Surfaces  Reason if not Attempted: Not attempted due to medical condition or safety concerns  CARE Score: 88  Discharge Goal: Independent    1 Step (Curb)  Reason if not Attempted: Not attempted due to medical condition or safety concerns  CARE Score: 88  Discharge Goal: Independent    4 Steps  Reason if not Attempted: Not attempted due to medical condition or safety concerns  CARE Score: 88  Discharge Goal: Not Attempted    12 Steps  Reason if not Attempted: Not attempted due to medical condition or safety concerns  CARE Score: 88  Discharge Goal: Not Attempted    Wheel 50 Feet with Two Turns  Assistance Needed: Supervision or touching assistance  CARE Score: 4  Discharge Goal: Independent    Wheel 150 Feet  Assistance Needed: Supervision or touching assistance  CARE Score: 4  Discharge Goal: Independent      LAST TREATMENT TIME  PT Individual Minutes  Time In: 0900  Time Out: 1000  Minutes: 60

## 2020-01-28 NOTE — PROGRESS NOTES
independent with overall dressing. Long term goal 3: Modified independent with toileting and toilet transfers. Long term goal 4: Independent with HEP  Long term goal 5: Patient verbalize DME needs. Patient Goals   Patient goals : Return home independently to assist his wife.         Therapy Time   Individual Concurrent Group Co-treatment   Time In 1300         Time Out 1345         Minutes 45         Timed Code Treatment Minutes: [address], OT

## 2020-01-28 NOTE — PROGRESS NOTES
Occupational Therapy   Occupational Therapy Initial Assessment  Date: 2020   Patient Name: Ray Granados  MRN: [id number]     : 1950    Date of Service: 2020    Discharge Recommendations:  Home with Home health OT       Assessment   Performance deficits / Impairments: Decreased functional mobility ; Decreased ADL status; Decreased strength;Decreased sensation;Decreased fine motor control;Decreased high-level IADLs;Decreased balance  Assessment: Patient requires increased assistance with ADLs due to B numbness in hands and decreased balance with functional mobility. Patient requires skilled OT to return the patient home independently with Home Health OT. Treatment Diagnosis: C5-C6 cervical discectomy   Prognosis: Good  Decision Making: Low Complexity  OT Education: OT Role;Plan of Care;ADL Adaptive Strategies;Transfer Training  Activity Tolerance  Activity Tolerance: Patient Tolerated treatment well  Safety Devices  Safety Devices in place: Yes  Type of devices: Bed alarm in place;Call light within reach           Patient Diagnosis(es): There were no encounter diagnoses. has a past medical history of Chronic back pain, Essential hypertension, GERD (gastroesophageal reflux disease), Hyperlipidemia, Low back pain, Nail fungus, Neuropathy, and Osteoarthritis. has a past surgical history that includes Cervical spine surgery; Cholecystectomy; and hernia repair.     Treatment Diagnosis: C5-C6 cervical discectomy       Restrictions  Restrictions/Precautions  Restrictions/Precautions: Fall Risk, Surgical Protocols  Required Braces or Orthoses?: Yes  Required Braces or Orthoses  Cervical: c-collar  Position Activity Restriction  Spinal Precautions: No Bending, No Lifting, No Twisting    Subjective   General  Chart Reviewed: Yes  Patient assessed for rehabilitation services?: Yes  Diagnosis: C5-C6 cervical discectomy   Pain Assessment  Pain Assessment: 0-10  Pain Level: 5  Pain Location: Shoulder  Pain Orientation: Right;Left  Pain Descriptors: Aching  Pain Frequency: Intermittent  Oxygen Therapy  O2 Device: None (Room air)  Social/Functional History  Social/Functional History  Lives With: Spouse  Type of Home: House  Home Layout: Two level, Able to Live on Main level with bedroom/bathroom  Home Access: Ramped entrance  Bathroom Shower/Tub: Tub/Shower unit, Curtain, Shower chair with back  Bathroom Toilet: Standard  Home Equipment: Cane(May have RW )  ADL Assistance: Independent  Homemaking Assistance: Independent  Ambulation Assistance: Independent  Transfer Assistance: Independent  Active : Yes  Mode of Transportation: Car  Occupation: Retired  Type of occupation: Clean carpents   IADL Comments: Responsible for all home making tasks. 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  He has had multiple falls since December 2019, where he fell and hit his neck        Objective   Vision: Impaired  Vision Exceptions: Wears glasses for reading  Hearing: Within functional limits          Balance  Sitting Balance: Supervision  Functional Mobility  Functional - Mobility Device: Rolling Walker  Activity: To/from bathroom  Assist Level: Contact guard assistance              Transfers  Stand Step Transfers: Contact guard assistance  Sit to stand: Contact guard assistance  Stand to sit: Contact guard assistance              Sensation  Overall Sensation Status: Impaired(Numbness in B fingers )        LUE AROM (degrees)  LUE AROM : WNL  Left Hand AROM (degrees)  Left Hand AROM: WNL  RUE AROM (degrees)  RUE AROM : WNL  Right Hand AROM (degrees)  Right Hand AROM: WNL  LUE Strength  Gross LUE Strength: WFL  RUE Strength  Gross RUE Strength: WFL                   Plan   Plan  Current Treatment Recommendations: Strengthening, Functional Mobility Training, Safety Education & Training, Patient/Caregiver Education & Training, Equipment Evaluation, Education, & procurement, Self-Care / ADL, Home Management Training       OutComes Score       01/28/20 1100   Eating   Assistance Needed Setup or clean-up assistance   CARE Score 5   Discharge Goal 6   Oral Hygiene   Assistance Needed Setup or clean-up assistance   CARE Score 5   Discharge Goal 6   [address] Needed Supervision or touching assistance   CARE Score 4   Discharge Goal 6   Shower/Bathe Self   Assistance Needed Partial/moderate assistance   Comment Min A    CARE Score 3   Discharge Goal 6   Upper Body Dressing   Assistance Needed Setup or clean-up assistance   CARE Score 5   Discharge Goal 6   Lower Body Dressing   Assistance Needed Supervision or touching assistance   Comment CGA    CARE Score 4   Discharge Goal 6   Putting On/Taking Off Footwear   Assistance Needed Supervision or touching assistance   CARE Score 4   Discharge Goal 6       Goals  Short term goals  Time Frame for Short term goals: 1 week   Short term goal 1: Supervision with bathing hygiene. Short term goal 2: Supervision with LB dressing. Short term goal 3: Supervision with clothing management/hygiene for toileting. Short term goal 4: Supervision with toilet transfers. Short term goal 5: Supervision with ambulatory home making tasks. Short term goal 6: Patient will complete 1-2 handed static standing task for 4 minutes, requiring Supervision. Long term goals  Time Frame for Long term goals : 2 weeks   Long term goal 1: Modified independent with bathing hygiene. Long term goal 2: Modified independent with overall dressing. Long term goal 3: Modified independent with toileting and toilet transfers. Long term goal 4: Independent with HEP  Long term goal 5: Patient verbalize DME needs. Patient Goals   Patient goals : Return home independently to assist his wife.         Therapy Time   Individual Concurrent Group Co-treatment   Time In 1100         Time Out 309 [facility]         Timed Code Treatment Minutes: [address], OT

## 2020-01-28 NOTE — PLAN OF CARE
Problem: SAFETY  Goal: Free from accidental physical injury  Outcome: Ongoing  Goal: LTG - Patient will demonstrate safety requirements appropriate to situation/environment  Outcome: Ongoing  Goal: LTG - patient will utilize safety techniques  Outcome: Ongoing  Goal: STG - patient locks brakes on wheelchair  Outcome: Ongoing  Goal: STG - Patient uses call light consistently to request assistance with transfers  Outcome: Ongoing  Goal: STG - patient uses gait belt during all transfers  Outcome: Ongoing     Problem: DAILY CARE  Goal: Daily care needs are met  Outcome: Ongoing     Problem: PAIN  Goal: Patient's pain/discomfort is manageable  Outcome: Ongoing  Goal: STG - pain is manageable through therapies  Outcome: Ongoing  Goal: STG - Patient will verbalize an acceptable level of pain  Outcome: Ongoing  Goal: STG - patients pain is managed to allow active participation in daily activities  Outcome: Ongoing     Problem: SKIN INTEGRITY  Goal: Skin integrity is maintained or improved  Outcome: Ongoing  Goal: STG - Patient demonstrates skin care/treatment/dressing change  Outcome: Ongoing  Goal: STG - patient will maintain good skin integrity  Outcome: Ongoing  Goal: STG - Patient exhibits signs of wound healing. Outcome: Ongoing  Goal: STG - patient demonstrates pressure reduction techniques  Outcome: Ongoing  Goal: STG - Patient demonstrates preventative skin care measures  Outcome: Ongoing     Problem: KNOWLEDGE DEFICIT  Goal: Patient/S.O. demonstrates understanding of disease process, treatment plan, medications, and discharge instructions.   Outcome: Ongoing     Problem: DISCHARGE BARRIERS  Goal: Patient's continuum of care needs are met  Outcome: Ongoing     Problem: Falls - Risk of:  Goal: Will remain free from falls  Description  Will remain free from falls  Outcome: Ongoing  Goal: Absence of physical injury  Description  Absence of physical injury  Outcome: Ongoing     Problem: Mobility - Impaired:  Goal: Mobility will improve  Description  Mobility will improve  Outcome: Ongoing     Problem: Skin Integrity:  Goal: Will show no infection signs and symptoms  Description  Will show no infection signs and symptoms  Outcome: Ongoing  Goal: Absence of new skin breakdown  Description  Absence of new skin breakdown  Outcome: Ongoing     Problem: IP BLADDER/VOIDING  Goal: LTG - Patient will utilize adaptive techniques/equipment to complete bladder elimination  Outcome: Ongoing     Problem: IP BOWEL ELIMINATION  Goal: LTG - patient will have regular and routine bowel evacuation  Outcome: Ongoing     Problem: IP BREATHING  Goal: LTG - Patient/caregiver will demonstrate/perform proper techniques to maintain patent airway  Outcome: Ongoing     Problem: NUTRITION  Goal: Patient maintains adequate hydration  Outcome: Ongoing     Problem: Discharge Planning:  Goal: Discharged to appropriate level of care  Description  Discharged to appropriate level of care  Outcome: Ongoing     Problem: Serum Glucose Level - Abnormal:  Goal: Ability to maintain appropriate glucose levels will improve  Description  Ability to maintain appropriate glucose levels will improve  Outcome: Not Met This Shift  Goal: Ability to maintain appropriate glucose levels has stabilized  Description  Ability to maintain appropriate glucose levels has stabilized  Outcome: Not Met This Shift     Problem: Sensory Perception - Impaired:  Goal: Ability to maintain a stable neurologic state will improve  Description  Ability to maintain a stable neurologic state will improve  Outcome: Ongoing     Problem: Anxiety:  Goal: Level of anxiety will decrease  Description  Level of anxiety will decrease  Outcome: Ongoing     Problem: Mood - Altered:  Goal: Mood stable  Description  Mood stable  Outcome: Ongoing     Problem: Pain:  Goal: Pain level will decrease  Description  Pain level will decrease  Outcome: Ongoing     Problem:  Activity:  Goal: Physical symptoms of sleep

## 2020-01-29 LAB
GLUCOSE BLD-MCNC: 207 MG/DL (ref 70–99)
GLUCOSE BLD-MCNC: 216 MG/DL (ref 70–99)
GLUCOSE BLD-MCNC: 237 MG/DL (ref 70–99)
GLUCOSE BLD-MCNC: 252 MG/DL (ref 70–99)
PERFORMED ON: ABNORMAL

## 2020-01-29 PROCEDURE — 6370000000 HC RX 637 (ALT 250 FOR IP): Performed by: PSYCHIATRY & NEUROLOGY

## 2020-01-29 PROCEDURE — 97535 SELF CARE MNGMENT TRAINING: CPT

## 2020-01-29 PROCEDURE — 82948 REAGENT STRIP/BLOOD GLUCOSE: CPT

## 2020-01-29 PROCEDURE — 1180000000 HC REHAB R&B

## 2020-01-29 PROCEDURE — 97530 THERAPEUTIC ACTIVITIES: CPT

## 2020-01-29 PROCEDURE — 97116 GAIT TRAINING THERAPY: CPT

## 2020-01-29 PROCEDURE — 97110 THERAPEUTIC EXERCISES: CPT

## 2020-01-29 PROCEDURE — 99232 SBSQ HOSP IP/OBS MODERATE 35: CPT | Performed by: PSYCHIATRY & NEUROLOGY

## 2020-01-29 RX ADMIN — OXYCODONE HYDROCHLORIDE AND ACETAMINOPHEN 1 TABLET: 7.5; 325 TABLET ORAL at 11:56

## 2020-01-29 RX ADMIN — INSULIN LISPRO 1 UNITS: 100 INJECTION, SOLUTION INTRAVENOUS; SUBCUTANEOUS at 20:41

## 2020-01-29 RX ADMIN — METFORMIN HYDROCHLORIDE 1000 MG: 500 TABLET ORAL at 16:54

## 2020-01-29 RX ADMIN — Medication 40 UNITS: at 20:41

## 2020-01-29 RX ADMIN — ZOLPIDEM TARTRATE 10 MG: 5 TABLET ORAL at 20:21

## 2020-01-29 RX ADMIN — PREGABALIN 200 MG: 75 CAPSULE ORAL at 14:12

## 2020-01-29 RX ADMIN — PANTOPRAZOLE SODIUM 40 MG: 40 TABLET, DELAYED RELEASE ORAL at 05:43

## 2020-01-29 RX ADMIN — BUSPIRONE HYDROCHLORIDE 10 MG: 10 TABLET ORAL at 20:21

## 2020-01-29 RX ADMIN — HYDROCHLOROTHIAZIDE 25 MG: 25 TABLET ORAL at 07:45

## 2020-01-29 RX ADMIN — BUSPIRONE HYDROCHLORIDE 10 MG: 10 TABLET ORAL at 07:45

## 2020-01-29 RX ADMIN — CITALOPRAM HYDROBROMIDE 20 MG: 10 TABLET ORAL at 07:45

## 2020-01-29 RX ADMIN — PREGABALIN 200 MG: 75 CAPSULE ORAL at 07:44

## 2020-01-29 RX ADMIN — SIMVASTATIN 40 MG: 40 TABLET, FILM COATED ORAL at 20:21

## 2020-01-29 RX ADMIN — OXYCODONE HYDROCHLORIDE AND ACETAMINOPHEN 1 TABLET: 7.5; 325 TABLET ORAL at 04:55

## 2020-01-29 RX ADMIN — INSULIN LISPRO 2 UNITS: 100 INJECTION, SOLUTION INTRAVENOUS; SUBCUTANEOUS at 07:45

## 2020-01-29 RX ADMIN — PREGABALIN 200 MG: 75 CAPSULE ORAL at 20:20

## 2020-01-29 RX ADMIN — OXYCODONE HYDROCHLORIDE AND ACETAMINOPHEN 1 TABLET: 7.5; 325 TABLET ORAL at 18:20

## 2020-01-29 RX ADMIN — INSULIN LISPRO 2 UNITS: 100 INJECTION, SOLUTION INTRAVENOUS; SUBCUTANEOUS at 16:54

## 2020-01-29 RX ADMIN — METFORMIN HYDROCHLORIDE 1000 MG: 500 TABLET ORAL at 07:45

## 2020-01-29 RX ADMIN — LOSARTAN POTASSIUM 100 MG: 100 TABLET, FILM COATED ORAL at 07:45

## 2020-01-29 RX ADMIN — INSULIN LISPRO 3 UNITS: 100 INJECTION, SOLUTION INTRAVENOUS; SUBCUTANEOUS at 11:57

## 2020-01-29 ASSESSMENT — PAIN DESCRIPTION - LOCATION
LOCATION: FOOT
LOCATION: FOOT

## 2020-01-29 ASSESSMENT — PAIN SCALES - GENERAL
PAINLEVEL_OUTOF10: 2
PAINLEVEL_OUTOF10: 7
PAINLEVEL_OUTOF10: 0
PAINLEVEL_OUTOF10: 7
PAINLEVEL_OUTOF10: 0
PAINLEVEL_OUTOF10: 6
PAINLEVEL_OUTOF10: 2
PAINLEVEL_OUTOF10: 0

## 2020-01-29 ASSESSMENT — PAIN DESCRIPTION - ORIENTATION
ORIENTATION: RIGHT;LEFT
ORIENTATION: RIGHT;LEFT

## 2020-01-29 ASSESSMENT — PAIN DESCRIPTION - PAIN TYPE: TYPE: ACUTE PAIN

## 2020-01-29 ASSESSMENT — PAIN DESCRIPTION - DESCRIPTORS: DESCRIPTORS: SHARP

## 2020-01-29 NOTE — PROGRESS NOTES
Occupational Therapy  Facility/Department: [facility] 8 REHAB UNIT  Daily Treatment Note  NAME: Trung White  : 1950  MRN: [id number]    Date of Service: 2020    Discharge Recommendations:  Home with Home health OT       Assessment   Performance deficits / Impairments: Decreased functional mobility ; Decreased ADL status; Decreased strength;Decreased sensation;Decreased fine motor control;Decreased high-level IADLs;Decreased balance  Treatment Diagnosis: C5-C6 cervical discectomy   Activity Tolerance  Activity Tolerance: Patient Tolerated treatment well  Safety Devices  Safety Devices in place: Yes  Type of devices: Call light within reach; Left in bed         Patient Diagnosis(es): There were no encounter diagnoses. has a past medical history of Chronic back pain, Essential hypertension, GERD (gastroesophageal reflux disease), Hyperlipidemia, Low back pain, Nail fungus, Neuropathy, and Osteoarthritis. has a past surgical history that includes Cervical spine surgery; Cholecystectomy; and hernia repair.     Restrictions  Restrictions/Precautions  Restrictions/Precautions: Fall Risk, Surgical Protocols  Required Braces or Orthoses?: Yes  Required Braces or Orthoses  Cervical: c-collar  Position Activity Restriction  Spinal Precautions: No Bending, No Lifting, No Twisting  Subjective   General  Chart Reviewed: Yes  Patient assessed for rehabilitation services?: Yes  Diagnosis: C5-C6 cervical discectomy     Objective     Balance  Sitting Balance: Supervision  Standing Balance: Stand by assistance  Standing Balance  Time: 4 minutes   Activity: 1-2 handed minimally dynamic task      Transfers  Sit to stand: Stand by assistance  Stand to sit: Stand by assistance         Type of ROM/Therapeutic Exercise  Type of ROM/Therapeutic Exercise: Free weights  Comment: MARLEY 2#; 2 sets of 15 in all planes       Plan   Plan  Current Treatment Recommendations: Strengthening, Functional Mobility Training, Safety Education & Training, Patient/Caregiver Education & Training, Equipment Evaluation, Education, & procurement, Self-Care / ADL, Home Management Training       Goals  Short term goals  Time Frame for Short term goals: 1 week   Short term goal 1: Supervision with bathing hygiene. Short term goal 2: Supervision with LB dressing. Short term goal 3: Supervision with clothing management/hygiene for toileting. Short term goal 4: Supervision with toilet transfers. Short term goal 5: Supervision with ambulatory home making tasks. Short term goal 6: Patient will complete 1-2 handed static standing task for 4 minutes, requiring Supervision. Short term goal 7: Patient will increase B  strength by 2# to increase independence with ADLs. Long term goals  Time Frame for Long term goals : 2 weeks   Long term goal 1: Modified independent with bathing hygiene. Long term goal 2: Modified independent with overall dressing. Long term goal 3: Modified independent with toileting and toilet transfers. Long term goal 4: Independent with HEP  Long term goal 5: Patient verbalize DME needs. Patient Goals   Patient goals : Return home independently to assist his wife.         Therapy Time   Individual Concurrent Group Co-treatment   Time In   1000       Time Out   1100       Minutes   60       Timed Code Treatment Minutes: [address], OT

## 2020-01-29 NOTE — PROGRESS NOTES
rollator was initiated. Pt did well overall, demonstrates increased lateral sway and req v/c for posturing.  Pt still has the fww for when he would like more stability and additioinal gait training with the rollator will be done this afternoon    Activity Tolerance   Activity Tolerance Patient Tolerated treatment well     Electronically signed by José Woodruff Student PT on 1/29/2020 at 12:44 PM

## 2020-01-29 NOTE — PROGRESS NOTES
Nutrition Assessment (Low Risk)    Type and Reason for Visit: Initial    Nutrition Recommendations: Diet education before discharge. Nutrition Assessment:  Patient assessed for nutritional risk. Deemed to be at low risk at this time. Will continue to monitor for changes in status. Pt adequately nourished on admission AEB good intake/good appetite reported. Pt at nutritional risk AEB elevated glucose, A1c >200. Pt stated DM is new dx. Will follow for intake, DM diet education before d/c.     Malnutrition Assessment:  · Malnutrition Status: No malnutrition    Nutrition Risk Level   Risk Level: Low    Nutrition Diagnosis:   · Problem: Food and nutrition-related knowledge deficit  · Etiology: Lack of prior nutrition-related education    Signs and symptoms: Lab values    Nutrition Intervention:  Food and/or Delivery: Continue current diet  Nutrition Education/Counseling/Coordination of Care:  Continued Inpatient Monitoring      Electronically signed by Marlyn Dove MS, RD, LD on 1/29/20 at 1:36 PM    Contact Number: [phone]

## 2020-01-29 NOTE — PROGRESS NOTES
Occupational Therapy  Facility/Department: [facility] 8 REHAB UNIT  Daily Treatment Note  NAME: Genny Patel  : 1950  MRN: [id number]    Date of Service: 2020    Discharge Recommendations:  Home with Home health OT       Assessment      Activity Tolerance  Activity Tolerance: Patient Tolerated treatment well  Safety Devices  Safety Devices in place: Yes  Type of devices: Left in chair(left with PT)         Patient Diagnosis(es): There were no encounter diagnoses. has a past medical history of Chronic back pain, Essential hypertension, GERD (gastroesophageal reflux disease), Hyperlipidemia, Low back pain, Nail fungus, Neuropathy, and Osteoarthritis. has a past surgical history that includes Cervical spine surgery; Cholecystectomy; and hernia repair.     Restrictions  Restrictions/Precautions  Restrictions/Precautions: Fall Risk, Surgical Protocols  Required Braces or Orthoses?: Yes  Required Braces or Orthoses  Cervical: c-collar  Position Activity Restriction  Spinal Precautions: No Bending, No Lifting, No Twisting  Subjective   General  Chart Reviewed: Yes  Patient assessed for rehabilitation services?: Yes  Diagnosis: C5-C6 cervical discectomy    Objective     Functional Mobility  Functional - Mobility Device: Rolling Walker  Activity: Other  Assist Level: Contact guard assistance  Functional Mobility Comments: short distance within therapy gym         [address] or touching assistance   CARE Score 4   Shower/Bathe Self   Assistance Needed Partial/moderate assistance   Comment min A, TTB   CARE Score 3      20 1305   Toilet Transfer   Assistance Needed Supervision or touching assistance   Comment CGA, RW   CARE Score 4     Plan   Plan  Current Treatment Recommendations: Strengthening, Functional Mobility Training, Safety Education & Training, Patient/Caregiver Education & Training, Equipment Evaluation, Education, & procurement, Self-Care / ADL, Home Management Training    Goals  Short term goals  Time Frame for Short term goals: 1 week   Short term goal 1: Supervision with bathing hygiene. Short term goal 2: Supervision with LB dressing. Short term goal 3: Supervision with clothing management/hygiene for toileting. Short term goal 4: Supervision with toilet transfers. Short term goal 5: Supervision with ambulatory home making tasks. Short term goal 6: Patient will complete 1-2 handed static standing task for 4 minutes, requiring Supervision. Short term goal 7: Patient will increase B  strength by 2# to increase independence with ADLs. Long term goals  Time Frame for Long term goals : 2 weeks   Long term goal 1: Modified independent with bathing hygiene. Long term goal 2: Modified independent with overall dressing. Long term goal 3: Modified independent with toileting and toilet transfers. Long term goal 4: Independent with HEP  Long term goal 5: Patient verbalize DME needs. Patient Goals   Patient goals : Return home independently to assist his wife.         Therapy Time   Individual Concurrent Group Co-treatment   Time In   1300       Time Out   1400       Minutes   60       Timed Code Treatment Minutes: 60 Minutes     Electronically signed by Stuart Garvin OT on 1/29/2020 at 5:25 PM

## 2020-01-29 NOTE — PLAN OF CARE
Problem: SAFETY  Goal: Free from accidental physical injury  1/29/2020 1131 by Raffy Plascencia LPN  Outcome: Ongoing  1/29/2020 0006 by Shahida Hanna RN  Outcome: Ongoing  Goal: LTG - Patient will demonstrate safety requirements appropriate to situation/environment  1/29/2020 1131 by Raffy Plascencia LPN  Outcome: Ongoing  1/29/2020 0006 by Shahida Hanna RN  Outcome: Ongoing  Goal: LTG - patient will utilize safety techniques  1/29/2020 1131 by Raffy Plascencia LPN  Outcome: Ongoing  1/29/2020 0006 by Shahida Hanna RN  Outcome: Ongoing  Goal: STG - patient locks brakes on wheelchair  1/29/2020 1131 by Raffy Plascencia LPN  Outcome: Ongoing  1/29/2020 0006 by Shahida Hanna RN  Outcome: Ongoing  Goal: STG - Patient uses call light consistently to request assistance with transfers  1/29/2020 1131 by Raffy Plascencia LPN  Outcome: Ongoing  1/29/2020 0006 by Shahida Hanna RN  Outcome: Ongoing  Goal: STG - patient uses gait belt during all transfers  1/29/2020 1131 by Raffy Plascencia LPN  Outcome: Ongoing  1/29/2020 0006 by Shahida Hanna RN  Outcome: Ongoing     Problem: DAILY CARE  Goal: Daily care needs are met  1/29/2020 1131 by Raffy Plascencia LPN  Outcome: Ongoing  1/29/2020 0006 by Shahida Hanna RN  Outcome: Ongoing     Problem: PAIN  Goal: Patient's pain/discomfort is manageable  1/29/2020 1131 by Raffy Plascencia LPN  Outcome: Ongoing  1/29/2020 0006 by Shahida Hanna RN  Outcome: Ongoing  Goal: STG - pain is manageable through therapies  1/29/2020 1131 by Raffy Plascencia LPN  Outcome: Ongoing  1/29/2020 0006 by Shahida Hanna RN  Outcome: Ongoing  Goal: STG - Patient will verbalize an acceptable level of pain  1/29/2020 1131 by Raffy Plascencia LPN  Outcome: Ongoing  1/29/2020 0006 by Shahida Hanna RN  Outcome: Ongoing  Goal: STG - patients pain is managed to allow active participation in daily activities  1/29/2020 1131 by Raffy Plascencia LPN  Outcome: Ongoing  1/29/2020 0006 by Ciarra Grimm RN  Outcome: Ongoing     Problem: SKIN INTEGRITY  Goal: Skin integrity is maintained or improved  1/29/2020 1131 by Sissy Martinez LPN  Outcome: Ongoing  1/29/2020 0006 by Ciarra Grimm RN  Outcome: Ongoing  Goal: STG - Patient demonstrates skin care/treatment/dressing change  1/29/2020 1131 by Sissy Martinez LPN  Outcome: Ongoing  1/29/2020 0006 by Ciarra Grimm RN  Outcome: Ongoing  Goal: STG - patient will maintain good skin integrity  1/29/2020 1131 by Sissy Martinez LPN  Outcome: Ongoing  1/29/2020 0006 by Ciarra Grimm RN  Outcome: Ongoing  Goal: STG - Patient exhibits signs of wound healing. 1/29/2020 1131 by Sissy Martinez LPN  Outcome: Ongoing  1/29/2020 0006 by Ciarra Grimm RN  Outcome: Ongoing  Goal: STG - patient demonstrates pressure reduction techniques  1/29/2020 1131 by Sissy Martinez LPN  Outcome: Ongoing  1/29/2020 0006 by Ciarra Grimm RN  Outcome: Ongoing  Goal: STG - Patient demonstrates preventative skin care measures  1/29/2020 1131 by Sissy Martinez LPN  Outcome: Ongoing  1/29/2020 0006 by Ciarra Grimm RN  Outcome: Ongoing     Problem: KNOWLEDGE DEFICIT  Goal: Patient/S.O. demonstrates understanding of disease process, treatment plan, medications, and discharge instructions.   1/29/2020 1131 by Sissy Martinez LPN  Outcome: Ongoing  1/29/2020 0006 by Ciarra Grimm RN  Outcome: Ongoing     Problem: DISCHARGE BARRIERS  Goal: Patient's continuum of care needs are met  1/29/2020 1131 by Sissy Martinez LPN  Outcome: Ongoing  1/29/2020 0006 by Ciarra Grimm RN  Outcome: Ongoing     Problem: Falls - Risk of:  Goal: Will remain free from falls  Description  Will remain free from falls  1/29/2020 1131 by Sissy Martinez LPN  Outcome: Ongoing  1/29/2020 0006 by Ciarra Grimm RN  Outcome: Ongoing  Goal: Absence of physical injury  Description  Absence of physical injury  1/29/2020 1131 by Ricardo Carpenter Sophie Milan LPN  Outcome: Ongoing  1/29/2020 0006 by Gonzalo Roberto RN  Outcome: Ongoing     Problem: Mobility - Impaired:  Goal: Mobility will improve  Description  Mobility will improve  1/29/2020 1131 by Joy Berry LPN  Outcome: Ongoing  1/29/2020 0006 by Gonzalo Roberto RN  Outcome: Ongoing     Problem: Skin Integrity:  Goal: Will show no infection signs and symptoms  Description  Will show no infection signs and symptoms  1/29/2020 1131 by Joy Berry LPN  Outcome: Ongoing  1/29/2020 0006 by Gonzalo Roberto RN  Outcome: Ongoing  Goal: Absence of new skin breakdown  Description  Absence of new skin breakdown  1/29/2020 1131 by Joy Berry LPN  Outcome: Ongoing  1/29/2020 0006 by Gonzalo Roberto RN  Outcome: Ongoing     Problem: IP BLADDER/VOIDING  Goal: LTG - Patient will utilize adaptive techniques/equipment to complete bladder elimination  1/29/2020 1131 by Joy Berry LPN  Outcome: Ongoing  1/29/2020 0006 by Gonzalo Roberto RN  Outcome: Ongoing     Problem: IP BOWEL ELIMINATION  Goal: LTG - patient will have regular and routine bowel evacuation  1/29/2020 1131 by Joy Berry LPN  Outcome: Ongoing  1/29/2020 0006 by Gonzalo Roberto RN  Outcome: Ongoing     Problem: IP BREATHING  Goal: LTG - Patient/caregiver will demonstrate/perform proper techniques to maintain patent airway  1/29/2020 1131 by Joy Berry LPN  Outcome: Ongoing  1/29/2020 0006 by Gonzalo Roberto RN  Outcome: Ongoing     Problem: NUTRITION  Goal: Patient maintains adequate hydration  1/29/2020 1131 by Joy Berry LPN  Outcome: Ongoing  1/29/2020 0006 by Gonzalo Roberto RN  Outcome: Ongoing     Problem: Discharge Planning:  Goal: Discharged to appropriate level of care  Description  Discharged to appropriate level of care  1/29/2020 1131 by Joy Berry LPN  Outcome: Ongoing  1/29/2020 0006 by Gonzalo Roberto RN  Outcome: Ongoing     Problem: Serum Glucose Level - Abnormal:  Goal:

## 2020-01-29 NOTE — PROGRESS NOTES
Patient:   Trung White  MR#:    [id number]   Room:    [id number]   YOB: 1950  Date of Progress Note: 1/29/2020  Time of Note                           8:05 AM  Consulting Physician:   Reid Muse M.D. Attending Physician:  Reid Muse MD     Chief complaint Cervical myelopathy with cervical fusion     S:This 77 y.o. male  admitted to [facility] on 1/23/20 w/spinal cord compression due to degenerative disorder of spinal column. The pt presented to the ED for continued falls and worsening neck pain along w/numbness and tingling in his arms and legs and difficulty w/dexterity issues. He was found to have a large cervical disc displacement at C5-6 that was cuasing cord compression. On 1/23/20 he was taken to OR by Dr. Shauna Schwartz for a cervical disectomy anterior w/fusion C5-6. He tolerated the procedure well. He is still having a myelpathic gait and is having difficlutly w/LOB. He continues to have problems w/urinary and fecal incontinence. He will have cervical collar for 14 days. Pt has uncontrolled DM w/A1C of 14.7. He was seen by wound care nurse at [facility] and was found to have a stage 2 pressure injury on his left gluteal area. He also has several diabetic foot ulcers on his L & R foot, as well as a R gluteal & Bilateral groin area moisture associated skin damage. Pictures are in chart. His blood sugars are running between 300-400. He is medically stable and is particiapting w/therapy. He is ready to begin rehab program w/plan of returning home after rehab stay.     REVIEW OF SYSTEMS:  Constitutional: No fevers No chills  Neck:No stiffness  Respiratory: No shortness of breath  Cardiovascular: No chest pain No palpitations  Gastrointestinal: No abdominal pain    Genitourinary: No Dysuria  Neurological: No headache, no confusion    Past Medical History:      Diagnosis Date    Chronic back pain     Essential hypertension 1/28/2020    GERD (gastroesophageal reflux disease)     Idalia Bower MD    dextrose 5 % solution, 100 mL/hr, Intravenous, PRN, Idalia Bower MD    insulin lispro (HUMALOG) injection vial 0-6 Units, 0-6 Units, Subcutaneous, TID WC, Idalia Bower MD, 2 Units at 01/29/20 0745    insulin lispro (HUMALOG) injection vial 0-3 Units, 0-3 Units, Subcutaneous, Nightly, Idalia Bower MD, 3 Units at 01/27/20 2207    insulin glargine (LANTUS) injection vial 40 Units, 40 Units, Subcutaneous, Nightly, Idalia Bower MD, 40 Units at 01/28/20 2203    losartan (COZAAR) tablet 100 mg, 100 mg, Oral, Daily, 100 mg at 01/29/20 0745 **AND** hydrochlorothiazide (HYDRODIURIL) tablet 25 mg, 25 mg, Oral, Daily, Idalia Bower MD, 25 mg at 01/29/20 0745    Allergies:  Sulfa antibiotics    Social History:   TOBACCO:   reports that he has been smoking. He has been smoking about 2.00 packs per day. He has never used smokeless tobacco.  ETOH:   reports no history of alcohol use. Family History:       Problem Relation Age of Onset    High Blood Pressure Father          PHYSICAL EXAM:  /69   Pulse 65   Temp 97.2 °F (36.2 °C) (Temporal)   Resp 16   Ht 5' 11\" (1.803 m)   Wt 217 lb 9.6 oz (98.7 kg)   SpO2 96%   BMI 30.35 kg/m²     Constitutional - well developed, well nourished. Eyes - conjunctiva normal.   Ear, nose, throat - No scars, masses, or lesions over external nose or ears, no atrophy of tongue  Neck-symmetric, no masses noted, no jugular vein distension  Respiration- chest wall appears symmetric, good expansion,   normal effort without use of accessory muscles  Musculoskeletal - no significant wasting of muscles noted, no bony deformities  Extremities-no clubbing, cyanosis or edema  Skin - warm, dry, and intact. No rash, erythema, or pallor.   Psychiatric - mood, affect, and behavior appear normal.      Neurological exam  Awake, alert, fluent oriented appropriate affect  Attention and concentration appear appropriate  Recent and remote memory appears unremarkable  Speech normal without dysarthria  No clear issues with language of fund of knowledge     Cranial Nerve Exam     CN III, IV,VI-EOMI, No nystagmus, conjugate eye movements, no ptosis    CN VII-no facial assymetry       Motor Exam  antigravity throughout upper and lower extremities bilaterally      Tremors- no tremors in hands or head noted     Gait  Not tested      Nursing/pcp notes, imaging,labs and vitals reviewed. PT,OT and/or speech notes reviewed    Lab Results   Component Value Date    WBC 9.1 2020    HGB 15.2 2020    HCT 45.7 2020    MCV 92.1 2020     2020     Lab Results   Component Value Date     2020    K 3.5 2020    CL 96 (L) 2020    CO2 28 2020    BUN 17 2020    CREATININE 0.6 2020    GLUCOSE 221 (H) 2020    CALCIUM 9.8 2020    PROT 6.1 (L) 2020    LABALBU 2.9 (L) 2020    BILITOT 0.6 2020    ALKPHOS 172 (H) 2020    AST 20 2020    ALT 16 2020    LABGLOM >60 2020   No results found for: INR, [facility], OT   Occupational Therapist   Occupational   Progress Notes   Signed   Date of Service:  2020  4:20 PM               Signed             Show:Clear all  []Manual[x]Template[x]Copied    Added by:  [x]Luz Valdivia OT    []Barbara for details  Occupational Therapy  Facility/Department: [facility] 8 REHAB UNIT  Daily Treatment Note  NAME: Lizbeth Mcnally  : 1950  MRN: [id number]     Date of Service: 2020     Discharge Recommendations:  Home with Home health OT     Assessment   Performance deficits / Impairments: Decreased functional mobility ; Decreased ADL status; Decreased strength;Decreased sensation;Decreased fine motor control;Decreased high-level IADLs;Decreased balance  Assessment: Patient requires increased assistance with ADLs due to B numbness in hands and decreased balance with functional mobility.  He also has diabetes with numbness in his feet with multiple wounds on B feet. Will need to make sure and elevate heels in bed. Patient requires skilled OT to return the patient home independently with Home Health OT. Treatment Diagnosis: C5-C6 cervical discectomy   Prognosis: Good  Decision Making: Low Complexity  OT Education: OT Role;Plan of Care;ADL Adaptive Strategies;Transfer Training  Activity Tolerance  Activity Tolerance: Patient Tolerated treatment well  Safety Devices  Safety Devices in place: Yes(Left with PT )  Type of devices: Left in chair           Patient Diagnosis(es): There were no encounter diagnoses.       has a past medical history of Chronic back pain, Essential hypertension, GERD (gastroesophageal reflux disease), Hyperlipidemia, Low back pain, Nail fungus, Neuropathy, and Osteoarthritis. has a past surgical history that includes Cervical spine surgery;  Cholecystectomy; and hernia repair.     Restrictions  Restrictions/Precautions  Restrictions/Precautions: Fall Risk, Surgical Protocols  Required Braces or Orthoses?: Yes  Required Braces or Orthoses  Cervical: c-collar  Position Activity Restriction  Spinal Precautions: No Bending, No Lifting, No Twisting  Subjective   General  Chart Reviewed: Yes  Patient assessed for rehabilitation services?: Yes  Diagnosis: C5-C6 cervical discectomy   Pain Assessment  Pain Assessment: 0-10  Pain Level: 5  Pain Location: Shoulder  Pain Orientation: Right;Left  Pain Descriptors: Aching  Pain Frequency: Intermittent   Orientation  Orientation  Overall Orientation Status: Within Normal Limits  Objective    Balance  Sitting Balance: Supervision  Standing Balance: Contact guard assistance  Functional Mobility  Functional - Mobility Device: Rolling Walker  Activity: To/from bathroom  Assist Level: Contact guard assistance  Transfers  Stand Step Transfers: Contact guard assistance  Sit to stand: Contact guard assistance  Stand to sit: Contact guard assistance  LUE AROM (degrees)  LUE AROM : WNL  Left Hand AROM (degrees)  Left Hand AROM: WNL  RUE AROM (degrees)  RUE AROM : WNL  Right Hand AROM (degrees)  Right Hand AROM: WNL  Hand Dominance  Hand Dominance: Right  Left Hand Strength -  (lbs)  Handle Setting 2: 53.1  Right Hand Strength -  (lbs)  Handle Setting 2: 50.1  Fine Motor Skills  Left 9 Hole Peg Test Time (secs): 38  Right 9 Hole Peg Test Time (secs): 50     Plan   Plan  Current Treatment Recommendations: Strengthening, Functional Mobility Training, Safety Education & Training, Patient/Caregiver Education & Training, Equipment Evaluation, Education, & procurement, Self-Care / ADL, Home Management Training     OutComes Score        01/28/20 1515   Lower Body Dressing   Assistance Needed Supervision or touching assistance   Comment CGA   CARE Score 4   Putting On/Taking Off Footwear   Assistance Needed Supervision or touching assistance   CARE Score 4                                                 Goals  Short term goals  Time Frame for Short term goals: 1 week   Short term goal 1: Supervision with bathing hygiene. Short term goal 2: Supervision with LB dressing. Short term goal 3: Supervision with clothing management/hygiene for toileting. Short term goal 4: Supervision with toilet transfers. Short term goal 5: Supervision with ambulatory home making tasks. Short term goal 6: Patient will complete 1-2 handed static standing task for 4 minutes, requiring Supervision. Short term goal 7: Patient will increase B  strength by 2# to increase independence with ADLs. Long term goals  Time Frame for Long term goals : 2 weeks   Long term goal 1: Modified independent with bathing hygiene. Long term goal 2: Modified independent with overall dressing. Long term goal 3: Modified independent with toileting and toilet transfers. Long term goal 4: Independent with HEP  Long term goal 5: Patient verbalize DME needs.    Patient Goals   Patient goals : Return home independently to assist his

## 2020-01-29 NOTE — PLAN OF CARE
Problem: SAFETY  Goal: Free from accidental physical injury  1/29/2020 0006 by Theo Higgins RN  Outcome: Ongoing  1/28/2020 1438 by Alec Sinclair LPN  Outcome: Ongoing  Goal: LTG - Patient will demonstrate safety requirements appropriate to situation/environment  1/29/2020 0006 by Theo Higgins RN  Outcome: Ongoing  1/28/2020 1438 by Alec Sinclair LPN  Outcome: Ongoing  Goal: LTG - patient will utilize safety techniques  1/29/2020 0006 by Theo Higgins RN  Outcome: Ongoing  1/28/2020 1438 by Alec Sinclair LPN  Outcome: Ongoing  Goal: STG - patient locks brakes on wheelchair  1/29/2020 0006 by Theo Higgins RN  Outcome: Ongoing  1/28/2020 1438 by Alec Sinclair LPN  Outcome: Ongoing  Goal: STG - Patient uses call light consistently to request assistance with transfers  1/29/2020 0006 by Theo Higgins RN  Outcome: Ongoing  1/28/2020 1438 by Alec Sinclair LPN  Outcome: Ongoing  Goal: STG - patient uses gait belt during all transfers  1/29/2020 0006 by Theo Higgins RN  Outcome: Ongoing  1/28/2020 1438 by Alec Sinclair LPN  Outcome: Ongoing     Problem: PAIN  Goal: Patient's pain/discomfort is manageable  1/29/2020 0006 by Theo Higgins RN  Outcome: Ongoing  1/28/2020 1438 by Alec Sinclair LPN  Outcome: Ongoing  Goal: STG - pain is manageable through therapies  1/29/2020 0006 by Theo Higgins RN  Outcome: Ongoing  1/28/2020 1438 by Alec Sinclair LPN  Outcome: Ongoing  Goal: STG - Patient will verbalize an acceptable level of pain  1/29/2020 0006 by Theo Higgins RN  Outcome: Ongoing  1/28/2020 1438 by Alec Sinclair LPN  Outcome: Ongoing  Goal: STG - patients pain is managed to allow active participation in daily activities  1/29/2020 0006 by Theo Higgins RN  Outcome: Ongoing  1/28/2020 1438 by Alec Sinclair LPN  Outcome: Ongoing     Problem: SKIN INTEGRITY  Goal: Skin integrity is maintained or improved  1/29/2020 0006 by Theo Higgins

## 2020-01-29 NOTE — PROGRESS NOTES
Physical Therapy     Name: Adi Bloom  MRN:  [id number]  Date of service:  1/29/2020 01/29/20 1411   Transfers   Sit to Stand Contact guard assistance;Stand by assistance   Stand to sit Contact guard assistance;Stand by assistance   Ambulation 1   Surface level tile   Device Rolling Walker   Other Apparatus Wheelchair follow   Assistance Contact guard assistance   Quality of Gait 3 point gait preparing for reciprocal, steady    Gait Deviations Slow Chantal; Increased ALONA   Distance 100ft   Comments demonstrates better balance and mechanics with fww than rollator   Ambulation 2   Surface - 2 level tile   Device 2 Rolling Walker   Other Apparatus 2 Wheelchair follow   Assistance 2 Contact guard assistance   Quality of Gait 2 reciprocal pattern   Gait Deviations Slow Chantal; Increased ALONA   Distance 100ft with turns    Comments demonstrates better balance during turns than yesterday    Propulsion 1   Propulsion Manual   Level Level Tile   Method RUE;LUE   Level of Assistance Stand by assistance   Description/ Details good w/c propulsion    Distance 150ft   Balance   Posture Good   Sitting - Static Good   Sitting - Dynamic Good   Standing - Static Fair;+   Standing - Dynamic Fair   Exercises   Hamstring Sets 20ea, GTB   Hip Flexion 10ea with manual resistance   Hip Extension/Leg Presses 10ea with manual resistance   Knee Long Arc Quad 10ea with manual resistance   Conditions Requiring Skilled Therapeutic Intervention   Assessment Pt completed treatment with good effort and form this afternoon. Pt demonstrated better balance and mechanics with the fww so at this time, it will be the primary AD. Pt completed general strengthening and balance exercises to improve his activity tolerance. Activity Tolerance   Activity Tolerance Patient Tolerated treatment well   Safety Devices   Type of devices All fall risk precautions in place; Bed alarm in place;Call light within reach;Gait belt;Left in bed       Electronically signed by Willie Espinoza Student PT on 1/29/2020 at 3:05 PM

## 2020-01-30 LAB
ALBUMIN SERPL-MCNC: 3.5 G/DL (ref 3.5–5.2)
ALP BLD-CCNC: 145 U/L (ref 40–130)
ALT SERPL-CCNC: 14 U/L (ref 5–41)
ANION GAP SERPL CALCULATED.3IONS-SCNC: 13 MMOL/L (ref 7–19)
AST SERPL-CCNC: 15 U/L (ref 5–40)
BASOPHILS ABSOLUTE: 0.1 K/UL (ref 0–0.2)
BASOPHILS RELATIVE PERCENT: 0.7 % (ref 0–1)
BILIRUB SERPL-MCNC: 0.7 MG/DL (ref 0.2–1.2)
BILIRUBIN URINE: NEGATIVE
BLOOD, URINE: NEGATIVE
BUN BLDV-MCNC: 16 MG/DL (ref 8–23)
CALCIUM SERPL-MCNC: 10 MG/DL (ref 8.8–10.2)
CHLORIDE BLD-SCNC: 93 MMOL/L (ref 98–111)
CLARITY: CLEAR
CO2: 30 MMOL/L (ref 22–29)
COLOR: YELLOW
CREAT SERPL-MCNC: 0.7 MG/DL (ref 0.5–1.2)
EOSINOPHILS ABSOLUTE: 0.3 K/UL (ref 0–0.6)
EOSINOPHILS RELATIVE PERCENT: 3.2 % (ref 0–5)
GFR NON-AFRICAN AMERICAN: >60
GLUCOSE BLD-MCNC: 192 MG/DL (ref 74–109)
GLUCOSE BLD-MCNC: 202 MG/DL (ref 70–99)
GLUCOSE BLD-MCNC: 222 MG/DL (ref 70–99)
GLUCOSE BLD-MCNC: 260 MG/DL (ref 70–99)
GLUCOSE BLD-MCNC: 297 MG/DL (ref 70–99)
GLUCOSE URINE: 250 MG/DL
HCT VFR BLD CALC: 46.3 % (ref 42–52)
HEMOGLOBIN: 15.2 G/DL (ref 14–18)
IMMATURE GRANULOCYTES #: 0.1 K/UL
KETONES, URINE: NEGATIVE MG/DL
LEUKOCYTE ESTERASE, URINE: NEGATIVE
LYMPHOCYTES ABSOLUTE: 2.6 K/UL (ref 1.1–4.5)
LYMPHOCYTES RELATIVE PERCENT: 25.3 % (ref 20–40)
MCH RBC QN AUTO: 30.6 PG (ref 27–31)
MCHC RBC AUTO-ENTMCNC: 32.8 G/DL (ref 33–37)
MCV RBC AUTO: 93.2 FL (ref 80–94)
MONOCYTES ABSOLUTE: 0.8 K/UL (ref 0–0.9)
MONOCYTES RELATIVE PERCENT: 7.8 % (ref 0–10)
NEUTROPHILS ABSOLUTE: 6.3 K/UL (ref 1.5–7.5)
NEUTROPHILS RELATIVE PERCENT: 61.7 % (ref 50–65)
NITRITE, URINE: NEGATIVE
PDW BLD-RTO: 12.5 % (ref 11.5–14.5)
PERFORMED ON: ABNORMAL
PH UA: 5.5 (ref 5–8)
PLATELET # BLD: 293 K/UL (ref 130–400)
PMV BLD AUTO: 10.7 FL (ref 9.4–12.4)
POTASSIUM REFLEX MAGNESIUM: 4.6 MMOL/L (ref 3.5–5)
PROTEIN UA: NEGATIVE MG/DL
RBC # BLD: 4.97 M/UL (ref 4.7–6.1)
SODIUM BLD-SCNC: 136 MMOL/L (ref 136–145)
SPECIFIC GRAVITY UA: 1.01 (ref 1–1.03)
TOTAL PROTEIN: 5.8 G/DL (ref 6.6–8.7)
UROBILINOGEN, URINE: 0.2 E.U./DL
WBC # BLD: 10.2 K/UL (ref 4.8–10.8)

## 2020-01-30 PROCEDURE — 1180000000 HC REHAB R&B

## 2020-01-30 PROCEDURE — 99232 SBSQ HOSP IP/OBS MODERATE 35: CPT | Performed by: PSYCHIATRY & NEUROLOGY

## 2020-01-30 PROCEDURE — 97530 THERAPEUTIC ACTIVITIES: CPT

## 2020-01-30 PROCEDURE — 6370000000 HC RX 637 (ALT 250 FOR IP): Performed by: PSYCHIATRY & NEUROLOGY

## 2020-01-30 PROCEDURE — 87086 URINE CULTURE/COLONY COUNT: CPT

## 2020-01-30 PROCEDURE — 85025 COMPLETE CBC W/AUTO DIFF WBC: CPT

## 2020-01-30 PROCEDURE — 81003 URINALYSIS AUTO W/O SCOPE: CPT

## 2020-01-30 PROCEDURE — 97110 THERAPEUTIC EXERCISES: CPT

## 2020-01-30 PROCEDURE — 36415 COLL VENOUS BLD VENIPUNCTURE: CPT

## 2020-01-30 PROCEDURE — 82948 REAGENT STRIP/BLOOD GLUCOSE: CPT

## 2020-01-30 PROCEDURE — 80053 COMPREHEN METABOLIC PANEL: CPT

## 2020-01-30 PROCEDURE — 97116 GAIT TRAINING THERAPY: CPT

## 2020-01-30 RX ORDER — OXYCODONE AND ACETAMINOPHEN 7.5; 325 MG/1; MG/1
1 TABLET ORAL EVERY 4 HOURS PRN
Status: DISCONTINUED | OUTPATIENT
Start: 2020-01-30 | End: 2020-02-07 | Stop reason: HOSPADM

## 2020-01-30 RX ADMIN — INSULIN LISPRO 2 UNITS: 100 INJECTION, SOLUTION INTRAVENOUS; SUBCUTANEOUS at 17:10

## 2020-01-30 RX ADMIN — PREGABALIN 200 MG: 75 CAPSULE ORAL at 21:05

## 2020-01-30 RX ADMIN — BUSPIRONE HYDROCHLORIDE 10 MG: 10 TABLET ORAL at 21:05

## 2020-01-30 RX ADMIN — METFORMIN HYDROCHLORIDE 1000 MG: 500 TABLET ORAL at 08:20

## 2020-01-30 RX ADMIN — OXYCODONE HYDROCHLORIDE AND ACETAMINOPHEN 1 TABLET: 7.5; 325 TABLET ORAL at 05:59

## 2020-01-30 RX ADMIN — HYDROCHLOROTHIAZIDE 25 MG: 25 TABLET ORAL at 08:19

## 2020-01-30 RX ADMIN — METFORMIN HYDROCHLORIDE 1000 MG: 500 TABLET ORAL at 17:09

## 2020-01-30 RX ADMIN — BACITRACIN: 500 OINTMENT TOPICAL at 22:10

## 2020-01-30 RX ADMIN — PREGABALIN 200 MG: 75 CAPSULE ORAL at 13:55

## 2020-01-30 RX ADMIN — SIMVASTATIN 40 MG: 40 TABLET, FILM COATED ORAL at 21:05

## 2020-01-30 RX ADMIN — LOSARTAN POTASSIUM 100 MG: 100 TABLET, FILM COATED ORAL at 08:19

## 2020-01-30 RX ADMIN — OXYCODONE HYDROCHLORIDE AND ACETAMINOPHEN 1 TABLET: 7.5; 325 TABLET ORAL at 09:43

## 2020-01-30 RX ADMIN — CITALOPRAM HYDROBROMIDE 20 MG: 10 TABLET ORAL at 08:20

## 2020-01-30 RX ADMIN — OXYCODONE HYDROCHLORIDE AND ACETAMINOPHEN 1 TABLET: 7.5; 325 TABLET ORAL at 22:08

## 2020-01-30 RX ADMIN — INSULIN LISPRO 3 UNITS: 100 INJECTION, SOLUTION INTRAVENOUS; SUBCUTANEOUS at 12:31

## 2020-01-30 RX ADMIN — PREGABALIN 200 MG: 75 CAPSULE ORAL at 08:19

## 2020-01-30 RX ADMIN — INSULIN LISPRO 2 UNITS: 100 INJECTION, SOLUTION INTRAVENOUS; SUBCUTANEOUS at 08:22

## 2020-01-30 RX ADMIN — Medication 40 UNITS: at 21:09

## 2020-01-30 RX ADMIN — OXYCODONE HYDROCHLORIDE AND ACETAMINOPHEN 1 TABLET: 7.5; 325 TABLET ORAL at 13:56

## 2020-01-30 RX ADMIN — OXYCODONE HYDROCHLORIDE AND ACETAMINOPHEN 1 TABLET: 7.5; 325 TABLET ORAL at 17:54

## 2020-01-30 RX ADMIN — BUSPIRONE HYDROCHLORIDE 10 MG: 10 TABLET ORAL at 08:20

## 2020-01-30 RX ADMIN — PANTOPRAZOLE SODIUM 40 MG: 40 TABLET, DELAYED RELEASE ORAL at 05:54

## 2020-01-30 RX ADMIN — CYCLOBENZAPRINE HYDROCHLORIDE 10 MG: 10 TABLET, FILM COATED ORAL at 17:54

## 2020-01-30 RX ADMIN — ZOLPIDEM TARTRATE 10 MG: 5 TABLET ORAL at 21:09

## 2020-01-30 ASSESSMENT — PAIN DESCRIPTION - FREQUENCY
FREQUENCY: INTERMITTENT

## 2020-01-30 ASSESSMENT — PAIN DESCRIPTION - LOCATION
LOCATION: NECK

## 2020-01-30 ASSESSMENT — PAIN - FUNCTIONAL ASSESSMENT
PAIN_FUNCTIONAL_ASSESSMENT: ACTIVITIES ARE NOT PREVENTED

## 2020-01-30 ASSESSMENT — PAIN SCALES - GENERAL
PAINLEVEL_OUTOF10: 5
PAINLEVEL_OUTOF10: 2
PAINLEVEL_OUTOF10: 6
PAINLEVEL_OUTOF10: 7
PAINLEVEL_OUTOF10: 4
PAINLEVEL_OUTOF10: 3
PAINLEVEL_OUTOF10: 6
PAINLEVEL_OUTOF10: 5
PAINLEVEL_OUTOF10: 4
PAINLEVEL_OUTOF10: 4
PAINLEVEL_OUTOF10: 3

## 2020-01-30 ASSESSMENT — PAIN DESCRIPTION - DESCRIPTORS
DESCRIPTORS: ACHING;SORE

## 2020-01-30 ASSESSMENT — PAIN DESCRIPTION - PAIN TYPE
TYPE: ACUTE PAIN

## 2020-01-30 ASSESSMENT — PAIN DESCRIPTION - ORIENTATION
ORIENTATION: ANTERIOR;POSTERIOR

## 2020-01-30 ASSESSMENT — PAIN DESCRIPTION - PROGRESSION
CLINICAL_PROGRESSION: GRADUALLY IMPROVING

## 2020-01-30 ASSESSMENT — PAIN DESCRIPTION - ONSET
ONSET: GRADUAL

## 2020-01-30 NOTE — PROGRESS NOTES
Physical Therapy     Name: Redd Hdz  MRN:  [id number]  Date of service:  1/30/2020 01/30/20 0912   Restrictions/Precautions   Restrictions/Precautions Fall Risk;Surgical Protocols   Required Braces or Orthoses? Yes   Required Braces or Orthoses   Cervical c-collar   Position Activity Restriction   Spinal Precautions No Bending; No Lifting; No Twisting   Transfers   Sit to Stand Stand by assistance   Stand to sit Stand by assistance   Bed to Chair Stand by assistance   Ambulation 1   Surface level tile   Device Rolling Walker   Other Apparatus Wheelchair follow   Assistance Contact guard assistance   Quality of Gait started with a 3 point gait, progressed to reciprocal    Gait Deviations Slow Chantal; Increased ALONA   Distance 150ft   Comments demonstrates better balance and mechanics with fww than rollator   Wheelchair Activities   Wheelchair Type Standard   Wheelchair Cushion Standard   Propulsion 1   Propulsion Manual   Level Level Tile   Method RUE;LUE;RLE;LLE   Level of Assistance Stand by assistance   Description/ Details good w/c propulsion    Distance 150ft   Balance   Posture Good   Sitting - Static Good   Sitting - Dynamic Good   Standing - Static Fair;+   Standing - Dynamic Fair;+   Exercises   Hamstring Sets 20ea, GTB   Knee Long Arc Quad 10ea 5lbs ankle weight   Other exercises   Other exercises 1 in // bars, HS, calf raises, hip ABD, 10ea   Other exercises 2 in // bars, static balance; feet together, apart, and in tandum. 30sec, little UE support as possible   Conditions Requiring Skilled Therapeutic Intervention   Assessment Pt completed treatment with good effort and form this morning. Pt participated in standing ther ex and demonstrates improved balance and mechanics during ambulation. Treatment this afternoon will include additional stair training and amb on uneven surfaces.     Activity Tolerance   Activity Tolerance Patient Tolerated treatment well   Safety Devices   Type of devices All fall risk precautions in place;Gait belt;Left in chair  (pt was left in OT gym with OT present )       Electronically signed by Shashank Timmons Student PT on 1/30/2020 at 10:04 AM

## 2020-01-30 NOTE — PROGRESS NOTES
Subjective :      Sapphire Villanueva is a 77 y.o. male referral for coccyx wond. Patient has a Other: dry eschar, ulcer, denuded wounds which is located on the toes, left achilles, left buttocks, inner thighs. Objective:        Wound:  Right foot plantar ulcer 3cm x 0.5cm x 0.3cm  Left foot medial heel 0.3cm x 0.3cm x <0.1cm  Left achilles 1cm x 1cm x <0.1cm dry eschar  Left buttocks Stage 2 pressure injury  2cm x 1.5cm x  0.1cm  Rash red/denuded inner thighs, scrotum, penis, gluteal folds    Wound Description:  See above  This was originally measured on right neck on 1/28/20  Measurements shown are from today's visit:1/30/20      Assessment :      Other: Dry scabbed eschar to right and left toes and left achilles    Patient has hard collar on, dressing c/d/i to right side of neck underneath collar    Noted patient was admitted to [facility] 1/23/20 with spinal cord compression, OR 1/23/20 per Dr. Mt Mejia for cervical disectomy anterior with fusion C5-6    Patient is now in [address] [place] [address] at [facility].        Patient seen with his nurse Malia Mack RN at bedside. Wound photos taken with patient written consent, this was placed in patient folder on unit. Plan :      Plan for wound:Aquacel and silicone dressing daily and prn soiling to right plantar foot ulcer, magic butt cream to rash areas buttocks and inner thighs, betadine to scabbed areas right and left feel and left achilles, silicone dressing and barrier cream to left buttocks ulcer, pressure injury prevention protocol, waffle chair cushion. Dress per physician order:orders placed in [address] Protocol and wound care order sets reviewed and updated/      Patient to see Severa Hammock APRN at [facility] one week after discharge. Spoke with ROMAIN MENDEZ she will schedule appointment.       Alwin Dancer, RN 1/30/2020

## 2020-01-30 NOTE — PROGRESS NOTES
 Hyperlipidemia     Low back pain 1/29/2016    Nail fungus     Neuropathy     Osteoarthritis        Past Surgical History:      Procedure Laterality Date   Sammy Israelole      Dr Brad Nielsen - done 2-3 yrs ago @ [facility], pt unaware of dates   [address] [facility]         Medications in Hospital:      Current Facility-Administered Medications:     oxyCODONE-acetaminophen (PERCOCET) 7.5-325 MG per tablet 1 tablet, 1 tablet, Oral, Q4H PRN, Justin Connolly MD    pregabalin (LYRICA) capsule 200 mg, 200 mg, Oral, TID, Justin Connolly MD, 200 mg at 01/30/20 0819    nicotine (NICODERM CQ) 14 MG/24HR 1 patch, 1 patch, Transdermal, Daily, Justin Connolly MD, 1 patch at 01/29/20 1700    busPIRone (BUSPAR) tablet 10 mg, 10 mg, Oral, BID, Justin Connolly MD, 10 mg at 01/30/20 0820    citalopram (CELEXA) tablet 20 mg, 20 mg, Oral, Daily, Justin Connolly MD, 20 mg at 01/30/20 0820    cyclobenzaprine (FLEXERIL) tablet 10 mg, 10 mg, Oral, TID PRN, Justin Connolly MD    metFORMIN (GLUCOPHAGE) tablet 1,000 mg, 1,000 mg, Oral, BID WC, Justin Connolly MD, 1,000 mg at 01/30/20 0820    pantoprazole (PROTONIX) tablet 40 mg, 40 mg, Oral, QAM AC, Justin Connolly MD, 40 mg at 01/30/20 0554    oxyCODONE-acetaminophen (PERCOCET) 7.5-325 MG per tablet 1 tablet, 1 tablet, Oral, Q6H PRN, Justin Connolly MD, 1 tablet at 01/30/20 0559    simvastatin (ZOCOR) tablet 40 mg, 40 mg, Oral, Nightly, Justin Connolly MD, 40 mg at 01/29/20 2021    zolpidem (AMBIEN) tablet 10 mg, 10 mg, Oral, Nightly PRN, Justin Connolly MD, 10 mg at 01/29/20 2021    acetaminophen (TYLENOL) tablet 650 mg, 650 mg, Oral, Q4H PRN, Justin Connolly MD    polyethylene glycol (GLYCOLAX) packet 17 g, 17 g, Oral, Daily PRN, Justin Connolly MD    glucose (GLUTOSE) 40 % oral gel 15 g, 15 g, Oral, PRN, Justin Connolly MD    dextrose 50 % IV solution, 12.5 g, Intravenous, PRN, Justin Connolly MD    glucagon (rDNA) injection 1 mg, 1 mg, Intramuscular, PRN, Tarah Conley MD    dextrose 5 % solution, 100 mL/hr, Intravenous, PRN, Tarah Conley MD    insulin lispro (HUMALOG) injection vial 0-6 Units, 0-6 Units, Subcutaneous, TID WC, Tarah Conley MD, 2 Units at 01/30/20 0822    insulin lispro (HUMALOG) injection vial 0-3 Units, 0-3 Units, Subcutaneous, Nightly, Tarah Conley MD, 1 Units at 01/29/20 2041    insulin glargine (LANTUS) injection vial 40 Units, 40 Units, Subcutaneous, Nightly, Tarah Conley MD, 40 Units at 01/29/20 2041    losartan (COZAAR) tablet 100 mg, 100 mg, Oral, Daily, 100 mg at 01/30/20 0819 **AND** hydrochlorothiazide (HYDRODIURIL) tablet 25 mg, 25 mg, Oral, Daily, Tarah Conley MD, 25 mg at 01/30/20 1802    Allergies:  Sulfa antibiotics    Social History:   TOBACCO:   reports that he has been smoking. He has been smoking about 2.00 packs per day. He has never used smokeless tobacco.  ETOH:   reports no history of alcohol use. Family History:       Problem Relation Age of Onset    High Blood Pressure Father          PHYSICAL EXAM:  BP (!) 140/81   Pulse 72   Temp 97.2 °F (36.2 °C) (Temporal)   Resp 18   Ht 5' 11\" (1.803 m)   Wt 217 lb 9.6 oz (98.7 kg)   SpO2 96%   BMI 30.35 kg/m²     Constitutional - well developed, well nourished. Eyes - conjunctiva normal.   Ear, nose, throat - No scars, masses, or lesions over external nose or ears, no atrophy of tongue  Neck-symmetric, no masses noted, no jugular vein distension  Respiration- chest wall appears symmetric, good expansion,   normal effort without use of accessory muscles  Musculoskeletal - no significant wasting of muscles noted, no bony deformities  Extremities-no clubbing, cyanosis or edema  Skin - warm, dry, and intact. No rash, erythema, or pallor.   Psychiatric - mood, affect, and behavior appear normal.      Neurological exam  Awake, alert, fluent oriented appropriate affect  Attention and concentration appear appropriate  Recent and remote memory appears Hand AROM (degrees)  Left Hand AROM: WNL  RUE AROM (degrees)  RUE AROM : WNL  Right Hand AROM (degrees)  Right Hand AROM: WNL  Hand Dominance  Hand Dominance: Right  Left Hand Strength -  (lbs)  Handle Setting 2: 53.1  Right Hand Strength -  (lbs)  Handle Setting 2: 50.1  Fine Motor Skills  Left 9 Hole Peg Test Time (secs): 38  Right 9 Hole Peg Test Time (secs): 50     Plan   Plan  Current Treatment Recommendations: Strengthening, Functional Mobility Training, Safety Education & Training, Patient/Caregiver Education & Training, Equipment Evaluation, Education, & procurement, Self-Care / ADL, Home Management Training     OutComes Score        01/28/20 1515   Lower Body Dressing   Assistance Needed Supervision or touching assistance   Comment CGA   CARE Score 4   Putting On/Taking Off Footwear   Assistance Needed Supervision or touching assistance   CARE Score 4                                                 Goals  Short term goals  Time Frame for Short term goals: 1 week   Short term goal 1: Supervision with bathing hygiene. Short term goal 2: Supervision with LB dressing. Short term goal 3: Supervision with clothing management/hygiene for toileting. Short term goal 4: Supervision with toilet transfers. Short term goal 5: Supervision with ambulatory home making tasks. Short term goal 6: Patient will complete 1-2 handed static standing task for 4 minutes, requiring Supervision. Short term goal 7: Patient will increase B  strength by 2# to increase independence with ADLs. Long term goals  Time Frame for Long term goals : 2 weeks   Long term goal 1: Modified independent with bathing hygiene. Long term goal 2: Modified independent with overall dressing. Long term goal 3: Modified independent with toileting and toilet transfers. Long term goal 4: Independent with HEP  Long term goal 5: Patient verbalize DME needs.    Patient Goals   Patient goals : Return home independently to assist his wife.      Therapy Time    Individual Concurrent Group Co-treatment   Time In 0358      Time Out 1553      Minutes 38      Timed Code Treatment Minutes: [address] [address], OT                      RECORD REVIEW: Previous medical records, medications were reviewed at today's visit    IMPRESSION:   1. Cervical myelopathy-s/p fusion  2. Mood disorder-on meds  3. DM-on meds monitor blood sugar  4. HTN-on meds monitor  5. GI-PPI/bowel regimen  6. Neuropathy-on Lyrica  7. Hyperlipidemia-on statin  8. Pain control-Percocet PRN  9. PT/OT   10.  Sacral ulcer wound care    Continue current care      Expected duration and frequency therapy: 180 minutes per day, 5 days per week    [address] [facility]  [phone] CELL  Dr Jason Montes

## 2020-01-30 NOTE — PATIENT CARE CONFERENCE
Pills whole. Incont bladder. R leg ronaldo. A/O x4. BM 25. No bending, lifting, twisting      SOCIAL WORK/CASE MANAGEMENT  Assessment: Prior to fall and need for surgery - Mr. Armani Amador indicates he took care of wife who is disabled to orthopedic problems- her sister currently assisting her. He is cared for by Pain Management and is quite anxious and his home doses of pain medications. Discharge Plan   Estimated Length of Stay: to be determined  Destination: home health    Pass: No    Services at Discharge: [facility], Occupational Therapy and Nursing per evaluations, [id number] Sandi Clark at Discharge: to be determined, has cervical collar    Progress made in the prior week:  1. N/A, new patient  2.  3.  4.  5.      Goals for following week:  1. Establish treatment plan  2.   3.   4.   5.     Factors facilitating achievement of predicted outcomes: cognitively intact    Barriers to the achievement of predicted outcomes: Pain, premorbid neuropathy  Team Members Present at Conference:  : Sergio Deshpande [place]   Occupational Therapist: Eduardo Martinez, RONNAR/L  Physical Therapist: Favio Estrada PT,DPT  Speech Therapist: N/A  Nurse: Vidya Sullivan RN, BSN   Nurse Manager:  Vidya Sullivan RN, BSN  Dietitian:  Yovani Babb MS, RD, LD  Rehab Director:  Isaak Sampson approve the established interdisciplinary plan of care as documented within the medical record of Torri Ko.

## 2020-01-30 NOTE — PROGRESS NOTES
Occupational Therapy     01/30/20 1300   Restrictions/Precautions   Restrictions/Precautions Surgical Protocols; Fall Risk   Required Braces or Orthoses   Cervical c-collar   Position Activity Restriction   Spinal Precautions No Bending; No Lifting; No Twisting   Other position/activity restrictions c collar x 14 days   ADL   UE Dressing Setup; Increased time to complete  (increased time for buttoning)   Functional Mobility   Functional - Mobility Device Rolling Walker  (in OT apt)   Assist Level Contact guard assistance  (R knee ronaldo at times)   Functional Mobility Comments cues for safety with moblity tasks   Assessment   Performance deficits / Impairments Decreased functional mobility ; Decreased ADL status; Decreased safe awareness;Decreased balance;Decreased high-level IADLs;Decreased fine motor control   Timed Code Treatment Minutes 45 Minutes   Activity Tolerance   Activity Tolerance Patient Tolerated treatment well   Safety Devices   Safety Devices in place Yes

## 2020-01-31 LAB
GLUCOSE BLD-MCNC: 142 MG/DL (ref 70–99)
GLUCOSE BLD-MCNC: 181 MG/DL (ref 70–99)
GLUCOSE BLD-MCNC: 237 MG/DL (ref 70–99)
GLUCOSE BLD-MCNC: 261 MG/DL (ref 70–99)
PERFORMED ON: ABNORMAL

## 2020-01-31 PROCEDURE — 97530 THERAPEUTIC ACTIVITIES: CPT

## 2020-01-31 PROCEDURE — 1180000000 HC REHAB R&B

## 2020-01-31 PROCEDURE — 97535 SELF CARE MNGMENT TRAINING: CPT

## 2020-01-31 PROCEDURE — 6370000000 HC RX 637 (ALT 250 FOR IP): Performed by: PSYCHIATRY & NEUROLOGY

## 2020-01-31 PROCEDURE — 99232 SBSQ HOSP IP/OBS MODERATE 35: CPT | Performed by: PSYCHIATRY & NEUROLOGY

## 2020-01-31 PROCEDURE — 97116 GAIT TRAINING THERAPY: CPT

## 2020-01-31 PROCEDURE — 82948 REAGENT STRIP/BLOOD GLUCOSE: CPT

## 2020-01-31 PROCEDURE — 97110 THERAPEUTIC EXERCISES: CPT

## 2020-01-31 RX ADMIN — LOSARTAN POTASSIUM 100 MG: 100 TABLET, FILM COATED ORAL at 07:46

## 2020-01-31 RX ADMIN — OXYCODONE HYDROCHLORIDE AND ACETAMINOPHEN 1 TABLET: 7.5; 325 TABLET ORAL at 13:21

## 2020-01-31 RX ADMIN — CITALOPRAM HYDROBROMIDE 20 MG: 10 TABLET ORAL at 07:47

## 2020-01-31 RX ADMIN — Medication 40 UNITS: at 20:25

## 2020-01-31 RX ADMIN — OXYCODONE HYDROCHLORIDE AND ACETAMINOPHEN 1 TABLET: 7.5; 325 TABLET ORAL at 07:47

## 2020-01-31 RX ADMIN — PREGABALIN 200 MG: 75 CAPSULE ORAL at 07:46

## 2020-01-31 RX ADMIN — METFORMIN HYDROCHLORIDE 1000 MG: 500 TABLET ORAL at 17:23

## 2020-01-31 RX ADMIN — BUSPIRONE HYDROCHLORIDE 10 MG: 10 TABLET ORAL at 20:25

## 2020-01-31 RX ADMIN — PANTOPRAZOLE SODIUM 40 MG: 40 TABLET, DELAYED RELEASE ORAL at 06:12

## 2020-01-31 RX ADMIN — OXYCODONE HYDROCHLORIDE AND ACETAMINOPHEN 1 TABLET: 7.5; 325 TABLET ORAL at 17:26

## 2020-01-31 RX ADMIN — PREGABALIN 200 MG: 75 CAPSULE ORAL at 20:24

## 2020-01-31 RX ADMIN — BUSPIRONE HYDROCHLORIDE 10 MG: 10 TABLET ORAL at 07:46

## 2020-01-31 RX ADMIN — PREGABALIN 200 MG: 75 CAPSULE ORAL at 13:25

## 2020-01-31 RX ADMIN — METFORMIN HYDROCHLORIDE 1000 MG: 500 TABLET ORAL at 07:47

## 2020-01-31 RX ADMIN — HYDROCHLOROTHIAZIDE 25 MG: 25 TABLET ORAL at 07:46

## 2020-01-31 RX ADMIN — SIMVASTATIN 40 MG: 40 TABLET, FILM COATED ORAL at 20:25

## 2020-01-31 RX ADMIN — OXYCODONE HYDROCHLORIDE AND ACETAMINOPHEN 1 TABLET: 7.5; 325 TABLET ORAL at 02:27

## 2020-01-31 RX ADMIN — INSULIN LISPRO 1 UNITS: 100 INJECTION, SOLUTION INTRAVENOUS; SUBCUTANEOUS at 07:53

## 2020-01-31 RX ADMIN — INSULIN LISPRO 3 UNITS: 100 INJECTION, SOLUTION INTRAVENOUS; SUBCUTANEOUS at 17:26

## 2020-01-31 RX ADMIN — ZOLPIDEM TARTRATE 10 MG: 5 TABLET ORAL at 20:25

## 2020-01-31 ASSESSMENT — PAIN DESCRIPTION - FREQUENCY
FREQUENCY: CONTINUOUS
FREQUENCY: INTERMITTENT
FREQUENCY: CONTINUOUS

## 2020-01-31 ASSESSMENT — PAIN DESCRIPTION - DESCRIPTORS
DESCRIPTORS: ACHING;SORE
DESCRIPTORS: ACHING

## 2020-01-31 ASSESSMENT — PAIN DESCRIPTION - ONSET
ONSET: ON-GOING
ONSET: GRADUAL
ONSET: ON-GOING

## 2020-01-31 ASSESSMENT — PAIN DESCRIPTION - LOCATION
LOCATION: KNEE
LOCATION: NECK
LOCATION: KNEE
LOCATION: KNEE

## 2020-01-31 ASSESSMENT — PAIN DESCRIPTION - PROGRESSION
CLINICAL_PROGRESSION: GRADUALLY WORSENING
CLINICAL_PROGRESSION: NOT CHANGED
CLINICAL_PROGRESSION: GRADUALLY IMPROVING
CLINICAL_PROGRESSION: NOT CHANGED

## 2020-01-31 ASSESSMENT — PAIN - FUNCTIONAL ASSESSMENT
PAIN_FUNCTIONAL_ASSESSMENT: ACTIVITIES ARE NOT PREVENTED

## 2020-01-31 ASSESSMENT — PAIN DESCRIPTION - PAIN TYPE
TYPE: ACUTE PAIN

## 2020-01-31 ASSESSMENT — PAIN SCALES - GENERAL
PAINLEVEL_OUTOF10: 0
PAINLEVEL_OUTOF10: 3
PAINLEVEL_OUTOF10: 7
PAINLEVEL_OUTOF10: 0
PAINLEVEL_OUTOF10: 5
PAINLEVEL_OUTOF10: 7
PAINLEVEL_OUTOF10: 3
PAINLEVEL_OUTOF10: 7
PAINLEVEL_OUTOF10: 0

## 2020-01-31 ASSESSMENT — PAIN DESCRIPTION - ORIENTATION
ORIENTATION: RIGHT
ORIENTATION: ANTERIOR;POSTERIOR
ORIENTATION: RIGHT
ORIENTATION: RIGHT

## 2020-01-31 NOTE — PROGRESS NOTES
Occupational Therapy  Facility/Department: [facility] 8 REHAB UNIT  Daily Treatment Note  NAME: Billy Carias  : 1950  MRN: [id number]    Date of Service: 2020    Discharge Recommendations:  Home with Home health OT       Assessment   Performance deficits / Impairments: Decreased functional mobility ; Decreased ADL status; Decreased safe awareness;Decreased balance;Decreased high-level IADLs;Decreased fine motor control  Patient Education: educated pt on proper way to clean c-collar   Activity Tolerance  Activity Tolerance: Patient Tolerated treatment well  Safety Devices  Safety Devices in place: Yes  Type of devices: Left in chair         Patient Diagnosis(es): There were no encounter diagnoses. has a past medical history of Chronic back pain, Essential hypertension, GERD (gastroesophageal reflux disease), Hyperlipidemia, Low back pain, Nail fungus, Neuropathy, and Osteoarthritis. has a past surgical history that includes Cervical spine surgery; Cholecystectomy; and hernia repair.     Restrictions  Restrictions/Precautions  Restrictions/Precautions: Fall Risk  Required Braces or Orthoses?: Yes  Required Braces or Orthoses  Cervical: c-collar  Position Activity Restriction  Spinal Precautions: No Bending, No Lifting, No Twisting  Other position/activity restrictions: C collar x 14 days  Subjective   General  Chart Reviewed: Yes  Patient assessed for rehabilitation services?: Yes  Family / Caregiver Present: No  Diagnosis: C5-C6 cervical discectomy   Subjective  Subjective: Pt c/o numbness in hands and difficulty with handwriting  Pain Assessment  Pain Assessment: 0-10  Pain Level: 0  Vital Signs  Patient Currently in Pain: No   Objective     Balance  Sitting Balance: Supervision  Standing Balance: Stand by assistance  Functional Mobility  Functional - Mobility Device: Rolling Walker  Activity: Other(bed to w/c)  Assist Level: Stand by assistance  Bed mobility  Rolling to Right: Supervision  Supine to Sit: Supervision  Transfers  Stand Step Transfers: Stand by assistance  Sit to stand: Stand by assistance  Stand to sit: Stand by assistance        Coordination  Fine Motor: various FM act utilizing pincer and tripod grasps         Type of ROM/Therapeutic Exercise  Comment:  exerciser 3 sets x 15 reps           01/31/20 1345   Upper Body Dressing   Assistance Needed Setup or clean-up assistance   CARE Score 5   Putting On/Taking Off Footwear   Assistance Needed Setup or clean-up assistance   CARE Score 5     Plan   Plan  Current Treatment Recommendations: Strengthening, Functional Mobility Training, Safety Education & Training, Patient/Caregiver Education & Training, Equipment Evaluation, Education, & procurement, Self-Care / ADL, Home Management Training    Goals  Short term goals  Time Frame for Short term goals: 1 week   Short term goal 1: Supervision with bathing hygiene. Short term goal 2: Supervision with LB dressing. Short term goal 3: Supervision with clothing management/hygiene for toileting. Short term goal 4: Supervision with toilet transfers. Short term goal 5: Supervision with ambulatory home making tasks. Short term goal 6: Patient will complete 1-2 handed static standing task for 4 minutes, requiring Supervision. Short term goal 7: Patient will increase B  strength by 2# to increase independence with ADLs. Long term goals  Time Frame for Long term goals : 2 weeks   Long term goal 1: Modified independent with bathing hygiene. Long term goal 2: Modified independent with overall dressing. Long term goal 3: Modified independent with toileting and toilet transfers. Long term goal 4: Independent with HEP  Long term goal 5: Patient verbalize DME needs. Patient Goals   Patient goals : Return home independently to assist his wife.         Therapy Time   Individual Concurrent Group Co-treatment   Time In 1345        Time Out 1430        Minutes 45        Timed Code Treatment Minutes: 39 Minutes       [employer]

## 2020-01-31 NOTE — PROGRESS NOTES
Occupational Therapy     01/31/20 0815   Restrictions/Precautions   Restrictions/Precautions Fall Risk   Required Braces or Orthoses? Yes   Position Activity Restriction   Spinal Precautions No Bending; No Lifting; No Twisting   Other position/activity restrictions C collar x 14 days   Vision   Vision Impaired   Vision Exceptions Wears glasses for reading   Hearing   Hearing [facility]   Pain Assessment   Patient Currently in Pain No   Pain Assessment 0-10   Pain Level 0   ADL   Grooming Stand by assistance   UE Bathing Stand by assistance   LE Bathing Stand by assistance   UE Dressing Stand by assistance  (Increased time to complete)   LE Dressing Stand by assistance   Balance   Sitting Balance Supervision   Standing Balance Stand by assistance   Functional Mobility   Functional - Mobility Device Rolling Walker   Assist Level Contact guard assistance   Functional Mobility Comments cues for safety with moblity tasks/ navigating turns w/ RW   Transfers   Stand Step Transfers Contact guard assistance   Sit to stand Stand by assistance   Stand to sit Stand by assistance   Wheelchair Bed Transfers   Wheelchair/Bed - Technique Stand step   Level of Asssistance Contact guard assistance   Activity Tolerance   Activity Tolerance Patient Tolerated treatment well   Safety Devices   Safety Devices in place Yes

## 2020-01-31 NOTE — PLAN OF CARE
Problem: SAFETY  Goal: Free from accidental physical injury  1/31/2020 0010 by Katie Escobedo LPN  Outcome: Ongoing  1/30/2020 1630 by Roxy Lynn RN  Outcome: Ongoing  Goal: LTG - Patient will demonstrate safety requirements appropriate to situation/environment  1/31/2020 0010 by Katie Escobedo LPN  Outcome: Ongoing  1/30/2020 1630 by Roxy Lynn RN  Outcome: Ongoing  Goal: LTG - patient will utilize safety techniques  1/31/2020 0010 by Katie Escobedo LPN  Outcome: Ongoing  1/30/2020 1630 by Roxy Lynn RN  Outcome: Ongoing  Goal: STG - patient locks brakes on wheelchair  1/31/2020 0010 by Katie Escobedo LPN  Outcome: Ongoing  1/30/2020 1630 by Roxy Lynn RN  Outcome: Ongoing  Goal: STG - Patient uses call light consistently to request assistance with transfers  1/31/2020 0010 by Katie Escobedo LPN  Outcome: Ongoing  1/30/2020 1630 by Roxy Lynn RN  Outcome: Ongoing  Goal: STG - patient uses gait belt during all transfers  1/31/2020 0010 by Katie Escobedo LPN  Outcome: Ongoing  1/30/2020 1630 by Roxy Lynn RN  Outcome: Ongoing     Problem: DAILY CARE  Goal: Daily care needs are met  1/31/2020 0010 by Katie Escobedo LPN  Outcome: Ongoing  1/30/2020 1630 by Roxy Lynn RN  Outcome: Ongoing     Problem: PAIN  Goal: Patient's pain/discomfort is manageable  1/31/2020 0010 by Katie Escobedo LPN  Outcome: Ongoing  1/30/2020 1630 by Roxy Lynn RN  Outcome: Ongoing  Goal: STG - pain is manageable through therapies  1/31/2020 0010 by Katie Escobedo LPN  Outcome: Ongoing  1/30/2020 1630 by Roxy Lynn RN  Outcome: Ongoing  Goal: STG - Patient will verbalize an acceptable level of pain  1/31/2020 0010 by Katie Escobedo LPN  Outcome: Ongoing  1/30/2020 1630 by Roxy Lynn RN  Outcome: Ongoing  Goal: STG - patients pain is managed to allow active participation in daily activities  1/31/2020 0010 by Katie Escobedo LPN  Outcome: Ongoing  1/30/2020 1630 by Edda Frost RN  Outcome: Ongoing     Problem: SKIN INTEGRITY  Goal: Skin integrity is maintained or improved  1/31/2020 0010 by Amaris Reyes LPN  Outcome: Ongoing  1/30/2020 1630 by Edda Frost RN  Outcome: Ongoing  Goal: STG - Patient demonstrates skin care/treatment/dressing change  1/31/2020 0010 by Amaris Reyes LPN  Outcome: Ongoing  1/30/2020 1630 by Edda Frost RN  Outcome: Ongoing  Goal: STG - patient will maintain good skin integrity  1/31/2020 0010 by Amaris Reyes LPN  Outcome: Ongoing  1/30/2020 1630 by Edda Frost RN  Outcome: Ongoing  Goal: STG - Patient exhibits signs of wound healing.  1/31/2020 0010 by Amaris Reyes LPN  Outcome: Ongoing  1/30/2020 1630 by Edda Frost RN  Outcome: Ongoing  Goal: STG - patient demonstrates pressure reduction techniques  1/31/2020 0010 by Amaris Reyes LPN  Outcome: Ongoing  1/30/2020 1630 by Edda Frost RN  Outcome: Ongoing  Goal: STG - Patient demonstrates preventative skin care measures  1/31/2020 0010 by Amaris Reyes LPN  Outcome: Ongoing  1/30/2020 1630 by Edda Frost RN  Outcome: Ongoing     Problem: KNOWLEDGE DEFICIT  Goal: Patient/S.O. demonstrates understanding of disease process, treatment plan, medications, and discharge instructions.   1/31/2020 0010 by Amaris Reyes LPN  Outcome: Ongoing  1/30/2020 1630 by Edda Frost RN  Outcome: Ongoing     Problem: DISCHARGE BARRIERS  Goal: Patient's continuum of care needs are met  1/31/2020 0010 by Amaris Reyes LPN  Outcome: Ongoing  1/30/2020 1630 by Edda Frost RN  Outcome: Ongoing     Problem: Falls - Risk of:  Goal: Will remain free from falls  Description  Will remain free from falls  1/31/2020 0010 by Amaris Reyes LPN  Outcome: Ongoing  1/30/2020 1630 by Edda Frost RN  Outcome: Ongoing  Goal: Absence of physical injury  Description  Absence of physical injury  1/31/2020 0010 by Larissa Vogel LPN  Outcome: Ongoing  1/30/2020 1630 by Dennis Navarrete RN  Outcome: Ongoing     Problem: Mobility - Impaired:  Goal: Mobility will improve  Description  Mobility will improve  1/31/2020 0010 by Larissa Vogel LPN  Outcome: Ongoing  1/30/2020 1630 by Dennis Navarrete RN  Outcome: Ongoing     Problem: Skin Integrity:  Goal: Will show no infection signs and symptoms  Description  Will show no infection signs and symptoms  1/31/2020 0010 by Larissa Vogel LPN  Outcome: Ongoing  1/30/2020 1630 by Dennis Navarrete RN  Outcome: Ongoing  Goal: Absence of new skin breakdown  Description  Absence of new skin breakdown  1/31/2020 0010 by Larissa Vogel LPN  Outcome: Ongoing  1/30/2020 1630 by Dennis Navarrete RN  Outcome: Ongoing     Problem: IP BLADDER/VOIDING  Goal: LTG - Patient will utilize adaptive techniques/equipment to complete bladder elimination  1/31/2020 0010 by Larissa Vogel LPN  Outcome: Ongoing  1/30/2020 1630 by Dennis Navarrete RN  Outcome: Ongoing     Problem: IP BOWEL ELIMINATION  Goal: LTG - patient will have regular and routine bowel evacuation  1/31/2020 0010 by Larissa Vogel LPN  Outcome: Ongoing  1/30/2020 1630 by Dennis Navarrete RN  Outcome: Ongoing     Problem: IP BREATHING  Goal: LTG - Patient/caregiver will demonstrate/perform proper techniques to maintain patent airway  1/31/2020 0010 by Larissa Vogel LPN  Outcome: Ongoing  1/30/2020 1630 by Dennis Navarrete RN  Outcome: Ongoing     Problem: NUTRITION  Goal: Patient maintains adequate hydration  1/31/2020 0010 by Larissa Vogel LPN  Outcome: Ongoing  1/30/2020 1630 by Dennis Navarrete RN  Outcome: Ongoing     Problem: Discharge Planning:  Goal: Discharged to appropriate level of care  Description  Discharged to appropriate level of care  1/31/2020 0010 by Larissa Vogel LPN  Outcome: Ongoing  1/30/2020 1630 by Dennis Navarrete RN  Outcome: Ongoing     Problem: Serum Glucose Level - Abnormal:  Goal: Ability to maintain appropriate glucose levels will improve  Description  Ability to maintain appropriate glucose levels will improve  1/31/2020 0010 by Nancy De La Torre LPN  Outcome: Ongoing  1/30/2020 1630 by Anand Frank RN  Outcome: Ongoing  Goal: Ability to maintain appropriate glucose levels has stabilized  Description  Ability to maintain appropriate glucose levels has stabilized  1/31/2020 0010 by Nancy De La Torre LPN  Outcome: Ongoing  1/30/2020 1630 by Anand Frank RN  Outcome: Ongoing     Problem: Sensory Perception - Impaired:  Goal: Ability to maintain a stable neurologic state will improve  Description  Ability to maintain a stable neurologic state will improve  1/31/2020 0010 by Nancy De La Torre LPN  Outcome: Ongoing  1/30/2020 1630 by Anand Frank RN  Outcome: Ongoing     Problem: Anxiety:  Goal: Level of anxiety will decrease  Description  Level of anxiety will decrease  1/31/2020 0010 by Nancy De La Torre LPN  Outcome: Ongoing  1/30/2020 1630 by Anand Frank RN  Outcome: Ongoing     Problem: Mood - Altered:  Goal: Mood stable  Description  Mood stable  1/31/2020 0010 by Nancy De La Torre LPN  Outcome: Ongoing  1/30/2020 1630 by Anand Frank RN  Outcome: Ongoing     Problem: Pain:  Goal: Pain level will decrease  Description  Pain level will decrease  1/31/2020 0010 by Nancy De La Torre LPN  Outcome: Ongoing  1/30/2020 1630 by Anand Frank RN  Outcome: Ongoing

## 2020-01-31 NOTE — PROGRESS NOTES
Physical Therapy     Name: Nguyen Lee  MRN:  [id number]  Date of service:  1/31/2020 01/31/20 1515   Transfers   Sit to Stand Stand by assistance   Stand to sit Stand by assistance   Balance   Posture Good   Sitting - Static Good   Sitting - Dynamic Good   Standing - Static Fair;+   Standing - Dynamic Fair;+   Other exercises   Other exercises 1 in // bars, HS, calf raises, hip ABD, 10ea   Other exercises 2 in // bars, static balance; feet together, apart, and in tandum. 30sec, little UE support as possible   Other exercises 3 in // bars, balloon batting 2 bouts, 3min, CGA    Conditions Requiring Skilled Therapeutic Intervention   Assessment Pt participated in static and dynamic balance activities this afternoon. Pt completed static balance in the parallel bars SBA and dynamic balloon batting with CGA. Pt also completed standing exercises in the // bars. Pt stated that his knee was feeling better this afternoon, however amb was postponed at this time in favor or strength and balance training. Activity Tolerance   Activity Tolerance Patient Tolerated treatment well   Safety Devices   Type of devices All fall risk precautions in place; Bed alarm in place; Chair alarm in place;Gait belt;Left in bed     Electronically signed by Sunshine Farmer Student PT on 1/31/2020 at 3:37 PM

## 2020-01-31 NOTE — PROGRESS NOTES
Patient:   Muna Cornell  MR#:    [id number]   Room:    6761/689-97   YOB: 1950  Date of Progress Note: 1/31/2020  Time of Note                           8:11 AM  Consulting Physician:   Eron Roy M.D. Attending Physician:  Eron Roy MD     Chief complaint Cervical myelopathy with cervical fusion     S:This 77 y.o. male  admitted to [facility] on 1/23/20 w/spinal cord compression due to degenerative disorder of spinal column. The pt presented to the ED for continued falls and worsening neck pain along w/numbness and tingling in his arms and legs and difficulty w/dexterity issues. He was found to have a large cervical disc displacement at C5-6 that was cuasing cord compression. On 1/23/20 he was taken to OR by Dr. Sara Fisher for a cervical disectomy anterior w/fusion C5-6. He tolerated the procedure well. He is still having a myelpathic gait and is having difficlutly w/LOB. He continues to have problems w/urinary and fecal incontinence. He will have cervical collar for 14 days. Pt has uncontrolled DM w/A1C of 14.7. He was seen by wound care nurse at [facility] and was found to have a stage 2 pressure injury on his left gluteal area. He also has several diabetic foot ulcers on his L & R foot, as well as a R gluteal & Bilateral groin area moisture associated skin damage. Pictures are in chart. His blood sugars are running between 300-400. He is medically stable and is particiapting w/therapy. He is ready to begin rehab program w/plan of returning home after rehab stay. No acute issues.     REVIEW OF SYSTEMS:  Constitutional: No fevers No chills  Neck:No stiffness  Respiratory: No shortness of breath  Cardiovascular: No chest pain No palpitations  Gastrointestinal: No abdominal pain    Genitourinary: No Dysuria  Neurological: No headache, no confusion    Past Medical History:      Diagnosis Date    Chronic back pain     Essential hypertension 1/28/2020    GERD (gastroesophageal reflux disease) solution, 12.5 g, Intravenous, PRN, Tomi Oliveira MD    glucagon (rDNA) injection 1 mg, 1 mg, Intramuscular, PRN, Tomi Oliveira MD    dextrose 5 % solution, 100 mL/hr, Intravenous, PRN, Tomi Oliveira MD    insulin lispro (HUMALOG) injection vial 0-6 Units, 0-6 Units, Subcutaneous, TID WC, Tomi Oliveira MD, 1 Units at 01/31/20 0753    insulin lispro (HUMALOG) injection vial 0-3 Units, 0-3 Units, Subcutaneous, Nightly, Tomi Oliveira MD, 1 Units at 01/29/20 2041    insulin glargine (LANTUS) injection vial 40 Units, 40 Units, Subcutaneous, Nightly, Tomi Oliveira MD, 40 Units at 01/30/20 2109    losartan (COZAAR) tablet 100 mg, 100 mg, Oral, Daily, 100 mg at 01/31/20 0746 **AND** hydrochlorothiazide (HYDRODIURIL) tablet 25 mg, 25 mg, Oral, Daily, Tomi Oliveira MD, 25 mg at 01/31/20 0746    Allergies:  Sulfa antibiotics    Social History:   TOBACCO:   reports that he has been smoking. He has been smoking about 2.00 packs per day. He has never used smokeless tobacco.  ETOH:   reports no history of alcohol use. Family History:       Problem Relation Age of Onset    High Blood Pressure Father          PHYSICAL EXAM:  /68   Pulse 67   Temp 96.4 °F (35.8 °C) (Temporal)   Resp 16   Ht 5' 11\" (1.803 m)   Wt 217 lb 9.6 oz (98.7 kg)   SpO2 94%   BMI 30.35 kg/m²     Constitutional - well developed, well nourished. Eyes - conjunctiva normal.   Ear, nose, throat - No scars, masses, or lesions over external nose or ears, no atrophy of tongue  Neck-symmetric, no masses noted, no jugular vein distension  Respiration- chest wall appears symmetric, good expansion,   normal effort without use of accessory muscles  Musculoskeletal - no significant wasting of muscles noted, no bony deformities  Extremities-no clubbing, cyanosis or edema  Skin - warm, dry, and intact. No rash, erythema, or pallor.   Psychiatric - mood, affect, and behavior appear normal.      Neurological exam  Awake, alert, fluent oriented appropriate affect  Attention and concentration appear appropriate  Recent and remote memory appears unremarkable  Speech normal without dysarthria  No clear issues with language of fund of knowledge     Cranial Nerve Exam     CN III, IV,VI-EOMI, No nystagmus, conjugate eye movements, no ptosis    CN VII-no facial assymetry       Motor Exam  antigravity throughout upper and lower extremities bilaterally      Tremors- no tremors in hands or head noted     Gait  Not tested      Nursing/pcp notes, imaging,labs and vitals reviewed. PT,OT and/or speech notes reviewed    Lab Results   Component Value Date    WBC 10.2 01/30/2020    HGB 15.2 01/30/2020    HCT 46.3 01/30/2020    MCV 93.2 01/30/2020     01/30/2020     Lab Results   Component Value Date     01/30/2020    K 4.6 01/30/2020    CL 93 (L) 01/30/2020    CO2 30 (H) 01/30/2020    BUN 16 01/30/2020    CREATININE 0.7 01/30/2020    GLUCOSE 192 (H) 01/30/2020    CALCIUM 10.0 01/30/2020    PROT 5.8 (L) 01/30/2020    LABALBU 3.5 01/30/2020    BILITOT 0.7 01/30/2020    ALKPHOS 145 (H) 01/30/2020    AST 15 01/30/2020    ALT 14 01/30/2020    LABGLOM >60 01/30/2020   No results found for: INR, PROTIME    Lopez Gar   Student Physical Therapist   Physical Therapy   Progress Notes   Cosign Needed   Date of Service:  1/30/2020  3:08 PM               Cosign Needed             Show:Clear all  []Manual[x]Template[]Copied    Added by:  [x]Erica Lira    []Barbara for details     Physical Therapy      Name: Ray Granados  MRN:  [id number]  Date of service:  1/30/2020 01/30/20 1415   Restrictions/Precautions   Restrictions/Precautions Fall Risk;Surgical Protocols   Required Braces or Orthoses   Cervical c-collar   Position Activity Restriction   Spinal Precautions No Bending; No Lifting; No Twisting   Ambulation 1   Surface uneven   Device Rolling Walker   Assistance Stand by assistance   Quality of Gait started with a 3 point gait, progressed to

## 2020-01-31 NOTE — PROGRESS NOTES
Radha Esquivel  [id number]     01/31/20 1122 01/31/20 1123   General   Response To Previous Treatment Patient with no complaints from previous session. --    Family / Caregiver Present No  --    Subjective   Subjective Pt reports that his R knee is hurting more this morning. Pt agreed to therapy. --    Pain Screening   Patient Currently in Pain Yes  --    Pain Assessment   Pain Assessment 0-10  --    Pain Level 3  --    Patient's Stated Pain Goal No pain  --    Pain Type Acute pain  --    Pain Location Knee  --    Pain Orientation Right  --    Pain Descriptors Aching  --    Pain Frequency Continuous  --    Pain Onset On-going  --    Clinical Progression Gradually worsening  --    Response to Pain Intervention None  --    Pre Treatment Pain Screening   Pain at present 3  --    Scale Used Numeric Score  --    Intervention List Patient able to continue with treatment  --    Bed Mobility   Rolling Modified independent  --    Supine to Sit Modified independent  --    Sit to Supine Modified independent  --    Transfers   Sit to Stand Stand by assistance  --    Stand to sit Stand by assistance  --    Bed to Chair Stand by assistance  --    Ambulation   Ambulation? Yes  --    WB Status WBAT  --    Ambulation 1   Surface level tile  --    Device Rolling Walker  --    Assistance Contact guard assistance  --    Quality of Gait Pt showed some instability in R knee during amb this morning. Pt needed VC's for proper gait techniques. --    Distance 150'  --    Exercises   Comments  --  Sitting Loy LE ther ex x 20 reps. Conditions Requiring Skilled Therapeutic Intervention   Body structures, Functions, Activity limitations  --  Decreased functional mobility ; Decreased strength;Decreased endurance   Assessment  --  Pt was having an increase in R knee pain this morning. Pt showed some instability on R knee during amb as well. Pt became more fatigue w/ an increase in R knee pain post amb.   Pt tolerated sitting ther ex w/ minimal increase in symtoms. Pt is doing well w/ bed mobility and improving w/ TF's.      Prognosis  --  Good   Activity Tolerance   Activity Tolerance  --  Patient limited by pain   Electronically signed by Laisha Hayes PTA on 1/31/2020 at 11:34 AM

## 2020-01-31 NOTE — PROGRESS NOTES
Partial dose waste message noted when I sign into [employer]. It is pertaining to Lyrica 200 mg (one 50 mg capsule & two 75 mg capsules). Pharmacist notified; no discrepancy made; Pharmacist, Cristy Parra, stated make note and have another nurse cosign details. 2030 PM Initial dispense from [facility] was one Lyrica 50mg capsule and one Lyrica 75 mg capsule  Needed another Lyrica 75 mg capsule to complete HS dosage, so I had to re-dispense the whole 200 mg dosage. Returned one Lyrica 50 capsule and one Lyrica 75 mg capsule.

## 2020-02-01 LAB
GLUCOSE BLD-MCNC: 118 MG/DL (ref 70–99)
GLUCOSE BLD-MCNC: 180 MG/DL (ref 70–99)
GLUCOSE BLD-MCNC: 219 MG/DL (ref 70–99)
GLUCOSE BLD-MCNC: 248 MG/DL (ref 70–99)
PERFORMED ON: ABNORMAL
URINE CULTURE, ROUTINE: NORMAL

## 2020-02-01 PROCEDURE — 97110 THERAPEUTIC EXERCISES: CPT

## 2020-02-01 PROCEDURE — 97535 SELF CARE MNGMENT TRAINING: CPT

## 2020-02-01 PROCEDURE — 97116 GAIT TRAINING THERAPY: CPT

## 2020-02-01 PROCEDURE — 99232 SBSQ HOSP IP/OBS MODERATE 35: CPT | Performed by: PSYCHIATRY & NEUROLOGY

## 2020-02-01 PROCEDURE — 97530 THERAPEUTIC ACTIVITIES: CPT

## 2020-02-01 PROCEDURE — 1180000000 HC REHAB R&B

## 2020-02-01 PROCEDURE — 82948 REAGENT STRIP/BLOOD GLUCOSE: CPT

## 2020-02-01 PROCEDURE — 6370000000 HC RX 637 (ALT 250 FOR IP): Performed by: PSYCHIATRY & NEUROLOGY

## 2020-02-01 RX ADMIN — METFORMIN HYDROCHLORIDE 1000 MG: 500 TABLET ORAL at 07:41

## 2020-02-01 RX ADMIN — OXYCODONE HYDROCHLORIDE AND ACETAMINOPHEN 1 TABLET: 7.5; 325 TABLET ORAL at 10:19

## 2020-02-01 RX ADMIN — PREGABALIN 200 MG: 75 CAPSULE ORAL at 20:45

## 2020-02-01 RX ADMIN — OXYCODONE HYDROCHLORIDE AND ACETAMINOPHEN 1 TABLET: 7.5; 325 TABLET ORAL at 19:30

## 2020-02-01 RX ADMIN — PREGABALIN 200 MG: 75 CAPSULE ORAL at 13:51

## 2020-02-01 RX ADMIN — INSULIN LISPRO 2 UNITS: 100 INJECTION, SOLUTION INTRAVENOUS; SUBCUTANEOUS at 16:52

## 2020-02-01 RX ADMIN — PREGABALIN 200 MG: 75 CAPSULE ORAL at 07:41

## 2020-02-01 RX ADMIN — Medication 40 UNITS: at 20:45

## 2020-02-01 RX ADMIN — BUSPIRONE HYDROCHLORIDE 10 MG: 10 TABLET ORAL at 07:41

## 2020-02-01 RX ADMIN — PANTOPRAZOLE SODIUM 40 MG: 40 TABLET, DELAYED RELEASE ORAL at 06:12

## 2020-02-01 RX ADMIN — SIMVASTATIN 40 MG: 40 TABLET, FILM COATED ORAL at 20:45

## 2020-02-01 RX ADMIN — BUSPIRONE HYDROCHLORIDE 10 MG: 10 TABLET ORAL at 20:45

## 2020-02-01 RX ADMIN — METFORMIN HYDROCHLORIDE 1000 MG: 500 TABLET ORAL at 16:52

## 2020-02-01 RX ADMIN — OXYCODONE HYDROCHLORIDE AND ACETAMINOPHEN 1 TABLET: 7.5; 325 TABLET ORAL at 06:15

## 2020-02-01 RX ADMIN — HYDROCHLOROTHIAZIDE 25 MG: 25 TABLET ORAL at 07:41

## 2020-02-01 RX ADMIN — ZOLPIDEM TARTRATE 10 MG: 5 TABLET ORAL at 22:06

## 2020-02-01 RX ADMIN — CITALOPRAM HYDROBROMIDE 20 MG: 10 TABLET ORAL at 07:41

## 2020-02-01 RX ADMIN — OXYCODONE HYDROCHLORIDE AND ACETAMINOPHEN 1 TABLET: 7.5; 325 TABLET ORAL at 14:51

## 2020-02-01 RX ADMIN — LOSARTAN POTASSIUM 100 MG: 100 TABLET, FILM COATED ORAL at 07:41

## 2020-02-01 ASSESSMENT — PAIN DESCRIPTION - ORIENTATION
ORIENTATION: RIGHT;LEFT
ORIENTATION: POSTERIOR
ORIENTATION: RIGHT
ORIENTATION: RIGHT;LEFT

## 2020-02-01 ASSESSMENT — PAIN - FUNCTIONAL ASSESSMENT
PAIN_FUNCTIONAL_ASSESSMENT: PREVENTS OR INTERFERES SOME ACTIVE ACTIVITIES AND ADLS
PAIN_FUNCTIONAL_ASSESSMENT: ACTIVITIES ARE NOT PREVENTED
PAIN_FUNCTIONAL_ASSESSMENT: PREVENTS OR INTERFERES SOME ACTIVE ACTIVITIES AND ADLS

## 2020-02-01 ASSESSMENT — PAIN DESCRIPTION - DESCRIPTORS
DESCRIPTORS: ACHING
DESCRIPTORS: THROBBING
DESCRIPTORS: DISCOMFORT
DESCRIPTORS: ACHING

## 2020-02-01 ASSESSMENT — PAIN SCALES - GENERAL
PAINLEVEL_OUTOF10: 2
PAINLEVEL_OUTOF10: 6
PAINLEVEL_OUTOF10: 0
PAINLEVEL_OUTOF10: 5
PAINLEVEL_OUTOF10: 6
PAINLEVEL_OUTOF10: 4
PAINLEVEL_OUTOF10: 2
PAINLEVEL_OUTOF10: 4
PAINLEVEL_OUTOF10: 5
PAINLEVEL_OUTOF10: 4

## 2020-02-01 ASSESSMENT — PAIN DESCRIPTION - FREQUENCY
FREQUENCY: CONTINUOUS
FREQUENCY: CONTINUOUS
FREQUENCY: INTERMITTENT
FREQUENCY: CONTINUOUS

## 2020-02-01 ASSESSMENT — PAIN DESCRIPTION - PAIN TYPE
TYPE: ACUTE PAIN
TYPE: SURGICAL PAIN
TYPE: ACUTE PAIN
TYPE: ACUTE PAIN

## 2020-02-01 ASSESSMENT — PAIN DESCRIPTION - PROGRESSION
CLINICAL_PROGRESSION: GRADUALLY IMPROVING
CLINICAL_PROGRESSION: NOT CHANGED
CLINICAL_PROGRESSION: GRADUALLY IMPROVING
CLINICAL_PROGRESSION: NOT CHANGED

## 2020-02-01 ASSESSMENT — PAIN DESCRIPTION - ONSET
ONSET: ON-GOING
ONSET: GRADUAL
ONSET: ON-GOING
ONSET: GRADUAL

## 2020-02-01 ASSESSMENT — PAIN DESCRIPTION - LOCATION
LOCATION: FOOT
LOCATION: NECK
LOCATION: KNEE
LOCATION: FOOT
LOCATION: NECK

## 2020-02-01 NOTE — PLAN OF CARE
Problem: SAFETY  Goal: Free from accidental physical injury  2/1/2020 1050 by Hortencia Sanchez RN  Outcome: Ongoing  1/31/2020 2326 by Tiffanie Napoles LPN  Outcome: Ongoing  Goal: LTG - Patient will demonstrate safety requirements appropriate to situation/environment  2/1/2020 1050 by Hortencia Sanchez RN  Outcome: Ongoing  1/31/2020 2326 by Tiffanie Napoles LPN  Outcome: Ongoing  Goal: LTG - patient will utilize safety techniques  2/1/2020 1050 by Hortencia Sanchez RN  Outcome: Ongoing  1/31/2020 2326 by Tiffanie Napoles LPN  Outcome: Ongoing  Goal: STG - patient locks brakes on wheelchair  2/1/2020 1050 by Hortencia Sanchez RN  Outcome: Ongoing  1/31/2020 2326 by Tiffanie Napoles LPN  Outcome: Ongoing  Goal: STG - Patient uses call light consistently to request assistance with transfers  2/1/2020 1050 by Hortencia Sanchez RN  Outcome: Ongoing  1/31/2020 2326 by Tiffanie Napoles LPN  Outcome: Ongoing  Goal: STG - patient uses gait belt during all transfers  2/1/2020 1050 by Hortencia Sanchez RN  Outcome: Ongoing  1/31/2020 2326 by Tiffanie Napoles LPN  Outcome: Ongoing     Problem: DAILY CARE  Goal: Daily care needs are met  2/1/2020 1050 by Hortencia Sanchez RN  Outcome: Ongoing  1/31/2020 2326 by Tiffanie Napoles LPN  Outcome: Ongoing     Problem: PAIN  Goal: Patient's pain/discomfort is manageable  2/1/2020 1050 by Hortencia Sanchez RN  Outcome: Ongoing  1/31/2020 2326 by Tiffanie Napoles LPN  Outcome: Ongoing  Goal: STG - pain is manageable through therapies  2/1/2020 1050 by Hortencia Sanchez RN  Outcome: Ongoing  1/31/2020 2326 by Tiffanie Napoles LPN  Outcome: Ongoing  Goal: STG - Patient will verbalize an acceptable level of pain  2/1/2020 1050 by Hortencia Sanchez RN  Outcome: Ongoing  1/31/2020 2326 by Tiffanie Napoles LPN  Outcome: Ongoing  Goal: STG - patients pain is managed to allow active participation in daily activities  2/1/2020 1050 by Russella Daniel, RN  Outcome: Ongoing  1/31/2020 2326 by Herberth Shrestha JACKI Chadwick  Outcome: Ongoing     Problem: SKIN INTEGRITY  Goal: Skin integrity is maintained or improved  2/1/2020 1050 by Quirino Murillo RN  Outcome: Ongoing  1/31/2020 2326 by Amaris Reyes LPN  Outcome: Ongoing  Goal: STG - Patient demonstrates skin care/treatment/dressing change  2/1/2020 1050 by Quirino Murillo RN  Outcome: Ongoing  1/31/2020 2326 by Amaris Reyes LPN  Outcome: Ongoing  Goal: STG - patient will maintain good skin integrity  2/1/2020 1050 by Quirino Murillo RN  Outcome: Ongoing  1/31/2020 2326 by Amaris Reyes LPN  Outcome: Ongoing  Goal: STG - Patient exhibits signs of wound healing. 2/1/2020 1050 by Quirino Murillo RN  Outcome: Ongoing  1/31/2020 2326 by Amaris Reyes LPN  Outcome: Ongoing  Goal: STG - patient demonstrates pressure reduction techniques  2/1/2020 1050 by Quirino Murillo RN  Outcome: Ongoing  1/31/2020 2326 by Amaris Reyes LPN  Outcome: Ongoing  Goal: STG - Patient demonstrates preventative skin care measures  2/1/2020 1050 by Quirino Murillo RN  Outcome: Ongoing  1/31/2020 2326 by Amaris Reyes LPN  Outcome: Ongoing     Problem: KNOWLEDGE DEFICIT  Goal: Patient/S.O. demonstrates understanding of disease process, treatment plan, medications, and discharge instructions.   2/1/2020 1050 by Quirino Murillo RN  Outcome: Ongoing  1/31/2020 2326 by Amaris Reyes LPN  Outcome: Ongoing     Problem: DISCHARGE BARRIERS  Goal: Patient's continuum of care needs are met  2/1/2020 1050 by Quirino Murillo RN  Outcome: Ongoing  1/31/2020 2326 by Amaris Reyes LPN  Outcome: Ongoing     Problem: Falls - Risk of:  Goal: Will remain free from falls  Description  Will remain free from falls  2/1/2020 1050 by Quirino Murillo RN  Outcome: Ongoing  1/31/2020 2326 by Amaris Reyes LPN  Outcome: Ongoing  Goal: Absence of physical injury  Description  Absence of physical injury  2/1/2020 1050 by Quirino Murillo RN  Outcome: Ongoing  1/31/2020 2326 by Migel Malcolm JACKI Chadwick  Outcome: Ongoing     Problem: Mobility - Impaired:  Goal: Mobility will improve  Description  Mobility will improve  2/1/2020 1050 by Lisha Gil RN  Outcome: Ongoing  1/31/2020 2326 by Rogelio Pacheco LPN  Outcome: Ongoing     Problem: Skin Integrity:  Goal: Will show no infection signs and symptoms  Description  Will show no infection signs and symptoms  2/1/2020 1050 by Lisha Gil RN  Outcome: Ongoing  1/31/2020 2326 by Rogelio Pacheco LPN  Outcome: Ongoing  Goal: Absence of new skin breakdown  Description  Absence of new skin breakdown  2/1/2020 1050 by Lisha Gil RN  Outcome: Ongoing  1/31/2020 2326 by Rogelio Pacheco LPN  Outcome: Ongoing     Problem: IP BLADDER/VOIDING  Goal: LTG - Patient will utilize adaptive techniques/equipment to complete bladder elimination  2/1/2020 1050 by Lisha Gil RN  Outcome: Ongoing  1/31/2020 2326 by Rogelio Pacheco LPN  Outcome: Ongoing     Problem: IP BOWEL ELIMINATION  Goal: LTG - patient will have regular and routine bowel evacuation  2/1/2020 1050 by Lisha Gil RN  Outcome: Ongoing  1/31/2020 2326 by Rogelio Pacheco LPN  Outcome: Ongoing     Problem: IP BREATHING  Goal: LTG - Patient/caregiver will demonstrate/perform proper techniques to maintain patent airway  2/1/2020 1050 by Lisha Gil RN  Outcome: Ongoing  1/31/2020 2326 by Rogelio Pacheco LPN  Outcome: Ongoing     Problem: NUTRITION  Goal: Patient maintains adequate hydration  2/1/2020 1050 by Lisha Gil RN  Outcome: Ongoing  1/31/2020 2326 by Rogelio Pacheco LPN  Outcome: Ongoing     Problem: Discharge Planning:  Goal: Discharged to appropriate level of care  Description  Discharged to appropriate level of care  2/1/2020 1050 by Lisha Gil RN  Outcome: Ongoing  1/31/2020 2326 by Rogelio Pacheco LPN  Outcome: Ongoing     Problem: Serum Glucose Level - Abnormal:  Goal: Ability to maintain appropriate glucose levels will improve  Description  Ability

## 2020-02-01 NOTE — PROGRESS NOTES
Patient:   Jada Norris  MR#:    [id number]   [facility] [id number]   YOB: 1950  Date of Progress Note: 2/1/2020  Time of Note                           10:38 AM  Consulting Physician:   Jovana Wynn M.D. Attending Physician:  Jovana Wynn MD     Chief complaint Cervical myelopathy with cervical fusion     S:This 77 y.o. male  admitted to [facility] on 1/23/20 w/spinal cord compression due to degenerative disorder of spinal column. The pt presented to the ED for continued falls and worsening neck pain along w/numbness and tingling in his arms and legs and difficulty w/dexterity issues. He was found to have a large cervical disc displacement at C5-6 that was cuasing cord compression. On 1/23/20 he was taken to OR by Dr. Rosmery Morales for a cervical disectomy anterior w/fusion C5-6. He tolerated the procedure well. He is still having a myelpathic gait and is having difficlutly w/LOB. He continues to have problems w/urinary and fecal incontinence. He will have cervical collar for 14 days. Pt has uncontrolled DM w/A1C of 14.7. He was seen by wound care nurse at [facility] and was found to have a stage 2 pressure injury on his left gluteal area. He also has several diabetic foot ulcers on his L & R foot, as well as a R gluteal & Bilateral groin area moisture associated skin damage. Pictures are in chart. His blood sugars are running between 300-400. He is medically stable and is particiapting w/therapy. He is ready to begin rehab program w/plan of returning home after rehab stay. No acute issues. pain is well controlled.     REVIEW OF SYSTEMS:  Constitutional: No fevers No chills  Neck:No stiffness  Respiratory: No shortness of breath  Cardiovascular: No chest pain No palpitations  Gastrointestinal: No abdominal pain    Genitourinary: No Dysuria  Neurological: No headache, no confusion    Past Medical History:      Diagnosis Date    Chronic back pain     Essential hypertension 1/28/2020    GERD (gastroesophageal reflux disease)     Hyperlipidemia     Low back pain 1/29/2016    Nail fungus     Neuropathy     Osteoarthritis        Past Surgical History:      Procedure Laterality Date   [facility]      Dr Dianelys Wilks - done 2-3 yrs ago @ [address] [facility],  unaware of dates   [address] [facility]         Medications in Hospital:      Current Facility-Administered Medications:     oxyCODONE-acetaminophen (PERCOCET) 7.5-325 MG per tablet 1 tablet, 1 tablet, Oral, Q4H PRN, Herb Senior MD, 1 tablet at 02/01/20 1019    magic butt cream, , Topical, Q4H PRN, Herb Senior MD    pregabalin (LYRICA) capsule 200 mg, 200 mg, Oral, TID, Herb Senior MD, 200 mg at 02/01/20 0741    nicotine (NICODERM CQ) 14 MG/24HR 1 patch, 1 patch, Transdermal, Daily, Herb Senior MD, 1 patch at 01/31/20 1746    busPIRone (BUSPAR) tablet 10 mg, 10 mg, Oral, BID, Herb Senior MD, 10 mg at 02/01/20 0741    citalopram (CELEXA) tablet 20 mg, 20 mg, Oral, Daily, Herb Senior MD, 20 mg at 02/01/20 0741    cyclobenzaprine (FLEXERIL) tablet 10 mg, 10 mg, Oral, TID PRN, Herb Senior MD, 10 mg at 01/30/20 1754    metFORMIN (GLUCOPHAGE) tablet 1,000 mg, 1,000 mg, Oral, BID WC, Herb Senior MD, 1,000 mg at 02/01/20 0741    pantoprazole (PROTONIX) tablet 40 mg, 40 mg, Oral, QAM AC, Herb Senior MD, 40 mg at 02/01/20 0612    oxyCODONE-acetaminophen (PERCOCET) 7.5-325 MG per tablet 1 tablet, 1 tablet, Oral, Q6H PRN, Herb Senior MD, 1 tablet at 01/30/20 0559    simvastatin (ZOCOR) tablet 40 mg, 40 mg, Oral, Nightly, Herb Senior MD, 40 mg at 01/31/20 2025    zolpidem (AMBIEN) tablet 10 mg, 10 mg, Oral, Nightly PRN, Herb Senior MD, 10 mg at 01/31/20 2025    acetaminophen (TYLENOL) tablet 650 mg, 650 mg, Oral, Q4H PRN, Herb Senior MD    polyethylene glycol (GLYCOLAX) packet 17 g, 17 g, Oral, Daily PRN, Herb Senior MD    glucose (GLUTOSE) 40 % oral gel 15 g, 15 g, Oral, PRN, Jules exam  Awake, alert, fluent oriented appropriate affect  Attention and concentration appear appropriate  Recent and remote memory appears unremarkable  Speech normal without dysarthria  No clear issues with language of fund of knowledge     Cranial Nerve Exam     CN III, IV,VI-EOMI, No nystagmus, conjugate eye movements, no ptosis    CN VII-no facial assymetry       Motor Exam  antigravity throughout upper and lower extremities bilaterally      Tremors- no tremors in hands or head noted     Gait  Not tested      Nursing/pcp notes, imaging,labs and vitals reviewed. PT,OT and/or speech notes reviewed    Lab Results   Component Value Date    WBC 10.2 01/30/2020    HGB 15.2 01/30/2020    HCT 46.3 01/30/2020    MCV 93.2 01/30/2020     01/30/2020     Lab Results   Component Value Date     01/30/2020    K 4.6 01/30/2020    CL 93 (L) 01/30/2020    CO2 30 (H) 01/30/2020    BUN 16 01/30/2020    CREATININE 0.7 01/30/2020    GLUCOSE 192 (H) 01/30/2020    CALCIUM 10.0 01/30/2020    PROT 5.8 (L) 01/30/2020    LABALBU 3.5 01/30/2020    BILITOT 0.7 01/30/2020    ALKPHOS 145 (H) 01/30/2020    AST 15 01/30/2020    ALT 14 01/30/2020    LABGLOM >60 01/30/2020   No results found for: INR, PROTIME    José Woodruff   Student Physical Therapist   Physical Therapy   Progress Notes   Cosign Needed   Date of Service:  1/30/2020  3:08 PM               Cosign Needed             Show:Clear all  []Manual[x]Template[]Copied    Added by:  [x]Erica Lira    []Barbara for details     Physical Therapy      Name: Sapphire Villanueva  MRN:  [id number]  Date of service:  1/30/2020 01/30/20 1415   Restrictions/Precautions   Restrictions/Precautions Fall Risk;Surgical Protocols   Required Braces or Orthoses   Cervical c-collar   Position Activity Restriction   Spinal Precautions No Bending; No Lifting; No Twisting   Ambulation 1   Surface uneven   Device Rolling Walker   Assistance Stand by assistance   Quality of Gait started with a 3 point gait, progressed to reciprocal    Gait Deviations Slow Chantal; Increased ALONA   Distance 150ft   Comments safely amb in [facility]    Ambulation 2   Surface - 2 uneven   Device 2 Rolling Walker   Assistance 2 Stand by assistance   Quality of Gait 2 reciprocal pattern   Gait Deviations Slow Chantal; Increased ALONA   Distance 250ft   Comments safely amb in [facility]    Ambulation 3   Surface - 3 level tile   Device 3 Cane Single point cane  (2nd bout used a LBQC )   Assistance 3 Contact guard assistance;Minimal assistance  (v/c for sequencing )   Quality of Gait 3 pt instructed in 3point gait and correct side to have cane on, pt did not always hold to sequence, pt was unsteady as RLE attempted to buckle   Gait Deviations Slow Chantal; Increased ALONA; Decreased step length   Distance 10ft   Comments bc pt is unsteady, SPC/LBQC are not recommended at this time    Stairs   # Steps  4   Stairs Height 4\"   Rails Bilateral   Assistance Contact guard assistance   Comment LLE leading ascention, RLE leading descention, v/c for sequencing    Balance   Posture Good   Sitting - Static Good   Sitting - Dynamic Good   Standing - Static Fair;+   Standing - Dynamic Fair;+   Conditions Requiring Skilled Therapeutic Intervention   Assessment Pt completed treatment with good effort and form. Pt traversed stairs safely, but when pt descended the first stair he stated that his R knee felt unsteady and he experienced a \"twinge\" of pain. Pt amb in [facility] with a seated rest bewteen the two bouts. Pt then requested to gait train using a SPC as he had used that previously. Amb was performed in the PT gym and pt was instructed to return to the 3point gait pattern since he is using a more unstable AD. Pt demonstrated inconsistent sequencing and frequently tried to move his RLE with his cane resulting in an unsteady bout of ambulation. Pt requested to try a [facility] which resulted in a similar gait.  At this time, it is recommended that the pt continue to use his fww as he is safe and able to utilize a reciprocal pattern. Activity Tolerance   Activity Tolerance Patient Tolerated treatment well;Patient limited by fatigue   Safety Devices   Type of devices All fall risk precautions in place; Bed alarm in place; Chair alarm in place;Gait belt;Left in bed      Electronically signed by Malena Primrose Student PT on 1/30/2020 at 3:09 PM                     RECORD REVIEW: Previous medical records, medications were reviewed at today's visit    IMPRESSION:   1. Cervical myelopathy-s/p fusion  2. Mood disorder-on meds  3. DM-on meds monitor blood sugar  4. HTN-on meds monitor  5. GI-PPI/bowel regimen  6. Neuropathy-on Lyrica  7. Hyperlipidemia-on statin  8. Pain control-Percocet PRN  9. PT/OT   10.  Sacral ulcer wound care    Continue current care      Expected duration and frequency therapy: 180 minutes per day, 5 days per week    [address] [facility]  [phone] CELL  Dr Mauricio Lema

## 2020-02-01 NOTE — PROGRESS NOTES
Physical therapy note     02/01/20 1300   Restrictions/Precautions   Restrictions/Precautions Fall Risk   Required Braces or Orthoses? Yes   Required Braces or Orthoses   Cervical c-collar   Position Activity Restriction   Spinal Precautions No Bending; No Lifting; No Twisting   Other position/activity restrictions C collar x 14 days   Pain Assessment   Pain Assessment 0-10   Pain Level 2   Pain Location Knee   Pain Orientation Right   Oxygen Therapy   O2 Device None (Room air)   Transfers   Car Transfer Stand by assistance   Ambulation 1   Surface level tile   Device Rolling Walker   Assistance Stand by assistance   Quality of Gait reciprocal gait pattern   Distance 200   Ambulation 2   Surface - 2 level tile   Device 2 Rolling Walker   Assistance 2 Stand by assistance   Distance 225   Stairs   # Steps  8   Stairs Height 4\"   Rails Bilateral   Assistance Stand by assistance   Comment LLE leading ascention, RLE leading descention, v/c for sequencing    Propulsion 1   Propulsion Manual   Level Level Tile   Method RUE;LUE   Level of Assistance Independent   Description/ Details good w/c propulsion    Distance 200

## 2020-02-01 NOTE — PROGRESS NOTES
Occupational Therapy     02/01/20 0900   General   Diagnosis C5-C6 cervical discectomy    Pain Assessment   Patient Currently in Pain Yes   Pain Assessment 0-10   Pain Level 2   Pain Type Acute pain   Pain Location Neck   Pain Orientation Posterior   Pain Descriptors Aching   Pain Frequency Continuous   Clinical Progression Not changed   Response to Pain Intervention Patient Satisfied   ADL   Toileting Stand by assistance  (x2 occasions.)   Balance   Sitting Balance Supervision   Standing Balance Stand by assistance   Standing Balance   Time 4 minutes   Activity 2 handed static standing task. Functional Mobility   Functional - Mobility Device Rolling Walker   Activity To/from bathroom   Assist Level Stand by assistance   Functional Mobility Comments x2 occasions. Transfers   Sit to stand Stand by assistance   Stand to sit Stand by assistance   Toilet Transfers   Toilet - Technique Ambulating   Equipment Used Standard bedside commode   Toilet Transfer Stand by assistance   Toilet Transfers Comments x2 occasions. Type of ROM/Therapeutic Exercise   Type of ROM/Therapeutic Exercise Elba ROACH 10# 4 sets x 20 reps. Assessment   Performance deficits / Impairments Decreased functional mobility ; Decreased ADL status; Decreased safe awareness;Decreased balance;Decreased high-level IADLs;Decreased fine motor control   Treatment Diagnosis C5-C6 cervical discectomy    Prognosis Good   Timed Code Treatment Minutes 60 Minutes   Activity Tolerance   Activity Tolerance Patient Tolerated treatment well   Safety Devices   Safety Devices in place Yes   Type of devices Call light within reach;Nurse notified  (Refused bed alarm.)   Plan   Current Treatment Recommendations Strengthening; Functional Mobility Training; Safety Education & Training;Patient/Caregiver Education & Training;Equipment Evaluation, Education, & procurement;Self-Care / ADL; Home Management Training

## 2020-02-01 NOTE — PROGRESS NOTES
assistance  Functional Mobility Comments: short distance x 2 occasions within room  Coordination  Fine Motor: handwriting task with good quality  Type of ROM/Therapeutic Exercise  Type of ROM/Therapeutic Exercise: Free weights(2# all planesx20reps)     Plan   Plan  Current Treatment Recommendations: Strengthening, Functional Mobility Training, Safety Education & Training, Patient/Caregiver Education & Training, Equipment Evaluation, Education, & procurement, Self-Care / ADL, Home Management Training    Goals  Short term goals  Time Frame for Short term goals: 1 week   Short term goal 1: Supervision with bathing hygiene. Short term goal 2: Supervision with LB dressing. Short term goal 3: Supervision with clothing management/hygiene for toileting. Short term goal 4: Supervision with toilet transfers. Short term goal 5: Supervision with ambulatory home making tasks. Short term goal 6: Patient will complete 1-2 handed static standing task for 4 minutes, requiring Supervision. Short term goal 7: Patient will increase B  strength by 2# to increase independence with ADLs. Long term goals  Time Frame for Long term goals : 2 weeks   Long term goal 1: Modified independent with bathing hygiene. Long term goal 2: Modified independent with overall dressing. Long term goal 3: Modified independent with toileting and toilet transfers. Long term goal 4: Independent with HEP  Long term goal 5: Patient verbalize DME needs. Patient Goals   Patient goals : Return home independently to assist his wife.         Therapy Time   Individual Concurrent Group Co-treatment   Time In 1405         Time Out 1435         Minutes 30         Timed Code Treatment Minutes: 30 Minutes       Electronically signed by Kandi Mallory OT on 2/1/2020 at 2:45 PM

## 2020-02-02 LAB
GLUCOSE BLD-MCNC: 123 MG/DL (ref 70–99)
GLUCOSE BLD-MCNC: 156 MG/DL (ref 70–99)
GLUCOSE BLD-MCNC: 184 MG/DL (ref 70–99)
GLUCOSE BLD-MCNC: 241 MG/DL (ref 70–99)
PERFORMED ON: ABNORMAL

## 2020-02-02 PROCEDURE — 82948 REAGENT STRIP/BLOOD GLUCOSE: CPT

## 2020-02-02 PROCEDURE — 99232 SBSQ HOSP IP/OBS MODERATE 35: CPT | Performed by: PSYCHIATRY & NEUROLOGY

## 2020-02-02 PROCEDURE — 6370000000 HC RX 637 (ALT 250 FOR IP): Performed by: PSYCHIATRY & NEUROLOGY

## 2020-02-02 PROCEDURE — 1180000000 HC REHAB R&B

## 2020-02-02 RX ADMIN — CITALOPRAM HYDROBROMIDE 20 MG: 10 TABLET ORAL at 08:03

## 2020-02-02 RX ADMIN — METFORMIN HYDROCHLORIDE 1000 MG: 500 TABLET ORAL at 08:02

## 2020-02-02 RX ADMIN — ZOLPIDEM TARTRATE 10 MG: 5 TABLET ORAL at 21:29

## 2020-02-02 RX ADMIN — PREGABALIN 200 MG: 75 CAPSULE ORAL at 13:57

## 2020-02-02 RX ADMIN — LOSARTAN POTASSIUM 100 MG: 100 TABLET, FILM COATED ORAL at 08:02

## 2020-02-02 RX ADMIN — BUSPIRONE HYDROCHLORIDE 10 MG: 10 TABLET ORAL at 08:02

## 2020-02-02 RX ADMIN — PREGABALIN 200 MG: 75 CAPSULE ORAL at 08:02

## 2020-02-02 RX ADMIN — METFORMIN HYDROCHLORIDE 1000 MG: 500 TABLET ORAL at 17:09

## 2020-02-02 RX ADMIN — OXYCODONE HYDROCHLORIDE AND ACETAMINOPHEN 1 TABLET: 7.5; 325 TABLET ORAL at 16:26

## 2020-02-02 RX ADMIN — Medication 40 UNITS: at 20:34

## 2020-02-02 RX ADMIN — SIMVASTATIN 40 MG: 40 TABLET, FILM COATED ORAL at 20:34

## 2020-02-02 RX ADMIN — PANTOPRAZOLE SODIUM 40 MG: 40 TABLET, DELAYED RELEASE ORAL at 05:27

## 2020-02-02 RX ADMIN — OXYCODONE HYDROCHLORIDE AND ACETAMINOPHEN 1 TABLET: 7.5; 325 TABLET ORAL at 12:25

## 2020-02-02 RX ADMIN — PREGABALIN 200 MG: 75 CAPSULE ORAL at 20:33

## 2020-02-02 RX ADMIN — HYDROCHLOROTHIAZIDE 25 MG: 25 TABLET ORAL at 08:02

## 2020-02-02 RX ADMIN — OXYCODONE HYDROCHLORIDE AND ACETAMINOPHEN 1 TABLET: 7.5; 325 TABLET ORAL at 20:33

## 2020-02-02 RX ADMIN — INSULIN LISPRO 1 UNITS: 100 INJECTION, SOLUTION INTRAVENOUS; SUBCUTANEOUS at 17:09

## 2020-02-02 RX ADMIN — BUSPIRONE HYDROCHLORIDE 10 MG: 10 TABLET ORAL at 20:34

## 2020-02-02 RX ADMIN — INSULIN LISPRO 1 UNITS: 100 INJECTION, SOLUTION INTRAVENOUS; SUBCUTANEOUS at 20:35

## 2020-02-02 RX ADMIN — OXYCODONE HYDROCHLORIDE AND ACETAMINOPHEN 1 TABLET: 7.5; 325 TABLET ORAL at 08:05

## 2020-02-02 ASSESSMENT — PAIN SCALES - GENERAL
PAINLEVEL_OUTOF10: 6
PAINLEVEL_OUTOF10: 0
PAINLEVEL_OUTOF10: 0
PAINLEVEL_OUTOF10: 5
PAINLEVEL_OUTOF10: 6
PAINLEVEL_OUTOF10: 0
PAINLEVEL_OUTOF10: 6

## 2020-02-02 ASSESSMENT — PAIN DESCRIPTION - LOCATION
LOCATION: FOOT

## 2020-02-02 ASSESSMENT — PAIN DESCRIPTION - DESCRIPTORS
DESCRIPTORS: THROBBING

## 2020-02-02 ASSESSMENT — PAIN DESCRIPTION - ONSET: ONSET: GRADUAL

## 2020-02-02 ASSESSMENT — PAIN DESCRIPTION - ORIENTATION
ORIENTATION: RIGHT;LEFT

## 2020-02-02 ASSESSMENT — PAIN DESCRIPTION - FREQUENCY: FREQUENCY: INTERMITTENT

## 2020-02-02 ASSESSMENT — PAIN DESCRIPTION - PAIN TYPE: TYPE: ACUTE PAIN

## 2020-02-02 ASSESSMENT — PAIN DESCRIPTION - PROGRESSION: CLINICAL_PROGRESSION: GRADUALLY IMPROVING

## 2020-02-02 NOTE — PLAN OF CARE
Problem: SAFETY  Goal: Free from accidental physical injury  2/2/2020 0019 by Courtney Turner LPN  Outcome: Ongoing  2/1/2020 1050 by Amie Lowry RN  Outcome: Ongoing  Goal: LTG - Patient will demonstrate safety requirements appropriate to situation/environment  2/2/2020 0019 by Courtney Turner LPN  Outcome: Ongoing  2/1/2020 1050 by Amie Lowry RN  Outcome: Ongoing  Goal: LTG - patient will utilize safety techniques  2/2/2020 0019 by Courtney Turner LPN  Outcome: Ongoing  2/1/2020 1050 by Amie Lowry RN  Outcome: Ongoing  Goal: STG - patient locks brakes on wheelchair  2/2/2020 0019 by Courtney Turner LPN  Outcome: Ongoing  2/1/2020 1050 by Amie Lowry RN  Outcome: Ongoing  Goal: STG - Patient uses call light consistently to request assistance with transfers  2/2/2020 0019 by Courtney Turner LPN  Outcome: Ongoing  2/1/2020 1050 by Amie Lowry RN  Outcome: Ongoing  Goal: STG - patient uses gait belt during all transfers  2/2/2020 0019 by Courtney Turner LPN  Outcome: Ongoing  2/1/2020 1050 by Amie Lowry RN  Outcome: Ongoing     Problem: DAILY CARE  Goal: Daily care needs are met  2/2/2020 0019 by Courtney Turner LPN  Outcome: Ongoing  2/1/2020 1050 by Amie Lowry RN  Outcome: Ongoing     Problem: PAIN  Goal: Patient's pain/discomfort is manageable  2/2/2020 0019 by Courtney Turner LPN  Outcome: Ongoing  2/1/2020 1050 by Amie Lowry RN  Outcome: Ongoing  Goal: STG - pain is manageable through therapies  2/2/2020 0019 by Courtney Turner LPN  Outcome: Ongoing  2/1/2020 1050 by Amie Lowry RN  Outcome: Ongoing  Goal: STG - Patient will verbalize an acceptable level of pain  2/2/2020 0019 by Cuortney Turner LPN  Outcome: Ongoing  2/1/2020 1050 by Amie Lowry RN  Outcome: Ongoing  Goal: STG - patients pain is managed to allow active participation in daily activities  2/2/2020 0019 by Courtney Turner LPN  Outcome: Ongoing  2/1/2020 1050 by Amie Lowry,

## 2020-02-02 NOTE — PLAN OF CARE
Ongoing  2/2/2020 0019 by Nancy De La Torre LPN  Outcome: Ongoing     Problem: SKIN INTEGRITY  Goal: Skin integrity is maintained or improved  2/2/2020 1211 by Mer Randolph LPN  Outcome: Ongoing  2/2/2020 0019 by Nancy De La Torre LPN  Outcome: Ongoing  Goal: STG - Patient demonstrates skin care/treatment/dressing change  2/2/2020 1211 by Mer Randolph LPN  Outcome: Ongoing  2/2/2020 0019 by Nancy De La Torre LPN  Outcome: Ongoing  Goal: STG - patient will maintain good skin integrity  2/2/2020 1211 by Mer Randolph LPN  Outcome: Ongoing  2/2/2020 0019 by Nancy De La Torre LPN  Outcome: Ongoing  Goal: STG - Patient exhibits signs of wound healing. 2/2/2020 1211 by Mer Randolph LPN  Outcome: Ongoing  2/2/2020 0019 by Nancy De La Torre LPN  Outcome: Ongoing  Goal: STG - patient demonstrates pressure reduction techniques  2/2/2020 1211 by Mer Randolph LPN  Outcome: Ongoing  2/2/2020 0019 by Nancy De La Torre LPN  Outcome: Ongoing  Goal: STG - Patient demonstrates preventative skin care measures  2/2/2020 1211 by Mer Randolph LPN  Outcome: Ongoing  2/2/2020 0019 by Nancy De La Torre LPN  Outcome: Ongoing     Problem: KNOWLEDGE DEFICIT  Goal: Patient/S.O. demonstrates understanding of disease process, treatment plan, medications, and discharge instructions.   2/2/2020 1211 by Mer Randolph LPN  Outcome: Ongoing  2/2/2020 0019 by Nancy De La Torre LPN  Outcome: Ongoing     Problem: DISCHARGE BARRIERS  Goal: Patient's continuum of care needs are met  2/2/2020 1211 by Mer Randolph LPN  Outcome: Ongoing  2/2/2020 0019 by Nancy De La Torre LPN  Outcome: Ongoing     Problem: Falls - Risk of:  Goal: Will remain free from falls  Description  Will remain free from falls  2/2/2020 1211 by Mer Randolph LPN  Outcome: Ongoing  2/2/2020 0019 by Nancy De La Torre LPN  Outcome: Ongoing  Goal: Absence of physical injury  Description  Absence of physical injury  2/2/2020 1211 by Mer Randolph LPN  Outcome: Ongoing  2/2/2020 0019 by Rogelio Pacheco LPN  Outcome: Ongoing     Problem: Mobility - Impaired:  Goal: Mobility will improve  Description  Mobility will improve  2/2/2020 1211 by Luis Alberto Zarate LPN  Outcome: Ongoing  2/2/2020 0019 by Rogelio Pacheco LPN  Outcome: Ongoing     Problem: Skin Integrity:  Goal: Will show no infection signs and symptoms  Description  Will show no infection signs and symptoms  2/2/2020 1211 by Luis Alberto Zarate LPN  Outcome: Ongoing  2/2/2020 0019 by Rogelio Pacheco LPN  Outcome: Ongoing  Goal: Absence of new skin breakdown  Description  Absence of new skin breakdown  2/2/2020 1211 by Luis Alberto Zarate LPN  Outcome: Ongoing  2/2/2020 0019 by Rogelio Pacheco LPN  Outcome: Ongoing     Problem: IP BLADDER/VOIDING  Goal: LTG - Patient will utilize adaptive techniques/equipment to complete bladder elimination  2/2/2020 1211 by Luis Alberto Zarate LPN  Outcome: Ongoing  2/2/2020 0019 by Rogelio Pacheco LPN  Outcome: Ongoing     Problem: IP BOWEL ELIMINATION  Goal: LTG - patient will have regular and routine bowel evacuation  2/2/2020 1211 by Luis Alberto Zarate LPN  Outcome: Ongoing  2/2/2020 0019 by Rogelio Pacheco LPN  Outcome: Ongoing     Problem: IP BREATHING  Goal: LTG - Patient/caregiver will demonstrate/perform proper techniques to maintain patent airway  2/2/2020 1211 by Luis Alberto Zarate LPN  Outcome: Ongoing  2/2/2020 0019 by Rogelio Pacheco LPN  Outcome: Ongoing     Problem: NUTRITION  Goal: Patient maintains adequate hydration  2/2/2020 1211 by Luis Alberto Zarate LPN  Outcome: Ongoing  2/2/2020 0019 by Rogelio Pacheco LPN  Outcome: Ongoing     Problem: Discharge Planning:  Goal: Discharged to appropriate level of care  Description  Discharged to appropriate level of care  2/2/2020 1211 by Luis Alberto Zarate LPN  Outcome: Ongoing  2/2/2020 0019 by Rogelio Pacheco LPN  Outcome: Ongoing     Problem: Serum Glucose Level - Abnormal:  Goal: Ability to maintain appropriate glucose levels will improve  Description  Ability to maintain appropriate glucose levels will improve  2/2/2020 1211 by Jesse Giron LPN  Outcome: Not Met This Shift  2/2/2020 0019 by Arianna Noriega LPN  Outcome: Ongoing  Goal: Ability to maintain appropriate glucose levels has stabilized  Description  Ability to maintain appropriate glucose levels has stabilized  2/2/2020 1211 by Jesse Giron LPN  Outcome: Not Met This Shift  2/2/2020 0019 by Arianna Noriega LPN  Outcome: Ongoing     Problem: Sensory Perception - Impaired:  Goal: Ability to maintain a stable neurologic state will improve  Description  Ability to maintain a stable neurologic state will improve  2/2/2020 1211 by Jesse Giron LPN  Outcome: Ongoing  2/2/2020 0019 by Arianna Noriega LPN  Outcome: Ongoing     Problem: Anxiety:  Goal: Level of anxiety will decrease  Description  Level of anxiety will decrease  2/2/2020 1211 by Jesse Giron LPN  Outcome: Ongoing  2/2/2020 0019 by Arianna Noriega LPN  Outcome: Ongoing     Problem: Mood - Altered:  Goal: Mood stable  Description  Mood stable  2/2/2020 1211 by Jesse Giron LPN  Outcome: Ongoing  2/2/2020 0019 by Arianna Noriega LPN  Outcome: Ongoing     Problem: Pain:  Goal: Pain level will decrease  Description  Pain level will decrease  2/2/2020 1211 by Jesse Giron LPN  Outcome: Ongoing  2/2/2020 0019 by Arianna Noriega LPN  Outcome: Ongoing     Problem:  Activity:  Goal: Physical symptoms of sleep deprivation will improve  Description  Physical symptoms of sleep deprivation will improve  Outcome: Ongoing

## 2020-02-02 NOTE — PROGRESS NOTES
appropriate affect  Attention and concentration appear appropriate  Recent and remote memory appears unremarkable  Speech normal without dysarthria  No clear issues with language of fund of knowledge     Cranial Nerve Exam     CN III, IV,VI-EOMI, No nystagmus, conjugate eye movements, no ptosis    CN VII-no facial assymetry       Motor Exam  antigravity throughout upper and lower extremities bilaterally      Tremors- no tremors in hands or head noted     Gait  Not tested      Nursing/pcp notes, imaging,labs and vitals reviewed. PT,OT and/or speech notes reviewed    Lab Results   Component Value Date    WBC 10.2 01/30/2020    HGB 15.2 01/30/2020    HCT 46.3 01/30/2020    MCV 93.2 01/30/2020     01/30/2020     Lab Results   Component Value Date     01/30/2020    K 4.6 01/30/2020    CL 93 (L) 01/30/2020    CO2 30 (H) 01/30/2020    BUN 16 01/30/2020    CREATININE 0.7 01/30/2020    GLUCOSE 192 (H) 01/30/2020    CALCIUM 10.0 01/30/2020    PROT 5.8 (L) 01/30/2020    LABALBU 3.5 01/30/2020    BILITOT 0.7 01/30/2020    ALKPHOS 145 (H) 01/30/2020    AST 15 01/30/2020    ALT 14 01/30/2020    LABGLOM >60 01/30/2020   No results found for: INR, PROTIME    Valley Olga   Student Physical Therapist   Physical Therapy   Progress Notes   Cosign Needed   Date of Service:  1/30/2020  3:08 PM               Cosign Needed             Show:Clear all  []Manual[x]Template[]Copied    Added by:  [x]Erica Lira    []Barbara for details     Physical Therapy      Name: Trung White  MRN:  [id number]  Date of service:  1/30/2020 01/30/20 1415   Restrictions/Precautions   Restrictions/Precautions Fall Risk;Surgical Protocols   Required Braces or Orthoses   Cervical c-collar   Position Activity Restriction   Spinal Precautions No Bending; No Lifting; No Twisting   Ambulation 1   Surface uneven   Device Rolling Walker   Assistance Stand by assistance   Quality of Gait started with a 3 point gait, progressed to

## 2020-02-03 ENCOUNTER — TELEPHONE (OUTPATIENT)
Dept: NEUROSURGERY | Facility: CLINIC | Age: 70
End: 2020-02-03

## 2020-02-03 LAB
ALBUMIN SERPL-MCNC: 3.3 G/DL (ref 3.5–5.2)
ALP BLD-CCNC: 230 U/L (ref 40–130)
ALT SERPL-CCNC: 30 U/L (ref 5–41)
ANION GAP SERPL CALCULATED.3IONS-SCNC: 11 MMOL/L (ref 7–19)
AST SERPL-CCNC: 28 U/L (ref 5–40)
BASOPHILS ABSOLUTE: 0.1 K/UL (ref 0–0.2)
BASOPHILS RELATIVE PERCENT: 0.9 % (ref 0–1)
BILIRUB SERPL-MCNC: 0.6 MG/DL (ref 0.2–1.2)
BUN BLDV-MCNC: 23 MG/DL (ref 8–23)
CALCIUM SERPL-MCNC: 9.7 MG/DL (ref 8.8–10.2)
CHLORIDE BLD-SCNC: 94 MMOL/L (ref 98–111)
CO2: 29 MMOL/L (ref 22–29)
CREAT SERPL-MCNC: 0.9 MG/DL (ref 0.5–1.2)
EOSINOPHILS ABSOLUTE: 0.3 K/UL (ref 0–0.6)
EOSINOPHILS RELATIVE PERCENT: 3.1 % (ref 0–5)
GFR NON-AFRICAN AMERICAN: >60
GLUCOSE BLD-MCNC: 104 MG/DL (ref 70–99)
GLUCOSE BLD-MCNC: 105 MG/DL (ref 74–109)
GLUCOSE BLD-MCNC: 107 MG/DL (ref 70–99)
GLUCOSE BLD-MCNC: 210 MG/DL (ref 70–99)
GLUCOSE BLD-MCNC: 220 MG/DL (ref 70–99)
HCT VFR BLD CALC: 44.7 % (ref 42–52)
HEMOGLOBIN: 14.7 G/DL (ref 14–18)
IMMATURE GRANULOCYTES #: 0.1 K/UL
LYMPHOCYTES ABSOLUTE: 3.4 K/UL (ref 1.1–4.5)
LYMPHOCYTES RELATIVE PERCENT: 33.9 % (ref 20–40)
MCH RBC QN AUTO: 30.6 PG (ref 27–31)
MCHC RBC AUTO-ENTMCNC: 32.9 G/DL (ref 33–37)
MCV RBC AUTO: 92.9 FL (ref 80–94)
MONOCYTES ABSOLUTE: 0.9 K/UL (ref 0–0.9)
MONOCYTES RELATIVE PERCENT: 9 % (ref 0–10)
NEUTROPHILS ABSOLUTE: 5.2 K/UL (ref 1.5–7.5)
NEUTROPHILS RELATIVE PERCENT: 52.2 % (ref 50–65)
PDW BLD-RTO: 12.4 % (ref 11.5–14.5)
PERFORMED ON: ABNORMAL
PLATELET # BLD: 346 K/UL (ref 130–400)
PMV BLD AUTO: 10.3 FL (ref 9.4–12.4)
POTASSIUM REFLEX MAGNESIUM: 4 MMOL/L (ref 3.5–5)
RBC # BLD: 4.81 M/UL (ref 4.7–6.1)
SODIUM BLD-SCNC: 134 MMOL/L (ref 136–145)
TOTAL PROTEIN: 5.7 G/DL (ref 6.6–8.7)
WBC # BLD: 10 K/UL (ref 4.8–10.8)

## 2020-02-03 PROCEDURE — 97530 THERAPEUTIC ACTIVITIES: CPT

## 2020-02-03 PROCEDURE — 85025 COMPLETE CBC W/AUTO DIFF WBC: CPT

## 2020-02-03 PROCEDURE — 97535 SELF CARE MNGMENT TRAINING: CPT

## 2020-02-03 PROCEDURE — 6370000000 HC RX 637 (ALT 250 FOR IP): Performed by: PSYCHIATRY & NEUROLOGY

## 2020-02-03 PROCEDURE — 80053 COMPREHEN METABOLIC PANEL: CPT

## 2020-02-03 PROCEDURE — 36415 COLL VENOUS BLD VENIPUNCTURE: CPT

## 2020-02-03 PROCEDURE — 97116 GAIT TRAINING THERAPY: CPT

## 2020-02-03 PROCEDURE — 1180000000 HC REHAB R&B

## 2020-02-03 PROCEDURE — 97110 THERAPEUTIC EXERCISES: CPT

## 2020-02-03 PROCEDURE — 82948 REAGENT STRIP/BLOOD GLUCOSE: CPT

## 2020-02-03 RX ADMIN — PREGABALIN 200 MG: 75 CAPSULE ORAL at 07:51

## 2020-02-03 RX ADMIN — PREGABALIN 200 MG: 75 CAPSULE ORAL at 19:46

## 2020-02-03 RX ADMIN — CITALOPRAM HYDROBROMIDE 20 MG: 10 TABLET ORAL at 07:51

## 2020-02-03 RX ADMIN — LOSARTAN POTASSIUM 100 MG: 100 TABLET, FILM COATED ORAL at 07:51

## 2020-02-03 RX ADMIN — OXYCODONE HYDROCHLORIDE AND ACETAMINOPHEN 1 TABLET: 7.5; 325 TABLET ORAL at 19:46

## 2020-02-03 RX ADMIN — INSULIN LISPRO 1 UNITS: 100 INJECTION, SOLUTION INTRAVENOUS; SUBCUTANEOUS at 21:12

## 2020-02-03 RX ADMIN — Medication 40 UNITS: at 21:12

## 2020-02-03 RX ADMIN — OXYCODONE HYDROCHLORIDE AND ACETAMINOPHEN 1 TABLET: 7.5; 325 TABLET ORAL at 14:58

## 2020-02-03 RX ADMIN — METFORMIN HYDROCHLORIDE 1000 MG: 500 TABLET ORAL at 17:36

## 2020-02-03 RX ADMIN — HYDROCHLOROTHIAZIDE 25 MG: 25 TABLET ORAL at 07:51

## 2020-02-03 RX ADMIN — OXYCODONE HYDROCHLORIDE AND ACETAMINOPHEN 1 TABLET: 7.5; 325 TABLET ORAL at 00:42

## 2020-02-03 RX ADMIN — OXYCODONE HYDROCHLORIDE AND ACETAMINOPHEN 1 TABLET: 7.5; 325 TABLET ORAL at 10:37

## 2020-02-03 RX ADMIN — METFORMIN HYDROCHLORIDE 1000 MG: 500 TABLET ORAL at 07:51

## 2020-02-03 RX ADMIN — INSULIN LISPRO 2 UNITS: 100 INJECTION, SOLUTION INTRAVENOUS; SUBCUTANEOUS at 17:36

## 2020-02-03 RX ADMIN — ZOLPIDEM TARTRATE 10 MG: 5 TABLET ORAL at 21:12

## 2020-02-03 RX ADMIN — BUSPIRONE HYDROCHLORIDE 10 MG: 10 TABLET ORAL at 07:51

## 2020-02-03 RX ADMIN — OXYCODONE HYDROCHLORIDE AND ACETAMINOPHEN 1 TABLET: 7.5; 325 TABLET ORAL at 04:43

## 2020-02-03 RX ADMIN — PREGABALIN 200 MG: 75 CAPSULE ORAL at 14:17

## 2020-02-03 RX ADMIN — PANTOPRAZOLE SODIUM 40 MG: 40 TABLET, DELAYED RELEASE ORAL at 05:37

## 2020-02-03 RX ADMIN — BUSPIRONE HYDROCHLORIDE 10 MG: 10 TABLET ORAL at 19:47

## 2020-02-03 RX ADMIN — SIMVASTATIN 40 MG: 40 TABLET, FILM COATED ORAL at 19:47

## 2020-02-03 ASSESSMENT — PAIN DESCRIPTION - PAIN TYPE
TYPE: ACUTE PAIN

## 2020-02-03 ASSESSMENT — PAIN SCALES - GENERAL
PAINLEVEL_OUTOF10: 3
PAINLEVEL_OUTOF10: 5
PAINLEVEL_OUTOF10: 5
PAINLEVEL_OUTOF10: 7
PAINLEVEL_OUTOF10: 4
PAINLEVEL_OUTOF10: 6
PAINLEVEL_OUTOF10: 0
PAINLEVEL_OUTOF10: 5
PAINLEVEL_OUTOF10: 0

## 2020-02-03 ASSESSMENT — PAIN - FUNCTIONAL ASSESSMENT
PAIN_FUNCTIONAL_ASSESSMENT: ACTIVITIES ARE NOT PREVENTED
PAIN_FUNCTIONAL_ASSESSMENT: ACTIVITIES ARE NOT PREVENTED

## 2020-02-03 ASSESSMENT — PAIN DESCRIPTION - DESCRIPTORS
DESCRIPTORS: ACHING
DESCRIPTORS: SORE
DESCRIPTORS: ACHING

## 2020-02-03 ASSESSMENT — PAIN DESCRIPTION - ORIENTATION
ORIENTATION: RIGHT;LEFT
ORIENTATION: RIGHT
ORIENTATION: RIGHT;LEFT

## 2020-02-03 ASSESSMENT — PAIN DESCRIPTION - ONSET
ONSET: ON-GOING

## 2020-02-03 ASSESSMENT — PAIN DESCRIPTION - PROGRESSION
CLINICAL_PROGRESSION: NOT CHANGED

## 2020-02-03 ASSESSMENT — PAIN DESCRIPTION - LOCATION
LOCATION: KNEE
LOCATION: FOOT

## 2020-02-03 ASSESSMENT — PAIN DESCRIPTION - FREQUENCY
FREQUENCY: CONTINUOUS

## 2020-02-03 NOTE — PROGRESS NOTES
Adi Bloom  [id number]     02/03/20 1142 02/03/20 1144   General   Response To Previous Treatment Patient with no complaints from previous session. --    Family / Caregiver Present No  --    Subjective   Subjective Pt agreed to therapy this morning.  --    Bed Mobility   Rolling Modified independent  --    Supine to Sit Modified independent  --    Sit to Supine Modified independent  --    Transfers   Sit to Stand Stand by assistance  --    Stand to sit Stand by assistance  --    Bed to Chair Stand by assistance  --    Ambulation   Ambulation? Yes  --    WB Status WBAT  --    Ambulation 1   Surface level tile  --    Device Rolling Walker  --    Other Apparatus   (C-Collar)  --    Assistance Contact guard assistance  --    Quality of Gait Pt continues to show R knee instability, but did have 2 episodes on R knee buckling needing Min a to correct. --    Distance 250'  --    Exercises   Comments  --  Sitting Loy LE ther ex x 20 reps. Conditions Requiring Skilled Therapeutic Intervention   Body structures, Functions, Activity limitations  --  Decreased functional mobility ; Decreased ADL status; Decreased endurance   Assessment  --  Pt is doing well w/ bed mobility and TF's. Pt continues to show instability in R knee during amb. Pt did have 2 episodes of R knee buckling during amb needing Min A to correct. Therapist had Pt perform sitting ther ex due to episodes of R knee buckling. Pt toleratd sitting ther ex well.    Prognosis  --  Good   Electronically signed by Pepper Scott PTA on 2/3/2020 at 11:48 AM

## 2020-02-03 NOTE — PROGRESS NOTES
Coordination  Fine Motor: various FM tasks utilizing pincer and tripod grasps with no difficulty      Sensory Integration  Sensory integration: Yes(HEP for sensory re-education )        Plan   Plan  Current Treatment Recommendations: Strengthening, Functional Mobility Training, Safety Education & Training, Patient/Caregiver Education & Training, Equipment Evaluation, Education, & procurement, Self-Care / ADL, Home Management Training    Goals  Short term goals  Time Frame for Short term goals: 1 week   Short term goal 1: Supervision with bathing hygiene. Short term goal 2: Supervision with LB dressing. Short term goal 3: Supervision with clothing management/hygiene for toileting. Short term goal 4: Supervision with toilet transfers. Short term goal 5: Supervision with ambulatory home making tasks. Short term goal 6: Patient will complete 1-2 handed static standing task for 4 minutes, requiring Supervision. Short term goal 7: Patient will increase B  strength by 2# to increase independence with ADLs. Long term goals  Time Frame for Long term goals : 2 weeks   Long term goal 1: Modified independent with bathing hygiene. Long term goal 2: Modified independent with overall dressing. Long term goal 3: Modified independent with toileting and toilet transfers. Long term goal 4: Independent with HEP  Long term goal 5: Patient verbalize DME needs. Patient Goals   Patient goals : Return home independently to assist his wife.         Therapy Time   Individual Concurrent Group Co-treatment   Time In   1400       Time Out   1445       Minutes   45       Timed Code Treatment Minutes: 39 Minutes       5500 Ricki Perea

## 2020-02-03 NOTE — PATIENT CARE CONFERENCE
[facility] ACUTE INPATIENT REHABILITATION  TEAM CONFERENCE NOTE    Date: 2/3/2020  Patient Name: Torri Ko        MRN: [id number]    : 1950  (77 y.o.)  Gender: male      Diagnosis: nontraumatic spinal dysfunction       PHYSICAL THERAPY  STRENGTH  Strength RLE  Strength RLE: Exception  Comment: grossly 4/5  Strength LLE  Strength LLE: Exception  Comment: grossly 4/5  ROM  AROM RLE (degrees)  RLE AROM: WFL  AROM LLE (degrees)  LLE AROM : WFL  BED MOBILITY  Bed Mobility  Rolling: Modified independent  Supine to Sit: Modified independent  Sit to Supine: Modified independent  TRANSFERS  Transfers  Sit to Stand: Stand by assistance  Stand to sit: Stand by assistance  Bed to Chair: Stand by assistance  Car Transfer: Stand by assistance  WHEELCHAIR PROPULSION  Propulsion 1  Propulsion: Manual  Level: Level Tile  Method: FLAVIO QUINN  Level of Assistance: Independent  Description/ Details: good w/c propulsion   Distance: 200  AMBULATION  Ambulation 1  Surface: level tile  Device: Rolling Walker  Other Apparatus: (C-Collar)  Assistance: Contact guard assistance  Quality of Gait: Pt continues to show R knee instability, but did have 2 episodes on R knee buckling needing Min a to correct. Gait Deviations: Slow Chantal, Increased ALONA  Distance: 250'  Comments: no lob steady chantal.   STAIRS  Stairs  # Steps : 6  Stairs Height: 6\"  Rails: Bilateral  Device: Rolling walker  Assistance: Stand by assistance  Comment: LLE leading ascention, RLE leading descention, v/c for sequencing   GOALS:  Short term goals  Time Frame for Short term goals: 1wk  Short term goal 1: supervision bed moblility  Short term goal 2: SBA transfers with AD  Short term goal 3: indep 150' WC propulsion  Short term goal 4: ' ambulation with AD  Short term goal 5: SBA 40ft amb on uneven surface with AD    Long term goals  Time Frame for Long term goals : 2wks   Long term goal 1: indep bed mobility  Long term goal 2: indep transfers with AD  Long term goal 3: indep 40ft amb uneven surface with AD  Long term goal 4: indep 150' amb with AD  Long term goal 5: indep 1 curb     ASSESSMENT:  Assessment: Pt is doing well w/ bed mobility and TF's. Pt continues to show instability in R knee during amb. Pt did have 2 episodes of R knee buckling during amb needing Min A to correct. Therapist had Pt perform sitting ther ex due to episodes of R knee buckling. Pt toleratd sitting ther ex well. SPEECH THERAPY: N/A      OCCUPATIONAL THERAPY    CURRENT IRF-SENA SCORES  Eating: CARE Score: 5  Oral Hygiene: CARE Score: 5  Toileting: CARE Score: 4  Shower/Bathe: CARE Score: 3  Upper Body Dressing: CARE Score: 5  Lower Body Dressing: CARE Score: 4  Footwear: CARE Score: 5  Toilet Transfers: CARE Score: 4  Picking Up Object:  CARE Score: 1  CGA for balance during dynamic ADLs    UE Functioning:  WFLs though c/o numbness/tingling    Pain Assessment:  Pain Level: 0  Pain Location: Neck    STGs:  Short term goals  Time Frame for Short term goals: 1 week   Short term goal 1: Supervision with bathing hygiene. Short term goal 2: Supervision with LB dressing. Short term goal 3: Supervision with clothing management/hygiene for toileting. Short term goal 4: Supervision with toilet transfers. Short term goal 5: Supervision with ambulatory home making tasks. Short term goal 6: Patient will complete 1-2 handed static standing task for 4 minutes, requiring Supervision. Short term goal 7: Patient will increase B  strength by 2# to increase independence with ADLs. LTGs:  Long term goals  Time Frame for Long term goals : 2 weeks   Long term goal 1: Modified independent with bathing hygiene. Long term goal 2: Modified independent with overall dressing. Long term goal 3: Modified independent with toileting and toilet transfers. Long term goal 4: Independent with HEP  Long term goal 5: Patient verbalize DME needs.      Assessment:  Decreased functional mobility ; Decreased ADL status; Decreased safe awareness; Decreased balance; Decreased high-level IADLs; Decreased fine motor control              NUTRITION  Current Wt: Weight: 222 lb 0.9 oz (100.7 kg) / Body mass index is 30.97 kg/m². Admission Wt: Admission Body Weight: 217 lb (98.4 kg)  Oral Diet Orders: Carb Control 4 Carbs/Meal   Oral Nutrition Supplement (ONS) Orders: None   PO intake remains good at %. Has had a weight decrease of 12.6% in 2 months. Accuchek's are still elevated but improving  107-241. Please see nutrition note for details. NURSING    FIMS:  Bladder  Level of Assistance: Bladder Level of Assistance: 5- Requires helper to provide or empty urinal, bedside commode, or requires set-up/cues to empty bladder (ie. helper provides self-catheter supplies )  Frequency of Accidents: Bladder Frequency of Accidents: 6 - No accidents,uses device like urinal, bedpan, absorbant pad, or requires medication to manage bladder    Bowel  Level of Assistance: Bowel Level of Assistance: 5- Requires helper to provide or empty bedpan, bedside commode, or requires set-up/cues to move bowels  Frequency of Accidents: Bowel Frequency of Accidents: 6 - No accidents, uses device like bedpan, diaper, bedside commode colostomy, or requires medication to manage bowels    Wounds/Incisions/Ulcers:  Stage 2 to buttocks with Mepilex, Anterior neck incision with surgical glue and steri strips, Wound to right foot on plantar area. Atul Scale Score: 18    Pain: Percocet 7.5 mg q4 hrs prn    Consultations/Labs/X-rays:   Routine labs    Family Education:   Need to make contact with family to initiate education prior to discharge- wife is unable, her sister is currently assisting her    Fall Risk:  Bundy Score: 72    Fall in the last week? No falls this hospital stay. Other Nursing Issues:   Patient is Alert and oriented. Continent of bowel and bladder with last BM on 02/02/20. Assist x 1 for transfers.

## 2020-02-03 NOTE — PROGRESS NOTES
Nutrition Assessment    Type and Reason for Visit: Reassess    Nutrition Recommendations: continue current POC. Provide education prior to discharge    Nutrition Assessment: Pt remains adequately nourished and not at nutritional risk d/t good po intake (%). Accuchek's remain elevated 107-241, but are slowly improving. Malnutrition Assessment:  · Malnutrition Status: No malnutrition  · Context: Acute illness or injury  · Findings of the 6 clinical characteristics of malnutrition (Minimum of 2 out of 6 clinical characteristics is required to make the diagnosis of moderate or severe Protein Calorie Malnutrition based on AND/ASPEN Guidelines):  1. Energy Intake-Greater than 75% of estimated energy requirement, Greater than or equal to 7 days    2. Weight Loss-10% loss or greater, (2 months)  3. Fat Loss-No significant subcutaneous fat loss,    4. Muscle Loss-No significant muscle mass loss,    5. Fluid Accumulation-No significant fluid accumulation, Generalized  6.   Strength-Not measured    Nutrition Risk Level: Low    Nutrition Diagnosis:   · Problem: Food and nutrition-related knowledge deficit  · Etiology: related to Lack of prior nutrition-related education     Signs and symptoms:  as evidenced by Lab values    Objective Information:  · Nutrition-Focused Physical Findings: well nourished  · Wound Type: Stage II, Surgical Wound  · Current Nutrition Therapies:  · Oral Diet Orders: Carb Control 4 Carbs/Meal   · Oral Diet intake: %  · Oral Nutrition Supplement (ONS) Orders: None     · Anthropometric Measures:  · Ht: 5' 11\" (180.3 cm)   · Current Body Wt: 222 lb (100.7 kg)  · Admission Body Wt: 217 lb (98.4 kg)  · Usual Body Wt: 254 lb (115.2 kg)  · % Weight Change:  ,  12.6% weight decrease in 2 months  · Ideal Body Wt: 172 lb (78 kg), % Ideal Body 129%  · BMI Classification: BMI 30.0 - 34.9 Obese Class I    Nutrition Interventions:   Continue current diet  Continued Inpatient Monitoring,

## 2020-02-03 NOTE — PROGRESS NOTES
Perla Stanton  [id number]     02/03/20 1529 02/03/20 1530 02/03/20 1531   General   Response To Previous Treatment Patient with no complaints from previous session. --   --    Family / Caregiver Present No  --   --    Subjective   Subjective Pt agreed to therapy this afternoon. --   --    Pain Screening   Patient Currently in Pain Yes  --   --    Pain Assessment   Pain Assessment 0-10  --   --    Pain Level 4  --   --    Patient's Stated Pain Goal No pain  --   --    Pain Type Acute pain  --   --    Pain Location Knee  --   --    Pain Orientation Right  --   --    Pain Descriptors Aching  --   --    Pain Frequency Continuous  --   --    Pain Onset On-going  --   --    Clinical Progression Not changed  --   --    Response to Pain Intervention Patient Satisfied  --   --    Multiple Pain Sites No  --   --    Pre Treatment Pain Screening   Pain at present 4  --   --    Scale Used Numeric Score  --   --    Intervention List Patient able to continue with treatment  --   --    Bed Mobility   Rolling  --  Modified independent  --    Supine to Sit  --  Modified independent  --    Sit to Supine  --  Modified independent  --    Transfers   Sit to Stand  --   --  Stand by assistance   Stand to sit  --   --  Stand by assistance   Bed to Chair  --   --  Stand by assistance   Ambulation   Ambulation?  --   --  Yes   WB Status  --   --  WBAT   Ambulation 1   Surface  --   --  level tile   Device  --   --  Rolling Walker   Other Apparatus  --   --    (C-Collar)   Assistance  --   --  Minimal assistance   Quality of Gait  --   --  Pt started amb this afternoon and R knee immediatley started buckling. Pt's R knee buckled twice needing Min A to Mod to correct. Pt's knee then started to buckle a 3rd time as Pt tried to amb. Amb was stopped at this point due to significant instability in R knee.      Distance  --   --  48'   Exercises   Comments  --   --   --    Conditions Requiring Skilled Therapeutic Intervention   Body structures, Functions, Activity limitations  --   --   --    Assessment  --   --   --    Prognosis  --   --   --    Activity Tolerance   Activity Tolerance  --   --   --       02/03/20 1533 02/03/20 1534   General   Response To Previous Treatment  --   --    Family / Caregiver Present  --   --    Subjective   Subjective  --   --    Pain Screening   Patient Currently in Pain  --   --    Pain Assessment   Pain Assessment  --   --    Pain Level  --   --    Patient's Stated Pain Goal  --   --    Pain Type  --   --    Pain Location  --   --    Pain Orientation  --   --    Pain Descriptors  --   --    Pain Frequency  --   --    Pain Onset  --   --    Clinical Progression  --   --    Response to Pain Intervention  --   --    Multiple Pain Sites  --   --    Pre Treatment Pain Screening   Pain at present  --   --    Scale Used  --   --    Intervention List  --   --    Bed Mobility   Rolling  --   --    Supine to Sit  --   --    Sit to Supine  --   --    Transfers   Sit to Stand  --   --    Stand to sit  --   --    Bed to Chair  --   --    Ambulation   Ambulation?  --   --    WB Status  --   --    Ambulation 1   Surface  --   --    Device  --   --    Other Apparatus  --   --    Assistance  --   --    Quality of Gait  --   --    Distance  --   --    Exercises   Comments Sitting Loy LE ther ex x 15 reps. --    Conditions Requiring Skilled Therapeutic Intervention   Body structures, Functions, Activity limitations  --  Decreased functional mobility ; Decreased ADL status; Decreased endurance   Assessment  --  Pt started amb this afternoon and R knee immediatley started buckling. Pt's R knee buckled twice needing Min A to Mod to correct. Pt's knee then started to buckle a 3rd time as Pt tried to amb. Amb was stopped at this point due to significant instability in R knee. Pt then performed Sititng Loy LE ther ex due to R knee instability again this afternoon.      Prognosis  --  Good   Activity Tolerance   Activity Tolerance  --  Patient

## 2020-02-03 NOTE — PROGRESS NOTES
Occupational Therapy     02/03/20 0815   Restrictions/Precautions   Restrictions/Precautions Fall Risk   Position Activity Restriction   Spinal Precautions No Bending; No Lifting; No Twisting   Other position/activity restrictions C collar x 14 days   Vision   Vision Impaired   Vision Exceptions Wears glasses for reading   Pain Assessment   Patient Currently in Pain No   Pain Assessment 0-10   Pain Level 0   ADL   UE Dressing Stand by assistance   LE Dressing Stand by assistance   Balance   Standing Balance Stand by assistance   Standing Balance   Time 2 mins x 3 occ. Activity Static standing activity at [facility] table. Comment Pt. removed lids/caps from bottle board and manipulated small pegs utilizing pincer grasp with fair quality.    Functional Mobility   Functional - Mobility Device Rolling Walker   Activity To/From therapy gym   Assist Level Stand by assistance   Functional Mobility Comments Self propelled w/ WC from room to gym    Bed mobility   Supine to Sit Supervision   Sit to Supine Supervision   Transfers   Stand Step Transfers Stand by assistance   Sit to stand Stand by assistance   Stand to sit Stand by assistance   Wheelchair Bed Transfers   Wheelchair/Bed - Technique Stand step   Level of Asssistance Contact guard assistance   Activity Tolerance   Activity Tolerance Patient Tolerated treatment well

## 2020-02-03 NOTE — PLAN OF CARE
Ongoing  Goal: STG - Patient demonstrates skin care/treatment/dressing change  2/2/2020 2227 by Jamil Horton RN  Outcome: Ongoing  2/2/2020 1211 by Jesse Giron LPN  Outcome: Ongoing  Goal: STG - patient will maintain good skin integrity  2/2/2020 2227 by Jamil Horton RN  Outcome: Ongoing  2/2/2020 1211 by Jesse Giron LPN  Outcome: Ongoing  Goal: STG - Patient exhibits signs of wound healing.   2/2/2020 2227 by Jamil Horton RN  Outcome: Ongoing  2/2/2020 1211 by Jesse Giron LPN  Outcome: Ongoing  Goal: STG - patient demonstrates pressure reduction techniques  2/2/2020 2227 by Jamil Horton RN  Outcome: Ongoing  2/2/2020 1211 by Jesse Giron LPN  Outcome: Ongoing  Goal: STG - Patient demonstrates preventative skin care measures  2/2/2020 2227 by Jamil Horton RN  Outcome: Ongoing  2/2/2020 1211 by Jesse Giron LPN  Outcome: Ongoing     Problem: Falls - Risk of:  Goal: Will remain free from falls  Description  Will remain free from falls  2/2/2020 2227 by Jamil Horton RN  Outcome: Ongoing  2/2/2020 1211 by Jesse Giron LPN  Outcome: Ongoing  Goal: Absence of physical injury  Description  Absence of physical injury  2/2/2020 2227 by Jamil Horton RN  Outcome: Ongoing  2/2/2020 1211 by Jesse Giron LPN  Outcome: Ongoing

## 2020-02-03 NOTE — TELEPHONE ENCOUNTER
Stacie with the [facility] has called and is requesting a call back with some questions needing clarified regarding the cervical collar following his 01/23/2020 cervical surgery.  Patient is telling them the collar should come off, but she needs clarified does it come off 14 days after date of surgery or 14 dates from discharge, once off does it remain off, she would like a call back.    You can reach her at [phone]

## 2020-02-04 LAB
GLUCOSE BLD-MCNC: 135 MG/DL (ref 70–99)
GLUCOSE BLD-MCNC: 158 MG/DL (ref 70–99)
GLUCOSE BLD-MCNC: 161 MG/DL (ref 70–99)
GLUCOSE BLD-MCNC: 164 MG/DL (ref 70–99)
PERFORMED ON: ABNORMAL

## 2020-02-04 PROCEDURE — 99233 SBSQ HOSP IP/OBS HIGH 50: CPT | Performed by: PSYCHIATRY & NEUROLOGY

## 2020-02-04 PROCEDURE — 1180000000 HC REHAB R&B

## 2020-02-04 PROCEDURE — 97535 SELF CARE MNGMENT TRAINING: CPT

## 2020-02-04 PROCEDURE — 6370000000 HC RX 637 (ALT 250 FOR IP): Performed by: PSYCHIATRY & NEUROLOGY

## 2020-02-04 PROCEDURE — 97530 THERAPEUTIC ACTIVITIES: CPT

## 2020-02-04 PROCEDURE — 97116 GAIT TRAINING THERAPY: CPT

## 2020-02-04 PROCEDURE — 97110 THERAPEUTIC EXERCISES: CPT

## 2020-02-04 PROCEDURE — 82948 REAGENT STRIP/BLOOD GLUCOSE: CPT

## 2020-02-04 RX ORDER — CELECOXIB 100 MG/1
100 CAPSULE ORAL DAILY
Status: DISCONTINUED | OUTPATIENT
Start: 2020-02-04 | End: 2020-02-07 | Stop reason: HOSPADM

## 2020-02-04 RX ORDER — CELECOXIB 100 MG/1
100 CAPSULE ORAL 2 TIMES DAILY
Status: DISCONTINUED | OUTPATIENT
Start: 2020-02-04 | End: 2020-02-04

## 2020-02-04 RX ADMIN — Medication 40 UNITS: at 21:21

## 2020-02-04 RX ADMIN — METFORMIN HYDROCHLORIDE 1000 MG: 500 TABLET ORAL at 08:12

## 2020-02-04 RX ADMIN — OXYCODONE HYDROCHLORIDE AND ACETAMINOPHEN 1 TABLET: 7.5; 325 TABLET ORAL at 20:23

## 2020-02-04 RX ADMIN — CITALOPRAM HYDROBROMIDE 20 MG: 10 TABLET ORAL at 08:11

## 2020-02-04 RX ADMIN — CELECOXIB 100 MG: 100 CAPSULE ORAL at 10:46

## 2020-02-04 RX ADMIN — OXYCODONE HYDROCHLORIDE AND ACETAMINOPHEN 1 TABLET: 7.5; 325 TABLET ORAL at 16:20

## 2020-02-04 RX ADMIN — PREGABALIN 200 MG: 75 CAPSULE ORAL at 20:22

## 2020-02-04 RX ADMIN — PANTOPRAZOLE SODIUM 40 MG: 40 TABLET, DELAYED RELEASE ORAL at 05:41

## 2020-02-04 RX ADMIN — OXYCODONE HYDROCHLORIDE AND ACETAMINOPHEN 1 TABLET: 7.5; 325 TABLET ORAL at 12:14

## 2020-02-04 RX ADMIN — BUSPIRONE HYDROCHLORIDE 10 MG: 10 TABLET ORAL at 20:23

## 2020-02-04 RX ADMIN — OXYCODONE HYDROCHLORIDE AND ACETAMINOPHEN 1 TABLET: 7.5; 325 TABLET ORAL at 08:12

## 2020-02-04 RX ADMIN — ZOLPIDEM TARTRATE 10 MG: 5 TABLET ORAL at 20:23

## 2020-02-04 RX ADMIN — PREGABALIN 200 MG: 75 CAPSULE ORAL at 08:11

## 2020-02-04 RX ADMIN — SIMVASTATIN 40 MG: 40 TABLET, FILM COATED ORAL at 20:23

## 2020-02-04 RX ADMIN — METFORMIN HYDROCHLORIDE 1000 MG: 500 TABLET ORAL at 16:52

## 2020-02-04 RX ADMIN — INSULIN LISPRO 1 UNITS: 100 INJECTION, SOLUTION INTRAVENOUS; SUBCUTANEOUS at 12:08

## 2020-02-04 RX ADMIN — INSULIN LISPRO 1 UNITS: 100 INJECTION, SOLUTION INTRAVENOUS; SUBCUTANEOUS at 16:52

## 2020-02-04 RX ADMIN — LOSARTAN POTASSIUM 100 MG: 100 TABLET, FILM COATED ORAL at 08:12

## 2020-02-04 RX ADMIN — PREGABALIN 200 MG: 75 CAPSULE ORAL at 14:14

## 2020-02-04 RX ADMIN — INSULIN LISPRO 1 UNITS: 100 INJECTION, SOLUTION INTRAVENOUS; SUBCUTANEOUS at 21:21

## 2020-02-04 RX ADMIN — BUSPIRONE HYDROCHLORIDE 10 MG: 10 TABLET ORAL at 08:11

## 2020-02-04 RX ADMIN — HYDROCHLOROTHIAZIDE 25 MG: 25 TABLET ORAL at 08:12

## 2020-02-04 ASSESSMENT — PAIN SCALES - GENERAL
PAINLEVEL_OUTOF10: 0
PAINLEVEL_OUTOF10: 3
PAINLEVEL_OUTOF10: 4
PAINLEVEL_OUTOF10: 5
PAINLEVEL_OUTOF10: 0
PAINLEVEL_OUTOF10: 5
PAINLEVEL_OUTOF10: 0
PAINLEVEL_OUTOF10: 4
PAINLEVEL_OUTOF10: 2
PAINLEVEL_OUTOF10: 6
PAINLEVEL_OUTOF10: 5
PAINLEVEL_OUTOF10: 0

## 2020-02-04 ASSESSMENT — PAIN DESCRIPTION - FREQUENCY: FREQUENCY: INTERMITTENT

## 2020-02-04 ASSESSMENT — PAIN DESCRIPTION - ONSET: ONSET: ON-GOING

## 2020-02-04 ASSESSMENT — PAIN DESCRIPTION - PAIN TYPE
TYPE: ACUTE PAIN
TYPE: ACUTE PAIN

## 2020-02-04 ASSESSMENT — PAIN DESCRIPTION - DESCRIPTORS: DESCRIPTORS: ACHING

## 2020-02-04 ASSESSMENT — PAIN DESCRIPTION - ORIENTATION: ORIENTATION: RIGHT;LEFT

## 2020-02-04 ASSESSMENT — PAIN DESCRIPTION - LOCATION: LOCATION: FOOT

## 2020-02-04 NOTE — TELEPHONE ENCOUNTER
Called Stacie back and left message on her voice mail clarifying that the cervical collar comes off and stays off 14 days after his surgery date.  Date of surgery is 01/23/2020, collar comes off on 02/06/2020

## 2020-02-04 NOTE — PROGRESS NOTES
Patient:   Lenny Oconnell  MR#:    [id number]   Room:    [facility]-   YOB: 1950  Date of Progress Note: 2/4/2020  Time of Note                           8:21 AM  Consulting Physician:   Rebeca Osei M.D. Attending Physician:  Rebeca Osei MD     Chief complaint Cervical myelopathy with cervical fusion     S:This 77 y.o. male  admitted to [facility] on 1/23/20 w/spinal cord compression due to degenerative disorder of spinal column. The pt presented to the ED for continued falls and worsening neck pain along w/numbness and tingling in his arms and legs and difficulty w/dexterity issues. He was found to have a large cervical disc displacement at C5-6 that was cuasing cord compression. On 1/23/20 he was taken to OR by Dr. Vahid López for a cervical disectomy anterior w/fusion C5-6. He tolerated the procedure well. He is still having a myelpathic gait and is having difficlutly w/LOB. He continues to have problems w/urinary and fecal incontinence. He will have cervical collar for 14 days. Pt has uncontrolled DM w/A1C of 14.7. He was seen by wound care nurse at [facility] and was found to have a stage 2 pressure injury on his left gluteal area. He also has several diabetic foot ulcers on his L & R foot, as well as a R gluteal & Bilateral groin area moisture associated skin damage. Pictures are in chart. His blood sugars are running between 300-400. He is medically stable and is particiapting w/therapy. He is ready to begin rehab program w/plan of returning home after rehab stay. No acute issues      REVIEW OF SYSTEMS:  Constitutional: No fevers No chills  Neck:No stiffness  Respiratory: No shortness of breath  Cardiovascular: No chest pain No palpitations  Gastrointestinal: No abdominal pain    Genitourinary: No Dysuria  Neurological: No headache, no confusion    Past Medical History:      Diagnosis Date    Chronic back pain     Essential hypertension 1/28/2020    GERD (gastroesophageal reflux disease)  Hyperlipidemia     Low back pain 1/29/2016    Nail fungus     Neuropathy     Osteoarthritis        Past Surgical History:      Procedure Laterality Date   Chau Marcin Flores - done 2-3 yrs ago @ [facility], pt unaware of dates   [facility]         Medications in Hospital:      Current Facility-Administered Medications:     oxyCODONE-acetaminophen (PERCOCET) 7.5-325 MG per tablet 1 tablet, 1 tablet, Oral, Q4H PRN, Tran Carcamo MD, 1 tablet at 02/04/20 5462    magic butt cream, , Topical, Q4H PRN, Tran Carcamo MD    pregabalin (LYRICA) capsule 200 mg, 200 mg, Oral, TID, Tran Carcamo MD, 200 mg at 02/04/20 0811    nicotine (NICODERM CQ) 14 MG/24HR 1 patch, 1 patch, Transdermal, Daily, Tran Carcamo MD, 1 patch at 02/03/20 1740    busPIRone (BUSPAR) tablet 10 mg, 10 mg, Oral, BID, Tran Carcamo MD, 10 mg at 02/04/20 0811    citalopram (CELEXA) tablet 20 mg, 20 mg, Oral, Daily, Tran Carcamo MD, 20 mg at 02/04/20 0811    cyclobenzaprine (FLEXERIL) tablet 10 mg, 10 mg, Oral, TID PRN, Tran Carcamo MD, 10 mg at 01/30/20 1754    metFORMIN (GLUCOPHAGE) tablet 1,000 mg, 1,000 mg, Oral, BID WC, Tran Carcamo MD, 1,000 mg at 02/04/20 0812    pantoprazole (PROTONIX) tablet 40 mg, 40 mg, Oral, QAM AC, Tran Carcamo MD, 40 mg at 02/04/20 0541    oxyCODONE-acetaminophen (PERCOCET) 7.5-325 MG per tablet 1 tablet, 1 tablet, Oral, Q6H PRN, Tran Carcamo MD, 1 tablet at 01/30/20 0559    simvastatin (ZOCOR) tablet 40 mg, 40 mg, Oral, Nightly, Tran Carcamo MD, 40 mg at 02/03/20 1947    zolpidem (AMBIEN) tablet 10 mg, 10 mg, Oral, Nightly PRN, Tran Carcamo MD, 10 mg at 02/03/20 2112    acetaminophen (TYLENOL) tablet 650 mg, 650 mg, Oral, Q4H PRN, Tran Carcamo MD    polyethylene glycol (GLYCOLAX) packet 17 g, 17 g, Oral, Daily PRN, Tran Carcamo MD    glucose (GLUTOSE) 40 % oral gel 15 g, 15 g, Oral, PRN, Tran Carcamo MD    dextrose 50 % IV appropriate affect  Attention and concentration appear appropriate  Recent and remote memory appears unremarkable  Speech normal without dysarthria  No clear issues with language of fund of knowledge     Cranial Nerve Exam     CN III, IV,VI-EOMI, No nystagmus, conjugate eye movements, no ptosis    CN VII-no facial assymetry       Motor Exam  antigravity throughout upper and lower extremities bilaterally      Tremors- no tremors in hands or head noted     Gait  Not tested      Nursing/pcp notes, imaging,labs and vitals reviewed. PT,OT and/or speech notes reviewed    Lab Results   Component Value Date    WBC 10.0 02/03/2020    HGB 14.7 02/03/2020    HCT 44.7 02/03/2020    MCV 92.9 02/03/2020     02/03/2020     Lab Results   Component Value Date     (L) 02/03/2020    K 4.0 02/03/2020    CL 94 (L) 02/03/2020    CO2 29 02/03/2020    BUN 23 02/03/2020    CREATININE 0.9 02/03/2020    GLUCOSE 105 02/03/2020    CALCIUM 9.7 02/03/2020    PROT 5.7 (L) 02/03/2020    LABALBU 3.3 (L) 02/03/2020    BILITOT 0.6 02/03/2020    ALKPHOS 230 (H) 02/03/2020    AST 28 02/03/2020    ALT 30 02/03/2020    LABGLOM >60 02/03/2020   No results found for: INR, PROTIME    Lopez Chaim   Student Physical Therapist   Physical Therapy   Progress Notes   Cosign Needed   Date of Service:  1/30/2020  3:08 PM               Cosign Needed             Show:Clear all  []Manual[x]Template[]Copied    Added by:  [x]Erica Lira    []Barbara for details     Physical Therapy      Name: Ray Granados  MRN:  [id number]  Date of service:  1/30/2020 01/30/20 1415   Restrictions/Precautions   Restrictions/Precautions Fall Risk;Surgical Protocols   Required Braces or Orthoses   Cervical c-collar   Position Activity Restriction   Spinal Precautions No Bending; No Lifting; No Twisting   Ambulation 1   Surface uneven   Device Rolling Walker   Assistance Stand by assistance   Quality of Gait started with a 3 point gait, progressed to reciprocal is safe and able to utilize a reciprocal pattern. Activity Tolerance   Activity Tolerance Patient Tolerated treatment well;Patient limited by fatigue   Safety Devices   Type of devices All fall risk precautions in place; Bed alarm in place; Chair alarm in place;Gait belt;Left in bed      Electronically signed by Yen Coronado Student PT on 1/30/2020 at 3:09 PM                     RECORD REVIEW: Previous medical records, medications were reviewed at today's visit    IMPRESSION:   1. Cervical myelopathy-s/p fusion  2. Mood disorder-on meds  3. DM-on meds monitor blood sugar  4. HTN-on meds monitor  5. GI-PPI/bowel regimen  6. Neuropathy-on Lyrica  7. Hyperlipidemia-on statin  8. Pain control-Percocet PRN  9. PT/OT   10.  Sacral ulcer wound care    Continue present care    Add Celebrex 2/4    Team conference with PT/OT/speech/nursing/Care coordinator to review in depth patients care and discharge planning      Ambulation 250 ft standard RW CGA  6 inch steps      ELOS Friday      Expected duration and frequency therapy: 180 minutes per day, 5 days per week    [address] [facility]  [phone] CELL  Dr Herb Senior

## 2020-02-04 NOTE — PROGRESS NOTES
Occupational Therapy  Facility/Department: [facility] 8 REHAB UNIT  Daily Treatment Note  NAME: Genny Patel  : 1950  MRN: [id number]    Date of Service: 2020    Discharge Recommendations:  Home with Home health OT  OT Equipment Recommendations  Equipment Needed: Yes  Mobility Devices: Henriquez Wolf; ADL Assistive Devices  Walker: Rolling  ADL Assistive Devices: Toileting - 3-in-1 Commode  Other: WILL SEND FAX TO [facility] AFTER DR SIGNS    Assessment      Activity Tolerance  Activity Tolerance: Patient Tolerated treatment well  Safety Devices  Safety Devices in place: Yes  Type of devices: Call light within reach         Patient Diagnosis(es): There were no encounter diagnoses. has a past medical history of Chronic back pain, Essential hypertension, GERD (gastroesophageal reflux disease), Hyperlipidemia, Low back pain, Nail fungus, Neuropathy, and Osteoarthritis. has a past surgical history that includes Cervical spine surgery; Cholecystectomy; and hernia repair. Restrictions  Restrictions/Precautions  Restrictions/Precautions: Fall Risk  Required Braces or Orthoses?: Yes  Required Braces or Orthoses  Cervical: c-collar  Position Activity Restriction  Spinal Precautions: No Bending, No Lifting, No Twisting  Other position/activity restrictions: C collar x 14 days  Subjective   General  Chart Reviewed: Yes  Patient assessed for rehabilitation services?: Yes  Family / Caregiver Present: No  Diagnosis: C5-C6 cervical discectomy   Subjective  Subjective: Pt c/o numbness in hands and difficulty with handwriting  Pain Assessment  Pain Assessment: 0-10  Pain Level: 0  Vital Signs  Patient Currently in Pain: No   Objective    Balance  Sitting Balance: Independent  Standing Balance: Supervision  Standing Balance  Time: 4 mins  Activity: 1 handed static act  Functional Mobility  Functional - Mobility Device: Rolling Walker  Activity: To/from bathroom; Other  Assist Level: Supervision  Functional Mobility Comments: short distance in room and therapy gym     Transfers  Stand Step Transfers: Supervision  Sit to stand: Supervision  Stand to sit: Supervision     Left Hand Strength -  (lbs)  Handle Setting 2: 60.4  Right Hand Strength -  (lbs)  Handle Setting 2: 51.5       02/04/20 1445   OT Education   OT Education Equipment   Patient Education DME      02/04/20 [address] or clean-up assistance   CARE Score 5   Lower Body Dressing   Assistance Needed Setup or clean-up assistance   CARE Score 5      02/04/20 1445   Toilet Transfer   Assistance Needed Setup or clean-up assistance   Comment short distane to bathroom   CARE Score 5       Plan   Plan  Specific instructions for Next Treatment: tub/shower transfer  Current Treatment Recommendations: Strengthening, Functional Mobility Training, Safety Education & Training, Patient/Caregiver Education & Training, Equipment Evaluation, Education, & procurement, Self-Care / ADL, Home Management Training    Goals  Short term goals  Time Frame for Short term goals: 1 week   Short term goal 1: Supervision with bathing hygiene. Short term goal 2: met  Short term goal 3: met  Short term goal 4: MET  Short term goal 5: Supervision with ambulatory home making tasks. Short term goal 6: met  Short term goal 7: Patient will increase B  strength by 2# to increase independence with ADLs. Long term goals  Time Frame for Long term goals : 2 weeks   Long term goal 1: Modified independent with bathing hygiene. Long term goal 2: Modified independent with overall dressing. Long term goal 3: Modified independent with toileting and toilet transfers. Long term goal 4: Independent with HEP  Long term goal 5: Patient verbalize DME needs. Patient Goals   Patient goals : Return home independently to assist his wife.         Therapy Time   Individual Concurrent Group Co-treatment   Time In   1358       Time Out   1530       Minutes   45       Timed Code Treatment Minutes: 45 Minutes     Electronically signed by Sharon Ku OT on 2/4/2020 at 3:39 PM

## 2020-02-04 NOTE — PROGRESS NOTES
Durable Medical Equipment   Physician Order      Patient Name Cynthia Tyler  Patient Phone: [phone]    Patient Address: [address] [place] [id number]    Patient Height Height: 5' 11\" (180.3 cm)  Patient Weight 222 lb 0.9 oz (100.7 kg)   1950             DME NEEDED:      **ROLLING WALKER    **BEDSIDE COMMODE        1. [facility] PART A AND B     F/O Payor/Plan Precert #   MEDICARE/MEDICARE PART A AND B    Subscriber Subscriber #   Mena Evans [id number]   Address Phone   [address] [place] [phone]   2.  [place]     F/O Payor/Plan Precert #   [facility] CARE MEDICARE SUPP    Subscriber Subscriber #   Mena Evans [id number]   Address Phone   [address]  [place] [address] [phone]             Spinal cord compression [facility]) G95.20   2020 Yes   Degeneration of lumbar or lumbosacral intervertebral disc M51.37   2012 Yes   Spinal stenosis, lumbar region, without neurogenic claudication M48.061   2012 Yes   Low back pain (Chronic) M54.5   2016 Yes   S/P cervical spinal fusion Z98.1   2020 Yes   Weakness R53.1   2020 Yes   Pain, neck M54.2   2020 Yes   Numbness R20.0   2020 Yes   Neuropathy G62.9   2020 Yes   Skin ulcer of sacrum, limited to breakdown of skin [facility]) L98.421   2020 Yes   Essential hypertension I10   2020 Yes   Type 2 diabetes mellitus with complication, without long-term current use of insulin ([facility] [address] E11.8   2020 Yes   Hyperlipidemia E78.5   2020 Yes      Non-Hospital Problem List   Date Reviewed: 2012      ICD-10-CM Priority Class Noted   Lumbago M54.5   2012            ____________________ _____________________ ________________   Physician Signature      Physician Name (print)   Physician NPI          Date

## 2020-02-04 NOTE — PROGRESS NOTES
Nasir Hdz  [id number]     02/04/20 6730 02/04/20 0927 02/04/20 0928   General   Response To Previous Treatment Patient with no complaints from previous session. --   --    Family / Caregiver Present No  --   --    Subjective   Subjective Pt reports that his R knee seems to feel better this morning and is ready for therapy. --   --    Pain Screening   Patient Currently in Pain No  --   --    Pain Assessment   Pain Assessment 0-10  --   --    Pain Level 0  --   --    Pre Treatment Pain Screening   Pain at present 0  --   --    Scale Used Numeric Score  --   --    Intervention List Patient able to continue with treatment  --   --    Bed Mobility   Rolling  --  Modified independent  --    Supine to Sit  --  Modified independent  --    Sit to Supine  --  Modified independent  --    Transfers   Sit to Stand  --   --  Modified independent   Stand to sit  --   --  Modified independent   Bed to Chair  --   --  Stand by assistance   Ambulation   Ambulation?  --   --  Yes   WB Status  --   --  WBAT   Ambulation 1   Surface  --   --  level tile   Device  --   --  Rolling Walker   Other Apparatus  --   --    (C-Collar)   Assistance  --   --  Stand by assistance   Quality of Gait  --   --  Pt showed much better stability in R knee this morning during amb. Pt also showed good pace and step length yusef.    Distance  --   --  250'x2   Exercises   Comments  --   --   --    Conditions Requiring Skilled Therapeutic Intervention   Body structures, Functions, Activity limitations  --   --   --    Assessment  --   --   --    Prognosis  --   --   --    Activity Tolerance   Activity Tolerance  --   --   --       02/04/20 0929 02/04/20 0930   General   Response To Previous Treatment  --   --    Family / Caregiver Present  --   --    Subjective   Subjective  --   --    Pain Screening   Patient Currently in Pain  --   --    Pain Assessment   Pain Assessment  --   --    Pain Level  --   --    Pre Treatment Pain Screening   Pain at present

## 2020-02-04 NOTE — PROGRESS NOTES
Physical Therapy  Name: July Varela  MRN:  [id number]  Date of service:  2/4/2020 02/04/20 1052   Required Braces or Orthoses   Cervical c-collar   Position Activity Restriction   Spinal Precautions No Bending; No Lifting; No Twisting   Other position/activity restrictions C collar x 14 days   General   Additional Pertinent Hx anterior cervical discectomy w/ fusion of C5-C6, DMII, hyperlipidemia, HTN, GERD, arthritis psoriasis, tobacco abuse, pressure ulcers, diabetic foot ulcers    Subjective   Subjective Patient agreeable to work with therapy   Bed Mobility   Rolling Modified independent   Supine to Sit Modified independent   Sit to Supine Modified independent   Transfers   Sit to Stand Modified independent   Stand to sit Modified independent   Bed to Chair Stand by assistance   Ambulation   WB Status WBAT   Ambulation 1   Surface level tile   Device Rolling Walker   Assistance Stand by assistance;Contact guard assistance   Quality of Gait occasional right knee buckle during gait, but patient compensates well with walker, no LOB noted   Gait Deviations Slow Chantal; Increased ALONA   Distance 250 feet   Exercises   Comments Seated bilateral LE ex's x 20 reps AND supine bilateral LE ex's x 20 reps   Short term goals   Time Frame for Short term goals 1wk   Short term goal 1 supervision bed moblility   Short term goal 2 SBA transfers with AD   Short term goal 3 indep 150' WC propulsion   Short term goal 4 ' ambulation with AD   Short term goal 5 SBA 40ft amb on uneven surface with AD   Long term goals   Time Frame for Long term goals  2wks    Long term goal 1 indep bed mobility   Long term goal 2 indep transfers with AD   Long term goal 3 indep 40ft amb uneven surface with AD   Long term goal 4 indep 150' amb with AD   Long term goal 5 indep 1 curb    Conditions Requiring Skilled Therapeutic Intervention   Body structures, Functions, Activity limitations Decreased functional mobility ; Decreased ADL status; Decreased endurance   Assessment Patient doing well with mobility, assisted patient back to bed and left with all needs in reach.    Treatment Diagnosis interference with activity, non traumatic spinal cord dysfunction (anterior disectomy)    Prognosis Good       Electronically signed by Diogenes Means PTA on 2/4/2020 at 10:56 AM

## 2020-02-05 LAB
GLUCOSE BLD-MCNC: 142 MG/DL (ref 70–99)
GLUCOSE BLD-MCNC: 156 MG/DL (ref 70–99)
GLUCOSE BLD-MCNC: 183 MG/DL (ref 70–99)
GLUCOSE BLD-MCNC: 96 MG/DL (ref 70–99)
PERFORMED ON: ABNORMAL
PERFORMED ON: NORMAL

## 2020-02-05 PROCEDURE — 97116 GAIT TRAINING THERAPY: CPT

## 2020-02-05 PROCEDURE — 97110 THERAPEUTIC EXERCISES: CPT

## 2020-02-05 PROCEDURE — 1180000000 HC REHAB R&B

## 2020-02-05 PROCEDURE — 6370000000 HC RX 637 (ALT 250 FOR IP): Performed by: PSYCHIATRY & NEUROLOGY

## 2020-02-05 PROCEDURE — 99232 SBSQ HOSP IP/OBS MODERATE 35: CPT | Performed by: PSYCHIATRY & NEUROLOGY

## 2020-02-05 PROCEDURE — 82948 REAGENT STRIP/BLOOD GLUCOSE: CPT

## 2020-02-05 PROCEDURE — 97530 THERAPEUTIC ACTIVITIES: CPT

## 2020-02-05 RX ADMIN — SIMVASTATIN 40 MG: 40 TABLET, FILM COATED ORAL at 20:32

## 2020-02-05 RX ADMIN — OXYCODONE HYDROCHLORIDE AND ACETAMINOPHEN 1 TABLET: 7.5; 325 TABLET ORAL at 11:33

## 2020-02-05 RX ADMIN — CELECOXIB 100 MG: 100 CAPSULE ORAL at 08:03

## 2020-02-05 RX ADMIN — METFORMIN HYDROCHLORIDE 1000 MG: 500 TABLET ORAL at 16:41

## 2020-02-05 RX ADMIN — OXYCODONE HYDROCHLORIDE AND ACETAMINOPHEN 1 TABLET: 7.5; 325 TABLET ORAL at 05:47

## 2020-02-05 RX ADMIN — PANTOPRAZOLE SODIUM 40 MG: 40 TABLET, DELAYED RELEASE ORAL at 05:42

## 2020-02-05 RX ADMIN — METFORMIN HYDROCHLORIDE 1000 MG: 500 TABLET ORAL at 08:03

## 2020-02-05 RX ADMIN — BUSPIRONE HYDROCHLORIDE 10 MG: 10 TABLET ORAL at 20:32

## 2020-02-05 RX ADMIN — PREGABALIN 200 MG: 75 CAPSULE ORAL at 13:55

## 2020-02-05 RX ADMIN — CITALOPRAM HYDROBROMIDE 20 MG: 10 TABLET ORAL at 08:03

## 2020-02-05 RX ADMIN — PREGABALIN 200 MG: 75 CAPSULE ORAL at 20:32

## 2020-02-05 RX ADMIN — ZOLPIDEM TARTRATE 10 MG: 5 TABLET ORAL at 20:32

## 2020-02-05 RX ADMIN — Medication 40 UNITS: at 20:32

## 2020-02-05 RX ADMIN — HYDROCHLOROTHIAZIDE 25 MG: 25 TABLET ORAL at 08:03

## 2020-02-05 RX ADMIN — LOSARTAN POTASSIUM 100 MG: 100 TABLET, FILM COATED ORAL at 08:03

## 2020-02-05 RX ADMIN — OXYCODONE HYDROCHLORIDE AND ACETAMINOPHEN 1 TABLET: 7.5; 325 TABLET ORAL at 20:32

## 2020-02-05 RX ADMIN — OXYCODONE HYDROCHLORIDE AND ACETAMINOPHEN 1 TABLET: 7.5; 325 TABLET ORAL at 16:41

## 2020-02-05 RX ADMIN — BUSPIRONE HYDROCHLORIDE 10 MG: 10 TABLET ORAL at 08:03

## 2020-02-05 RX ADMIN — PREGABALIN 200 MG: 75 CAPSULE ORAL at 08:03

## 2020-02-05 ASSESSMENT — PAIN DESCRIPTION - PAIN TYPE
TYPE: ACUTE PAIN

## 2020-02-05 ASSESSMENT — PAIN DESCRIPTION - PROGRESSION
CLINICAL_PROGRESSION: NOT CHANGED

## 2020-02-05 ASSESSMENT — PAIN DESCRIPTION - FREQUENCY
FREQUENCY: CONTINUOUS
FREQUENCY: INTERMITTENT
FREQUENCY: CONTINUOUS

## 2020-02-05 ASSESSMENT — PAIN SCALES - GENERAL
PAINLEVEL_OUTOF10: 0
PAINLEVEL_OUTOF10: 6
PAINLEVEL_OUTOF10: 5
PAINLEVEL_OUTOF10: 0
PAINLEVEL_OUTOF10: 6
PAINLEVEL_OUTOF10: 6
PAINLEVEL_OUTOF10: 0
PAINLEVEL_OUTOF10: 2
PAINLEVEL_OUTOF10: 4
PAINLEVEL_OUTOF10: 0

## 2020-02-05 ASSESSMENT — PAIN DESCRIPTION - ORIENTATION
ORIENTATION: RIGHT;LEFT
ORIENTATION: LEFT;RIGHT
ORIENTATION: RIGHT;LEFT

## 2020-02-05 ASSESSMENT — PAIN DESCRIPTION - LOCATION
LOCATION: FOOT

## 2020-02-05 ASSESSMENT — PAIN - FUNCTIONAL ASSESSMENT
PAIN_FUNCTIONAL_ASSESSMENT: ACTIVITIES ARE NOT PREVENTED

## 2020-02-05 ASSESSMENT — PAIN DESCRIPTION - ONSET
ONSET: ON-GOING

## 2020-02-05 ASSESSMENT — PAIN DESCRIPTION - DESCRIPTORS
DESCRIPTORS: ACHING
DESCRIPTORS: BURNING

## 2020-02-05 NOTE — PROGRESS NOTES
02/05/20 0815   Restrictions/Precautions   Restrictions/Precautions Fall Risk   Required Braces or Orthoses? Yes   Position Activity Restriction   Spinal Precautions No Bending; No Lifting; No Twisting   Other position/activity restrictions C collar x 14 days   Vision   Vision Impaired   Vision Exceptions Wears glasses for reading   Pain Assessment   Patient Currently in Pain No   Pain Assessment 0-10   Pain Level 0   Functional Mobility   Functional - Mobility Device Rolling Walker   Activity Other   Assist Level Stand by assistance   Functional Mobility Comments Pt. ambulated to/from [facility] w/  into kitchen to make coffee   Transfers   Stand Step Transfers Stand by assistance   Sit to stand Stand by assistance   Stand to sit Stand by assistance   Transfer Comments Pt. RLE (knee) weak on t/f   Assessment   Performance deficits / Impairments Decreased functional mobility ; Decreased ADL status; Decreased strength;Decreased balance;Decreased high-level IADLs;Decreased sensation   Assessment Pt. did well with kitchen task. Upon initial transfer from [facility] to , Pt. displayed weakness in RLE (Knee)    Activity Tolerance   Activity Tolerance Patient Tolerated treatment well   Safety Devices   Safety Devices in place Yes   Type of devices Call light within reach   Plan   Current Treatment Recommendations Strengthening; Functional Mobility Training; Endurance Training;Self-Care / ADL; Patient/Caregiver Education & Training; Safety Education & Training   Occupational Therapy

## 2020-02-05 NOTE — PLAN OF CARE
Problem: SAFETY  Goal: Free from accidental physical injury  2/4/2020 2249 by Taryn Mejias RN  Outcome: Ongoing  2/4/2020 1537 by Lorraine Nieto RN  Outcome: Ongoing  Goal: LTG - Patient will demonstrate safety requirements appropriate to situation/environment  2/4/2020 2249 by Taryn Mejias RN  Outcome: Ongoing  2/4/2020 1537 by Lorraine Nieto RN  Outcome: Ongoing  Goal: LTG - patient will utilize safety techniques  2/4/2020 2249 by Taryn Mejias RN  Outcome: Ongoing  2/4/2020 1537 by Lorraine Nieto RN  Outcome: Ongoing  Goal: STG - patient locks brakes on wheelchair  2/4/2020 2249 by Taryn Mejias RN  Outcome: Ongoing  2/4/2020 1537 by Lorraine Nieto RN  Outcome: Ongoing  Goal: STG - Patient uses call light consistently to request assistance with transfers  2/4/2020 2249 by Taryn Mejias RN  Outcome: Ongoing  2/4/2020 1537 by Lorraine Nieto RN  Outcome: Ongoing  Goal: STG - patient uses gait belt during all transfers  2/4/2020 2249 by Taryn Mejias RN  Outcome: Ongoing  2/4/2020 1537 by Lorraine Nieto RN  Outcome: Ongoing     Problem: PAIN  Goal: Patient's pain/discomfort is manageable  2/4/2020 2249 by Taryn Mejias RN  Outcome: Ongoing  2/4/2020 1537 by Lorraine Nieto RN  Outcome: Ongoing  Goal: STG - pain is manageable through therapies  2/4/2020 2249 by Taryn Mejias RN  Outcome: Ongoing  2/4/2020 1537 by Lorraine Nieto RN  Outcome: Ongoing  Goal: STG - Patient will verbalize an acceptable level of pain  2/4/2020 2249 by Taryn Mejias RN  Outcome: Ongoing  2/4/2020 1537 by Lorraine Nieto RN  Outcome: Ongoing  Goal: STG - patients pain is managed to allow active participation in daily activities  2/4/2020 2249 by Taryn Mejias RN  Outcome: Ongoing  2/4/2020 1537 by Lorraine Nieto RN  Outcome: Ongoing     Problem: SKIN INTEGRITY  Goal: Skin integrity is maintained or improved  2/4/2020 2249 by Taryn Mejias RN  Outcome: Ongoing  2/4/2020 1537 by Lorraine Nieto,

## 2020-02-05 NOTE — PROGRESS NOTES
Patient:   Kelton Nyhan  MR#:    [id number]   Room:    [facility]/616-83   YOB: 1950  Date of Progress Note: 2/5/2020  Time of Note                           1:02 PM  Consulting Physician:   Amisha Carballo M.D. Attending Physician:  Amisha Carballo MD     Chief complaint Cervical myelopathy with cervical fusion     S:This 77 y.o. male  admitted to [facility] on 1/23/20 w/spinal cord compression due to degenerative disorder of spinal column. The pt presented to the ED for continued falls and worsening neck pain along w/numbness and tingling in his arms and legs and difficulty w/dexterity issues. He was found to have a large cervical disc displacement at C5-6 that was cuasing cord compression. On 1/23/20 he was taken to OR by Dr. Matthew Shipley for a cervical disectomy anterior w/fusion C5-6. He tolerated the procedure well. He is still having a myelpathic gait and is having difficlutly w/LOB. He continues to have problems w/urinary and fecal incontinence. He will have cervical collar for 14 days. Pt has uncontrolled DM w/A1C of 14.7. He was seen by wound care nurse at [facility] and was found to have a stage 2 pressure injury on his left gluteal area. He also has several diabetic foot ulcers on his L & R foot, as well as a R gluteal & Bilateral groin area moisture associated skin damage. Pictures are in chart. His blood sugars are running between 300-400. He is medically stable and is particiapting w/therapy. He is ready to begin rehab program w/plan of returning home after rehab stay. No new issues      REVIEW OF SYSTEMS:  Constitutional: No fevers No chills  Neck:No stiffness  Respiratory: No shortness of breath  Cardiovascular: No chest pain No palpitations  Gastrointestinal: No abdominal pain    Genitourinary: No Dysuria  Neurological: No headache, no confusion    Past Medical History:      Diagnosis Date    Chronic back pain     Essential hypertension 1/28/2020    GERD (gastroesophageal reflux disease)     Hyperlipidemia     Low back pain 1/29/2016    Nail fungus     Neuropathy     Osteoarthritis        Past Surgical History:      Procedure Laterality Date   Pamela Crawford - done 2-3 yrs ago @ [facility],  unaware of dates   [facility]         Medications in Hospital:      Current Facility-Administered Medications:     celecoxib (CELEBREX) capsule 100 mg, 100 mg, Oral, Daily, Tarah Conley MD, 100 mg at 02/05/20 0803    oxyCODONE-acetaminophen (PERCOCET) 7.5-325 MG per tablet 1 tablet, 1 tablet, Oral, Q4H PRN, Tarah Conley MD, 1 tablet at 02/05/20 1133    magic butt cream, , Topical, Q4H PRN, Tarah Conley MD    pregabalin (LYRICA) capsule 200 mg, 200 mg, Oral, TID, Tarah Conley MD, 200 mg at 02/05/20 0803    nicotine (NICODERM CQ) 14 MG/24HR 1 patch, 1 patch, Transdermal, Daily, Tarah Conley MD, 1 patch at 02/04/20 1622    busPIRone (BUSPAR) tablet 10 mg, 10 mg, Oral, BID, Tarah Conley MD, 10 mg at 02/05/20 0803    citalopram (CELEXA) tablet 20 mg, 20 mg, Oral, Daily, Tarah Conley MD, 20 mg at 02/05/20 0803    cyclobenzaprine (FLEXERIL) tablet 10 mg, 10 mg, Oral, TID PRN, Tarah MD Jarvis, 10 mg at 01/30/20 1754    metFORMIN (GLUCOPHAGE) tablet 1,000 mg, 1,000 mg, Oral, BID WC, Tarah Conley MD, 1,000 mg at 02/05/20 0803    pantoprazole (PROTONIX) tablet 40 mg, 40 mg, Oral, QAM AC, Tarah Conley MD, 40 mg at 02/05/20 0542    oxyCODONE-acetaminophen (PERCOCET) 7.5-325 MG per tablet 1 tablet, 1 tablet, Oral, Q6H PRN, Tarah Conley MD, 1 tablet at 01/30/20 0559    simvastatin (ZOCOR) tablet 40 mg, 40 mg, Oral, Nightly, Tarah Conley MD, 40 mg at 02/04/20 2023    zolpidem (AMBIEN) tablet 10 mg, 10 mg, Oral, Nightly PRN, Tarah Conley MD, 10 mg at 02/04/20 2023    acetaminophen (TYLENOL) tablet 650 mg, 650 mg, Oral, Q4H PRN, Tarah Conley MD    polyethylene glycol (GLYCOLAX) packet 17 g, 17 g, Oral, Daily PRN, MD Dina Rich Level  --  0  --    Vital Signs   Level of Consciousness 0  --   --    Pre Treatment Pain Screening   Pain at present  --  0  --    Scale Used  --  Numeric Score  --    Intervention List  --  Patient able to continue with treatment  --    Bed Mobility   Rolling  --  Modified independent  --    Supine to Sit  --  Modified independent  --    Sit to Supine  --  Modified independent  --    Transfers   Sit to Stand  --  Modified independent  --    Stand to sit  --  Modified independent  --    Bed to Chair  --  Modified independent  --    Ambulation   Ambulation?  --   --  Yes   WB Status  --   --  WBAT   Ambulation 1   Surface  --   --  level tile   Device  --   --  Rolling Walker   Other Apparatus  --   --     (C-Collar)   Assistance  --   --  Stand by assistance   Quality of Gait  --   --  Pt did not show any unsteadiness of R knee buckling during amb this morning. .   Distance  --   --  250'x2   Exercises   Comments  --   --   --    Conditions Requiring Skilled Therapeutic Intervention   Body structures, Functions, Activity limitations  --   --   --    Assessment  --   --   --    Prognosis  --   --   --    Activity Tolerance   Activity Tolerance  --   --   --         02/05/20 1046   General   Response To Previous Treatment  --    Family / Caregiver Present  --    Subjective   Subjective  --    Pain Screening   Patient Currently in Pain  --    Pain Assessment   Pain Assessment  --    Pain Level  --    Vital Signs   Level of Consciousness  --    Pre Treatment Pain Screening   Pain at present  --    Scale Used  --    Intervention List  --    Bed Mobility   Rolling  --    Supine to Sit  --    Sit to Supine  --    Transfers   Sit to Stand  --    Stand to sit  --    Bed to Chair  --    Ambulation   Ambulation?  --    WB Status  --    Ambulation 1   Surface  --    Device  --    Other Apparatus  --    Assistance  --    Quality of Gait  --    Distance  --    Exercises   Comments Standing Loy LE ther ex x 15-20 reps in parallel

## 2020-02-05 NOTE — PROGRESS NOTES
[facility]  [id number]     02/05/20 1322   Subjective   Subjective Agrees to work with therapy. Bed Mobility   Supine to Sit Modified independent   Transfers   Sit to Stand Modified independent   Stand to sit Modified independent   Ambulation   Ambulation? Yes   WB Status WBAT   More Ambulation? Yes   Ambulation 1   Surface level tile   Device Rolling Walker   Other Apparatus   (C-collar)   Assistance Stand by assistance   Quality of Gait steady, no LOB noted   Distance 250'   Ambulation 2   Surface - 2 level tile   Device 2 Rolling Walker   Other Apparatus 2   (C-collar)   Assistance 2 Stand by assistance   Quality of Gait 2 steady, no LOB noted   Distance 350'   Comments patient tolerated increased gait distance    Exercises   Hip Flexion 20   Hip Abduction 20   Knee Long Arc Quad 20   Ankle Pumps 20   Comments AROM B LE ex's in sitting and HS curls with red and green theraband and ball squeezes to increase strength and functional mobility   Other Activities   Comment Patient did well with therapy. Patient tolerated increased gait distance this treatment. Patient agreed to stay up in w/c for OT treatment. Short term goals   Time Frame for Short term goals 1wk   Short term goal 1 supervision bed moblility   Short term goal 2 SBA transfers with AD   Short term goal 3 indep 150' WC propulsion   Short term goal 4 ' ambulation with AD   Short term goal 5 SBA 40ft amb on uneven surface with AD   Long term goals   Time Frame for Long term goals  2wks    Long term goal 1 indep bed mobility   Long term goal 2 indep transfers with AD   Long term goal 3 indep 40ft amb uneven surface with AD   Long term goal 4 indep 150' amb with AD   Long term goal 5 indep 1 curb    Activity Tolerance   Activity Tolerance Patient Tolerated treatment well   Safety Devices   Type of devices All fall risk precautions in place; Left in chair   Electronically signed by Chris Caro PTA on 2/5/2020 at 1:44 PM

## 2020-02-06 LAB
ALBUMIN SERPL-MCNC: 3.7 G/DL (ref 3.5–5.2)
ALP BLD-CCNC: 181 U/L (ref 40–130)
ALT SERPL-CCNC: 20 U/L (ref 5–41)
ANION GAP SERPL CALCULATED.3IONS-SCNC: 14 MMOL/L (ref 7–19)
AST SERPL-CCNC: 18 U/L (ref 5–40)
BASOPHILS ABSOLUTE: 0.1 K/UL (ref 0–0.2)
BASOPHILS RELATIVE PERCENT: 1.4 % (ref 0–1)
BILIRUB SERPL-MCNC: 0.5 MG/DL (ref 0.2–1.2)
BUN BLDV-MCNC: 28 MG/DL (ref 8–23)
CALCIUM SERPL-MCNC: 10 MG/DL (ref 8.8–10.2)
CHLORIDE BLD-SCNC: 98 MMOL/L (ref 98–111)
CO2: 30 MMOL/L (ref 22–29)
CREAT SERPL-MCNC: 1 MG/DL (ref 0.5–1.2)
EOSINOPHILS ABSOLUTE: 0.3 K/UL (ref 0–0.6)
EOSINOPHILS RELATIVE PERCENT: 4.3 % (ref 0–5)
GFR NON-AFRICAN AMERICAN: >60
GLUCOSE BLD-MCNC: 125 MG/DL (ref 70–99)
GLUCOSE BLD-MCNC: 132 MG/DL (ref 70–99)
GLUCOSE BLD-MCNC: 168 MG/DL (ref 70–99)
GLUCOSE BLD-MCNC: 73 MG/DL (ref 74–109)
GLUCOSE BLD-MCNC: 83 MG/DL (ref 70–99)
HCT VFR BLD CALC: 46.5 % (ref 42–52)
HEMOGLOBIN: 15.2 G/DL (ref 14–18)
IMMATURE GRANULOCYTES #: 0.1 K/UL
LYMPHOCYTES ABSOLUTE: 2.5 K/UL (ref 1.1–4.5)
LYMPHOCYTES RELATIVE PERCENT: 31.3 % (ref 20–40)
MCH RBC QN AUTO: 30.6 PG (ref 27–31)
MCHC RBC AUTO-ENTMCNC: 32.7 G/DL (ref 33–37)
MCV RBC AUTO: 93.6 FL (ref 80–94)
MONOCYTES ABSOLUTE: 0.8 K/UL (ref 0–0.9)
MONOCYTES RELATIVE PERCENT: 10 % (ref 0–10)
NEUTROPHILS ABSOLUTE: 4.2 K/UL (ref 1.5–7.5)
NEUTROPHILS RELATIVE PERCENT: 52.2 % (ref 50–65)
PDW BLD-RTO: 12.4 % (ref 11.5–14.5)
PERFORMED ON: ABNORMAL
PERFORMED ON: NORMAL
PLATELET # BLD: 341 K/UL (ref 130–400)
PMV BLD AUTO: 10.2 FL (ref 9.4–12.4)
POTASSIUM REFLEX MAGNESIUM: 4.1 MMOL/L (ref 3.5–5)
RBC # BLD: 4.97 M/UL (ref 4.7–6.1)
SODIUM BLD-SCNC: 142 MMOL/L (ref 136–145)
TOTAL PROTEIN: 6 G/DL (ref 6.6–8.7)
WBC # BLD: 8 K/UL (ref 4.8–10.8)

## 2020-02-06 PROCEDURE — 97116 GAIT TRAINING THERAPY: CPT

## 2020-02-06 PROCEDURE — 99232 SBSQ HOSP IP/OBS MODERATE 35: CPT | Performed by: PSYCHIATRY & NEUROLOGY

## 2020-02-06 PROCEDURE — 6370000000 HC RX 637 (ALT 250 FOR IP): Performed by: PSYCHIATRY & NEUROLOGY

## 2020-02-06 PROCEDURE — 97530 THERAPEUTIC ACTIVITIES: CPT

## 2020-02-06 PROCEDURE — 80053 COMPREHEN METABOLIC PANEL: CPT

## 2020-02-06 PROCEDURE — 36415 COLL VENOUS BLD VENIPUNCTURE: CPT

## 2020-02-06 PROCEDURE — 97110 THERAPEUTIC EXERCISES: CPT

## 2020-02-06 PROCEDURE — 1180000000 HC REHAB R&B

## 2020-02-06 PROCEDURE — 85025 COMPLETE CBC W/AUTO DIFF WBC: CPT

## 2020-02-06 PROCEDURE — 82948 REAGENT STRIP/BLOOD GLUCOSE: CPT

## 2020-02-06 RX ORDER — OXYCODONE AND ACETAMINOPHEN 7.5; 325 MG/1; MG/1
1 TABLET ORAL EVERY 6 HOURS PRN
Qty: 120 TABLET | Refills: 0 | Status: SHIPPED | OUTPATIENT
Start: 2020-02-06 | End: 2020-03-07

## 2020-02-06 RX ORDER — CYCLOBENZAPRINE HCL 10 MG
10 TABLET ORAL 3 TIMES DAILY PRN
Qty: 90 TABLET | Refills: 0 | Status: SHIPPED | OUTPATIENT
Start: 2020-02-06

## 2020-02-06 RX ORDER — ZOLPIDEM TARTRATE 10 MG/1
10 TABLET ORAL NIGHTLY PRN
Qty: 30 TABLET | Refills: 0 | Status: SHIPPED | OUTPATIENT
Start: 2020-02-06 | End: 2020-03-07

## 2020-02-06 RX ORDER — PREGABALIN 200 MG/1
200 CAPSULE ORAL 3 TIMES DAILY
Qty: 90 CAPSULE | Refills: 0 | Status: SHIPPED | OUTPATIENT
Start: 2020-02-06 | End: 2020-03-07

## 2020-02-06 RX ORDER — CELECOXIB 100 MG/1
100 CAPSULE ORAL DAILY
Qty: 30 CAPSULE | Refills: 0 | Status: SHIPPED | OUTPATIENT
Start: 2020-02-07

## 2020-02-06 RX ORDER — NICOTINE 21 MG/24HR
1 PATCH, TRANSDERMAL 24 HOURS TRANSDERMAL DAILY
Qty: 30 PATCH | Refills: 0 | Status: SHIPPED | OUTPATIENT
Start: 2020-02-06

## 2020-02-06 RX ORDER — CITALOPRAM 20 MG/1
20 TABLET ORAL DAILY
Qty: 30 TABLET | Refills: 0 | Status: SHIPPED | OUTPATIENT
Start: 2020-02-06

## 2020-02-06 RX ORDER — SIMVASTATIN 40 MG
40 TABLET ORAL NIGHTLY
Qty: 30 TABLET | Refills: 0 | Status: SHIPPED | OUTPATIENT
Start: 2020-02-06

## 2020-02-06 RX ORDER — OMEPRAZOLE 20 MG/1
20 CAPSULE, DELAYED RELEASE ORAL DAILY
Qty: 30 CAPSULE | Refills: 0 | Status: SHIPPED | OUTPATIENT
Start: 2020-02-06

## 2020-02-06 RX ORDER — BUSPIRONE HYDROCHLORIDE 10 MG/1
10 TABLET ORAL 2 TIMES DAILY
Qty: 60 TABLET | Refills: 0 | Status: SHIPPED | OUTPATIENT
Start: 2020-02-06

## 2020-02-06 RX ADMIN — OXYCODONE HYDROCHLORIDE AND ACETAMINOPHEN 1 TABLET: 7.5; 325 TABLET ORAL at 12:29

## 2020-02-06 RX ADMIN — PANTOPRAZOLE SODIUM 40 MG: 40 TABLET, DELAYED RELEASE ORAL at 05:38

## 2020-02-06 RX ADMIN — BUSPIRONE HYDROCHLORIDE 10 MG: 10 TABLET ORAL at 08:15

## 2020-02-06 RX ADMIN — PREGABALIN 200 MG: 75 CAPSULE ORAL at 20:42

## 2020-02-06 RX ADMIN — ZOLPIDEM TARTRATE 10 MG: 5 TABLET ORAL at 20:42

## 2020-02-06 RX ADMIN — PREGABALIN 200 MG: 75 CAPSULE ORAL at 14:14

## 2020-02-06 RX ADMIN — CELECOXIB 100 MG: 100 CAPSULE ORAL at 08:14

## 2020-02-06 RX ADMIN — CITALOPRAM HYDROBROMIDE 20 MG: 10 TABLET ORAL at 08:15

## 2020-02-06 RX ADMIN — PREGABALIN 200 MG: 75 CAPSULE ORAL at 08:16

## 2020-02-06 RX ADMIN — OXYCODONE HYDROCHLORIDE AND ACETAMINOPHEN 1 TABLET: 7.5; 325 TABLET ORAL at 20:42

## 2020-02-06 RX ADMIN — OXYCODONE HYDROCHLORIDE AND ACETAMINOPHEN 1 TABLET: 7.5; 325 TABLET ORAL at 08:15

## 2020-02-06 RX ADMIN — OXYCODONE HYDROCHLORIDE AND ACETAMINOPHEN 1 TABLET: 7.5; 325 TABLET ORAL at 16:27

## 2020-02-06 RX ADMIN — Medication 40 UNITS: at 20:41

## 2020-02-06 RX ADMIN — HYDROCHLOROTHIAZIDE 25 MG: 25 TABLET ORAL at 08:15

## 2020-02-06 RX ADMIN — METFORMIN HYDROCHLORIDE 1000 MG: 500 TABLET ORAL at 17:02

## 2020-02-06 RX ADMIN — SIMVASTATIN 40 MG: 40 TABLET, FILM COATED ORAL at 20:42

## 2020-02-06 RX ADMIN — METFORMIN HYDROCHLORIDE 1000 MG: 500 TABLET ORAL at 08:15

## 2020-02-06 RX ADMIN — BUSPIRONE HYDROCHLORIDE 10 MG: 10 TABLET ORAL at 20:42

## 2020-02-06 RX ADMIN — LOSARTAN POTASSIUM 100 MG: 100 TABLET, FILM COATED ORAL at 08:15

## 2020-02-06 ASSESSMENT — PAIN DESCRIPTION - ORIENTATION
ORIENTATION: RIGHT;LEFT
ORIENTATION: RIGHT;LEFT

## 2020-02-06 ASSESSMENT — PAIN SCALES - GENERAL
PAINLEVEL_OUTOF10: 3
PAINLEVEL_OUTOF10: 0
PAINLEVEL_OUTOF10: 4
PAINLEVEL_OUTOF10: 5
PAINLEVEL_OUTOF10: 5
PAINLEVEL_OUTOF10: 6
PAINLEVEL_OUTOF10: 5
PAINLEVEL_OUTOF10: 3
PAINLEVEL_OUTOF10: 3
PAINLEVEL_OUTOF10: 5
PAINLEVEL_OUTOF10: 3

## 2020-02-06 ASSESSMENT — PAIN DESCRIPTION - LOCATION
LOCATION: FOOT
LOCATION: KNEE
LOCATION: FOOT

## 2020-02-06 ASSESSMENT — PAIN DESCRIPTION - PAIN TYPE
TYPE: CHRONIC PAIN

## 2020-02-06 ASSESSMENT — PAIN - FUNCTIONAL ASSESSMENT: PAIN_FUNCTIONAL_ASSESSMENT: ACTIVITIES ARE NOT PREVENTED

## 2020-02-06 ASSESSMENT — PAIN DESCRIPTION - FREQUENCY: FREQUENCY: CONTINUOUS

## 2020-02-06 ASSESSMENT — PAIN DESCRIPTION - ONSET: ONSET: ON-GOING

## 2020-02-06 ASSESSMENT — PAIN DESCRIPTION - DESCRIPTORS: DESCRIPTORS: ACHING

## 2020-02-06 ASSESSMENT — PAIN DESCRIPTION - PROGRESSION: CLINICAL_PROGRESSION: NOT CHANGED

## 2020-02-06 NOTE — PROGRESS NOTES
Nicole Pressley MD    glucose (GLUTOSE) 40 % oral gel 15 g, 15 g, Oral, PRN, Ryan Aguero MD    dextrose 50 % IV solution, 12.5 g, Intravenous, PRN, Ryan Aguero MD    glucagon (rDNA) injection 1 mg, 1 mg, Intramuscular, PRN, Ryan Aguero MD    dextrose 5 % solution, 100 mL/hr, Intravenous, PRN, Ryan Aguero MD    insulin lispro (HUMALOG) injection vial 0-6 Units, 0-6 Units, Subcutaneous, TID WC, Ryan Aguero MD, 1 Units at 02/04/20 1652    insulin lispro (HUMALOG) injection vial 0-3 Units, 0-3 Units, Subcutaneous, Nightly, Ryan Aguero MD, 1 Units at 02/04/20 2121    insulin glargine (LANTUS) injection vial 40 Units, 40 Units, Subcutaneous, Nightly, Ryan Aguero MD, 40 Units at 02/05/20 2032    losartan (COZAAR) tablet 100 mg, 100 mg, Oral, Daily, 100 mg at 02/06/20 0815 **AND** hydrochlorothiazide (HYDRODIURIL) tablet 25 mg, 25 mg, Oral, Daily, Ryan Aguero MD, 25 mg at 02/06/20 0815    Allergies:  Sulfa antibiotics    Social History:   TOBACCO:   reports that he has been smoking. He has been smoking about 2.00 packs per day. He has never used smokeless tobacco.  ETOH:   reports no history of alcohol use. Family History:       Problem Relation Age of Onset    High Blood Pressure Father          PHYSICAL EXAM:  /75   Pulse 70   Temp 97.8 °F (36.6 °C) (Temporal)   Resp 18   Ht 5' 11\" (1.803 m)   Wt 222 lb 0.9 oz (100.7 kg)   SpO2 93%   BMI 30.97 kg/m²     Constitutional - well developed, well nourished. Eyes - conjunctiva normal.   Ear, nose, throat - No scars, masses, or lesions over external nose or ears, no atrophy of tongue  Neck-symmetric, no masses noted, no jugular vein distension  Respiration- chest wall appears symmetric, good expansion,   normal effort without use of accessory muscles  Musculoskeletal - no significant wasting of muscles noted, no bony deformities  Extremities-no clubbing, cyanosis or edema  Skin - warm, dry, and intact.  No rash, erythema, or pallor. Psychiatric - mood, affect, and behavior appear normal.      Neurological exam  Awake, alert, fluent oriented appropriate affect  Attention and concentration appear appropriate  Recent and remote memory appears unremarkable  Speech normal without dysarthria  No clear issues with language of fund of knowledge     Cranial Nerve Exam     CN III, IV,VI-EOMI, No nystagmus, conjugate eye movements, no ptosis    CN VII-no facial assymetry       Motor Exam  antigravity throughout upper and lower extremities bilaterally      Tremors- no tremors in hands or head noted     Gait  Not tested      Nursing/pcp notes, imaging,labs and vitals reviewed. PT,OT and/or speech notes reviewed    Lab Results   Component Value Date    WBC 8.0 02/06/2020    HGB 15.2 02/06/2020    HCT 46.5 02/06/2020    MCV 93.6 02/06/2020     02/06/2020     Lab Results   Component Value Date     02/06/2020    K 4.1 02/06/2020    CL 98 02/06/2020    CO2 30 (H) 02/06/2020    BUN 28 (H) 02/06/2020    CREATININE 1.0 02/06/2020    GLUCOSE 73 (L) 02/06/2020    CALCIUM 10.0 02/06/2020    PROT 6.0 (L) 02/06/2020    LABALBU 3.7 02/06/2020    BILITOT 0.5 02/06/2020    ALKPHOS 181 (H) 02/06/2020    AST 18 02/06/2020    ALT 20 02/06/2020    LABGLOM >60 02/06/2020   No results found for: INR, PROTIME    Christine MARILYN Waite   Therapy Assistant   Physical Therapy   Progress Notes   Signed   Date of Service:  2/5/2020 10:49 AM               Signed             Show:Clear all  []Manual[x]Template[]Copied    Added by:  [x]Rhett Griffin PTA    []Barbara for details  Jayashree Burgos  [id number]       02/05/20 1031 02/05/20 1044 02/05/20 1045   General   Response To Previous Treatment  --  Patient with no complaints from previous session.   --    Family / Caregiver Present  --  No  --    Subjective   Subjective  --  Pt agreed to therapy this morning.  --    Pain Screening   Patient Currently in Pain  --  No  --    Pain Assessment   Pain Assessment  --  0-10  --    Pain Level  --  0  --    Vital Signs   Level of Consciousness 0  --   --    Pre Treatment Pain Screening   Pain at present  --  0  --    Scale Used  --  Numeric Score  --    Intervention List  --  Patient able to continue with treatment  --    Bed Mobility   Rolling  --  Modified independent  --    Supine to Sit  --  Modified independent  --    Sit to Supine  --  Modified independent  --    Transfers   Sit to Stand  --  Modified independent  --    Stand to sit  --  Modified independent  --    Bed to Chair  --  Modified independent  --    Ambulation   Ambulation?  --   --  Yes   WB Status  --   --  WBAT   Ambulation 1   Surface  --   --  level tile   Device  --   --  Rolling Walker   Other Apparatus  --   --     (C-Collar)   Assistance  --   --  Stand by assistance   Quality of Gait  --   --  Pt did not show any unsteadiness of R knee buckling during amb this morning. .   Distance  --   --  250'x2   Exercises   Comments  --   --   --    Conditions Requiring Skilled Therapeutic Intervention   Body structures, Functions, Activity limitations  --   --   --    Assessment  --   --   --    Prognosis  --   --   --    Activity Tolerance   Activity Tolerance  --   --   --         02/05/20 1046   General   Response To Previous Treatment  --    Family / Caregiver Present  --    Subjective   Subjective  --    Pain Screening   Patient Currently in Pain  --    Pain Assessment   Pain Assessment  --    Pain Level  --    Vital Signs   Level of Consciousness  --    Pre Treatment Pain Screening   Pain at present  --    Scale Used  --    Intervention List  --    Bed Mobility   Rolling  --    Supine to Sit  --    Sit to Supine  --    Transfers   Sit to Stand  --    Stand to sit  --    Bed to Chair  --    Ambulation   Ambulation?  --    WB Status  --    Ambulation 1   Surface  --    Device  --    Other Apparatus  --    Assistance  --    Quality of Gait  --    Distance  --    Exercises   Comments Standing Loy LE ther ex x 15-20 reps in parallel bars. Conditions Requiring Skilled Therapeutic Intervention   Body structures, Functions, Activity limitations Decreased functional mobility ; Decreased ADL status; Decreased endurance   Assessment Ck'd Pt for UP AD DARLIN. Pt is cleared for UP AD Darlin. Pt tolerated amb and Standing ther ex well w/ minimal fatigue and showed no unsteadiness in R knee. Prognosis Good   Activity Tolerance   Activity Tolerance Patient Tolerated treatment well   Electronically signed by Deb Pascual PTA on 2/5/2020 at 10:49 AM               Cosigned by: Vahid Lynn PT at 2/5/2020 11:34 AM   Revision History                            RECORD REVIEW: Previous medical records, medications were reviewed at today's visit    IMPRESSION:   1. Cervical myelopathy-s/p fusion  2. Mood disorder-on meds  3. DM-on meds monitor blood sugar  4. HTN-on meds monitor  5. GI-PPI/bowel regimen  6. Neuropathy-on Lyrica  7. Hyperlipidemia-on statin  8. Pain control-Percocet PRN  9. PT/OT   10.  Sacral ulcer wound care    Continue current care    Add Celebrex 2/4        ELOS tomorrow    Expected duration and frequency therapy: 180 minutes per day, 5 days per week    [address] [facility], [place], operated by [facility]  [phone] CELL  Dr Jovana Wynn

## 2020-02-06 NOTE — DISCHARGE SUMMARY
Neurology Discharge Summary     Patient Identification:  Redd Hdz is a 77 y.o. male. :  1950  Admit Date:  2020  Discharge date : 2020   Attending Provider: Idalia Bower MD     Account Number: [de-identified]                                   Admission Diagnoses:   Other cord compression [G95.29]  Other specified degenerative diseases of nervous system [G31.89]  S/P cervical spinal fusion [Z98.1]    Discharge Diagnoses:  Principal Problem:    Spinal cord compression ([address]  Active Problems:    Degeneration of lumbar or lumbosacral intervertebral disc    Spinal stenosis, lumbar region, without neurogenic claudication    Low back pain    S/P cervical spinal fusion    Weakness    Pain, neck    Numbness    Neuropathy    Skin ulcer of sacrum, limited to breakdown of skin ([address]    Essential hypertension    Type 2 diabetes mellitus with complication, without long-term current use of insulin ([address]    Hyperlipidemia  Resolved Problems:    * No resolved hospital problems. *      Discharge Medications:    Current Discharge Medication List           Details   celecoxib (CELEBREX) 100 MG capsule Take 1 capsule by mouth daily  Qty: 30 capsule, Refills: 0      nicotine (NICODERM CQ) 14 MG/24HR Place 1 patch onto the skin daily  Qty: 30 patch, Refills: 0              Details   oxyCODONE-acetaminophen (PERCOCET) 7.5-325 MG per tablet Take 1 tablet by mouth every 6 hours as needed for Pain for up to 30 days. Qty: 120 tablet, Refills: 0    Comments: Reduce doses taken as pain becomes manageable  Associated Diagnoses: Chronic left-sided low back pain without sciatica; Degeneration of lumbar or lumbosacral intervertebral disc; Spinal stenosis, lumbar region, without neurogenic claudication; Spinal cord compression ([address] S/P cervical spinal fusion; Weakness; Pain, neck; Numbness; Neuropathy; Skin ulcer of sacrum, limited to breakdown of skin ([address]  Essential hypertension      busPIRone (BUSPAR) 10 MG tablet Take 1 tablet by mouth 2 times daily  Qty: 60 tablet, Refills: 0      pregabalin (LYRICA) 200 MG capsule Take 1 capsule by mouth 3 times daily for 30 days. May fill 07/03/18  Qty: 90 capsule, Refills: 0    Associated Diagnoses: Chronic left-sided low back pain without sciatica      citalopram (CELEXA) 20 MG tablet Take 1 tablet by mouth daily  Qty: 30 tablet, Refills: 0      metFORMIN (GLUCOPHAGE) 500 MG tablet Take 2 tablets by mouth 2 times daily (with meals)  Qty: 120 tablet, Refills: 0      simvastatin (ZOCOR) 40 MG tablet Take 1 tablet by mouth nightly  Qty: 30 tablet, Refills: 0      zolpidem (AMBIEN) 10 MG tablet Take 1 tablet by mouth nightly as needed for Sleep for up to 30 days. Qty: 30 tablet, Refills: 0    Associated Diagnoses: Chronic left-sided low back pain without sciatica; Degeneration of lumbar or lumbosacral intervertebral disc; Spinal stenosis, lumbar region, without neurogenic claudication; Spinal cord compression ([address] S/P cervical spinal fusion; Weakness; Pain, neck; Numbness; Neuropathy; Skin ulcer of sacrum, limited to breakdown of skin ([address]  Essential hypertension      cyclobenzaprine (FLEXERIL) 10 MG tablet Take 1 tablet by mouth 3 times daily as needed for Muscle spasms  Qty: 90 tablet, Refills: 0      omeprazole (PRILOSEC) 20 MG delayed release capsule Take 1 capsule by mouth daily  Qty: 30 capsule, Refills: 0              Details   insulin detemir (LEVEMIR) 100 UNIT/ML injection vial Inject 40 Units into the skin nightly      losartan-hydrochlorothiazide (HYZAAR) 100-25 MG per tablet Take 1 tablet by mouth daily           Current Discharge Medication List      STOP taking these medications       insulin lispro (HUMALOG) 100 UNIT/ML injection vial Comments:   Reason for Stopping:         oxyCODONE-acetaminophen (PERCOCET)  MG per tablet Comments:   Reason for Stopping:         diclofenac (VOLTAREN) 75 MG EC tablet Comments:   Reason for Stopping:         VOLTAREN 1 % GEL Comments:   Reason for Stopping:         losartan-hydrochlorothiazide (HYZAAR) 50-12.5 MG per tablet Comments:   Reason for Stopping:                 Consults:   none    Hospital Course: This 77 y.o. male  admitted to [facility] on 1/23/20 w/spinal cord compression due to degenerative disorder of spinal column. The pt presented to the ED for continued falls and worsening neck pain along w/numbness and tingling in his arms and legs and difficulty w/dexterity issues. He was found to have a large cervical disc displacement at C5-6 that was cuasing cord compression. On 1/23/20 he was taken to OR by Dr. Meredith Starkey for a cervical disectomy anterior w/fusion C5-6. He tolerated the procedure well. He is still having a myelpathic gait and is having difficlutly w/LOB. He continues to have problems w/urinary and fecal incontinence. He will have cervical collar for 14 days. Pt has uncontrolled DM w/A1C of 14.7. He was seen by wound care nurse at [facility] and was found to have a stage 2 pressure injury on his left gluteal area. He also has several diabetic foot ulcers on his L & R foot, as well as a R gluteal & Bilateral groin area moisture associated skin damage. Pictures are in chart. His blood sugars are running between 300-400. He is medically stable and is particiapting w/therapy. The patient was admitted to inpatient rehab with clinical improvement. . His pain was improved with medication adjustment. He was treated for a sacral ulcer and his cervical collar was removed 2/6/20. Plan is to DC home with home health. Discharge Instructions     Patient Instructions:   Home  Therapy orders: PT, OT and nursing   Discharge lab work: none  Code status: Full Code   Activity: activity as tolerated  Diet: DIET CARB CONTROL;     Wound Care: as directed  Equipment: as per therapy      Keren Severe, MD    At least 35 minutes were spent in discharging the patient

## 2020-02-06 NOTE — PLAN OF CARE
Mobility will improve  Description  Mobility will improve  Outcome: Ongoing     Problem: Skin Integrity:  Goal: Will show no infection signs and symptoms  Description  Will show no infection signs and symptoms  Outcome: Ongoing  Goal: Absence of new skin breakdown  Description  Absence of new skin breakdown  Outcome: Ongoing     Problem: IP BLADDER/VOIDING  Goal: LTG - Patient will utilize adaptive techniques/equipment to complete bladder elimination  Outcome: Ongoing     Problem: IP BOWEL ELIMINATION  Goal: LTG - patient will have regular and routine bowel evacuation  Outcome: Ongoing     Problem: IP BREATHING  Goal: LTG - Patient/caregiver will demonstrate/perform proper techniques to maintain patent airway  Outcome: Ongoing     Problem: NUTRITION  Goal: Patient maintains adequate hydration  Outcome: Ongoing     Problem: Discharge Planning:  Goal: Discharged to appropriate level of care  Description  Discharged to appropriate level of care  Outcome: Ongoing     Problem: Serum Glucose Level - Abnormal:  Goal: Ability to maintain appropriate glucose levels will improve  Description  Ability to maintain appropriate glucose levels will improve  Outcome: Ongoing  Goal: Ability to maintain appropriate glucose levels has stabilized  Description  Ability to maintain appropriate glucose levels has stabilized  Outcome: Ongoing     Problem: Sensory Perception - Impaired:  Goal: Ability to maintain a stable neurologic state will improve  Description  Ability to maintain a stable neurologic state will improve  Outcome: Ongoing     Problem: Anxiety:  Goal: Level of anxiety will decrease  Description  Level of anxiety will decrease  Outcome: Ongoing     Problem: Mood - Altered:  Goal: Mood stable  Description  Mood stable  Outcome: Ongoing     Problem: Pain:  Goal: Pain level will decrease  Description  Pain level will decrease  Outcome: Ongoing

## 2020-02-06 NOTE — PROGRESS NOTES
Conservation;Precautions; ADL Adaptive Strategies   Patient Education Educated Pt. on energy consrvation and keeping balance while completing ADL's. Discussed challenges/barriers the Pt. may see in home, trip hazards/ fall prevention.

## 2020-02-07 VITALS
OXYGEN SATURATION: 93 % | DIASTOLIC BLOOD PRESSURE: 65 MMHG | BODY MASS INDEX: 31.09 KG/M2 | HEART RATE: 60 BPM | WEIGHT: 222.06 LBS | SYSTOLIC BLOOD PRESSURE: 103 MMHG | HEIGHT: 71 IN | TEMPERATURE: 97.2 F | RESPIRATION RATE: 18 BRPM

## 2020-02-07 LAB
GLUCOSE BLD-MCNC: 83 MG/DL (ref 70–99)
GLUCOSE BLD-MCNC: 98 MG/DL (ref 70–99)
PERFORMED ON: NORMAL
PERFORMED ON: NORMAL

## 2020-02-07 PROCEDURE — 99239 HOSP IP/OBS DSCHRG MGMT >30: CPT | Performed by: PSYCHIATRY & NEUROLOGY

## 2020-02-07 PROCEDURE — 82948 REAGENT STRIP/BLOOD GLUCOSE: CPT

## 2020-02-07 PROCEDURE — 6370000000 HC RX 637 (ALT 250 FOR IP): Performed by: PSYCHIATRY & NEUROLOGY

## 2020-02-07 RX ADMIN — PANTOPRAZOLE SODIUM 40 MG: 40 TABLET, DELAYED RELEASE ORAL at 05:32

## 2020-02-07 RX ADMIN — OXYCODONE HYDROCHLORIDE AND ACETAMINOPHEN 1 TABLET: 7.5; 325 TABLET ORAL at 09:32

## 2020-02-07 RX ADMIN — HYDROCHLOROTHIAZIDE 25 MG: 25 TABLET ORAL at 08:03

## 2020-02-07 RX ADMIN — OXYCODONE HYDROCHLORIDE AND ACETAMINOPHEN 1 TABLET: 7.5; 325 TABLET ORAL at 05:31

## 2020-02-07 RX ADMIN — BUSPIRONE HYDROCHLORIDE 10 MG: 10 TABLET ORAL at 08:02

## 2020-02-07 RX ADMIN — LOSARTAN POTASSIUM 100 MG: 100 TABLET, FILM COATED ORAL at 08:03

## 2020-02-07 RX ADMIN — PREGABALIN 200 MG: 75 CAPSULE ORAL at 08:02

## 2020-02-07 RX ADMIN — CITALOPRAM HYDROBROMIDE 20 MG: 10 TABLET ORAL at 08:02

## 2020-02-07 RX ADMIN — CELECOXIB 100 MG: 100 CAPSULE ORAL at 08:02

## 2020-02-07 RX ADMIN — METFORMIN HYDROCHLORIDE 1000 MG: 500 TABLET ORAL at 08:02

## 2020-02-07 ASSESSMENT — PAIN DESCRIPTION - PAIN TYPE
TYPE: CHRONIC PAIN
TYPE: CHRONIC PAIN

## 2020-02-07 ASSESSMENT — PAIN DESCRIPTION - ORIENTATION
ORIENTATION: RIGHT;LEFT
ORIENTATION: RIGHT;LEFT

## 2020-02-07 ASSESSMENT — PAIN SCALES - GENERAL
PAINLEVEL_OUTOF10: 1
PAINLEVEL_OUTOF10: 3
PAINLEVEL_OUTOF10: 4
PAINLEVEL_OUTOF10: 5
PAINLEVEL_OUTOF10: 0
PAINLEVEL_OUTOF10: 5
PAINLEVEL_OUTOF10: 2

## 2020-02-07 ASSESSMENT — PAIN DESCRIPTION - LOCATION
LOCATION: FOOT
LOCATION: FOOT

## 2020-02-07 NOTE — PROGRESS NOTES
reflux disease)     Hyperlipidemia     Low back pain 1/29/2016    Nail fungus     Neuropathy     Osteoarthritis        Past Surgical History:      Procedure Laterality Date   Stef Caroline      Dr Nuria Campbell - done 2-3 yrs ago @ [facility], pt unaware of dates   [address] [facility]         Medications in Hospital:      Current Facility-Administered Medications:     celecoxib (CELEBREX) capsule 100 mg, 100 mg, Oral, Daily, Pura Martinez MD, 100 mg at 02/07/20 0802    oxyCODONE-acetaminophen (PERCOCET) 7.5-325 MG per tablet 1 tablet, 1 tablet, Oral, Q4H PRN, Pura Martinez MD, 1 tablet at 02/07/20 0932    magic butt cream, , Topical, Q4H PRN, Pura Martinez MD    pregabalin (LYRICA) capsule 200 mg, 200 mg, Oral, TID, Pura Martinez MD, 200 mg at 02/07/20 0802    nicotine (NICODERM CQ) 14 MG/24HR 1 patch, 1 patch, Transdermal, Daily, Pura Martinez MD, 1 patch at 02/06/20 1628    busPIRone (BUSPAR) tablet 10 mg, 10 mg, Oral, BID, Pura Martinez MD, 10 mg at 02/07/20 0802    citalopram (CELEXA) tablet 20 mg, 20 mg, Oral, Daily, Pura Martinez MD, 20 mg at 02/07/20 0802    cyclobenzaprine (FLEXERIL) tablet 10 mg, 10 mg, Oral, TID PRN, Pura Martinez MD, 10 mg at 01/30/20 1754    metFORMIN (GLUCOPHAGE) tablet 1,000 mg, 1,000 mg, Oral, BID WC, Pura Martinez MD, 1,000 mg at 02/07/20 0802    pantoprazole (PROTONIX) tablet 40 mg, 40 mg, Oral, QAM AC, Pura Martinez MD, 40 mg at 02/07/20 0532    oxyCODONE-acetaminophen (PERCOCET) 7.5-325 MG per tablet 1 tablet, 1 tablet, Oral, Q6H PRN, Pura Martinez MD, 1 tablet at 01/30/20 0559    simvastatin (ZOCOR) tablet 40 mg, 40 mg, Oral, Nightly, Pura Martinez MD, 40 mg at 02/06/20 2042    zolpidem (AMBIEN) tablet 10 mg, 10 mg, Oral, Nightly PRN, Pura Martinez MD, 10 mg at 02/06/20 2042    acetaminophen (TYLENOL) tablet 650 mg, 650 mg, Oral, Q4H PRN, Pura Martinez MD    polyethylene glycol (GLYCOLAX) packet 17 g, 17 g, Oral, Daily PRN, Ryan Aguero MD    glucose (GLUTOSE) 40 % oral gel 15 g, 15 g, Oral, PRN, Ryan Aguero MD    dextrose 50 % IV solution, 12.5 g, Intravenous, PRN, Ryan Aguero MD    glucagon (rDNA) injection 1 mg, 1 mg, Intramuscular, PRN, Ryan Aguero MD    dextrose 5 % solution, 100 mL/hr, Intravenous, PRN, Ryan Aguero MD    insulin lispro (HUMALOG) injection vial 0-6 Units, 0-6 Units, Subcutaneous, TID WC, Ryan Aguero MD, 1 Units at 02/04/20 1652    insulin lispro (HUMALOG) injection vial 0-3 Units, 0-3 Units, Subcutaneous, Nightly, Ryan Aguero MD, 1 Units at 02/04/20 2121    insulin glargine (LANTUS) injection vial 40 Units, 40 Units, Subcutaneous, Nightly, Ryan Aguero MD, 40 Units at 02/06/20 2041    losartan (COZAAR) tablet 100 mg, 100 mg, Oral, Daily, 100 mg at 02/07/20 0803 **AND** hydrochlorothiazide (HYDRODIURIL) tablet 25 mg, 25 mg, Oral, Daily, Ryan Aguero MD, 25 mg at 02/07/20 0803    Allergies:  Sulfa antibiotics    Social History:   TOBACCO:   reports that he has been smoking. He has been smoking about 2.00 packs per day. He has never used smokeless tobacco.  ETOH:   reports no history of alcohol use. Family History:       Problem Relation Age of Onset    High Blood Pressure Father          PHYSICAL EXAM:  /65   Pulse 60   Temp 97.2 °F (36.2 °C) (Temporal)   Resp 18   Ht 5' 11\" (1.803 m)   Wt 222 lb 0.9 oz (100.7 kg)   SpO2 93%   BMI 30.97 kg/m²     Constitutional - well developed, well nourished. Eyes - conjunctiva normal.   Ear, nose, throat - No scars, masses, or lesions over external nose or ears, no atrophy of tongue  Neck-symmetric, no masses noted, no jugular vein distension  Respiration- chest wall appears symmetric, good expansion,   normal effort without use of accessory muscles  Musculoskeletal - no significant wasting of muscles noted, no bony deformities  Extremities-no clubbing, cyanosis or edema  Skin - warm, dry, and intact.  No rash, erythema, or Assessment  --  0-10  --    Pain Level  --  0  --    Vital Signs   Level of Consciousness 0  --   --    Pre Treatment Pain Screening   Pain at present  --  0  --    Scale Used  --  Numeric Score  --    Intervention List  --  Patient able to continue with treatment  --    Bed Mobility   Rolling  --  Modified independent  --    Supine to Sit  --  Modified independent  --    Sit to Supine  --  Modified independent  --    Transfers   Sit to Stand  --  Modified independent  --    Stand to sit  --  Modified independent  --    Bed to Chair  --  Modified independent  --    Ambulation   Ambulation?  --   --  Yes   WB Status  --   --  WBAT   Ambulation 1   Surface  --   --  level tile   Device  --   --  Rolling Walker   Other Apparatus  --   --     (C-Collar)   Assistance  --   --  Stand by assistance   Quality of Gait  --   --  Pt did not show any unsteadiness of R knee buckling during amb this morning. .   Distance  --   --  250'x2   Exercises   Comments  --   --   --    Conditions Requiring Skilled Therapeutic Intervention   Body structures, Functions, Activity limitations  --   --   --    Assessment  --   --   --    Prognosis  --   --   --    Activity Tolerance   Activity Tolerance  --   --   --         02/05/20 1046   General   Response To Previous Treatment  --    Family / Caregiver Present  --    Subjective   Subjective  --    Pain Screening   Patient Currently in Pain  --    Pain Assessment   Pain Assessment  --    Pain Level  --    Vital Signs   Level of Consciousness  --    Pre Treatment Pain Screening   Pain at present  --    Scale Used  --    Intervention List  --    Bed Mobility   Rolling  --    Supine to Sit  --    Sit to Supine  --    Transfers   Sit to Stand  --    Stand to sit  --    Bed to Chair  --    Ambulation   Ambulation?  --    WB Status  --    Ambulation 1   Surface  --    Device  --    Other Apparatus  --    Assistance  --    Quality of Gait  --    Distance  --    Exercises   Comments Standing Loy LE

## 2020-02-07 NOTE — FLOWSHEET NOTE
Location Orientation: Plantar;Right  Wound Description (Comments): calloused with dry eshcar   Dressing Status Dry; Intact   Dressing/Treatment Silicone dressing  (Mepilex/aquacell.)   Incision 01/28/20 Neck Right   Date First Assessed/Time First Assessed: 01/28/20 0826   Present on Hospital Admission: Yes  Primary Wound Type: Surgical Type  Location: Neck  Wound Location Orientation: Right   Wound Assessment Clean;Dry; Intact   Closure Steri-Strips   Drainage Amount None   Odor None   Dressing/Treatment Open to air   Psychosocial   Psychosocial (WDL) WDL   Patient Behaviors Calm; Cooperative

## 2020-02-07 NOTE — PROGRESS NOTES
Nutrition Education    Type and Reason for Visit: Patient Education    Nutrition Assessment:  Provided instruction to pt re: carb-controlled diet. Noted improvement of glucose during admission. Pt indicates understanding. Available for questions as needed. · Verbally reviewed information with Patient  · Written educational materials provided.       Electronically signed by Chelsea Mary MS, RD, LD on 2/7/20 at 11:02 AM    Contact Number: [phone]

## 2020-02-07 NOTE — PLAN OF CARE
Problem: SAFETY  Goal: Free from accidental physical injury  2/7/2020 0211 by Marciano Meléndez LPN  Outcome: Ongoing  2/6/2020 1652 by Gricel Jimenez RN  Outcome: Ongoing  Goal: LTG - Patient will demonstrate safety requirements appropriate to situation/environment  2/7/2020 0211 by Marciano Meléndez LPN  Outcome: Ongoing  2/6/2020 1652 by Gricel Jimenez RN  Outcome: Ongoing  Goal: LTG - patient will utilize safety techniques  2/7/2020 0211 by Marciano Meléndez LPN  Outcome: Ongoing  2/6/2020 1652 by Gricel Jimenez RN  Outcome: Ongoing  Goal: STG - patient locks brakes on wheelchair  2/7/2020 0211 by Marciano Meléndez LPN  Outcome: Ongoing  2/6/2020 1652 by Gricel Jimenez RN  Outcome: Ongoing  Goal: STG - Patient uses call light consistently to request assistance with transfers  2/7/2020 0211 by Marciano Meléndez LPN  Outcome: Ongoing  2/6/2020 1652 by Gricel Jimenez RN  Outcome: Ongoing  Goal: STG - patient uses gait belt during all transfers  2/7/2020 0211 by Marciano Meléndez LPN  Outcome: Ongoing  2/6/2020 1652 by Gricel Jimenez RN  Outcome: Ongoing     Problem: DAILY CARE  Goal: Daily care needs are met  2/7/2020 0211 by Marciano Meléndez LPN  Outcome: Ongoing  2/6/2020 1652 by Gricel Jimenez RN  Outcome: Ongoing     Problem: PAIN  Goal: Patient's pain/discomfort is manageable  2/7/2020 0211 by Marciano Meléndez LPN  Outcome: Ongoing  2/6/2020 1652 by Gricel Jimenez RN  Outcome: Ongoing  Goal: STG - pain is manageable through therapies  2/7/2020 0211 by Marciano Meléndez LPN  Outcome: Ongoing  2/6/2020 1652 by Gricel Jimenez RN  Outcome: Ongoing  Goal: STG - Patient will verbalize an acceptable level of pain  2/7/2020 0211 by Marciano Meléndez LPN  Outcome: Ongoing  2/6/2020 1652 by Gricel Jimenez RN  Outcome: Ongoing  Goal: STG - patients pain is managed to allow active participation in daily activities  2/7/2020 0211 by Marciano Meléndez LPN  Outcome: Ongoing  2/6/2020 1652 by Rich Rivera RN  Outcome: Ongoing     Problem: SKIN INTEGRITY  Goal: Skin integrity is maintained or improved  2/7/2020 0211 by Rogelio Pacheco LPN  Outcome: Ongoing  2/6/2020 1652 by Rich Rivera RN  Outcome: Ongoing  Goal: STG - Patient demonstrates skin care/treatment/dressing change  2/7/2020 0211 by Rogelio Pacheco, LPN  Outcome: Ongoing  2/6/2020 1652 by Rich Rivera RN  Outcome: Ongoing  Goal: STG - patient will maintain good skin integrity  2/7/2020 0211 by Rogelio Pacheco LPN  Outcome: Ongoing  2/6/2020 1652 by Rich Rivera RN  Outcome: Ongoing  Goal: STG - Patient exhibits signs of wound healing. 2/7/2020 0211 by Rogelio Pacheco LPN  Outcome: Ongoing  2/6/2020 1652 by Rich iRvera RN  Outcome: Ongoing  Goal: STG - patient demonstrates pressure reduction techniques  2/7/2020 0211 by Rogelio Pacheco LPN  Outcome: Ongoing  2/6/2020 1652 by Rich Rivera RN  Outcome: Ongoing  Goal: STG - Patient demonstrates preventative skin care measures  2/7/2020 0211 by Rogelio Pacheco LPN  Outcome: Ongoing  2/6/2020 1652 by Rich Rivera RN  Outcome: Ongoing     Problem: KNOWLEDGE DEFICIT  Goal: Patient/S.O. demonstrates understanding of disease process, treatment plan, medications, and discharge instructions.   2/7/2020 0211 by Rogelio Pacheco LPN  Outcome: Ongoing  2/6/2020 1652 by Rich Rivera RN  Outcome: Ongoing     Problem: DISCHARGE BARRIERS  Goal: Patient's continuum of care needs are met  2/7/2020 0211 by Rogelio Pacheco LPN  Outcome: Ongoing  2/6/2020 1652 by Rich Rivera RN  Outcome: Ongoing     Problem: Falls - Risk of:  Goal: Will remain free from falls  Description  Will remain free from falls  2/7/2020 0211 by Rogelio Pacheco LPN  Outcome: Ongoing  2/6/2020 1652 by Rich Rivera RN  Outcome: Ongoing  Goal: Absence of physical injury  Description  Absence of physical injury  2/7/2020 0211 by Alison Almeida JACKI Chadwick  Outcome: Ongoing  2/6/2020 1652 by Valeria Barragan RN  Outcome: Ongoing     Problem: Mobility - Impaired:  Goal: Mobility will improve  Description  Mobility will improve  2/7/2020 0211 by Priscilla Vines LPN  Outcome: Ongoing  2/6/2020 1652 by Valeria Barragan RN  Outcome: Ongoing     Problem: Skin Integrity:  Goal: Will show no infection signs and symptoms  Description  Will show no infection signs and symptoms  2/7/2020 0211 by Priscilla Vines LPN  Outcome: Ongoing  2/6/2020 1652 by Valeria Barragan RN  Outcome: Ongoing  Goal: Absence of new skin breakdown  Description  Absence of new skin breakdown  2/7/2020 0211 by Priscilla Vines LPN  Outcome: Ongoing  2/6/2020 1652 by Valeria Barragan RN  Outcome: Ongoing     Problem: IP BLADDER/VOIDING  Goal: LTG - Patient will utilize adaptive techniques/equipment to complete bladder elimination  2/7/2020 0211 by Priscilla Vines LPN  Outcome: Ongoing  2/6/2020 1652 by Valeria Barragan RN  Outcome: Ongoing     Problem: IP BOWEL ELIMINATION  Goal: LTG - patient will have regular and routine bowel evacuation  2/7/2020 0211 by Priscilla Vines LPN  Outcome: Ongoing  2/6/2020 1652 by Valeria Barragan RN  Outcome: Ongoing     Problem: IP BREATHING  Goal: LTG - Patient/caregiver will demonstrate/perform proper techniques to maintain patent airway  2/7/2020 0211 by Priscilla Vines LPN  Outcome: Ongoing  2/6/2020 1652 by Valeria Barragan RN  Outcome: Ongoing     Problem: NUTRITION  Goal: Patient maintains adequate hydration  2/7/2020 0211 by Priscilla Vines LPN  Outcome: Ongoing  2/6/2020 1652 by Valeria Barragan RN  Outcome: Ongoing     Problem: Discharge Planning:  Goal: Discharged to appropriate level of care  Description  Discharged to appropriate level of care  2/7/2020 0211 by Priscilla Vines LPN  Outcome: Ongoing  2/6/2020 1652 by Valeria Barragan RN  Outcome: Ongoing     Problem: Serum Glucose Level - Abnormal:  Goal:

## 2020-02-07 NOTE — CARE COORDINATION
Mr. Elian Garcia is discharged home with family, referral made to [facility] for nursing and therapy to continue in transition to home.

## 2020-02-10 ENCOUNTER — TELEPHONE (OUTPATIENT)
Dept: NEUROSURGERY | Facility: CLINIC | Age: 70
End: 2020-02-10

## 2020-02-10 NOTE — TELEPHONE ENCOUNTER
TamaraJohn's wife has called the office and states Jonh would like to know when he can start driving, he had cervical surgery on 01/23/2020, he is out of his brace and has a post op visit on 02/18/2020,  He is still on pain medication but she states he is always on medication.    Please advise about driving and call the patient.

## 2020-02-10 NOTE — DISCHARGE SUMMARY
Physical Therapy Discharge Note  DATE:  2/10/2020  NAME:  Nasir Hdz  :  1950  (77 y.o.,male)  MRN:  [id number]    HEIGHT:  Height: 5' 11\" (180.3 cm)  WEIGHT:  Weight: 222 lb 0.9 oz (100.7 kg)    PATIENT DIAGNOSIS(ES):    Diagnosis: nontraumatic spinal dysfunction   Additional Pertinent Hx: anterior cervical discectomy w/ fusion of C5-C6, DMII, hyperlipidemia, HTN, GERD, arthritis psoriasis, tobacco abuse, pressure ulcers, diabetic foot ulcers   RESTRICTIONS/PRECAUTIONS:    Restrictions/Precautions  Restrictions/Precautions: Up Ad Darlin  Required Braces or Orthoses?: No  Position Activity Restriction  Spinal Precautions: No Bending, No Lifting, No Twisting  Other position/activity restrictions: No longer has to wear collar  OVERALL  ORIENTATION STATUS:     PAIN:  Pain Level: 2  Pain Type: Chronic pain    Pain Location: Foot     Pain Orientation: Right, Left      NEUROLOGICAL                       Sensation  Overall Sensation Status: Impaired(Numbness in B fingers )  STRENGTH  Strength RLE  Strength RLE: Exception  Comment: grossly 4/5  Strength LLE  Strength LLE: Exception  Comment: grossly 4/5  ROM  AROM RLE (degrees)  RLE AROM: WFL  AROM LLE (degrees)  LLE AROM : WFL  POSTURE/BALANCE  Balance  Posture: Good  Sitting - Static: Good  Sitting - Dynamic: Good  Standing - Static: Fair, +  Standing - Dynamic: Good, -  Tandem Stance R Le(sec with UE support in // bars)  Tandem Stance L Le(sec with UE support in // bars )       ACTIVITY TOLERANCE  Activity Tolerance  Activity Tolerance: Patient Tolerated treatment well      BED MOBILITY  Bed Mobility  Rolling: Independent  Supine to Sit: Independent  Sit to Supine: Modified independent  TRANSFERS  Sit to Stand: Independent      Bed to Chair: Independent  Car Transfer: Independent     WHEELCHAIR PROPULSION 1  Propulsion 1  Propulsion: Manual  Level: Level Tile  Method: FLAVIO QUINN  Level of Assistance: Independent  Description/ Details: good w/c propulsion Distance: 225'  WHEELCHAIR PROPULSION 2  Propulsion 2  Propulsion: Manual  Level: Level Tile  Method: FLAVIO QUINN  Level of Assistance: Stand by assistance  Description/ Details: good WC propulsion   Distance: 150ft  AMBULATION 1  Ambulation 1  Surface: level tile  Device: Rolling Walker  Other Apparatus: (C-collar)  Assistance: Independent  Quality of Gait: reciprocal pattern, no LOB  Gait Deviations: Slow Chantal, Increased ALONA  Distance: 250  Comments: no lob steady chantal. STAIRS  Stairs  # Steps : 12  Stairs Height: 6\"  Rails: Bilateral  Device: Rolling walker  Assistance: Independent  Comment: LLE leading ascention, RLE leading descention, v/c for sequencing     GOALS:  Short term goals  Time Frame for Short term goals: 1wk  Short term goal 1: supervision bed moblility  Short term goal 2: SBA transfers with AD  Short term goal 3: indep 150' WC propulsion  Short term goal 4: ' ambulation with AD  Short term goal 5: SBA 40ft amb on uneven surface with AD    Long term goals  Time Frame for Long term goals : 2wks   Long term goal 1: indep bed mobility  Long term goal 2: indep transfers with AD  Long term goal 3: indep 40ft amb uneven surface with AD  Long term goal 4: indep 150' amb with AD  Long term goal 5: indep 1 curb   HOME LIVING:     Type of Home: House  Home Layout: Two level, Able to Live on Main level with bedroom/bathroom  Home Access: Ramped entrance        ASSESSMENT (IMPAIRMENTS/BARRIERS): Body structures, Functions, Activity limitations: Decreased functional mobility , Decreased ADL status, Decreased endurance  Assessment: Ck'd Pt for UP AD HIEN. Pt is cleared for UP AD Hien. Pt tolerated amb and Standing ther ex well w/ minimal fatigue and showed no unsteadiness in R knee.    Activity Tolerance: Patient Tolerated treatment well     Discharge Recommendations: Home with Home health PT    PATIENT GOAL FOR REHAB:  RETURN TO PRIOR LEVEL OF FUNCTION       IRF/SENA  Roll Left and Right  Assistance Needed: Independent  CARE Score: 6  Discharge Goal: Independent    Sit to Lying  Assistance Needed: Independent  CARE Score: 6  Discharge Goal: Independent    Lying to Sitting on Side of Bed  Assistance Needed: Independent  CARE Score: 6  Discharge Goal: Independent    Sit to Stand  Assistance Needed: Independent  CARE Score: 6  Discharge Goal: Independent    Chair/Bed-to-Chair Transfer  Assistance Needed: Independent  CARE Score: 6  Discharge Goal: Independent    Car Transfer  Assistance Needed: Independent  CARE Score: 6  Discharge Goal: Independent    Walk 10 Feet  Assistance Needed: Independent  CARE Score: 6  Discharge Goal: Independent    Walk 50 Feet with Two Turns  Assistance Needed: Independent  CARE Score: 6  Discharge Goal: Independent    Walk 150 Feet  Assistance Needed: Independent  CARE Score: 6  Discharge Goal: Independent    Walking 10 Feet on Uneven Surfaces  Assistance Needed: Supervision or touching assistance  CARE Score: 4  Discharge Goal: Independent    1 Step (Curb)  Assistance Needed: Independent  CARE Score: 6  Discharge Goal: Independent    4 Steps  Assistance Needed: Independent  Reason if not Attempted: Not attempted due to medical condition or safety concerns  CARE Score: 6  Discharge Goal: Not Attempted    12 Steps  Assistance Needed: Independent  Reason if not Attempted: Not attempted due to medical condition or safety concerns  CARE Score: 6  Discharge Goal: Not Attempted    Wheel 50 Feet with Two Turns  Assistance Needed: Supervision or touching assistance  CARE Score: 4  Discharge Goal: Independent    Wheel 150 Feet  Assistance Needed: Supervision or touching assistance  CARE Score: 4  Discharge Goal: Independent      Electronically signed by Joaquin Ackerman PT on 2/10/20 at 8:46 AM

## 2020-02-11 NOTE — TELEPHONE ENCOUNTER
Called John back and spoke with him and explained no driving until he is seen for his post op visit and then he can discuss about his return to activities, patient understood.

## 2020-02-12 ENCOUNTER — OFFICE VISIT (OUTPATIENT)
Dept: WOUND CARE | Facility: HOSPITAL | Age: 70
End: 2020-02-12

## 2020-02-12 ENCOUNTER — HOSPITAL ENCOUNTER (INPATIENT)
Facility: HOSPITAL | Age: 70
LOS: 1 days | Discharge: HOME HEALTH | End: 2020-02-14
Attending: EMERGENCY MEDICINE | Admitting: FAMILY MEDICINE

## 2020-02-12 ENCOUNTER — LAB REQUISITION (OUTPATIENT)
Dept: LAB | Facility: HOSPITAL | Age: 70
End: 2020-02-12

## 2020-02-12 DIAGNOSIS — Z00.00 ENCOUNTER FOR GENERAL ADULT MEDICAL EXAMINATION WITHOUT ABNORMAL FINDINGS: ICD-10-CM
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
## 2020-02-12 DIAGNOSIS — Z79.899 POLYPHARMACY: ICD-10-CM

## 2020-02-12 DIAGNOSIS — Z74.09 IMPAIRED FUNCTIONAL MOBILITY, BALANCE, GAIT, AND ENDURANCE: ICD-10-CM

## 2020-02-12 DIAGNOSIS — R41.82 ALTERED MENTAL STATUS, UNSPECIFIED ALTERED MENTAL STATUS TYPE: Primary | ICD-10-CM

## 2020-02-12 DIAGNOSIS — R79.89 ELEVATED LACTIC ACID LEVEL: ICD-10-CM

## 2020-02-12 PROCEDURE — 87070 CULTURE OTHR SPECIMN AEROBIC: CPT | Performed by: NURSE PRACTITIONER

## 2020-02-12 PROCEDURE — 87186 SC STD MICRODIL/AGAR DIL: CPT | Performed by: NURSE PRACTITIONER

## 2020-02-12 PROCEDURE — 93005 ELECTROCARDIOGRAM TRACING: CPT | Performed by: EMERGENCY MEDICINE

## 2020-02-12 PROCEDURE — G0463 HOSPITAL OUTPT CLINIC VISIT: HCPCS

## 2020-02-12 PROCEDURE — 87176 TISSUE HOMOGENIZATION CULTR: CPT | Performed by: NURSE PRACTITIONER

## 2020-02-12 PROCEDURE — 99285 EMERGENCY DEPT VISIT HI MDM: CPT

## 2020-02-12 PROCEDURE — 87147 CULTURE TYPE IMMUNOLOGIC: CPT | Performed by: NURSE PRACTITIONER

## 2020-02-12 PROCEDURE — 87075 CULTR BACTERIA EXCEPT BLOOD: CPT | Performed by: NURSE PRACTITIONER

## 2020-02-12 PROCEDURE — P9612 CATHETERIZE FOR URINE SPEC: HCPCS

## 2020-02-12 PROCEDURE — 0JBQ0ZZ EXCISION OF RIGHT FOOT SUBCUTANEOUS TISSUE AND FASCIA, OPEN APPROACH: ICD-10-PCS | Performed by: NURSE PRACTITIONER

## 2020-02-12 PROCEDURE — 87205 SMEAR GRAM STAIN: CPT | Performed by: NURSE PRACTITIONER

## 2020-02-12 RX ORDER — ONDANSETRON 2 MG/ML
4 INJECTION INTRAMUSCULAR; INTRAVENOUS ONCE
Status: COMPLETED | OUTPATIENT
Start: 2020-02-12 | End: 2020-02-13

## 2020-02-12 RX ORDER — NALOXONE HYDROCHLORIDE 1 MG/ML
2 INJECTION INTRAMUSCULAR; INTRAVENOUS; SUBCUTANEOUS ONCE
Status: COMPLETED | OUTPATIENT
Start: 2020-02-12 | End: 2020-02-13

## 2020-02-13 ENCOUNTER — APPOINTMENT (OUTPATIENT)
Dept: CT IMAGING | Facility: HOSPITAL | Age: 70
End: 2020-02-13

## 2020-02-13 ENCOUNTER — TELEPHONE (OUTPATIENT)
Dept: PODIATRY | Facility: CLINIC | Age: 70
End: 2020-02-13

## 2020-02-13 ENCOUNTER — APPOINTMENT (OUTPATIENT)
Dept: GENERAL RADIOLOGY | Facility: HOSPITAL | Age: 70
End: 2020-02-13

## 2020-02-13 PROBLEM — Z98.1 S/P CERVICAL SPINAL FUSION: Status: ACTIVE | Noted: 2020-01-27

## 2020-02-13 PROBLEM — M54.2 PAIN, NECK: Status: ACTIVE | Noted: 2020-01-27

## 2020-02-13 PROBLEM — R53.1 WEAKNESS: Status: ACTIVE | Noted: 2020-01-27

## 2020-02-13 PROBLEM — Z79.899 POLYPHARMACY: Chronic | Status: ACTIVE | Noted: 2020-02-13

## 2020-02-13 PROBLEM — R41.82 ALTERED MENTAL STATUS: Status: ACTIVE | Noted: 2020-02-13

## 2020-02-13 PROBLEM — L98.421 SKIN ULCER OF SACRUM, LIMITED TO BREAKDOWN OF SKIN (HCC): Status: ACTIVE | Noted: 2020-01-28

## 2020-02-13 LAB
ABO GROUP BLD: NORMAL
ADV 40+41 DNA STL QL NAA+NON-PROBE: NOT DETECTED
ALBUMIN SERPL-MCNC: 3.1 G/DL (ref 3.5–5.2)
ALBUMIN SERPL-MCNC: 3.7 G/DL (ref 3.5–5.2)
ALBUMIN/GLOB SERPL: 1.1 G/DL
ALBUMIN/GLOB SERPL: 1.4 G/DL
ALP SERPL-CCNC: 144 U/L (ref 39–117)
ALP SERPL-CCNC: 148 U/L (ref 39–117)
ALT SERPL W P-5'-P-CCNC: 12 U/L (ref 1–41)
ALT SERPL W P-5'-P-CCNC: 13 U/L (ref 1–41)
AMMONIA BLD-SCNC: 18 UMOL/L (ref 16–60)
AMPHET+METHAMPHET UR QL: NEGATIVE
AMPHETAMINES UR QL: NEGATIVE
ANION GAP SERPL CALCULATED.3IONS-SCNC: 11 MMOL/L (ref 5–15)
ANION GAP SERPL CALCULATED.3IONS-SCNC: 11 MMOL/L (ref 5–15)
APTT PPP: 39.3 SECONDS (ref 24.1–35)
ARTERIAL PATENCY WRIST A: ABNORMAL
AST SERPL-CCNC: 13 U/L (ref 1–40)
AST SERPL-CCNC: 17 U/L (ref 1–40)
ASTRO TYP 1-8 RNA STL QL NAA+NON-PROBE: NOT DETECTED
ATMOSPHERIC PRESS: 750 MMHG
BARBITURATES UR QL SCN: NEGATIVE
BASE EXCESS BLDA CALC-SCNC: 1.1 MMOL/L (ref 0–2)
BASOPHILS # BLD AUTO: 0.05 10*3/MM3 (ref 0–0.2)
BASOPHILS # BLD AUTO: 0.06 10*3/MM3 (ref 0–0.2)
BASOPHILS NFR BLD AUTO: 0.5 % (ref 0–1.5)
BASOPHILS NFR BLD AUTO: 0.6 % (ref 0–1.5)
BDY SITE: ABNORMAL
BENZODIAZ UR QL SCN: NEGATIVE
BILIRUB SERPL-MCNC: 0.4 MG/DL (ref 0.2–1.2)
BILIRUB SERPL-MCNC: 0.6 MG/DL (ref 0.2–1.2)
BILIRUB UR QL STRIP: NEGATIVE
BLD GP AB SCN SERPL QL: NEGATIVE
BODY TEMPERATURE: 37 C
BUN BLD-MCNC: 26 MG/DL (ref 8–23)
BUN BLD-MCNC: 33 MG/DL (ref 8–23)
BUN/CREAT SERPL: 27.7 (ref 7–25)
BUN/CREAT SERPL: 30 (ref 7–25)
BUPRENORPHINE SERPL-MCNC: NEGATIVE NG/ML
C CAYETANENSIS DNA STL QL NAA+NON-PROBE: NOT DETECTED
C DIFF TOX GENS STL QL NAA+PROBE: NEGATIVE
CALCIUM SPEC-SCNC: 9.1 MG/DL (ref 8.6–10.5)
CALCIUM SPEC-SCNC: 9.3 MG/DL (ref 8.6–10.5)
CAMPY SP DNA.DIARRHEA STL QL NAA+PROBE: NOT DETECTED
CANNABINOIDS SERPL QL: NEGATIVE
CHLORIDE SERPL-SCNC: 94 MMOL/L (ref 98–107)
CHLORIDE SERPL-SCNC: 99 MMOL/L (ref 98–107)
CK SERPL-CCNC: 63 U/L (ref 20–200)
CLARITY UR: CLEAR
CO2 SERPL-SCNC: 27 MMOL/L (ref 22–29)
CO2 SERPL-SCNC: 28 MMOL/L (ref 22–29)
COCAINE UR QL: NEGATIVE
COLOR UR: ABNORMAL
CREAT BLD-MCNC: 0.94 MG/DL (ref 0.76–1.27)
CREAT BLD-MCNC: 1.1 MG/DL (ref 0.76–1.27)
CRYPTOSP STL CULT: NOT DETECTED
D-LACTATE SERPL-SCNC: 1.4 MMOL/L (ref 0.5–2)
D-LACTATE SERPL-SCNC: 2.9 MMOL/L (ref 0.5–2)
DEPRECATED RDW RBC AUTO: 39.3 FL (ref 37–54)
DEPRECATED RDW RBC AUTO: 39.4 FL (ref 37–54)
E COLI DNA SPEC QL NAA+PROBE: NOT DETECTED
E HISTOLYT AG STL-ACNC: NOT DETECTED
EAEC PAA PLAS AGGR+AATA ST NAA+NON-PRB: NOT DETECTED
EC STX1 + STX2 GENES STL NAA+PROBE: NOT DETECTED
EOSINOPHIL # BLD AUTO: 0.26 10*3/MM3 (ref 0–0.4)
EOSINOPHIL # BLD AUTO: 0.28 10*3/MM3 (ref 0–0.4)
EOSINOPHIL NFR BLD AUTO: 2.6 % (ref 0.3–6.2)
EOSINOPHIL NFR BLD AUTO: 2.8 % (ref 0.3–6.2)
EPEC EAE GENE STL QL NAA+NON-PROBE: NOT DETECTED
ERYTHROCYTE [DISTWIDTH] IN BLOOD BY AUTOMATED COUNT: 12.1 % (ref 12.3–15.4)
ERYTHROCYTE [DISTWIDTH] IN BLOOD BY AUTOMATED COUNT: 12.1 % (ref 12.3–15.4)
ETEC LTA+ST1A+ST1B TOX ST NAA+NON-PROBE: NOT DETECTED
ETHANOL UR QL: <0.01 %
G LAMBLIA DNA SPEC QL NAA+PROBE: NOT DETECTED
GFR SERPL CREATININE-BSD FRML MDRD: 67 ML/MIN/1.73
GFR SERPL CREATININE-BSD FRML MDRD: 80 ML/MIN/1.73
GLOBULIN UR ELPH-MCNC: 2.6 GM/DL
GLOBULIN UR ELPH-MCNC: 2.7 GM/DL
GLUCOSE BLD-MCNC: 244 MG/DL (ref 65–99)
GLUCOSE BLD-MCNC: 316 MG/DL (ref 65–99)
GLUCOSE BLDC GLUCOMTR-MCNC: 132 MG/DL (ref 70–130)
GLUCOSE BLDC GLUCOMTR-MCNC: 156 MG/DL (ref 70–130)
GLUCOSE BLDC GLUCOMTR-MCNC: 212 MG/DL (ref 70–130)
GLUCOSE BLDC GLUCOMTR-MCNC: 230 MG/DL (ref 70–130)
GLUCOSE UR STRIP-MCNC: ABNORMAL MG/DL
HBA1C MFR BLD: 12.2 % (ref 4.8–5.6)
HCO3 BLDA-SCNC: 27.6 MMOL/L (ref 20–26)
HCT VFR BLD AUTO: 41.3 % (ref 37.5–51)
HCT VFR BLD AUTO: 42.1 % (ref 37.5–51)
HGB BLD-MCNC: 14.4 G/DL (ref 13–17.7)
HGB BLD-MCNC: 14.5 G/DL (ref 13–17.7)
HGB UR QL STRIP.AUTO: NEGATIVE
HOLD SPECIMEN: NORMAL
IMM GRANULOCYTES # BLD AUTO: 0.03 10*3/MM3 (ref 0–0.05)
IMM GRANULOCYTES # BLD AUTO: 0.04 10*3/MM3 (ref 0–0.05)
IMM GRANULOCYTES NFR BLD AUTO: 0.3 % (ref 0–0.5)
IMM GRANULOCYTES NFR BLD AUTO: 0.4 % (ref 0–0.5)
INR PPP: 0.96 (ref 0.91–1.09)
KETONES UR QL STRIP: NEGATIVE
L PNEUMO1 AG UR QL IA: NEGATIVE
LEUKOCYTE ESTERASE UR QL STRIP.AUTO: NEGATIVE
LYMPHOCYTES # BLD AUTO: 1.57 10*3/MM3 (ref 0.7–3.1)
LYMPHOCYTES # BLD AUTO: 1.76 10*3/MM3 (ref 0.7–3.1)
LYMPHOCYTES NFR BLD AUTO: 15.9 % (ref 19.6–45.3)
LYMPHOCYTES NFR BLD AUTO: 17.9 % (ref 19.6–45.3)
Lab: ABNORMAL
MAGNESIUM SERPL-MCNC: 1.4 MG/DL (ref 1.6–2.4)
MAGNESIUM SERPL-MCNC: 1.7 MG/DL (ref 1.6–2.4)
MCH RBC QN AUTO: 30.5 PG (ref 26.6–33)
MCH RBC QN AUTO: 31 PG (ref 26.6–33)
MCHC RBC AUTO-ENTMCNC: 34.4 G/DL (ref 31.5–35.7)
MCHC RBC AUTO-ENTMCNC: 34.9 G/DL (ref 31.5–35.7)
MCV RBC AUTO: 88.4 FL (ref 79–97)
MCV RBC AUTO: 88.8 FL (ref 79–97)
METHADONE UR QL SCN: NEGATIVE
MODALITY: ABNORMAL
MONOCYTES # BLD AUTO: 0.57 10*3/MM3 (ref 0.1–0.9)
MONOCYTES # BLD AUTO: 0.63 10*3/MM3 (ref 0.1–0.9)
MONOCYTES NFR BLD AUTO: 5.8 % (ref 5–12)
MONOCYTES NFR BLD AUTO: 6.4 % (ref 5–12)
NEUTROPHILS # BLD AUTO: 7.14 10*3/MM3 (ref 1.7–7)
NEUTROPHILS # BLD AUTO: 7.28 10*3/MM3 (ref 1.7–7)
NEUTROPHILS NFR BLD AUTO: 72.8 % (ref 42.7–76)
NEUTROPHILS NFR BLD AUTO: 74 % (ref 42.7–76)
NITRITE UR QL STRIP: NEGATIVE
NOROVIRUS GI+II RNA STL QL NAA+NON-PROBE: NOT DETECTED
NRBC BLD AUTO-RTO: 0 /100 WBC (ref 0–0.2)
NRBC BLD AUTO-RTO: 0 /100 WBC (ref 0–0.2)
OPIATES UR QL: NEGATIVE
OXYCODONE UR QL SCN: POSITIVE
P SHIGELLOIDES DNA STL QL NAA+PROBE: NOT DETECTED
PCO2 BLDA: 50.2 MM HG (ref 35–45)
PCP UR QL SCN: NEGATIVE
PH BLDA: 7.35 PH UNITS (ref 7.35–7.45)
PH UR STRIP.AUTO: <=5 [PH] (ref 5–8)
PHOSPHATE SERPL-MCNC: 2.9 MG/DL (ref 2.5–4.5)
PLATELET # BLD AUTO: 238 10*3/MM3 (ref 140–450)
PLATELET # BLD AUTO: 248 10*3/MM3 (ref 140–450)
PMV BLD AUTO: 10.8 FL (ref 6–12)
PMV BLD AUTO: 11.2 FL (ref 6–12)
PO2 BLDA: 64 MM HG (ref 83–108)
POTASSIUM BLD-SCNC: 3.7 MMOL/L (ref 3.5–5.2)
POTASSIUM BLD-SCNC: 3.9 MMOL/L (ref 3.5–5.2)
PROCALCITONIN SERPL-MCNC: 0.31 NG/ML (ref 0.1–0.25)
PROPOXYPH UR QL: NEGATIVE
PROT SERPL-MCNC: 5.8 G/DL (ref 6–8.5)
PROT SERPL-MCNC: 6.3 G/DL (ref 6–8.5)
PROT UR QL STRIP: NEGATIVE
PROTHROMBIN TIME: 13.1 SECONDS (ref 11.9–14.6)
RBC # BLD AUTO: 4.65 10*6/MM3 (ref 4.14–5.8)
RBC # BLD AUTO: 4.76 10*6/MM3 (ref 4.14–5.8)
RH BLD: POSITIVE
RV RNA STL NAA+PROBE: NOT DETECTED
S PNEUM AG SPEC QL LA: NEGATIVE
SALMONELLA DNA SPEC QL NAA+PROBE: NOT DETECTED
SAO2 % BLDCOA: 93.2 % (ref 94–99)
SAPO I+II+IV+V RNA STL QL NAA+NON-PROBE: NOT DETECTED
SHIGELLA SP+EIEC IPAH STL QL NAA+PROBE: NOT DETECTED
SODIUM BLD-SCNC: 132 MMOL/L (ref 136–145)
SODIUM BLD-SCNC: 138 MMOL/L (ref 136–145)
SP GR UR STRIP: >=1.03 (ref 1–1.03)
T&S EXPIRATION DATE: NORMAL
T4 FREE SERPL-MCNC: 1.16 NG/DL (ref 0.93–1.7)
TRICYCLICS UR QL SCN: POSITIVE
TROPONIN T SERPL-MCNC: <0.01 NG/ML (ref 0–0.03)
TSH SERPL DL<=0.05 MIU/L-ACNC: 1.04 UIU/ML (ref 0.27–4.2)
UROBILINOGEN UR QL STRIP: ABNORMAL
V CHOLERAE DNA SPEC QL NAA+PROBE: NOT DETECTED
VENTILATOR MODE: ABNORMAL
VIBRIO DNA SPEC NAA+PROBE: NOT DETECTED
WBC NRBC COR # BLD: 9.82 10*3/MM3 (ref 3.4–10.8)
WBC NRBC COR # BLD: 9.85 10*3/MM3 (ref 3.4–10.8)
YERSINIA STL CULT: NOT DETECTED

## 2020-02-13 PROCEDURE — 94799 UNLISTED PULMONARY SVC/PX: CPT

## 2020-02-13 PROCEDURE — 99406 BEHAV CHNG SMOKING 3-10 MIN: CPT

## 2020-02-13 PROCEDURE — 36600 WITHDRAWAL OF ARTERIAL BLOOD: CPT

## 2020-02-13 PROCEDURE — 84443 ASSAY THYROID STIM HORMONE: CPT | Performed by: INTERNAL MEDICINE

## 2020-02-13 PROCEDURE — 87899 AGENT NOS ASSAY W/OPTIC: CPT | Performed by: INTERNAL MEDICINE

## 2020-02-13 PROCEDURE — 0097U HC BIOFIRE FILMARRAY GI PANEL: CPT | Performed by: EMERGENCY MEDICINE

## 2020-02-13 PROCEDURE — 85730 THROMBOPLASTIN TIME PARTIAL: CPT | Performed by: EMERGENCY MEDICINE

## 2020-02-13 PROCEDURE — 86900 BLOOD TYPING SEROLOGIC ABO: CPT | Performed by: EMERGENCY MEDICINE

## 2020-02-13 PROCEDURE — 84100 ASSAY OF PHOSPHORUS: CPT | Performed by: INTERNAL MEDICINE

## 2020-02-13 PROCEDURE — 70450 CT HEAD/BRAIN W/O DYE: CPT

## 2020-02-13 PROCEDURE — 82140 ASSAY OF AMMONIA: CPT | Performed by: EMERGENCY MEDICINE

## 2020-02-13 PROCEDURE — 71045 X-RAY EXAM CHEST 1 VIEW: CPT

## 2020-02-13 PROCEDURE — 84484 ASSAY OF TROPONIN QUANT: CPT | Performed by: EMERGENCY MEDICINE

## 2020-02-13 PROCEDURE — 83036 HEMOGLOBIN GLYCOSYLATED A1C: CPT | Performed by: INTERNAL MEDICINE

## 2020-02-13 PROCEDURE — 82962 GLUCOSE BLOOD TEST: CPT

## 2020-02-13 PROCEDURE — 87493 C DIFF AMPLIFIED PROBE: CPT | Performed by: EMERGENCY MEDICINE

## 2020-02-13 PROCEDURE — 25010000002 CEFTRIAXONE PER 250 MG: Performed by: EMERGENCY MEDICINE

## 2020-02-13 PROCEDURE — 83735 ASSAY OF MAGNESIUM: CPT | Performed by: INTERNAL MEDICINE

## 2020-02-13 PROCEDURE — 83605 ASSAY OF LACTIC ACID: CPT | Performed by: EMERGENCY MEDICINE

## 2020-02-13 PROCEDURE — 25010000002 MAGNESIUM SULFATE 2 GM/50ML SOLUTION: Performed by: EMERGENCY MEDICINE

## 2020-02-13 PROCEDURE — 25010000002 ONDANSETRON PER 1 MG: Performed by: EMERGENCY MEDICINE

## 2020-02-13 PROCEDURE — 94664 DEMO&/EVAL PT USE INHALER: CPT

## 2020-02-13 PROCEDURE — 85025 COMPLETE CBC W/AUTO DIFF WBC: CPT | Performed by: EMERGENCY MEDICINE

## 2020-02-13 PROCEDURE — 25010000002 LORAZEPAM PER 2 MG: Performed by: EMERGENCY MEDICINE

## 2020-02-13 PROCEDURE — 81003 URINALYSIS AUTO W/O SCOPE: CPT | Performed by: EMERGENCY MEDICINE

## 2020-02-13 PROCEDURE — 85610 PROTHROMBIN TIME: CPT | Performed by: EMERGENCY MEDICINE

## 2020-02-13 PROCEDURE — 86850 RBC ANTIBODY SCREEN: CPT | Performed by: EMERGENCY MEDICINE

## 2020-02-13 PROCEDURE — 84439 ASSAY OF FREE THYROXINE: CPT | Performed by: INTERNAL MEDICINE

## 2020-02-13 PROCEDURE — 83735 ASSAY OF MAGNESIUM: CPT | Performed by: EMERGENCY MEDICINE

## 2020-02-13 PROCEDURE — 72125 CT NECK SPINE W/O DYE: CPT

## 2020-02-13 PROCEDURE — 85025 COMPLETE CBC W/AUTO DIFF WBC: CPT | Performed by: INTERNAL MEDICINE

## 2020-02-13 PROCEDURE — 82803 BLOOD GASES ANY COMBINATION: CPT

## 2020-02-13 PROCEDURE — 82550 ASSAY OF CK (CPK): CPT | Performed by: EMERGENCY MEDICINE

## 2020-02-13 PROCEDURE — 80053 COMPREHEN METABOLIC PANEL: CPT | Performed by: INTERNAL MEDICINE

## 2020-02-13 PROCEDURE — 63710000001 INSULIN LISPRO (HUMAN) PER 5 UNITS: Performed by: INTERNAL MEDICINE

## 2020-02-13 PROCEDURE — 80307 DRUG TEST PRSMV CHEM ANLYZR: CPT | Performed by: EMERGENCY MEDICINE

## 2020-02-13 PROCEDURE — 84145 PROCALCITONIN (PCT): CPT | Performed by: EMERGENCY MEDICINE

## 2020-02-13 PROCEDURE — 25010000002 ENOXAPARIN PER 10 MG: Performed by: INTERNAL MEDICINE

## 2020-02-13 PROCEDURE — 86901 BLOOD TYPING SEROLOGIC RH(D): CPT | Performed by: EMERGENCY MEDICINE

## 2020-02-13 PROCEDURE — 87040 BLOOD CULTURE FOR BACTERIA: CPT | Performed by: EMERGENCY MEDICINE

## 2020-02-13 PROCEDURE — 25010000002 AZITHROMYCIN PER 500 MG: Performed by: EMERGENCY MEDICINE

## 2020-02-13 PROCEDURE — 80053 COMPREHEN METABOLIC PANEL: CPT | Performed by: EMERGENCY MEDICINE

## 2020-02-13 RX ORDER — LORAZEPAM 2 MG/ML
1 INJECTION INTRAMUSCULAR ONCE
Status: COMPLETED | OUTPATIENT
Start: 2020-02-13 | End: 2020-02-13

## 2020-02-13 RX ORDER — ONDANSETRON 4 MG/1
4 TABLET, FILM COATED ORAL EVERY 6 HOURS PRN
Status: DISCONTINUED | OUTPATIENT
Start: 2020-02-13 | End: 2020-02-14 | Stop reason: HOSPADM

## 2020-02-13 RX ORDER — NABUMETONE 750 MG/1
750 TABLET, FILM COATED ORAL 2 TIMES DAILY
COMMUNITY
End: 2021-03-19

## 2020-02-13 RX ORDER — SODIUM CHLORIDE 0.9 % (FLUSH) 0.9 %
10 SYRINGE (ML) INJECTION EVERY 12 HOURS SCHEDULED
Status: DISCONTINUED | OUTPATIENT
Start: 2020-02-13 | End: 2020-02-14 | Stop reason: HOSPADM

## 2020-02-13 RX ORDER — ACETAMINOPHEN 650 MG/1
650 SUPPOSITORY RECTAL EVERY 4 HOURS PRN
Status: DISCONTINUED | OUTPATIENT
Start: 2020-02-13 | End: 2020-02-14 | Stop reason: HOSPADM

## 2020-02-13 RX ORDER — LORAZEPAM 2 MG/ML
1 INJECTION INTRAMUSCULAR EVERY 4 HOURS PRN
Status: DISCONTINUED | OUTPATIENT
Start: 2020-02-13 | End: 2020-02-14 | Stop reason: HOSPADM

## 2020-02-13 RX ORDER — CITALOPRAM 20 MG/1
20 TABLET ORAL DAILY
Status: DISCONTINUED | OUTPATIENT
Start: 2020-02-13 | End: 2020-02-14 | Stop reason: HOSPADM

## 2020-02-13 RX ORDER — ACETAMINOPHEN 325 MG/1
650 TABLET ORAL EVERY 4 HOURS PRN
Status: DISCONTINUED | OUTPATIENT
Start: 2020-02-13 | End: 2020-02-14 | Stop reason: HOSPADM

## 2020-02-13 RX ORDER — ATORVASTATIN CALCIUM 10 MG/1
20 TABLET, FILM COATED ORAL NIGHTLY
Status: DISCONTINUED | OUTPATIENT
Start: 2020-02-13 | End: 2020-02-14 | Stop reason: HOSPADM

## 2020-02-13 RX ORDER — PANTOPRAZOLE SODIUM 40 MG/1
40 TABLET, DELAYED RELEASE ORAL DAILY
Status: DISCONTINUED | OUTPATIENT
Start: 2020-02-13 | End: 2020-02-14 | Stop reason: HOSPADM

## 2020-02-13 RX ORDER — NICOTINE 21 MG/24HR
1 PATCH, TRANSDERMAL 24 HOURS TRANSDERMAL
Status: DISCONTINUED | OUTPATIENT
Start: 2020-02-13 | End: 2020-02-14 | Stop reason: HOSPADM

## 2020-02-13 RX ORDER — MAGNESIUM SULFATE HEPTAHYDRATE 40 MG/ML
2 INJECTION, SOLUTION INTRAVENOUS ONCE
Status: COMPLETED | OUTPATIENT
Start: 2020-02-13 | End: 2020-02-13

## 2020-02-13 RX ORDER — IPRATROPIUM BROMIDE AND ALBUTEROL SULFATE 2.5; .5 MG/3ML; MG/3ML
3 SOLUTION RESPIRATORY (INHALATION)
Status: DISCONTINUED | OUTPATIENT
Start: 2020-02-13 | End: 2020-02-14

## 2020-02-13 RX ORDER — CELECOXIB 100 MG/1
100 CAPSULE ORAL DAILY
COMMUNITY
End: 2020-06-18

## 2020-02-13 RX ORDER — LOSARTAN POTASSIUM AND HYDROCHLOROTHIAZIDE 12.5; 5 MG/1; MG/1
1 TABLET ORAL DAILY
COMMUNITY
End: 2021-12-20 | Stop reason: HOSPADM

## 2020-02-13 RX ORDER — ONDANSETRON 2 MG/ML
4 INJECTION INTRAMUSCULAR; INTRAVENOUS EVERY 6 HOURS PRN
Status: DISCONTINUED | OUTPATIENT
Start: 2020-02-13 | End: 2020-02-14 | Stop reason: HOSPADM

## 2020-02-13 RX ORDER — SODIUM CHLORIDE 9 MG/ML
100 INJECTION, SOLUTION INTRAVENOUS CONTINUOUS
Status: DISCONTINUED | OUTPATIENT
Start: 2020-02-13 | End: 2020-02-13

## 2020-02-13 RX ORDER — DEXTROSE MONOHYDRATE 25 G/50ML
25 INJECTION, SOLUTION INTRAVENOUS
Status: DISCONTINUED | OUTPATIENT
Start: 2020-02-13 | End: 2020-02-14 | Stop reason: HOSPADM

## 2020-02-13 RX ORDER — NICOTINE POLACRILEX 4 MG
15 LOZENGE BUCCAL
Status: DISCONTINUED | OUTPATIENT
Start: 2020-02-13 | End: 2020-02-14 | Stop reason: HOSPADM

## 2020-02-13 RX ORDER — SODIUM CHLORIDE 0.9 % (FLUSH) 0.9 %
10 SYRINGE (ML) INJECTION AS NEEDED
Status: DISCONTINUED | OUTPATIENT
Start: 2020-02-13 | End: 2020-02-14 | Stop reason: HOSPADM

## 2020-02-13 RX ADMIN — CEFTRIAXONE SODIUM 2 G: 2 INJECTION, POWDER, FOR SOLUTION INTRAMUSCULAR; INTRAVENOUS at 01:29

## 2020-02-13 RX ADMIN — AZITHROMYCIN DIHYDRATE 500 MG: 500 INJECTION, POWDER, LYOPHILIZED, FOR SOLUTION INTRAVENOUS at 01:38

## 2020-02-13 RX ADMIN — SODIUM CHLORIDE, PRESERVATIVE FREE 10 ML: 5 INJECTION INTRAVENOUS at 09:09

## 2020-02-13 RX ADMIN — INSULIN LISPRO 4 UNITS: 100 INJECTION, SOLUTION INTRAVENOUS; SUBCUTANEOUS at 09:16

## 2020-02-13 RX ADMIN — AZITHROMYCIN MONOHYDRATE 500 MG: 500 INJECTION, POWDER, LYOPHILIZED, FOR SOLUTION INTRAVENOUS at 23:13

## 2020-02-13 RX ADMIN — CEFTRIAXONE SODIUM 1 G: 1 INJECTION, POWDER, FOR SOLUTION INTRAMUSCULAR; INTRAVENOUS at 23:13

## 2020-02-13 RX ADMIN — ENOXAPARIN SODIUM 40 MG: 40 INJECTION SUBCUTANEOUS at 09:09

## 2020-02-13 RX ADMIN — ONDANSETRON HYDROCHLORIDE 4 MG: 2 SOLUTION INTRAMUSCULAR; INTRAVENOUS at 01:57

## 2020-02-13 RX ADMIN — LORAZEPAM 1 MG: 2 INJECTION INTRAMUSCULAR; INTRAVENOUS at 01:39

## 2020-02-13 RX ADMIN — ACETAMINOPHEN 650 MG: 650 SUPPOSITORY RECTAL at 20:48

## 2020-02-13 RX ADMIN — INSULIN LISPRO 4 UNITS: 100 INJECTION, SOLUTION INTRAVENOUS; SUBCUTANEOUS at 12:02

## 2020-02-13 RX ADMIN — IPRATROPIUM BROMIDE AND ALBUTEROL SULFATE 3 ML: 2.5; .5 SOLUTION RESPIRATORY (INHALATION) at 10:30

## 2020-02-13 RX ADMIN — DOXYCYCLINE 100 MG: 100 INJECTION, POWDER, LYOPHILIZED, FOR SOLUTION INTRAVENOUS at 12:02

## 2020-02-13 RX ADMIN — DOXYCYCLINE 100 MG: 100 INJECTION, POWDER, LYOPHILIZED, FOR SOLUTION INTRAVENOUS at 23:13

## 2020-02-13 RX ADMIN — IPRATROPIUM BROMIDE AND ALBUTEROL SULFATE 3 ML: 2.5; .5 SOLUTION RESPIRATORY (INHALATION) at 06:47

## 2020-02-13 RX ADMIN — IPRATROPIUM BROMIDE AND ALBUTEROL SULFATE 3 ML: 2.5; .5 SOLUTION RESPIRATORY (INHALATION) at 14:33

## 2020-02-13 RX ADMIN — SODIUM CHLORIDE 100 ML/HR: 9 INJECTION, SOLUTION INTRAVENOUS at 09:16

## 2020-02-13 RX ADMIN — MAGNESIUM SULFATE HEPTAHYDRATE 2 G: 40 INJECTION, SOLUTION INTRAVENOUS at 02:13

## 2020-02-13 RX ADMIN — NALOXONE HYDROCHLORIDE 2 MG: 1 INJECTION PARENTERAL at 00:49

## 2020-02-13 RX ADMIN — SODIUM CHLORIDE 1000 ML: 9 INJECTION, SOLUTION INTRAVENOUS at 01:27

## 2020-02-13 RX ADMIN — IPRATROPIUM BROMIDE AND ALBUTEROL SULFATE 3 ML: 2.5; .5 SOLUTION RESPIRATORY (INHALATION) at 18:31

## 2020-02-13 RX ADMIN — NICOTINE 1 PATCH: 21 PATCH, EXTENDED RELEASE TRANSDERMAL at 09:09

## 2020-02-13 NOTE — TELEPHONE ENCOUNTER
Hilda @ [facility] wound care called stated Muriel is referring pt to see Dr. Post for diabetic nail care- I have scheduled to see pt on June 18th @ 3pm. Left message and mailed appt reminder

## 2020-02-14 VITALS
WEIGHT: 229.6 LBS | HEIGHT: 72 IN | BODY MASS INDEX: 31.1 KG/M2 | TEMPERATURE: 98.2 F | OXYGEN SATURATION: 97 % | DIASTOLIC BLOOD PRESSURE: 58 MMHG | RESPIRATION RATE: 16 BRPM | SYSTOLIC BLOOD PRESSURE: 124 MMHG | HEART RATE: 83 BPM

## 2020-02-14 PROBLEM — J18.9 COMMUNITY ACQUIRED PNEUMONIA OF LEFT LOWER LOBE OF LUNG: Status: ACTIVE | Noted: 2020-02-14

## 2020-02-14 LAB
BACTERIA SPEC AEROBE CULT: ABNORMAL
GLUCOSE BLDC GLUCOMTR-MCNC: 222 MG/DL (ref 70–130)
GLUCOSE BLDC GLUCOMTR-MCNC: 275 MG/DL (ref 70–130)
GRAM STN SPEC: ABNORMAL
GRAM STN SPEC: ABNORMAL

## 2020-02-14 PROCEDURE — 63710000001 INSULIN LISPRO (HUMAN) PER 5 UNITS: Performed by: INTERNAL MEDICINE

## 2020-02-14 PROCEDURE — 82962 GLUCOSE BLOOD TEST: CPT

## 2020-02-14 PROCEDURE — 25010000002 ENOXAPARIN PER 10 MG: Performed by: INTERNAL MEDICINE

## 2020-02-14 PROCEDURE — 25010000002 CEFTRIAXONE PER 250 MG: Performed by: INTERNAL MEDICINE

## 2020-02-14 PROCEDURE — 97162 PT EVAL MOD COMPLEX 30 MIN: CPT

## 2020-02-14 PROCEDURE — 25010000002 AZITHROMYCIN PER 500 MG: Performed by: INTERNAL MEDICINE

## 2020-02-14 PROCEDURE — 94640 AIRWAY INHALATION TREATMENT: CPT

## 2020-02-14 RX ORDER — DOXYCYCLINE 100 MG/1
100 CAPSULE ORAL 2 TIMES DAILY
Qty: 12 CAPSULE | Refills: 0 | Status: SHIPPED | OUTPATIENT
Start: 2020-02-14 | End: 2020-02-20

## 2020-02-14 RX ORDER — OXYCODONE AND ACETAMINOPHEN 7.5; 325 MG/1; MG/1
1 TABLET ORAL EVERY 6 HOURS PRN
Status: DISCONTINUED | OUTPATIENT
Start: 2020-02-14 | End: 2020-02-14 | Stop reason: HOSPADM

## 2020-02-14 RX ORDER — CEFDINIR 300 MG/1
300 CAPSULE ORAL 2 TIMES DAILY
Qty: 12 CAPSULE | Refills: 0 | Status: SHIPPED | OUTPATIENT
Start: 2020-02-14 | End: 2020-02-20

## 2020-02-14 RX ORDER — IPRATROPIUM BROMIDE AND ALBUTEROL SULFATE 2.5; .5 MG/3ML; MG/3ML
3 SOLUTION RESPIRATORY (INHALATION) EVERY 4 HOURS PRN
Status: DISCONTINUED | OUTPATIENT
Start: 2020-02-14 | End: 2020-02-14 | Stop reason: HOSPADM

## 2020-02-14 RX ORDER — PREGABALIN 100 MG/1
200 CAPSULE ORAL EVERY 8 HOURS SCHEDULED
Status: DISCONTINUED | OUTPATIENT
Start: 2020-02-14 | End: 2020-02-14 | Stop reason: HOSPADM

## 2020-02-14 RX ADMIN — SODIUM CHLORIDE, PRESERVATIVE FREE 10 ML: 5 INJECTION INTRAVENOUS at 09:34

## 2020-02-14 RX ADMIN — ENOXAPARIN SODIUM 40 MG: 40 INJECTION SUBCUTANEOUS at 09:28

## 2020-02-14 RX ADMIN — INSULIN LISPRO 6 UNITS: 100 INJECTION, SOLUTION INTRAVENOUS; SUBCUTANEOUS at 11:53

## 2020-02-14 RX ADMIN — PANTOPRAZOLE SODIUM 40 MG: 40 TABLET, DELAYED RELEASE ORAL at 09:28

## 2020-02-14 RX ADMIN — IPRATROPIUM BROMIDE AND ALBUTEROL SULFATE 3 ML: 2.5; .5 SOLUTION RESPIRATORY (INHALATION) at 06:27

## 2020-02-14 RX ADMIN — DOXYCYCLINE 100 MG: 100 INJECTION, POWDER, LYOPHILIZED, FOR SOLUTION INTRAVENOUS at 11:53

## 2020-02-14 RX ADMIN — INSULIN LISPRO 4 UNITS: 100 INJECTION, SOLUTION INTRAVENOUS; SUBCUTANEOUS at 09:40

## 2020-02-14 RX ADMIN — LOSARTAN POTASSIUM: 50 TABLET, FILM COATED ORAL at 09:28

## 2020-02-14 RX ADMIN — NICOTINE 1 PATCH: 21 PATCH, EXTENDED RELEASE TRANSDERMAL at 09:33

## 2020-02-14 RX ADMIN — CITALOPRAM 20 MG: 20 TABLET, FILM COATED ORAL at 09:28

## 2020-02-14 RX ADMIN — OXYCODONE HYDROCHLORIDE AND ACETAMINOPHEN 1 TABLET: 7.5; 325 TABLET ORAL at 09:40

## 2020-02-14 RX ADMIN — PREGABALIN 200 MG: 100 CAPSULE ORAL at 13:32

## 2020-02-15 ENCOUNTER — READMISSION MANAGEMENT (OUTPATIENT)
Dept: CALL CENTER | Facility: HOSPITAL | Age: 70
End: 2020-02-15

## 2020-02-15 NOTE — OUTREACH NOTE
Prep Survey      Responses   Facility patient discharged from?  [place]   Is patient eligible?  Yes   Discharge diagnosis  AMS, polypharmacy, skin ulcer of sacrum, weakness, DM II, spinal cord compression d/t degenerative disorder of spinal column   Does the patient have one of the following disease processes/diagnoses(primary or secondary)?  Other   Does the patient have Home health ordered?  No   Is there a DME ordered?  No   Comments regarding appointments  See AVS   Prep survey completed?  Yes          Che Weiss RN

## 2020-02-16 ENCOUNTER — READMISSION MANAGEMENT (OUTPATIENT)
Dept: CALL CENTER | Facility: HOSPITAL | Age: 70
End: 2020-02-16

## 2020-02-16 NOTE — OUTREACH NOTE
Medical Week 1 Survey      Responses   Facility patient discharged from?  [place]   Does the patient have one of the following disease processes/diagnoses(primary or secondary)?  Other   Is there a successful TCM telephone encounter documented?  No   Week 1 attempt successful?  Yes   Call start time  1536   Call end time  1540   Discharge diagnosis  AMS, polypharmacy, skin ulcer of sacrum, weakness, DM II, spinal cord compression d/t degenerative disorder of spinal column   Is patient permission given to speak with other caregiver?  Yes   List who call center can speak with  Wife, Tamara    Meds reviewed with patient/caregiver?  Yes   Is the patient having any side effects they believe may be caused by any medication additions or changes?  No   Does the patient have all medications ordered at discharge?  Yes   Is the patient taking all medications as directed (includes completed medication regime)?  Yes   Does the patient have a primary care provider?   Yes   Does the patient have an appointment with their PCP within 7 days of discharge?  Yes   Has the patient kept scheduled appointments due by today?  N/A   Has home health visited the patient within 72 hours of discharge?  N/A   Did the patient receive a copy of their discharge instructions?  Yes   Nursing interventions  Reviewed instructions with patient   What is the patient's perception of their health status since discharge?  Improving   Is the patient/caregiver able to teach back signs and symptoms related to disease process for when to call PCP?  Yes   Is the patient/caregiver able to teach back signs and symptoms related to disease process for when to call 911?  Yes   Is the patient/caregiver able to teach back the hierarchy of who to call/visit for symptoms/problems? PCP, Specialist, Home health nurse, Urgent Care, ED, 911  Yes   Additional teach back comments  He is a smoker.  Has thought about quitting but not yet.    Week 1 call completed?  Yes           Minna Sarah, RN

## 2020-02-17 LAB — BACTERIA SPEC ANAEROBE CULT: NORMAL

## 2020-02-18 ENCOUNTER — HOSPITAL ENCOUNTER (OUTPATIENT)
Dept: GENERAL RADIOLOGY | Facility: HOSPITAL | Age: 70
Discharge: HOME | End: 2020-02-18
Admitting: NURSE PRACTITIONER

## 2020-02-18 ENCOUNTER — OFFICE VISIT (OUTPATIENT)
Dept: NEUROSURGERY | Facility: CLINIC | Age: 70
End: 2020-02-18

## 2020-02-18 ENCOUNTER — HOSPITAL ENCOUNTER (OUTPATIENT)
Dept: GENERAL RADIOLOGY | Facility: HOSPITAL | Age: 70
Discharge: HOME | End: 2020-02-18

## 2020-02-18 ENCOUNTER — TRANSCRIBE ORDERS (OUTPATIENT)
Dept: ADMINISTRATIVE | Facility: HOSPITAL | Age: 70
End: 2020-02-18

## 2020-02-18 VITALS
HEIGHT: 71 IN | DIASTOLIC BLOOD PRESSURE: 64 MMHG | BODY MASS INDEX: 32.48 KG/M2 | SYSTOLIC BLOOD PRESSURE: 120 MMHG | WEIGHT: 232 LBS

## 2020-02-18 DIAGNOSIS — M47.12 CERVICAL SPONDYLOSIS WITH MYELOPATHY: Primary | ICD-10-CM

## 2020-02-18 DIAGNOSIS — L97.519 TYPE 2 DIABETES MELLITUS WITH RIGHT DIABETIC FOOT ULCER (HCC): Primary | ICD-10-CM

## 2020-02-18 DIAGNOSIS — E11.621 TYPE 2 DIABETES MELLITUS WITH RIGHT DIABETIC FOOT ULCER (HCC): Primary | ICD-10-CM

## 2020-02-18 DIAGNOSIS — L97.519 TYPE 2 DIABETES MELLITUS WITH RIGHT DIABETIC FOOT ULCER (HCC): ICD-10-CM

## 2020-02-18 DIAGNOSIS — M47.12 CERVICAL SPONDYLOSIS WITH MYELOPATHY: ICD-10-CM

## 2020-02-18 DIAGNOSIS — Z72.0 TOBACCO ABUSE: ICD-10-CM

## 2020-02-18 DIAGNOSIS — E11.621 TYPE 2 DIABETES MELLITUS WITH RIGHT DIABETIC FOOT ULCER (HCC): ICD-10-CM

## 2020-02-18 LAB
BACTERIA SPEC AEROBE CULT: NORMAL
BACTERIA SPEC AEROBE CULT: NORMAL

## 2020-02-18 PROCEDURE — 73630 X-RAY EXAM OF FOOT: CPT

## 2020-02-18 PROCEDURE — 72050 X-RAY EXAM NECK SPINE 4/5VWS: CPT

## 2020-02-18 PROCEDURE — 99024 POSTOP FOLLOW-UP VISIT: CPT | Performed by: NURSE PRACTITIONER

## 2020-02-18 NOTE — PATIENT INSTRUCTIONS
Steps to Quit Smoking    Smoking tobacco can be bad for your health. It can also affect almost every organ in your body. Smoking puts you and people around you at risk for many serious long-lasting (chronic) diseases. Quitting smoking is hard, but it is one of the best things that you can do for your health. It is never too late to quit.  What are the benefits of quitting smoking?  When you quit smoking, you lower your risk for getting serious diseases and conditions. They can include:  · Lung cancer or lung disease.  · Heart disease.  · Stroke.  · Heart attack.  · Not being able to have children (infertility).  · Weak bones (osteoporosis) and broken bones (fractures).  If you have coughing, wheezing, and shortness of breath, those symptoms may get better when you quit. You may also get sick less often. If you are pregnant, quitting smoking can help to lower your chances of having a baby of low birth weight.  What can I do to help me quit smoking?  Talk with your doctor about what can help you quit smoking. Some things you can do (strategies) include:  · Quitting smoking totally, instead of slowly cutting back how much you smoke over a period of time.  · Going to in-person counseling. You are more likely to quit if you go to many counseling sessions.  · Using resources and support systems, such as:  ? Online chats with a counselor.  ? Phone quitlines.  ? Printed self-help materials.  ? Support groups or group counseling.  ? Text messaging programs.  ? Mobile phone apps or applications.  · Taking medicines. Some of these medicines may have nicotine in them. If you are pregnant or breastfeeding, do not take any medicines to quit smoking unless your doctor says it is okay. Talk with your doctor about counseling or other things that can help you.  Talk with your doctor about using more than one strategy at the same time, such as taking medicines while you are also going to in-person counseling. This can help make  quitting easier.  What things can I do to make it easier to quit?  Quitting smoking might feel very hard at first, but there is a lot that you can do to make it easier. Take these steps:  · Talk to your family and friends. Ask them to support and encourage you.  · Call phone quitlines, reach out to support groups, or work with a counselor.  · Ask people who smoke to not smoke around you.  · Avoid places that make you want (trigger) to smoke, such as:  ? Bars.  ? Parties.  ? Smoke-break areas at work.  · Spend time with people who do not smoke.  · Lower the stress in your life. Stress can make you want to smoke. Try these things to help your stress:  ? Getting regular exercise.  ? Deep-breathing exercises.  ? Yoga.  ? Meditating.  ? Doing a body scan. To do this, close your eyes, focus on one area of your body at a time from head to toe, and notice which parts of your body are tense. Try to relax the muscles in those areas.  · Download or buy apps on your mobile phone or tablet that can help you stick to your quit plan. There are many free apps, such as QuitGuide from the CDC (Centers for Disease Control and Prevention). You can find more support from smokefree.gov and other websites.  This information is not intended to replace advice given to you by your health care provider. Make sure you discuss any questions you have with your health care provider.  Document Released: 10/14/2010 Document Revised: 08/15/2017 Document Reviewed: 05/03/2016  [employer] Interactive Patient Education © 2019 [employer]  BMI for Adults    Body mass index (BMI) is a number that is calculated from a person's weight and height. BMI may help to estimate how much of a person's weight is composed of fat. BMI can help identify those who may be at higher risk for certain medical problems.  How is BMI used with adults?  BMI is used as a screening tool to identify possible weight problems. It is used to check whether a person is obese,  "overweight, healthy weight, or underweight.  How is BMI calculated?  BMI measures your weight and compares it to your height. This can be done either in English (U.S[place] or metric measurements. Note that charts are available to help you find your BMI quickly and easily without having to do these calculations yourself.  To calculate your BMI in English (U.S.) measurements, your health care provider will:  1. Measure your weight in pounds (lb).  2. Multiply the number of pounds by 703.  ? For example, for a person who weighs 180 lb, multiply that number by 703, which equals 126,540.  3. Measure your height in inches (in). Then multiply that number by itself to get a measurement called \"inches squared.\"  ? For example, for a person who is 70 in tall, the \"inches squared\" measurement is 70 in x 70 in, which equals 4900 inches squared.  4. Divide the total from Step 2 (number of lb x 703) by the total from Step 3 (inches squared): 126,540 ÷ 4900 = 25.8. This is your BMI.  To calculate your BMI in metric measurements, your health care provider will:  1. Measure your weight in kilograms (kg).  2. Measure your height in meters (m). Then multiply that number by itself to get a measurement called \"meters squared.\"  ? For example, for a person who is 1.75 m tall, the \"meters squared\" measurement is 1.75 m x 1.75 m, which is equal to 3.1 meters squared.  3. Divide the number of kilograms (your weight) by the meters squared number. In this example: 70 ÷ 3.1 = 22.6. This is your BMI.  How is BMI interpreted?  To interpret your results, your health care provider will use BMI charts to identify whether you are underweight, normal weight, overweight, or obese. The following guidelines will be used:  · Underweight: BMI less than 18.5.  · Normal weight: BMI between 18.5 and 24.9.  · Overweight: BMI between 25 and 29.9.  · Obese: BMI of 30 and above.  Please note:  · Weight includes both fat and muscle, so someone with a muscular build, " such as an athlete, may have a BMI that is higher than 24.9. In cases like these, BMI is not an accurate measure of body fat.  · To determine if excess body fat is the cause of a BMI of 25 or higher, further assessments may need to be done by a health care provider.  · BMI is usually interpreted in the same way for men and women.  Why is BMI a useful tool?  BMI is useful in two ways:  · Identifying a weight problem that may be related to a medical condition, or that may increase the risk for medical problems.  · Promoting lifestyle and diet changes in order to reach a healthy weight.  Summary  · Body mass index (BMI) is a number that is calculated from a person's weight and height.  · BMI may help to estimate how much of a person's weight is composed of fat. BMI can help identify those who may be at higher risk for certain medical problems.  · BMI can be measured using English measurements or metric measurements.  · To interpret your results, your health care provider will use BMI charts to identify whether you are underweight, normal weight, overweight, or obese.  This information is not intended to replace advice given to you by your health care provider. Make sure you discuss any questions you have with your health care provider.  Document Released: 08/29/2005 Document Revised: 10/31/2018 Document Reviewed: 10/31/2018  [employer] Interactive Patient Education © 2019 [employer]

## 2020-02-18 NOTE — PROGRESS NOTES
"  Chief complaint:   Chief Complaint   Patient presents with   • Post-op     John is returning for his post op visit for his 01/23/2020 cervical surgery, he seems to be doing fine and has no complaints today.         Subjective     HPI: This is a 69 y.o. male patient who went to the operating room on 1/23/2020 for a Cervical Discectomy Anterior With Fusion C5-6. The patient is here in follow up today for postoperative visit.  The patient was exhibiting signs of myelopathy.  He did have surgery and [facility]  Patient was also having difficulty with some nonhealing diabetic foot wounds as well as the fact that his diabetes was out of control.  He did end up getting readmitted to the hospital for altered mental status in mid February.  He is here in follow-up today.  He says that he is going to see his primary care doctor later this week.  He does feel like overall his generalized movements are doing better and his gait and ambulation is more stable.  He is using a walker.  Denies any neck pain.  Denies any arm pain.  He has not had any falls since his surgery.  Overall he is happy and satisfied with how he is doing from the surgery at this time.        Review of Systems   Musculoskeletal: Negative for neck pain.   Neurological: Negative.          Objective      Vital Signs  /64 (BP Location: Right arm, Patient Position: Sitting)   Ht 180.3 cm (71\")   Wt 105 kg (232 lb)   BMI 32.36 kg/m²     Physical Exam   Constitutional: He is oriented to person, place, and time. He appears well-developed and well-nourished.   HENT:   Head: Normocephalic.   Eyes: Pupils are equal, round, and reactive to light. EOM are normal.   Neck: Normal range of motion.   Pulmonary/Chest: Effort normal.   Musculoskeletal: Normal range of motion.   Neurological: He is alert and oriented to person, place, and time. He has normal strength. No cranial nerve deficit or sensory deficit. Gait abnormal. GCS eye " subscore is 4. GCS verbal subscore is 5. GCS motor subscore is 6.   Reflex Scores:       Bicep reflexes are 0 on the right side and 0 on the left side.       Brachioradialis reflexes are 0 on the right side and 0 on the left side.       Patellar reflexes are 0 on the right side and 0 on the left side.       Achilles reflexes are 0 on the right side and 0 on the left side.  Slightly unsteady gait but improved from preoperative gait    No clonus or Clayton's   Skin: Skin is warm.   Psychiatric: He has a normal mood and affect. His speech is normal and behavior is normal. Thought content normal.     Incisions clean dry and intact    Results Review: No new imaging          Assessment/Plan: This point we are going to send the patient for set of x-rays of the cervical spine to make sure that there is no complications with the hardware.  We will have him follow-up with Dr. Montano in 6 to 8 weeks.    Patient is a non-smoker  BMI shows the patient is Obese. BMI chart was given to the patient.     John was seen today for post-op.    Diagnoses and all orders for this visit:    Cervical spondylosis with myelopathy  -     XR Spine Cervical Complete 4 or 5 View; Future    Tobacco abuse    Body mass index (BMI) of 32.0 to 32.9 in adult        I discussed the patients findings and my recommendations with patient  Ryan Lou, APRN  02/18/20

## 2020-02-19 ENCOUNTER — OFFICE VISIT (OUTPATIENT)
Dept: WOUND CARE | Facility: HOSPITAL | Age: 70
End: 2020-02-19

## 2020-02-19 ENCOUNTER — TELEPHONE (OUTPATIENT)
Dept: NEUROSURGERY | Facility: CLINIC | Age: 70
End: 2020-02-19

## 2020-02-19 NOTE — TELEPHONE ENCOUNTER
John was seen in the office for a post op visit on 02/18/2020 and was sent for an x-ray, Ryan reviewed x-ray and patient was called with results, x-ray looks good and he may stay out of his collar, patient understood.

## 2020-02-24 ENCOUNTER — READMISSION MANAGEMENT (OUTPATIENT)
Dept: CALL CENTER | Facility: HOSPITAL | Age: 70
End: 2020-02-24

## 2020-02-24 NOTE — OUTREACH NOTE
Medical Week 2 Survey      Responses   Facility patient discharged from?  [place]   Does the patient have one of the following disease processes/diagnoses(primary or secondary)?  Other   Week 2 attempt successful?  Yes   Call start time  1755   Discharge diagnosis  AMS, polypharmacy, skin ulcer of sacrum, weakness, DM II, spinal cord compression d/t degenerative disorder of spinal column   Call end time  1756   Meds reviewed with patient/caregiver?  Yes   Is the patient taking all medications as directed (includes completed medication regime)?  Yes   Comments regarding appointments  Wound care appt 2/26/20   Has the patient kept scheduled appointments due by today?  Yes   Psychosocial issues?  No   What is the patient's perception of their health status since discharge?  Improving   If the patient is a current smoker, are they able to teach back resources for cessation?  3-800-VdxmRsw [Smoker]   Week 2 Call Completed?  Yes          Mickie Álvarez RN

## 2020-02-26 ENCOUNTER — OFFICE VISIT (OUTPATIENT)
Dept: WOUND CARE | Facility: HOSPITAL | Age: 70
End: 2020-02-26

## 2020-03-03 ENCOUNTER — READMISSION MANAGEMENT (OUTPATIENT)
Dept: CALL CENTER | Facility: HOSPITAL | Age: 70
End: 2020-03-03

## 2020-03-03 NOTE — OUTREACH NOTE
Medical Week 3 Survey      Responses   [facility] patient discharged from?  [place]   Does the patient have one of the following disease processes/diagnoses(primary or secondary)?  Other   Week 3 attempt successful?  Yes   Call start time  1513   Call end time  1517   Discharge diagnosis  AMS, polypharmacy, skin ulcer of sacrum, weakness, DM II, spinal cord compression d/t degenerative disorder of spinal column   Is patient permission given to speak with other caregiver?  Yes   List who call center can speak with  Tamara Quintero reviewed with patient/caregiver?  Yes   Is the patient having any side effects they believe may be caused by any medication additions or changes?  No   Does the patient have all medications ordered at discharge?  Yes   Is the patient taking all medications as directed (includes completed medication regime)?  Yes   Comments regarding appointments  He sees wound care   Does the patient have a primary care provider?   Yes   Does the patient have an appointment with their PCP within 7 days of discharge?  Yes   Has the patient kept scheduled appointments due by today?  Yes   Did the patient receive a copy of their discharge instructions?  Yes   Nursing interventions  Reviewed instructions with patient   What is the patient's perception of their health status since discharge?  Improving   Is the patient/caregiver able to teach back signs and symptoms related to disease process for when to call PCP?  Yes   Is the patient/caregiver able to teach back the hierarchy of who to call/visit for symptoms/problems? PCP, Specialist, Home health nurse, Urgent Care, ED, 911  Yes   If the patient is a current smoker, are they able to teach back resources for cessation?  1-532-NwijMrq   Additional teach back comments  He does smoke.    Week 3 Call Completed?  Yes   Graduated  Yes [He states he is feeling much better. ]   Did the patient feel the follow up calls were helpful during their recovery  period?  Yes   Was the number of calls appropriate?  Yes   Revoked  No further contact(revokes)-requires comment          Judy Christian RN

## 2020-03-04 ENCOUNTER — OFFICE VISIT (OUTPATIENT)
Dept: WOUND CARE | Facility: HOSPITAL | Age: 70
End: 2020-03-04

## 2020-03-17 ENCOUNTER — OFFICE VISIT (OUTPATIENT)
Dept: WOUND CARE | Facility: HOSPITAL | Age: 70
End: 2020-03-17

## 2020-03-24 ENCOUNTER — OFFICE VISIT (OUTPATIENT)
Dept: WOUND CARE | Facility: HOSPITAL | Age: 70
End: 2020-03-24

## 2020-03-31 ENCOUNTER — OFFICE VISIT (OUTPATIENT)
Dept: WOUND CARE | Facility: HOSPITAL | Age: 70
End: 2020-03-31

## 2020-04-07 ENCOUNTER — OFFICE VISIT (OUTPATIENT)
Dept: WOUND CARE | Facility: HOSPITAL | Age: 70
End: 2020-04-07

## 2020-04-14 ENCOUNTER — OFFICE VISIT (OUTPATIENT)
Dept: WOUND CARE | Facility: HOSPITAL | Age: 70
End: 2020-04-14

## 2020-04-14 PROCEDURE — 11055 PARING/CUTG B9 HYPRKER LES 1: CPT

## 2020-04-28 ENCOUNTER — OFFICE VISIT (OUTPATIENT)
Dept: NEUROSURGERY | Facility: CLINIC | Age: 70
End: 2020-04-28

## 2020-04-28 VITALS
SYSTOLIC BLOOD PRESSURE: 132 MMHG | DIASTOLIC BLOOD PRESSURE: 62 MMHG | WEIGHT: 239 LBS | HEIGHT: 71 IN | BODY MASS INDEX: 33.46 KG/M2

## 2020-04-28 DIAGNOSIS — M47.12 CERVICAL SPONDYLOSIS WITH MYELOPATHY: Primary | ICD-10-CM

## 2020-04-28 DIAGNOSIS — Z72.0 TOBACCO ABUSE: ICD-10-CM

## 2020-04-28 PROCEDURE — 99213 OFFICE O/P EST LOW 20 MIN: CPT | Performed by: NEUROLOGICAL SURGERY

## 2020-04-28 RX ORDER — CLOTRIMAZOLE AND BETAMETHASONE DIPROPIONATE 10; .64 MG/G; MG/G
CREAM TOPICAL 2 TIMES DAILY
COMMUNITY
Start: 2020-03-20 | End: 2021-03-19

## 2020-04-28 RX ORDER — PEN NEEDLE, DIABETIC 31 GX3/16"
NEEDLE, DISPOSABLE MISCELLANEOUS
Status: ON HOLD | COMMUNITY
Start: 2020-04-13 | End: 2021-12-16

## 2020-04-28 NOTE — PROGRESS NOTES
SUBJECTIVE:  Patient ID: John Rodriguez is a 69 y.o. male is here today for follow-up.    Chief Complaint: Cervical myelopathy  Chief Complaint   Patient presents with   • Cervical Spondylosis     patient is here for follow up; patient underwent C 5/6 ACDF on 1/23/2020.       HPI  69-year-old gentleman went to the operating room January 23 for a single level anterior cervical fusion for a large acute disc herniation with cord compression.  The patient was complaining preoperatively of bilateral upper extremity numbness frequent falls difficulty walking, bowel bladder incontinence.  The patient did an extended stay at [facility].  He says he is doing very well.  He has a little bit of numbness in his hands no neck pain no arm pain.  He feels that his walking is significantly better than it was before surgery although he still uses a walker at times.  He is not having any trouble with his bowel or his bladder control.  Right now is very satisfied with results of his surgery.    The following portions of the patient's history were reviewed and updated as appropriate: allergies, current medications, past family history, past medical history, past social history, past surgical history and problem list.    OBJECTIVE:    Review of Systems   Musculoskeletal: Positive for gait problem.   All other systems reviewed and are negative.         Physical Exam   Constitutional: He is oriented to person, place, and time.   Neurological: He is oriented to person, place, and time. He has normal strength. He has a normal Finger-Nose-Finger Test.   Reflex Scores:       Patellar reflexes are 2+ on the right side and 1+ on the left side.  Psychiatric: His speech is normal.       Neurologic Exam     Mental Status   Oriented to person, place, and time.   Attention: normal.   Speech: speech is normal   Level of consciousness: alert  Knowledge: good.     Cranial Nerves   Cranial nerves II through XII intact.     Motor Exam   Muscle bulk:  normal  Overall muscle tone: normal  Right arm pronator drift: absent  Left arm pronator drift: absent    Strength   Strength 5/5 throughout.     Sensory Exam   Light touch normal.   Pinprick normal.     Gait, Coordination, and Reflexes     Gait  Gait: (Slow and cautious gait, slightly wide-based, mildly unsteady)    Coordination   Finger to nose coordination: normal    Tremor   Resting tremor: absent  Intention tremor: absent  Action tremor: absent    Reflexes   Right patellar: 2+  Left patellar: 1+No dysdiadochokinesia       Independent Review of Radiographic Studies:   Plain x-rays show good positioning of the interbody device and hardware    ASSESSMENT/PLAN:  The patient is doing very well.  A significant portion of his myelopathic symptoms are improving.  He still has some difficulty with gait but he also has orthopedic knee issues and diabetic foot ulcer issues.  I reassured him that over the next several months it is very likely that he will continue to make improvements.  We will see him in follow-up in about 3 months.      1. Cervical spondylosis with myelopathy    2. BMI 33.0-33.9,adult    3. Tobacco abuse            Return in about 3 months (around 7/28/2020) for follow up w/[facility].      Santino Montano MD

## 2020-04-28 NOTE — PATIENT INSTRUCTIONS
"PATIENT TO CONTINUE TO FOLLOW UP WITH HIS PRIMARY CARE PROVIDER FOR YEARLY PHYSICAL EXAMS TO ENSURE COMPLETE HEALTH MAINTENANCE        BMI for Adults    Body mass index (BMI) is a number that is calculated from a person's weight and height. BMI may help to estimate how much of a person's weight is composed of fat. BMI can help identify those who may be at higher risk for certain medical problems.  How is BMI used with adults?  BMI is used as a screening tool to identify possible weight problems. It is used to check whether a person is obese, overweight, healthy weight, or underweight.  How is BMI calculated?  BMI measures your weight and compares it to your height. This can be done either in English (U[place] or metric measurements. Note that charts are available to help you find your BMI quickly and easily without having to do these calculations yourself.  To calculate your BMI in English ([place] measurements, your health care provider will:  1. Measure your weight in pounds (lb).  2. Multiply the number of pounds by 703.  ? For example, for a person who weighs 180 lb, multiply that number by 703, which equals 126,540.  3. Measure your height in inches (in). Then multiply that number by itself to get a measurement called \"inches squared.\"  ? For example, for a person who is 70 in tall, the \"inches squared\" measurement is 70 in x 70 in, which equals 4900 inches squared.  4. Divide the total from Step 2 (number of lb x 703) by the total from Step 3 (inches squared): 126,540 ÷ 4900 = 25.8. This is your BMI.  To calculate your BMI in metric measurements, your health care provider will:  1. Measure your weight in kilograms (kg).  2. Measure your height in meters (m). Then multiply that number by itself to get a measurement called \"meters squared.\"  ? For example, for a person who is 1.75 m tall, the \"meters squared\" measurement is 1.75 m x 1.75 m, which is equal to 3.1 meters squared.  3. Divide the number of kilograms " (your weight) by the meters squared number. In this example: 70 ÷ 3.1 = 22.6. This is your BMI.  How is BMI interpreted?  To interpret your results, your health care provider will use BMI charts to identify whether you are underweight, normal weight, overweight, or obese. The following guidelines will be used:  · Underweight: BMI less than 18.5.  · Normal weight: BMI between 18.5 and 24.9.  · Overweight: BMI between 25 and 29.9.  · Obese: BMI of 30 and above.  Please note:  · Weight includes both fat and muscle, so someone with a muscular build, such as an athlete, may have a BMI that is higher than 24.9. In cases like these, BMI is not an accurate measure of body fat.  · To determine if excess body fat is the cause of a BMI of 25 or higher, further assessments may need to be done by a health care provider.  · BMI is usually interpreted in the same way for men and women.  Why is BMI a useful tool?  BMI is useful in two ways:  · Identifying a weight problem that may be related to a medical condition, or that may increase the risk for medical problems.  · Promoting lifestyle and diet changes in order to reach a healthy weight.  Summary  · Body mass index (BMI) is a number that is calculated from a person's weight and height.  · BMI may help to estimate how much of a person's weight is composed of fat. BMI can help identify those who may be at higher risk for certain medical problems.  · BMI can be measured using English measurements or metric measurements.  · To interpret your results, your health care provider will use BMI charts to identify whether you are underweight, normal weight, overweight, or obese.  This information is not intended to replace advice given to you by your health care provider. Make sure you discuss any questions you have with your health care provider.  Document Released: 08/29/2005 Document Revised: 10/31/2018 Document Reviewed: 10/31/2018  Elsevier Interactive Patient Education © 2020  [employer] Inc.        Steps to Quit Smoking    Smoking tobacco can be harmful to your health and can affect almost every organ in your body. Smoking puts you, and those around you, at risk for developing many serious chronic diseases. Quitting smoking is difficult, but it is one of the best things that you can do for your health. It is never too late to quit.  What are the benefits of quitting smoking?  When you quit smoking, you lower your risk of developing serious diseases and conditions, such as:  · Lung cancer or lung disease, such as COPD.  · Heart disease.  · Stroke.  · Heart attack.  · Infertility.  · Osteoporosis and bone fractures.  Additionally, symptoms such as coughing, wheezing, and shortness of breath may get better when you quit. You may also find that you get sick less often because your body is stronger at fighting off colds and infections. If you are pregnant, quitting smoking can help to reduce your chances of having a baby of low birth weight.  How do I get ready to quit?  When you decide to quit smoking, create a plan to make sure that you are successful. Before you quit:  · Pick a date to quit. Set a date within the next two weeks to give you time to prepare.  · Write down the reasons why you are quitting. Keep this list in places where you will see it often, such as on your bathroom mirror or in your car or wallet.  · Identify the people, places, things, and activities that make you want to smoke (triggers) and avoid them. Make sure to take these actions:  ? Throw away all cigarettes at home, at work, and in your car.  ? Throw away smoking accessories, such as ashtrays and lighters.  ? Clean your car and make sure to empty the ashtray.  ? Clean your home, including curtains and carpets.  · Tell your family, friends, and coworkers that you are quitting. Support from your loved ones can make quitting easier.  · Talk with your health care provider about your options for quitting smoking.  · Find  out what treatment options are covered by your health insurance.  What strategies can I use to quit smoking?  Talk with your healthcare provider about different strategies to quit smoking. Some strategies include:  · Quitting smoking altogether instead of gradually lessening how much you smoke over a period of time. Research shows that quitting “cold turkey” is more successful than gradually quitting.  · Attending in-person counseling to help you build problem-solving skills. You are more likely to have success in quitting if you attend several counseling sessions. Even short sessions of 10 minutes can be effective.  · Finding resources and support systems that can help you to quit smoking and remain smoke-free after you quit. These resources are most helpful when you use them often. They can include:  ? Online chats with a counselor.  ? Telephone quitlines.  ? Printed self-help materials.  ? Support groups or group counseling.  ? Text messaging programs.  ? Mobile phone applications.  · Taking medicines to help you quit smoking. (If you are pregnant or breastfeeding, talk with your health care provider first.) Some medicines contain nicotine and some do not. Both types of medicines help with cravings, but the medicines that include nicotine help to relieve withdrawal symptoms. Your health care provider may recommend:  ? Nicotine patches, gum, or lozenges.  ? Nicotine inhalers or sprays.  ? Non-nicotine medicine that is taken by mouth.  Talk with your health care provider about combining strategies, such as taking medicines while you are also receiving in-person counseling. Using these two strategies together makes you more likely to succeed in quitting than if you used either strategy on its own.  If you are pregnant or breastfeeding, talk with your health care provider about finding counseling or other support strategies to quit smoking. Do not take medicine to help you quit smoking unless told to do so by your  health care provider.  What things can I do to make it easier to quit?  Quitting smoking might feel overwhelming at first, but there is a lot that you can do to make it easier. Take these important actions:  · Reach out to your family and friends and ask that they support and encourage you during this time. Call telephone quitlines, reach out to support groups, or work with a counselor for support.  · Ask people who smoke to avoid smoking around you.  · Avoid places that trigger you to smoke, such as bars, parties, or smoke-break areas at work.  · Spend time around people who do not smoke.  · Lessen stress in your life, because stress can be a smoking trigger for some people. To lessen stress, try:  ? Exercising regularly.  ? Deep-breathing exercises.  ? Yoga.  ? Meditating.  ? Performing a body scan. This involves closing your eyes, scanning your body from head to toe, and noticing which parts of your body are particularly tense. Purposefully relax the muscles in those areas.  · Download or purchase mobile phone or tablet apps (applications) that can help you stick to your quit plan by providing reminders, tips, and encouragement. There are many free apps, such as QuitGuide from the CDC (Centers for Disease Control and Prevention). You can find other support for quitting smoking (smoking cessation) through smokefree.gov and other websites.  How will I feel when I quit smoking?  Within the first 24 hours of quitting smoking, you may start to feel some withdrawal symptoms. These symptoms are usually most noticeable 2-3 days after quitting, but they usually do not last beyond 2-3 weeks. Changes or symptoms that you might experience include:  · Mood swings.  · Restlessness, anxiety, or irritation.  · Difficulty concentrating.  · Dizziness.  · Strong cravings for sugary foods in addition to nicotine.  · Mild weight gain.  · Constipation.  · Nausea.  · Coughing or a sore throat.  · Changes in how your medicines work in  your body.  · A depressed mood.  · Difficulty sleeping (insomnia).  After the first 2-3 weeks of quitting, you may start to notice more positive results, such as:  · Improved sense of smell and taste.  · Decreased coughing and sore throat.  · Slower heart rate.  · Lower blood pressure.  · Clearer skin.  · The ability to breathe more easily.  · Fewer sick days.  Quitting smoking is very challenging for most people. Do not get discouraged if you are not successful the first time. Some people need to make many attempts to quit before they achieve long-term success. Do your best to stick to your quit plan, and talk with your health care provider if you have any questions or concerns.  This information is not intended to replace advice given to you by your health care provider. Make sure you discuss any questions you have with your health care provider.  Document Released: 12/12/2002 Document Revised: 07/24/2018 Document Reviewed: 05/03/2016  [employer] Interactive Patient Education © 2020 Elsevier Inc.

## 2020-06-11 NOTE — PROGRESS NOTES
[facility] - PODIATRY    Today's Date: 06/18/20    Patient Name: John Rodriguez  MRN: [id number]  CSN: [id number]  PCP: Jt Monroy MD  Referring Provider: No ref. provider found    SUBJECTIVE     Chief Complaint   Patient presents with   • Establish Care     pt c/o long, thickened toenails, lesions on toes(right foot) , calluses, left foot callus, split open, pt admits to numbness- some pain - throbbing pain scale 6/10   • Diabetes     last stated blood sugar 348 mg/dl - pt stated takes insulin@ hs  PCP Dr. Monroy 04/14/2020     HPI: John Rodriguez, a 69 y.o.male, comes to clinic as a(n) new patient presenting for diabetic foot exam and complaining of thickened toenails and calluses. Patient has h/o acid reflux, arthritis, back pain, DM2, HLD, HTN, Psoriasis, Tobacco use. Patient is IDDM with last stated BG level of 348mg/dl.  Admits to numbness and tingling in his feet.  Denies open wounds or sores.  Relates several calluses to his feet including a large 1 to his right foot with a deep fissure.  Denies redness or drainage.  States that his toenails are long, thick, discolored and crumbly.  He has difficulty caring for his own feet.  Utilizes a walker.  Admits to being barefoot frequently while at home. Admits pain at 6/10 level and described as aching, numbness and tingling. Relates previous treatment(s) including foot care by podiatrist. Denies any constitutional symptoms. No other pedal complaints at this time.    Past Medical History:   Diagnosis Date   • Acid reflux    • Arthritis    • Back pain    • Diabetes mellitus (CMS/HCC)    • Hydrocele in adult    • Hyperlipidemia    • Hypertension    • Psoriasis    • Wears glasses      Past Surgical History:   Procedure Laterality Date   • ANTERIOR CERVICAL DISCECTOMY W/ FUSION N/A 1/23/2020    Procedure: CERVICAL DISCECTOMY ANTERIOR WITH FUSION C5-6;  Surgeon: Santino Montano MD;  Location: [facility];  Service: Neurosurgery   • BACK SURGERY     •  HERNIA REPAIR     • HYDROCELECTOMY Left 11/20/2017    Procedure: HYDROCELECTOMY;  Surgeon: Neeraj Hurtado MD;  Location: [facility] OR;  Service:      Family History   Problem Relation Age of Onset   • No Known Problems Father    • No Known Problems Mother      Social History     Socioeconomic History   • Marital status:      Spouse name: Not on file   • Number of children: Not on file   • Years of education: Not on file   • Highest education level: Not on file   Tobacco Use   • Smoking status: Current Every Day Smoker     Packs/day: 1.00     Years: 50.00     Pack years: 50.00     Types: Cigarettes   • Smokeless tobacco: Never Used   Substance and Sexual Activity   • Alcohol use: No   • Drug use: No   • Sexual activity: Defer     Allergies   Allergen Reactions   • Sulfa Antibiotics Nausea Only     Current Outpatient Medications   Medication Sig Dispense Refill   • busPIRone (BUSPAR) 10 MG tablet Take 10 mg by mouth 2 (Two) Times a Day.     • citalopram (CeleXA) 20 MG tablet Take 20 mg by mouth Daily.     • clotrimazole-betamethasone (LOTRISONE) 1-0.05 % cream Apply  topically to the appropriate area as directed 2 (Two) Times a Day.     • EASY TOUCH PEN NEEDLES 31G X 5 MM misc      • insulin detemir (LEVEMIR) 100 UNIT/ML injection Inject 40 Units under the skin into the appropriate area as directed Every Night.  12   • insulin lispro (humaLOG) 100 UNIT/ML injection Inject 0-9 Units under the skin into the appropriate area as directed 4 (Four) Times a Day With Meals & at Bedtime.  12   • losartan-hydrochlorothiazide (HYZAAR) 50-12.5 MG per tablet Take 1 tablet by mouth Daily.     • metFORMIN (GLUCOPHAGE) 1000 MG tablet Take 1 tablet by mouth 2 (Two) Times a Day With Meals.     • mupirocin (BACTROBAN) 2 % ointment APPLY TO AFFECTED AREA EVERY DAY     • nabumetone (RELAFEN) 750 MG tablet Take 750 mg by mouth 2 (Two) Times a Day.     • omeprazole (priLOSEC) 20 MG capsule Take 20 mg by mouth Daily.     •  oxyCODONE-acetaminophen (PERCOCET) 7.5-325 MG per tablet Take 1 tablet by mouth Every 6 (Six) Hours As Needed for Moderate Pain . 120 tablet 0   • pregabalin (LYRICA) 200 MG capsule Take 1 capsule by mouth 3 (Three) Times a Day. 90 capsule 0   • simvastatin (ZOCOR) 40 MG tablet Take 40 mg by mouth Every Night.     • zolpidem (AMBIEN) 10 MG tablet Take 10 mg by mouth At Night As Needed for Sleep.       No current facility-administered medications for this visit.      Review of Systems   Constitutional: Negative for chills and fever.   HENT: Negative for congestion.    Respiratory: Negative for shortness of breath.    Cardiovascular: Positive for leg swelling. Negative for chest pain.   Gastrointestinal: Negative for constipation, diarrhea, nausea and vomiting.   Musculoskeletal: Positive for arthralgias.   Skin: Negative for wound.   Neurological: Positive for numbness.       OBJECTIVE     Vitals:    20 1454   BP: 126/76   Pulse: 69   SpO2: 96%       PHYSICAL EXAM  GEN:   Accompanied by none.     Foot/Ankle Exam:       General:   Diabetic Foot Exam Performed    Appearance: disheveled    Orientation: AAOx3    Affect: appropriate    Gait: unimpaired    Assistance: walker    Shoe Gear:  Diabetic shoes    VASCULAR      Right Foot Vascularity   Dorsalis pedis:  1+  Posterior tibial:  1+  Skin Temperature: warm    Edema Gradin+ and pitting  CFT:  5  Pedal Hair Growth:  Present  Varicosities: spider veins       Left Foot Vascularity   Dorsalis pedis:  1+  Posterior tibial:  1+  Skin Temperature: warm    Edema Gradin+ and pitting  CFT:  5  Pedal Hair Growth:  Present  Varicosities: spider veins        NEUROLOGIC     Right Foot Neurologic   Light touch sensation:  Diminished  Vibratory sensation:  Diminished  Hot/Cold sensation: diminished    Protective Sensation using [place]ika Monofilament:  1     Left Foot Neurologic   Light touch sensation:  Diminished  Vibratory sensation:  Diminished  Hot/cold  sensation: diminished    Protective Sensation using [place]-Kanika Monofilament:  0     MUSCULOSKELETAL      Right Foot Musculoskeletal   Ecchymosis:  None  Tenderness: none    Arch:  Normal  Claw toe:  Second toe, third toe, fourth toe and fifth toe     Left Foot Musculoskeletal   Ecchymosis:  None  Tenderness: none    Arch:  Normal  Claw toe:  Second toe, third toe, fourth toe and fifth toe     MUSCLE STRENGTH     Right Foot Muscle Strength   Foot dorsiflexion:  5  Foot plantar flexion:  5  Foot inversion:  5  Foot eversion:  5     Left Foot Muscle Strength   Foot dorsiflexion:  5  Foot plantar flexion:  5  Foot inversion:  5  Foot eversion:  5     RANGE OF MOTION      Right Foot Range of Motion   Foot and ankle ROM within normal limits       Left Foot Range of Motion   Foot and ankle ROM within normal limits       DERMATOLOGIC     Right Foot Dermatologic   Skin: corn and fissure (medial MTPJ callus)    Skin comment:  Small exsoriations to foot and lower leg.  Nails: onychomycosis, abnormally thick, subungual debris and dystrophic nails       Left Foot Dermatologic   Skin: corn    Skin comment:  Small exsoriations to foot and lower leg.  Nails: onychomycosis, abnormally thick, subungual debris and dystrophic nails       Image:       RADIOLOGY/NUCLEAR:  No results found.    LABORATORY/CULTURE RESULTS:      PATHOLOGY RESULTS:       ASSESSMENT/PLAN     John was seen today for establish care and diabetes.    Diagnoses and all orders for this visit:    Onychomycosis    Foot callus    Type 2 diabetes mellitus with diabetic neuropathy, with long-term current use of insulin (CMS/[facility])  -     US Ankle / Brachial Indices Extremity Complete; Future    Tobacco abuse  -     US Ankle / Brachial Indices Extremity Complete; Future    Peripheral arterial disease (CMS/[facility])   -     US Ankle / Brachial Indices Extremity Complete; Future      Comprehensive lower extremity examination and evaluation was performed.  Discussed findings  and treatment plan including risks, benefits, and treatment options with patient in detail. Patient agreed with treatment plan.  After verbal consent obtained, nail(s) x10 debrided of length and thickness with nail nipper without incidence  After verbal consent obtained, calluses x4 pared utilizing dermal curette and/or scalpel without incidence  Patient may maintain nails and calluses at home utilizing emery board or pumice stone between visits as needed  Reviewed at home diabetic foot care including daily foot checks   Patient needs to work towards improved blood glucose control.  Advised patient to wear some form of shoe gear at all times.  Ordered ABIs due to decreased vascular exam, diabetes, and tobacco use.  An After Visit Summary was printed and given to the patient at discharge, including (if requested) any available informative/educational handouts regarding diagnosis, treatment, or medications. All questions were answered to patient/family satisfaction. Should symptoms fail to improve or worsen they agree to call or return to clinic or to go to the Emergency Department. Discussed the importance of following up with any needed screening tests/labs/specialist appointments and any requested follow-up recommended by me today. Importance of maintaining follow-up discussed and patient accepts that missed appointments can delay diagnosis and potentially lead to worsening of conditions.  Return in about 3 months (around 9/18/2020)., or sooner if acute issues arise.    Lab Frequency Next Occurrence   Follow Anesthesia Guidelines / Standing Orders Once 01/28/2020   Provide NPO Instructions to Patient Once 01/28/2020   Chlorhexidine Skin Prep Once 01/28/2020   Type and screen Once 01/23/2020       This document has been electronically signed by Milton Post DPM on June 18, 2020 15:28

## 2020-06-17 ENCOUNTER — TELEPHONE (OUTPATIENT)
Dept: PODIATRY | Facility: CLINIC | Age: 70
End: 2020-06-17

## 2020-06-18 ENCOUNTER — OFFICE VISIT (OUTPATIENT)
Dept: PODIATRY | Facility: CLINIC | Age: 70
End: 2020-06-18

## 2020-06-18 VITALS
WEIGHT: 245 LBS | BODY MASS INDEX: 34.3 KG/M2 | HEART RATE: 69 BPM | DIASTOLIC BLOOD PRESSURE: 76 MMHG | SYSTOLIC BLOOD PRESSURE: 126 MMHG | HEIGHT: 71 IN | OXYGEN SATURATION: 96 %

## 2020-06-18 DIAGNOSIS — Z72.0 TOBACCO ABUSE: ICD-10-CM

## 2020-06-18 DIAGNOSIS — I73.9 PERIPHERAL ARTERIAL DISEASE (HCC): ICD-10-CM

## 2020-06-18 DIAGNOSIS — B35.1 ONYCHOMYCOSIS: Primary | ICD-10-CM

## 2020-06-18 DIAGNOSIS — E11.40 TYPE 2 DIABETES MELLITUS WITH DIABETIC NEUROPATHY, WITH LONG-TERM CURRENT USE OF INSULIN (HCC): ICD-10-CM

## 2020-06-18 DIAGNOSIS — Z79.4 TYPE 2 DIABETES MELLITUS WITH DIABETIC NEUROPATHY, WITH LONG-TERM CURRENT USE OF INSULIN (HCC): ICD-10-CM

## 2020-06-18 DIAGNOSIS — L84 FOOT CALLUS: ICD-10-CM

## 2020-06-18 PROCEDURE — 99203 OFFICE O/P NEW LOW 30 MIN: CPT | Performed by: PODIATRIST

## 2020-06-18 PROCEDURE — 11721 DEBRIDE NAIL 6 OR MORE: CPT | Performed by: PODIATRIST

## 2020-06-18 PROCEDURE — 11056 PARNG/CUTG B9 HYPRKR LES 2-4: CPT | Performed by: PODIATRIST

## 2020-07-06 ENCOUNTER — HOSPITAL ENCOUNTER (OUTPATIENT)
Dept: ULTRASOUND IMAGING | Facility: HOSPITAL | Age: 70
Discharge: HOME | End: 2020-07-06
Admitting: PODIATRIST

## 2020-07-06 DIAGNOSIS — Z79.4 TYPE 2 DIABETES MELLITUS WITH DIABETIC NEUROPATHY, WITH LONG-TERM CURRENT USE OF INSULIN (HCC): ICD-10-CM

## 2020-07-06 DIAGNOSIS — E11.40 TYPE 2 DIABETES MELLITUS WITH DIABETIC NEUROPATHY, WITH LONG-TERM CURRENT USE OF INSULIN (HCC): ICD-10-CM

## 2020-07-06 DIAGNOSIS — I73.9 PERIPHERAL ARTERIAL DISEASE (HCC): ICD-10-CM

## 2020-07-06 DIAGNOSIS — Z72.0 TOBACCO ABUSE: ICD-10-CM

## 2020-07-06 PROCEDURE — 93923 UPR/LXTR ART STDY 3+ LVLS: CPT | Performed by: SURGERY

## 2020-07-06 PROCEDURE — 93923 UPR/LXTR ART STDY 3+ LVLS: CPT

## 2020-07-28 ENCOUNTER — OFFICE VISIT (OUTPATIENT)
Dept: NEUROSURGERY | Facility: CLINIC | Age: 70
End: 2020-07-28

## 2020-07-28 VITALS
SYSTOLIC BLOOD PRESSURE: 140 MMHG | BODY MASS INDEX: 35.42 KG/M2 | DIASTOLIC BLOOD PRESSURE: 70 MMHG | HEIGHT: 71 IN | WEIGHT: 253 LBS

## 2020-07-28 DIAGNOSIS — M47.12 CERVICAL SPONDYLOSIS WITH MYELOPATHY: Primary | ICD-10-CM

## 2020-07-28 DIAGNOSIS — Z72.0 TOBACCO ABUSE: ICD-10-CM

## 2020-07-28 PROCEDURE — 99213 OFFICE O/P EST LOW 20 MIN: CPT | Performed by: NURSE PRACTITIONER

## 2020-07-28 NOTE — PROGRESS NOTES
Chief complaint:   Chief Complaint   Patient presents with   • Neck Pain     John is returning today for a routine follow up for his cervical spondylosis, he states he has no pain and he is doing well.       Subjective     HPI: This is a 70-year-old male gentleman who went to the operating room on January 23, 2020 for a large disc herniation at C5-6.  The patient was also displaying myelopathic symptoms.  He did go through a significant amount of therapy postoperatively.  He comes in today stating that he is not having any neck pain.  Denies any arm pain.  He does feel like his walking is significantly improved since his surgery.  He continues to deal with an orthopedic knee issue.  Denies any numbness or tingling.  Overall he is very happy and satisfied with results of the surgery.  He is not taking any pain medication    Review of Systems   Musculoskeletal: Positive for arthralgias.   Neurological: Negative.         Past Medical History:   Diagnosis Date   • Acid reflux    • Arthritis    • Back pain    • Diabetes mellitus (CMS/HCC)    • Hydrocele in adult    • Hyperlipidemia    • Hypertension    • Psoriasis    • Wears glasses      Past Surgical History:   Procedure Laterality Date   • ANTERIOR CERVICAL DISCECTOMY W/ FUSION N/A 1/23/2020    Procedure: CERVICAL DISCECTOMY ANTERIOR WITH FUSION C5-6;  Surgeon: Santino Montano MD;  Location: [facility] OR;  Service: Neurosurgery   • BACK SURGERY     • HERNIA REPAIR     • HYDROCELECTOMY Left 11/20/2017    Procedure: HYDROCELECTOMY;  Surgeon: Neeraj Hurtado MD;  Location: [facility] OR;  Service:      Family History   Problem Relation Age of Onset   • No Known Problems Father    • No Known Problems Mother      Social History     Tobacco Use   • Smoking status: Current Every Day Smoker     Packs/day: 1.00     Years: 50.00     Pack years: 50.00     Types: Cigarettes   • Smokeless tobacco: Never Used   Substance Use Topics   • Alcohol use: No   • Drug use: No  "      (Not in a hospital admission)  Allergies:  Sulfa antibiotics    Objective      Vital Signs  /70 (BP Location: Right arm, Patient Position: Sitting)   Ht 180.3 cm (71\")   Wt 115 kg (253 lb)   BMI 35.29 kg/m²     Physical Exam   Constitutional: He is oriented to person, place, and time. He appears well-developed and well-nourished.   HENT:   Head: Normocephalic.   Eyes: Pupils are equal, round, and reactive to light. EOM are normal.   Neck: Normal range of motion.   Pulmonary/Chest: Effort normal.   Musculoskeletal: Normal range of motion.        Right knee: Tenderness found.   Neurological: He is alert and oriented to person, place, and time. He has normal strength and normal reflexes. No cranial nerve deficit or sensory deficit. Gait normal. GCS eye subscore is 4. GCS verbal subscore is 5. GCS motor subscore is 6.   Skin: Skin is warm.   Psychiatric: He has a normal mood and affect. His speech is normal and behavior is normal. Thought content normal.       Neurologic Exam     Mental Status   Oriented to person, place, and time.   Speech: speech is normal     Cranial Nerves     CN III, IV, VI   Pupils are equal, round, and reactive to light.  Extraocular motions are normal.     Motor Exam     Strength   Strength 5/5 throughout.       Results Review: No new imaging        Assessment/Plan: Patient is doing good from a surgical standpoint.  He is over 6 months out from the surgery.  At this point I think the patient is safe and stable to be seen on an as-needed basis.  He was told to call us if he had any further issues.  He can continue working with his primary care doctor in regards to his diabetes.  BMI shows that he is overweight.  BMI chart was given to the patient.  He is a non-smokerAdvance Care Planning   ACP discussion was held with the patient during this visit. Patient does not have an advance directive, information provided.      John was seen today for neck pain.    Diagnoses and all orders " for this visit:    Cervical spondylosis with myelopathy    Tobacco abuse    Adult BMI 35.0-35.9 kg/sq m          I discussed the patients findings and my recommendations with patient    Ryan Lou, APRN  07/28/20  11:15

## 2020-07-28 NOTE — PATIENT INSTRUCTIONS
"BMI for Adults    Body mass index (BMI) is a number that is calculated from a person's weight and height. BMI may help to estimate how much of a person's weight is composed of fat. BMI can help identify those who may be at higher risk for certain medical problems.  How is BMI used with adults?  BMI is used as a screening tool to identify possible weight problems. It is used to check whether a person is obese, overweight, healthy weight, or underweight.  How is BMI calculated?  BMI measures your weight and compares it to your height. This can be done either in English ([place] or metric measurements. Note that charts are available to help you find your BMI quickly and easily without having to do these calculations yourself.  To calculate your BMI in English ([place] measurements, your health care provider will:  1. Measure your weight in pounds (lb).  2. Multiply the number of pounds by 703.  ? For example, for a person who weighs 180 lb, multiply that number by 703, which equals 126,540.  3. Measure your height in inches (in). Then multiply that number by itself to get a measurement called \"inches squared.\"  ? For example, for a person who is 70 in tall, the \"inches squared\" measurement is 70 in x 70 in, which equals 4900 inches squared.  4. Divide the total from Step 2 (number of lb x 703) by the total from Step 3 (inches squared): 126,540 ÷ 4900 = 25.8. This is your BMI.  To calculate your BMI in metric measurements, your health care provider will:  1. Measure your weight in kilograms (kg).  2. Measure your height in meters (m). Then multiply that number by itself to get a measurement called \"meters squared.\"  ? For example, for a person who is 1.75 m tall, the \"meters squared\" measurement is 1.75 m x 1.75 m, which is equal to 3.1 meters squared.  3. Divide the number of kilograms (your weight) by the meters squared number. In this example: 70 ÷ 3.1 = 22.6. This is your BMI.  How is BMI interpreted?  To interpret your " results, your health care provider will use BMI charts to identify whether you are underweight, normal weight, overweight, or obese. The following guidelines will be used:  · Underweight: BMI less than 18.5.  · Normal weight: BMI between 18.5 and 24.9.  · Overweight: BMI between 25 and 29.9.  · Obese: BMI of 30 and above.  Please note:  · Weight includes both fat and muscle, so someone with a muscular build, such as an athlete, may have a BMI that is higher than 24.9. In cases like these, BMI is not an accurate measure of body fat.  · To determine if excess body fat is the cause of a BMI of 25 or higher, further assessments may need to be done by a health care provider.  · BMI is usually interpreted in the same way for men and women.  Why is BMI a useful tool?  BMI is useful in two ways:  · Identifying a weight problem that may be related to a medical condition, or that may increase the risk for medical problems.  · Promoting lifestyle and diet changes in order to reach a healthy weight.  Summary  · Body mass index (BMI) is a number that is calculated from a person's weight and height.  · BMI may help to estimate how much of a person's weight is composed of fat. BMI can help identify those who may be at higher risk for certain medical problems.  · BMI can be measured using English measurements or metric measurements.  · To interpret your results, your health care provider will use BMI charts to identify whether you are underweight, normal weight, overweight, or obese.  This information is not intended to replace advice given to you by your health care provider. Make sure you discuss any questions you have with your health care provider.  Document Released: 08/29/2005 Document Revised: 11/30/2018 Document Reviewed: 10/31/2018  Elsevier Patient Education © 2020 [employer]      Advance Care Planning and Advance Directives     You make decisions on a daily basis - decisions about where you want to live, your career,  your home, your life. Perhaps one of the most important decisions you face is your choice for future medical care. Take time to talk with your family and your healthcare team and start planning today.  Advance Care Planning is a process that can help you:  · Understand possible future healthcare decisions in light of your own experiences  · Reflect on those decision in light of your goals and values  · Discuss your decisions with those closest to you and the healthcare professionals that care for you  · Make a plan by creating a document that reflects your wishes    Surrogate Decision Maker  In the event of a medical emergency, which has left you unable to communicate or to make your own decisions, you would need someone to make decisions for you.  It is important to discuss your preferences for medical treatment with this person while you are in good health.     Qualities of a surrogate decision maker:  • Willing to take on this role and responsibility  • Knows what you want for future medical care  • Willing to follow your wishes even if they don't agree with them  • Able to make difficult medical decisions under stressful circumstances    Advance Directives  These are legal documents you can create that will guide your healthcare team and decision maker(s) when needed. These documents can be stored in the electronic medical record.    · Living Will - a legal document to guide your care if you have a terminal condition or a serious illness and are unable to communicate. States vary by statute in document names/types, but most forms may include one or more of the following:        -  Directions regarding life-prolonging treatments        -  Directions regarding artificially provided nutrition/hydration        -  Choosing a healthcare decision maker        -  Direction regarding organ/tissue donation    · Durable Power of  for Healthcare - this document names an -in-fact to make medical decisions for  you, but it may also allow this person to make personal and financial decisions for you. Please seek the advice of an  if you need this type of document.    **Advance Directives are not required and no one may discriminate against you if you do not sign one.    Medical Orders  Many states allow specific forms/orders signed by your physician to record your wishes for medical treatment in your current state of health. This form, signed in personal communication with your physician, addresses resuscitation and other medical interventions that you may or may not want.      For more information or to schedule a time with a [facility] Advance Care Planning Facilitator contact: [facility]/ACP or call [phone] and someone will contact you directly.    Steps to Quit Smoking  Smoking tobacco is the leading cause of preventable death. It can affect almost every organ in the body. Smoking puts you and people around you at risk for many serious, long-lasting (chronic) diseases. Quitting smoking can be hard, but it is one of the best things that you can do for your health. It is never too late to quit.  How do I get ready to quit?  When you decide to quit smoking, make a plan to help you succeed. Before you quit:  · Pick a date to quit. Set a date within the next 2 weeks to give you time to prepare.  · Write down the reasons why you are quitting. Keep this list in places where you will see it often.  · Tell your family, friends, and co-workers that you are quitting. Their support is important.  · Talk with your doctor about the choices that may help you quit.  · Find out if your health insurance will pay for these treatments.  · Know the people, places, things, and activities that make you want to smoke (triggers). Avoid them.  What first steps can I take to quit smoking?  · Throw away all cigarettes at home, at work, and in your car.  · Throw away the things that you use when you smoke, such as ashtrays  and lighters.  · Clean your car. Make sure to empty the ashtray.  · Clean your home, including curtains and carpets.  What can I do to help me quit smoking?  Talk with your doctor about taking medicines and seeing a counselor at the same time. You are more likely to succeed when you do both.  · If you are pregnant or breastfeeding, talk with your doctor about counseling or other ways to quit smoking. Do not take medicine to help you quit smoking unless your doctor tells you to do so.  To quit smoking:  Quit right away  · Quit smoking totally, instead of slowly cutting back on how much you smoke over a period of time.  · Go to counseling. You are more likely to quit if you go to counseling sessions regularly.  Take medicine  You may take medicines to help you quit. Some medicines need a prescription, and some you can buy over-the-counter. Some medicines may contain a drug called nicotine to replace the nicotine in cigarettes. Medicines may:  · Help you to stop having the desire to smoke (cravings).  · Help to stop the problems that come when you stop smoking (withdrawal symptoms).  Your doctor may ask you to use:  · Nicotine patches, gum, or lozenges.  · Nicotine inhalers or sprays.  · Non-nicotine medicine that is taken by mouth.  Find resources  Find resources and other ways to help you quit smoking and remain smoke-free after you quit. These resources are most helpful when you use them often. They include:  · Online chats with a counselor.  · Phone quitlines.  · Printed self-help materials.  · Support groups or group counseling.  · Text messaging programs.  · Mobile phone apps. Use apps on your mobile phone or tablet that can help you stick to your quit plan. There are many free apps for mobile phones and tablets as well as websites. Examples include Quit Guide from the CDC and smokefree.gov    What things can I do to make it easier to quit?    · Talk to your family and friends. Ask them to support and encourage  you.  · Call a phone quitline (8-585-QUITNOW), reach out to support groups, or work with a counselor.  · Ask people who smoke to not smoke around you.  · Avoid places that make you want to smoke, such as:  ? Bars.  ? Parties.  ? Smoke-break areas at work.  · Spend time with people who do not smoke.  · Lower the stress in your life. Stress can make you want to smoke. Try these things to help your stress:  ? Getting regular exercise.  ? Doing deep-breathing exercises.  ? Doing yoga.  ? Meditating.  ? Doing a body scan. To do this, close your eyes, focus on one area of your body at a time from head to toe. Notice which parts of your body are tense. Try to relax the muscles in those areas.  How will I feel when I quit smoking?  Day 1 to 3 weeks  Within the first 24 hours, you may start to have some problems that come from quitting tobacco. These problems are very bad 2-3 days after you quit, but they do not often last for more than 2-3 weeks. You may get these symptoms:  · Mood swings.  · Feeling restless, nervous, angry, or annoyed.  · Trouble concentrating.  · Dizziness.  · Strong desire for high-sugar foods and nicotine.  · Weight gain.  · Trouble pooping (constipation).  · Feeling like you may vomit (nausea).  · Coughing or a sore throat.  · Changes in how the medicines that you take for other issues work in your body.  · Depression.  · Trouble sleeping (insomnia).  Week 3 and afterward  After the first 2-3 weeks of quitting, you may start to notice more positive results, such as:  · Better sense of smell and taste.  · Less coughing and sore throat.  · Slower heart rate.  · Lower blood pressure.  · Clearer skin.  · Better breathing.  · Fewer sick days.  Quitting smoking can be hard. Do not give up if you fail the first time. Some people need to try a few times before they succeed. Do your best to stick to your quit plan, and talk with your doctor if you have any questions or concerns.  Summary  · Smoking tobacco is  the leading cause of preventable death. Quitting smoking can be hard, but it is one of the best things that you can do for your health.  · When you decide to quit smoking, make a plan to help you succeed.  · Quit smoking right away, not slowly over a period of time.  · When you start quitting, seek help from your doctor, family, or friends.  This information is not intended to replace advice given to you by your health care provider. Make sure you discuss any questions you have with your health care provider.  Document Released: 10/14/2010 Document Revised: 03/06/2020 Document Reviewed: 03/07/2020  Elsevier Patient Education © 2020 [employer]

## 2020-09-15 ENCOUNTER — OFFICE VISIT (OUTPATIENT)
Dept: PODIATRY | Facility: CLINIC | Age: 70
End: 2020-09-15

## 2020-09-15 VITALS
BODY MASS INDEX: 35.14 KG/M2 | DIASTOLIC BLOOD PRESSURE: 64 MMHG | WEIGHT: 251 LBS | HEART RATE: 64 BPM | OXYGEN SATURATION: 100 % | HEIGHT: 71 IN | SYSTOLIC BLOOD PRESSURE: 122 MMHG

## 2020-09-15 DIAGNOSIS — L84 FOOT CALLUS: ICD-10-CM

## 2020-09-15 DIAGNOSIS — Z79.4 TYPE 2 DIABETES MELLITUS WITH DIABETIC NEUROPATHY, WITH LONG-TERM CURRENT USE OF INSULIN (HCC): ICD-10-CM

## 2020-09-15 DIAGNOSIS — Z72.0 TOBACCO ABUSE: ICD-10-CM

## 2020-09-15 DIAGNOSIS — I73.9 PERIPHERAL ARTERIAL DISEASE (HCC): ICD-10-CM

## 2020-09-15 DIAGNOSIS — B35.1 ONYCHOMYCOSIS: Primary | ICD-10-CM

## 2020-09-15 DIAGNOSIS — E11.40 TYPE 2 DIABETES MELLITUS WITH DIABETIC NEUROPATHY, WITH LONG-TERM CURRENT USE OF INSULIN (HCC): ICD-10-CM

## 2020-09-15 PROBLEM — E11.621 DIABETIC ULCER OF FOOT ASSOCIATED WITH TYPE 2 DIABETES MELLITUS, LIMITED TO BREAKDOWN OF SKIN (HCC): Status: RESOLVED | Noted: 2020-01-24 | Resolved: 2020-09-15

## 2020-09-15 PROBLEM — L97.501 DIABETIC ULCER OF FOOT ASSOCIATED WITH TYPE 2 DIABETES MELLITUS, LIMITED TO BREAKDOWN OF SKIN (HCC): Status: RESOLVED | Noted: 2020-01-24 | Resolved: 2020-09-15

## 2020-09-15 PROCEDURE — 11056 PARNG/CUTG B9 HYPRKR LES 2-4: CPT | Performed by: PODIATRIST

## 2020-09-15 PROCEDURE — 11721 DEBRIDE NAIL 6 OR MORE: CPT | Performed by: PODIATRIST

## 2020-09-15 NOTE — PROGRESS NOTES
[facility] - PODIATRY    Today's Date: 09/15/20    Patient Name: John Rodriguez  MRN: [id number]  CSN: [id number]  PCP: Jt Monroy MD  Referring Provider: No ref. provider found    SUBJECTIVE     Chief Complaint   Patient presents with   • Follow-up     pt is here for a 3 mo f/u on diabetic foot and care - pt denies any pain at this moment; Pt states bilateral feet are sore - pt c/o long, thickened toenails with discoloration - pcp 04/14/2020   • Diabetes     pt is unaware of the last blood sugar reading.      HPI: John Rodriguez, a 70 y.o.male, comes to clinic as a(n) established patient presenting for diabetic foot exam and complaining of thickened toenails and calluses. Patient has h/o acid reflux, arthritis, back pain, DM2, HLD, HTN, Psoriasis, Tobacco use. Patient is IDDM and unsure of last BG level.  Admits to numbness and tingling in his feet.  Denies open wounds or sores.  Relates several calluses to his feet including a large 1 to his right foot that occasionally has an open fissure.  States that his toenails are long, thick, discolored and crumbly.  He has difficulty caring for his own feet. Admits to being barefoot frequently while at home. Denies pain. Relates previous treatment(s) including foot care by podiatrist. Denies any constitutional symptoms. No other pedal complaints at this time.    Past Medical History:   Diagnosis Date   • Acid reflux    • Arthritis    • Back pain    • Diabetes mellitus (CMS/HCC)    • Hydrocele in adult    • Hyperlipidemia    • Hypertension    • Psoriasis    • Wears glasses      Past Surgical History:   Procedure Laterality Date   • ANTERIOR CERVICAL DISCECTOMY W/ FUSION N/A 1/23/2020    Procedure: CERVICAL DISCECTOMY ANTERIOR WITH FUSION C5-6;  Surgeon: Santino Montano MD;  Location: [facility] OR;  Service: Neurosurgery   • BACK SURGERY     • HERNIA REPAIR     • HYDROCELECTOMY Left 11/20/2017    Procedure: HYDROCELECTOMY;  Surgeon: Neeraj Hurtado,  MD;  Location: [facility] OR;  Service:      Family History   Problem Relation Age of Onset   • No Known Problems Father    • No Known Problems Mother      Social History     Socioeconomic History   • Marital status:      Spouse name: Not on file   • Number of children: Not on file   • Years of education: Not on file   • Highest education level: Not on file   Tobacco Use   • Smoking status: Current Every Day Smoker     Packs/day: 1.00     Years: 50.00     Pack years: 50.00     Types: Cigarettes   • Smokeless tobacco: Never Used   Substance and Sexual Activity   • Alcohol use: No   • Drug use: No   • Sexual activity: Defer     Allergies   Allergen Reactions   • Sulfa Antibiotics Nausea Only     Current Outpatient Medications   Medication Sig Dispense Refill   • busPIRone (BUSPAR) 10 MG tablet Take 10 mg by mouth 2 (Two) Times a Day.     • citalopram (CeleXA) 20 MG tablet Take 20 mg by mouth Daily.     • clotrimazole-betamethasone (LOTRISONE) 1-0.05 % cream Apply  topically to the appropriate area as directed 2 (Two) Times a Day.     • EASY TOUCH PEN NEEDLES 31G X 5 MM misc      • insulin detemir (LEVEMIR) 100 UNIT/ML injection Inject 40 Units under the skin into the appropriate area as directed Every Night.  12   • insulin lispro (humaLOG) 100 UNIT/ML injection Inject 0-9 Units under the skin into the appropriate area as directed 4 (Four) Times a Day With Meals & at Bedtime.  12   • losartan-hydrochlorothiazide (HYZAAR) 50-12.5 MG per tablet Take 1 tablet by mouth Daily.     • metFORMIN (GLUCOPHAGE) 1000 MG tablet Take 1 tablet by mouth 2 (Two) Times a Day With Meals.     • mupirocin (BACTROBAN) 2 % ointment APPLY TO AFFECTED AREA EVERY DAY     • nabumetone (RELAFEN) 750 MG tablet Take 750 mg by mouth 2 (Two) Times a Day.     • omeprazole (priLOSEC) 20 MG capsule Take 20 mg by mouth Daily.     • oxyCODONE-acetaminophen (PERCOCET) 7.5-325 MG per tablet Take 1 tablet by mouth Every 6 (Six) Hours As Needed for  Moderate Pain . 120 tablet 0   • pregabalin (LYRICA) 200 MG capsule Take 1 capsule by mouth 3 (Three) Times a Day. 90 capsule 0   • simvastatin (ZOCOR) 40 MG tablet Take 40 mg by mouth Every Night.     • zolpidem (AMBIEN) 10 MG tablet Take 10 mg by mouth At Night As Needed for Sleep.       No current facility-administered medications for this visit.      Review of Systems   Constitutional: Negative for chills and fever.   HENT: Negative for congestion.    Respiratory: Negative for shortness of breath.    Cardiovascular: Positive for leg swelling. Negative for chest pain.   Gastrointestinal: Negative for constipation, diarrhea, nausea and vomiting.   Musculoskeletal: Positive for arthralgias.   Skin: Negative for wound.   Neurological: Positive for numbness.       OBJECTIVE     Vitals:    09/15/20 1401   BP: 122/64   Pulse: 64   SpO2: 100%       PHYSICAL EXAM  GEN:   Accompanied by none.     Foot/Ankle Exam:       General:   Diabetic Foot Exam Performed    Appearance: disheveled    Orientation: AAOx3    Affect: appropriate    Gait: unimpaired    Assistance: independent    Shoe Gear:  Diabetic shoes    VASCULAR      Right Foot Vascularity   Dorsalis pedis:  1+  Posterior tibial:  1+  Skin Temperature: warm    Edema Gradin+ and pitting  CFT:  5  Pedal Hair Growth:  Present  Varicosities: spider veins       Left Foot Vascularity   Dorsalis pedis:  1+  Posterior tibial:  1+  Skin Temperature: warm    Edema Gradin+ and pitting  CFT:  5  Pedal Hair Growth:  Present  Varicosities: spider veins        NEUROLOGIC     Right Foot Neurologic   Light touch sensation:  Diminished  Vibratory sensation:  Diminished  Hot/Cold sensation: diminished    Protective Sensation using [place] Monofilament:  1     Left Foot Neurologic   Light touch sensation:  Diminished  Vibratory sensation:  Diminished  Hot/cold sensation: diminished    Protective Sensation using [place] Monofilament:  0     MUSCULOSKELETAL       Right Foot Musculoskeletal   Ecchymosis:  None  Tenderness: none    Arch:  Normal  Claw toe:  Second toe, third toe, fourth toe and fifth toe     Left Foot Musculoskeletal   Ecchymosis:  None  Tenderness: none    Arch:  Normal  Claw toe:  Second toe, third toe, fourth toe and fifth toe     MUSCLE STRENGTH     Right Foot Muscle Strength   Foot dorsiflexion:  5  Foot plantar flexion:  5  Foot inversion:  5  Foot eversion:  5     Left Foot Muscle Strength   Foot dorsiflexion:  5  Foot plantar flexion:  5  Foot inversion:  5  Foot eversion:  5     RANGE OF MOTION      Right Foot Range of Motion   Foot and ankle ROM within normal limits       Left Foot Range of Motion   Foot and ankle ROM within normal limits       DERMATOLOGIC     Right Foot Dermatologic   Skin: corn    Nails: onychomycosis, abnormally thick, subungual debris and dystrophic nails       Left Foot Dermatologic   Skin: corn    Nails: onychomycosis, abnormally thick, subungual debris and dystrophic nails       Image:       RADIOLOGY/NUCLEAR:  No results found.    LABORATORY/CULTURE RESULTS:      PATHOLOGY RESULTS:       ASSESSMENT/PLAN     John was seen today for follow-up and diabetes.    Diagnoses and all orders for this visit:    Onychomycosis    Foot callus    Type 2 diabetes mellitus with diabetic neuropathy, with long-term current use of insulin (CMS/[facility])    Peripheral arterial disease (CMS/[facility])     Tobacco abuse      Comprehensive lower extremity examination and evaluation was performed.  Discussed findings and treatment plan including risks, benefits, and treatment options with patient in detail. Patient agreed with treatment plan.  After verbal consent obtained, nail(s) x10 debrided of length and thickness with nail nipper without incidence  After verbal consent obtained, calluses x4 pared utilizing dermal curette and/or scalpel without incidence  Patient may maintain nails and calluses at home utilizing emery board or pumice stone between visits  as needed  Reviewed at home diabetic foot care including daily foot checks   Patient needs to continue to work towards improved blood glucose control.  Advised patient to wear some form of shoe gear at all times.  Tobacco cessation needed. Not ready to quit.  An After Visit Summary was printed and given to the patient at discharge, including (if requested) any available informative/educational handouts regarding diagnosis, treatment, or medications. All questions were answered to patient/family satisfaction. Should symptoms fail to improve or worsen they agree to call or return to clinic or to go to the Emergency Department. Discussed the importance of following up with any needed screening tests/labs/specialist appointments and any requested follow-up recommended by me today. Importance of maintaining follow-up discussed and patient accepts that missed appointments can delay diagnosis and potentially lead to worsening of conditions.  Return in about 3 months (around 12/15/2020)., or sooner if acute issues arise.    Lab Frequency Next Occurrence   Follow Anesthesia Guidelines / Standing Orders Once 01/28/2020   Provide NPO Instructions to Patient Once 01/28/2020   Chlorhexidine Skin Prep Once 01/28/2020   Type and screen Once 01/23/2020       This document has been electronically signed by Milton Post DPM on September 15, 2020 14:20 CDT

## 2020-12-16 NOTE — PROGRESS NOTES
[facility] - PODIATRY    Today's Date: 12/18/20    Patient Name: John Rodriguez  MRN: [id number]  CSN: [id number]  PCP: Jt Monroy MD  Referring Provider: No ref. provider found    SUBJECTIVE     Chief Complaint   Patient presents with   • Follow-up     pt is here for a 3 mo f/u on diabetic foot and care - pt denies any pain at this moment - pt presents with thickened toenails with discoloration - pcp 04/16/2020   • Diabetes     the patient is unaware of the last blood sugar reading      HPI: John Rodriguez, a 70 y.o.male, comes to clinic as a(n) established patient presenting for diabetic foot exam and complaining of thickened toenails and calluses. Patient has h/o acid reflux, arthritis, back pain, DM2, HLD, HTN, Psoriasis, Tobacco use. Patient is IDDM and unsure of last BG level. Does not check glucose routinely. Admits to numbness and tingling in his feet.  Denies known open wounds or sores.  Relates several calluses to his feet including a large one to his right foot that tends to fissure.  States that his toenails are long, thick, discolored and crumbly.  He relates has difficulty caring for his own feet. Continues to walk around in home barefoot and notes that his dogs often step on/scratch his feet and toes. Denies pain today. Relates previous treatment(s) including foot care by podiatrist. States he has been using [employer] board at home on calluses. Denies any constitutional symptoms. No other pedal complaints at this time.    Past Medical History:   Diagnosis Date   • Acid reflux    • Arthritis    • Back pain    • Diabetes mellitus (CMS/HCC)    • Hydrocele in adult    • Hyperlipidemia    • Hypertension    • Psoriasis    • Wears glasses      Past Surgical History:   Procedure Laterality Date   • ANTERIOR CERVICAL DISCECTOMY W/ FUSION N/A 1/23/2020    Procedure: CERVICAL DISCECTOMY ANTERIOR WITH FUSION C5-6;  Surgeon: Santino Montano MD;  Location: [facility];  Service: Neurosurgery   •  BACK SURGERY     • HERNIA REPAIR     • HYDROCELECTOMY Left 11/20/2017    Procedure: HYDROCELECTOMY;  Surgeon: Neeraj Hurtado MD;  Location: [facility] OR;  Service:      Family History   Problem Relation Age of Onset   • No Known Problems Father    • No Known Problems Mother      Social History     Socioeconomic History   • Marital status:      Spouse name: Not on file   • Number of children: Not on file   • Years of education: Not on file   • Highest education level: Not on file   Tobacco Use   • Smoking status: Current Every Day Smoker     Packs/day: 1.00     Years: 50.00     Pack years: 50.00     Types: Cigarettes   • Smokeless tobacco: Never Used   Substance and Sexual Activity   • Alcohol use: No   • Drug use: No   • Sexual activity: Defer     Allergies   Allergen Reactions   • Sulfa Antibiotics Nausea Only     Current Outpatient Medications   Medication Sig Dispense Refill   • busPIRone (BUSPAR) 10 MG tablet Take 10 mg by mouth 2 (Two) Times a Day.     • citalopram (CeleXA) 20 MG tablet Take 20 mg by mouth Daily.     • clotrimazole-betamethasone (LOTRISONE) 1-0.05 % cream Apply  topically to the appropriate area as directed 2 (Two) Times a Day.     • EASY TOUCH PEN NEEDLES 31G X 5 MM misc      • insulin detemir (LEVEMIR) 100 UNIT/ML injection Inject 40 Units under the skin into the appropriate area as directed Every Night.  12   • insulin lispro (humaLOG) 100 UNIT/ML injection Inject 0-9 Units under the skin into the appropriate area as directed 4 (Four) Times a Day With Meals & at Bedtime.  12   • losartan-hydrochlorothiazide (HYZAAR) 50-12.5 MG per tablet Take 1 tablet by mouth Daily.     • metFORMIN (GLUCOPHAGE) 1000 MG tablet Take 1 tablet by mouth 2 (Two) Times a Day With Meals.     • mupirocin (BACTROBAN) 2 % ointment APPLY TO AFFECTED AREA EVERY DAY     • nabumetone (RELAFEN) 750 MG tablet Take 750 mg by mouth 2 (Two) Times a Day.     • omeprazole (priLOSEC) 20 MG capsule Take 20 mg by mouth  Daily.     • oxyCODONE-acetaminophen (PERCOCET) 7.5-325 MG per tablet Take 1 tablet by mouth Every 6 (Six) Hours As Needed for Moderate Pain . 120 tablet 0   • pregabalin (LYRICA) 200 MG capsule Take 1 capsule by mouth 3 (Three) Times a Day. 90 capsule 0   • simvastatin (ZOCOR) 40 MG tablet Take 40 mg by mouth Every Night.     • zolpidem (AMBIEN) 10 MG tablet Take 10 mg by mouth At Night As Needed for Sleep.       No current facility-administered medications for this visit.      Review of Systems   Constitutional: Negative for chills and fever.   HENT: Negative for congestion.    Respiratory: Negative for shortness of breath.    Cardiovascular: Positive for leg swelling. Negative for chest pain.   Gastrointestinal: Negative for constipation, diarrhea, nausea and vomiting.   Musculoskeletal: Positive for arthralgias.   Skin: Negative for wound.        Scabs to feet from dogs   Neurological: Positive for numbness.       OBJECTIVE     Vitals:    20 1428   BP: 144/68   Pulse: 65   SpO2: 99%       PHYSICAL EXAM  GEN:   Accompanied by none.     Foot/Ankle Exam:       General:   Diabetic Foot Exam Performed    Appearance: appears stated age and healthy and obesity    Orientation: AAOx3    Affect: appropriate    Gait: unimpaired    Assistance: independent    Shoe Gear:  Diabetic shoes    VASCULAR      Right Foot Vascularity   Dorsalis pedis:  1+  Posterior tibial:  1+  Skin Temperature: warm    Edema Gradin+ and pitting  CFT:  5  Pedal Hair Growth:  Absent  Varicosities: spider veins       Left Foot Vascularity   Dorsalis pedis:  1+  Posterior tibial:  1+  Skin Temperature: warm    Edema Gradin+ and pitting  CFT:  5  Pedal Hair Growth:  Absent  Varicosities: spider veins        NEUROLOGIC     Right Foot Neurologic   Light touch sensation:  Diminished  Vibratory sensation:  Diminished  Hot/Cold sensation: diminished    Protective Sensation using [place]-Kanika Monofilament:  1     Left Foot Neurologic      Light touch sensation:  Diminished  Vibratory sensation:  Diminished  Hot/cold sensation: diminished    Protective Sensation using [place]-Kanika Monofilament:  0     MUSCULOSKELETAL      Right Foot Musculoskeletal   Ecchymosis:  None  Tenderness: none    Arch:  Normal  Claw toe:  Second toe, third toe, fourth toe and fifth toe  Hallux valgus: No       Left Foot Musculoskeletal   Ecchymosis:  None  Tenderness: none    Arch:  Normal  Claw toe:  Second toe, third toe, fourth toe and fifth toe  Hallux valgus: No       MUSCLE STRENGTH     Right Foot Muscle Strength   Foot dorsiflexion:  5  Foot plantar flexion:  5  Foot inversion:  5  Foot eversion:  5     Left Foot Muscle Strength   Foot dorsiflexion:  5  Foot plantar flexion:  5  Foot inversion:  5  Foot eversion:  5     RANGE OF MOTION      Right Foot Range of Motion   Foot and ankle ROM within normal limits       Left Foot Range of Motion   Foot and ankle ROM within normal limits       DERMATOLOGIC     Right Foot Dermatologic   Skin: corn    Nails: onychomycosis, abnormally thick, subungual debris and dystrophic nails       Left Foot Dermatologic   Skin: corn and skin changes (scratches with scabs to dorsal foot and toes)    Nails: onychomycosis, abnormally thick, subungual debris and dystrophic nails       Image:       RADIOLOGY/NUCLEAR:  No results found.    LABORATORY/CULTURE RESULTS:      PATHOLOGY RESULTS:       ASSESSMENT/PLAN     Diagnoses and all orders for this visit:    1. Onychomycosis (Primary)    2. Foot callus    3. Type 2 diabetes mellitus with diabetic neuropathy, with long-term current use of insulin (CMS/[facility])    4. Peripheral arterial disease (CMS/[facility])     5. Tobacco abuse      Comprehensive lower extremity examination and evaluation was performed.  Discussed findings and treatment plan including risks, benefits, and treatment options with patient in detail. Patient agreed with treatment plan.  After verbal consent obtained, nail(s) x10  debrided of length and thickness with nail nipper without incidence  After verbal consent obtained, calluses x2 pared utilizing dermal curette and/or scalpel without incidence  Patient may maintain nails and calluses at home utilizing emery board or pumice stone between visits as needed  Reviewed at home diabetic foot care including daily foot checks   Patient needs to continue to work towards improved blood glucose control.  Continue to encourage wearing some form of shoe gear around home to prevent wounds.  Tobacco cessation needed.   Moisturize feet daily.  An After Visit Summary was printed and given to the patient at discharge, including (if requested) any available informative/educational handouts regarding diagnosis, treatment, or medications. All questions were answered to patient/family satisfaction. Should symptoms fail to improve or worsen they agree to call or return to clinic or to go to the Emergency Department. Discussed the importance of following up with any needed screening tests/labs/specialist appointments and any requested follow-up recommended by me today. Importance of maintaining follow-up discussed and patient accepts that missed appointments can delay diagnosis and potentially lead to worsening of conditions.  Return in about 3 months (around 3/18/2021)., or sooner if acute issues arise.    Lab Frequency Next Occurrence   Follow Anesthesia Guidelines / Standing Orders Once 01/28/2020   Provide NPO Instructions to Patient Once 01/28/2020   Chlorhexidine Skin Prep Once 01/28/2020   Type and screen Once 01/23/2020       This document has been electronically signed by MOHAMUD Lazo on December 18, 2020 15:05 CST

## 2020-12-18 ENCOUNTER — OFFICE VISIT (OUTPATIENT)
Dept: PODIATRY | Facility: CLINIC | Age: 70
End: 2020-12-18

## 2020-12-18 VITALS
HEIGHT: 71 IN | BODY MASS INDEX: 35 KG/M2 | HEART RATE: 65 BPM | DIASTOLIC BLOOD PRESSURE: 68 MMHG | OXYGEN SATURATION: 99 % | WEIGHT: 250 LBS | SYSTOLIC BLOOD PRESSURE: 144 MMHG

## 2020-12-18 DIAGNOSIS — I73.9 PERIPHERAL ARTERIAL DISEASE (HCC): ICD-10-CM

## 2020-12-18 DIAGNOSIS — L84 FOOT CALLUS: ICD-10-CM

## 2020-12-18 DIAGNOSIS — Z79.4 TYPE 2 DIABETES MELLITUS WITH DIABETIC NEUROPATHY, WITH LONG-TERM CURRENT USE OF INSULIN (HCC): ICD-10-CM

## 2020-12-18 DIAGNOSIS — Z72.0 TOBACCO ABUSE: ICD-10-CM

## 2020-12-18 DIAGNOSIS — B35.1 ONYCHOMYCOSIS: Primary | ICD-10-CM

## 2020-12-18 DIAGNOSIS — E11.40 TYPE 2 DIABETES MELLITUS WITH DIABETIC NEUROPATHY, WITH LONG-TERM CURRENT USE OF INSULIN (HCC): ICD-10-CM

## 2020-12-18 DIAGNOSIS — E66.9 OBESITY (BMI 30.0-34.9): ICD-10-CM

## 2020-12-18 PROCEDURE — 11056 PARNG/CUTG B9 HYPRKR LES 2-4: CPT | Performed by: NURSE PRACTITIONER

## 2020-12-18 PROCEDURE — 99213 OFFICE O/P EST LOW 20 MIN: CPT | Performed by: NURSE PRACTITIONER

## 2020-12-18 PROCEDURE — 11721 DEBRIDE NAIL 6 OR MORE: CPT | Performed by: NURSE PRACTITIONER

## 2021-03-17 NOTE — PROGRESS NOTES
"    [facility] - PODIATRY    Today's Date: 03/19/21    Patient Name: John Rodriguez  MRN: [id number]  CSN: [id number]  PCP: Jt Monroy MD  Referring Provider: No ref. provider found    SUBJECTIVE     Chief Complaint   Patient presents with   • Follow-up     3mo f/u for diabetic nail care- pt states no pain, just need for nail care- pt denies pain at this time -pt presents with large callous to right foot. and calouses to left foot, heel. Toe nails are long, and thickened with discoloration- pcp 04/16/2020   • Diabetes     pt unaware of last BG     HPI: John Rodriguez, a 70 y.o.male, comes to clinic as a(n) established patient presenting for diabetic foot exam and complaining of thickened toenails and calluses. Patient has h/o acid reflux, arthritis, back pain, DM2, HLD, HTN, Psoriasis, Tobacco use. Patient is IDDM and unsure of last BG level. Admits to numbness and tingling in his feet.  Denies known open wounds or sores.  Has been working on calluses at home with \"[employer]\".  States that his toenails are long, thick, discolored and crumbly.  He relates has difficulty caring for his own feet. Continues to walk around in home barefoot and notes that his dogs often step on/scratch his feet and toes and has abrasion to 2nd toe of left foot due to dog. Denies pain today. Relates previous treatment(s) including foot care by podiatrist.  Denies any constitutional symptoms. No other pedal complaints at this time.    Past Medical History:   Diagnosis Date   • Acid reflux    • Arthritis    • Back pain    • Diabetes mellitus (CMS/HCC)    • Hydrocele in adult    • Hyperlipidemia    • Hypertension    • Psoriasis    • Wears glasses      Past Surgical History:   Procedure Laterality Date   • ANTERIOR CERVICAL DISCECTOMY W/ FUSION N/A 1/23/2020    Procedure: CERVICAL DISCECTOMY ANTERIOR WITH FUSION C5-6;  Surgeon: Santino Montano MD;  Location: [facility];  Service: Neurosurgery   • BACK SURGERY     • HERNIA REPAIR "     • HYDROCELECTOMY Left 11/20/2017    Procedure: HYDROCELECTOMY;  Surgeon: Neeraj Hurtado MD;  Location: [facility] OR;  Service:      Family History   Problem Relation Age of Onset   • No Known Problems Father    • No Known Problems Mother      Social History     Socioeconomic History   • Marital status:      Spouse name: Not on file   • Number of children: Not on file   • Years of education: Not on file   • Highest education level: Not on file   Tobacco Use   • Smoking status: Current Every Day Smoker     Packs/day: 1.00     Years: 50.00     Pack years: 50.00     Types: Cigarettes   • Smokeless tobacco: Never Used   Vaping Use   • Vaping Use: Never used   Substance and Sexual Activity   • Alcohol use: No   • Drug use: No   • Sexual activity: Defer     Allergies   Allergen Reactions   • Sulfa Antibiotics Nausea Only     Current Outpatient Medications   Medication Sig Dispense Refill   • busPIRone (BUSPAR) 10 MG tablet Take 10 mg by mouth 2 (Two) Times a Day.     • citalopram (CeleXA) 20 MG tablet Take 20 mg by mouth Daily.     • EASY TOUCH PEN NEEDLES 31G X 5 MM misc      • insulin detemir (LEVEMIR) 100 UNIT/ML injection Inject 40 Units under the skin into the appropriate area as directed Every Night.  12   • losartan-hydrochlorothiazide (HYZAAR) 50-12.5 MG per tablet Take 1 tablet by mouth Daily.     • metFORMIN (GLUCOPHAGE) 1000 MG tablet Take 1 tablet by mouth 2 (Two) Times a Day With Meals.     • mupirocin (BACTROBAN) 2 % ointment APPLY TO AFFECTED AREA EVERY DAY     • omeprazole (priLOSEC) 20 MG capsule Take 20 mg by mouth Daily.     • oxyCODONE-acetaminophen (PERCOCET) 7.5-325 MG per tablet Take 1 tablet by mouth Every 6 (Six) Hours As Needed for Moderate Pain . 120 tablet 0   • pregabalin (LYRICA) 200 MG capsule Take 1 capsule by mouth 3 (Three) Times a Day. 90 capsule 0   • simvastatin (ZOCOR) 40 MG tablet Take 40 mg by mouth Every Night.     • zolpidem (AMBIEN) 10 MG tablet Take 10 mg by mouth  At Night As Needed for Sleep.       No current facility-administered medications for this visit.     Review of Systems   Constitutional: Negative for chills and fever.   HENT: Negative for congestion.    Respiratory: Negative for shortness of breath.    Cardiovascular: Positive for leg swelling. Negative for chest pain.   Gastrointestinal: Negative for constipation, diarrhea, nausea and vomiting.   Musculoskeletal: Positive for arthralgias.   Skin: Negative for wound.        Scabs to feet from dogs   Neurological: Positive for numbness.       OBJECTIVE     Vitals:    03/19/21 1306   BP: 130/70   Pulse: 70   SpO2: 96%       PHYSICAL EXAM  GEN:   Accompanied by none.     Foot/Ankle Exam:       General:   Diabetic Foot Exam Performed    Appearance: appears stated age and healthy and obesity    Orientation: AAOx3    Affect: appropriate    Gait: unimpaired    Assistance: independent    Shoe Gear:  Diabetic shoes    VASCULAR      Right Foot Vascularity   Dorsalis pedis:  1+  Posterior tibial:  1+  Skin Temperature: warm    Edema Grading:  Pitting and 2+  CFT:  5  Pedal Hair Growth:  Absent  Varicosities: spider veins       Left Foot Vascularity   Dorsalis pedis:  1+  Posterior tibial:  1+  Skin Temperature: warm    Edema Grading:  Pitting and 2+  CFT:  5  Pedal Hair Growth:  Absent  Varicosities: spider veins        NEUROLOGIC     Right Foot Neurologic   Light touch sensation:  Diminished  Vibratory sensation:  Diminished  Hot/Cold sensation: diminished    Protective Sensation using Deep Water-Kanika Monofilament:  1     Left Foot Neurologic   Light touch sensation:  Diminished  Vibratory sensation:  Diminished  Hot/cold sensation: diminished    Protective Sensation using Deep Water-Kanika Monofilament:  0     MUSCULOSKELETAL      Right Foot Musculoskeletal   Ecchymosis:  None  Tenderness: none    Arch:  Normal  Claw toe:  Second toe, third toe, fourth toe and fifth toe  Hallux valgus: No       Left Foot Musculoskeletal    Ecchymosis:  None  Tenderness: none    Arch:  Normal  Claw toe:  Second toe, third toe, fourth toe and fifth toe  Hallux valgus: No       MUSCLE STRENGTH     Right Foot Muscle Strength   Foot dorsiflexion:  5  Foot plantar flexion:  5  Foot inversion:  5  Foot eversion:  5     Left Foot Muscle Strength   Foot dorsiflexion:  5  Foot plantar flexion:  5  Foot inversion:  5  Foot eversion:  5     RANGE OF MOTION      Right Foot Range of Motion   Foot and ankle ROM within normal limits       Left Foot Range of Motion   Foot and ankle ROM within normal limits       DERMATOLOGIC     Right Foot Dermatologic   Skin: corn    Nails: onychomycosis, abnormally thick, subungual debris and dystrophic nails       Left Foot Dermatologic   Skin: corn and skin changes (scabbed abrasion to distal 2nd toe on dorsal IPJ)    Nails: onychomycosis, abnormally thick, subungual debris and dystrophic nails       Image:       RADIOLOGY/NUCLEAR:  No results found.    LABORATORY/CULTURE RESULTS:      PATHOLOGY RESULTS:       ASSESSMENT/PLAN     Diagnoses and all orders for this visit:    1. Onychomycosis (Primary)    2. Foot callus    3. Type 2 diabetes mellitus with diabetic neuropathy, with long-term current use of insulin (CMS/[facility])    4. Peripheral arterial disease (CMS/[facility])     5. Tobacco abuse    6. Morbidly obese (CMS/[facility])      Comprehensive lower extremity examination and evaluation was performed.  Discussed findings and treatment plan including risks, benefits, and treatment options with patient in detail. Patient agreed with treatment plan.  After verbal consent obtained, nail(s) x10 debrided of length and thickness with nail nipper without incidence  After verbal consent obtained, calluses x2 pared utilizing dermal curette and/or scalpel without incidence  Patient may maintain nails and calluses at home utilizing emery board or pumice stone between visits as needed  Reviewed at home diabetic foot care including daily foot checks    Continue diabetic monitoring and control under direction of PCP.  Tobacco cessation needed.   Moisturize to moisturize feet and use bianca board/pumice stone on calluses.  Patient's Body mass index is 35.98 kg/m². BMI is above normal parameters. Recommendations include: educational material.  An After Visit Summary was printed and given to the patient at discharge, including (if requested) any available informative/educational handouts regarding diagnosis, treatment, or medications. All questions were answered to patient/family satisfaction. Should symptoms fail to improve or worsen they agree to call or return to clinic or to go to the Emergency Department. Discussed the importance of following up with any needed screening tests/labs/specialist appointments and any requested follow-up recommended by me today. Importance of maintaining follow-up discussed and patient accepts that missed appointments can delay diagnosis and potentially lead to worsening of conditions.  Return in about 3 months (around 6/19/2021)., or sooner if acute issues arise.    Lab Frequency Next Occurrence   Follow Anesthesia Guidelines / Standing Orders Once 01/28/2020   Provide NPO Instructions to Patient Once 01/28/2020   Chlorhexidine Skin Prep Once 01/28/2020   Type and screen Once 01/23/2020       This document has been electronically signed by MOHAMUD Lazo on March 19, 2021 16:28 CDT

## 2021-03-18 ENCOUNTER — TELEPHONE (OUTPATIENT)
Dept: PODIATRY | Facility: CLINIC | Age: 71
End: 2021-03-18

## 2021-03-19 ENCOUNTER — OFFICE VISIT (OUTPATIENT)
Dept: PODIATRY | Facility: CLINIC | Age: 71
End: 2021-03-19

## 2021-03-19 VITALS
HEART RATE: 70 BPM | OXYGEN SATURATION: 96 % | HEIGHT: 71 IN | SYSTOLIC BLOOD PRESSURE: 130 MMHG | WEIGHT: 258 LBS | DIASTOLIC BLOOD PRESSURE: 70 MMHG | BODY MASS INDEX: 36.12 KG/M2

## 2021-03-19 DIAGNOSIS — L84 FOOT CALLUS: ICD-10-CM

## 2021-03-19 DIAGNOSIS — I73.9 PERIPHERAL ARTERIAL DISEASE (HCC): ICD-10-CM

## 2021-03-19 DIAGNOSIS — B35.1 ONYCHOMYCOSIS: Primary | ICD-10-CM

## 2021-03-19 DIAGNOSIS — Z79.4 TYPE 2 DIABETES MELLITUS WITH DIABETIC NEUROPATHY, WITH LONG-TERM CURRENT USE OF INSULIN (HCC): ICD-10-CM

## 2021-03-19 DIAGNOSIS — Z72.0 TOBACCO ABUSE: ICD-10-CM

## 2021-03-19 DIAGNOSIS — E11.40 TYPE 2 DIABETES MELLITUS WITH DIABETIC NEUROPATHY, WITH LONG-TERM CURRENT USE OF INSULIN (HCC): ICD-10-CM

## 2021-03-19 DIAGNOSIS — E66.01 MORBIDLY OBESE (HCC): ICD-10-CM

## 2021-03-19 PROCEDURE — 11721 DEBRIDE NAIL 6 OR MORE: CPT | Performed by: NURSE PRACTITIONER

## 2021-03-19 PROCEDURE — 99213 OFFICE O/P EST LOW 20 MIN: CPT | Performed by: NURSE PRACTITIONER

## 2021-03-19 PROCEDURE — 11056 PARNG/CUTG B9 HYPRKR LES 2-4: CPT | Performed by: NURSE PRACTITIONER

## 2021-03-19 NOTE — PATIENT INSTRUCTIONS
Diabetes Mellitus and Foot Care  Foot care is an important part of your health, especially when you have diabetes. Diabetes may cause you to have problems because of poor blood flow (circulation) to your feet and legs, which can cause your skin to:  · Become thinner and drier.  · Break more easily.  · Heal more slowly.  · Peel and crack.  You may also have nerve damage (neuropathy) in your legs and feet, causing decreased feeling in them. This means that you may not notice minor injuries to your feet that could lead to more serious problems. Noticing and addressing any potential problems early is the best way to prevent future foot problems.  How to care for your feet  Foot hygiene  · Wash your feet daily with warm water and mild soap. Do not use hot water. Then, pat your feet and the areas between your toes until they are completely dry. Do not soak your feet as this can dry your skin.  · Trim your toenails straight across. Do not dig under them or around the cuticle. File the edges of your nails with an emery board or nail file.  · Apply a moisturizing lotion or petroleum jelly to the skin on your feet and to dry, brittle toenails. Use lotion that does not contain alcohol and is unscented. Do not apply lotion between your toes.  Shoes and socks  · Wear clean socks or stockings every day. Make sure they are not too tight. Do not wear knee-high stockings since they may decrease blood flow to your legs.  · Wear shoes that fit properly and have enough cushioning. Always look in your shoes before you put them on to be sure there are no objects inside.  · To break in new shoes, wear them for just a few hours a day. This prevents injuries on your feet.  Wounds, scrapes, corns, and calluses  · Check your feet daily for blisters, cuts, bruises, sores, and redness. If you cannot see the bottom of your feet, use a mirror or ask someone for help.  · Do not cut corns or calluses or try to remove them with medicine.  · If you  find a minor scrape, cut, or break in the skin on your feet, keep it and the skin around it clean and dry. You may clean these areas with mild soap and water. Do not clean the area with peroxide, alcohol, or iodine.  · If you have a wound, scrape, corn, or callus on your foot, look at it several times a day to make sure it is healing and not infected. Check for:  ? Redness, swelling, or pain.  ? Fluid or blood.  ? Warmth.  ? Pus or a bad smell.  General instructions  · Do not cross your legs. This may decrease blood flow to your feet.  · Do not use heating pads or hot water bottles on your feet. They may burn your skin. If you have lost feeling in your feet or legs, you may not know this is happening until it is too late.  · Protect your feet from hot and cold by wearing shoes, such as at the beach or on hot pavement.  · Schedule a complete foot exam at least once a year (annually) or more often if you have foot problems. If you have foot problems, report any cuts, sores, or bruises to your health care provider immediately.  Contact a health care provider if:  · You have a medical condition that increases your risk of infection and you have any cuts, sores, or bruises on your feet.  · You have an injury that is not healing.  · You have redness on your legs or feet.  · You feel burning or tingling in your legs or feet.  · You have pain or cramps in your legs and feet.  · Your legs or feet are numb.  · Your feet always feel cold.  · You have pain around a toenail.  Get help right away if:  · You have a wound, scrape, corn, or callus on your foot and:  ? You have pain, swelling, or redness that gets worse.  ? You have fluid or blood coming from the wound, scrape, corn, or callus.  ? Your wound, scrape, corn, or callus feels warm to the touch.  ? You have pus or a bad smell coming from the wound, scrape, corn, or callus.  ? You have a fever.  ? You have a red line going up your leg.  Summary  · Check your feet every day  for cuts, sores, red spots, swelling, and blisters.  · Moisturize feet and legs daily.  · Wear shoes that fit properly and have enough cushioning.  · If you have foot problems, report any cuts, sores, or bruises to your health care provider immediately.  · Schedule a complete foot exam at least once a year (annually) or more often if you have foot problems.  This information is not intended to replace advice given to you by your health care provider. Make sure you discuss any questions you have with your health care provider.  Document Revised: 09/09/2020 Document Reviewed: 01/19/2018  [email or web address] Patient Education © 2021 [email or web address]      BMI for Adults  What is BMI?  Body mass index (BMI) is a number that is calculated from a person's weight and height. BMI can help estimate how much of a person's weight is composed of fat. BMI does not measure body fat directly. Rather, it is an alternative to procedures that directly measure body fat, which can be difficult and expensive.  BMI can help identify people who may be at higher risk for certain medical problems.  What are BMI measurements used for?  BMI is used as a screening tool to identify possible weight problems. It helps determine whether a person is obese, overweight, a healthy weight, or underweight.  BMI is useful for:  · Identifying a weight problem that may be related to a medical condition or may increase the risk for medical problems.  · Promoting changes, such as changes in diet and exercise, to help reach a healthy weight. BMI screening can be repeated to see if these changes are working.  How is BMI calculated?  BMI involves measuring your weight in relation to your height. Both height and weight are measured, and the BMI is calculated from those numbers. This can be done either in English ([place] or metric measurements. Note that charts and online BMI calculators are available to help you find your BMI quickly and easily without having to do these  "calculations yourself.  To calculate your BMI in English ([place] measurements:    1. Measure your weight in pounds (lb).  2. Multiply the number of pounds by 703.  ? For example, for a person who weighs 180 lb, multiply that number by 703, which equals 126,540.  3. Measure your height in inches. Then multiply that number by itself to get a measurement called \"inches squared.\"  ? For example, for a person who is 70 inches tall, the \"inches squared\" measurement is 70 inches x 70 inches, which equals 4,900 inches squared.  4. Divide the total from step 2 (number of lb x 703) by the total from step 3 (inches squared): 126,540 ÷ 4,900 = 25.8. This is your BMI.  To calculate your BMI in metric measurements:  1. Measure your weight in kilograms (kg).  2. Measure your height in meters (m). Then multiply that number by itself to get a measurement called \"meters squared.\"  ? For example, for a person who is 1.75 m tall, the \"meters squared\" measurement is 1.75 m x 1.75 m, which is equal to 3.1 meters squared.  3. Divide the number of kilograms (your weight) by the meters squared number. In this example: 70 ÷ 3.1 = 22.6. This is your BMI.  What do the results mean?  BMI charts are used to identify whether you are underweight, normal weight, overweight, or obese. The following guidelines will be used:  · Underweight: BMI less than 18.5.  · Normal weight: BMI between 18.5 and 24.9.  · Overweight: BMI between 25 and 29.9.  · Obese: BMI of 30 or above.  Keep these notes in mind:  · Weight includes both fat and muscle, so someone with a muscular build, such as an athlete, may have a BMI that is higher than 24.9. In cases like these, BMI is not an accurate measure of body fat.  · To determine if excess body fat is the cause of a BMI of 25 or higher, further assessments may need to be done by a health care provider.  · BMI is usually interpreted in the same way for men and women.  Where to find more information  For more information " about BMI, including tools to quickly calculate your BMI, go to these websites:  · Centers for Disease Control and Prevention: www.cdc.gov  · American Heart Association: www.heart.org  · National Heart, Lung, and Blood [place]: www.nhlbi.nih.gov  Summary  · Body mass index (BMI) is a number that is calculated from a person's weight and height.  · BMI may help estimate how much of a person's weight is composed of fat. BMI can help identify those who may be at higher risk for certain medical problems.  · BMI can be measured using English measurements or metric measurements.  · BMI charts are used to identify whether you are underweight, normal weight, overweight, or obese.  This information is not intended to replace advice given to you by your health care provider. Make sure you discuss any questions you have with your health care provider.  Document Revised: 09/09/2020 Document Reviewed: 07/17/2020  [employer] Patient Education © 2021 [employer]

## 2021-07-12 NOTE — PROGRESS NOTES
"    [facility] - PODIATRY    Today's Date: 07/19/21    Patient Name: John Rodriguez  MRN: [id number]  CSN: [id number]  PCP: Jt Monroy MD  Referring Provider: No ref. provider found    SUBJECTIVE     Chief Complaint   Patient presents with   • Follow-up     3 month follow up diabetic nail care- pt states left heal is sore- pt denies pain at present- pt presents with long thick nails, callus on inside of right foot great toe, sores on inside of right foot heal area, left foot sore on back of heal   • Diabetes     hasnt taken in while      HPI: John Rodriguez, a 71 y.o.male, comes to clinic as a(n) established patient presenting for diabetic foot exam and complaining of thickened toenails and calluses. Patient has h/o acid reflux, arthritis, back pain, DM2, HLD, HTN, Psoriasis, Tobacco use. Patient is IDDM and unsure of last BG level. States that glucose has been \"good\". Relates numbness and tingling in his feet. States that he has wound on the left heel that has been present for a couple of months and that he has been trying to treat at home.  States that his toenails are long, thick, discolored and crumbly. Notes difficulty caring for his own feet.  Denies pain today but notes some tenderness of the wound of heel. Relates previous treatment(s) including foot care by podiatrist.  Denies any constitutional symptoms. No other pedal complaints at this time.    Past Medical History:   Diagnosis Date   • Acid reflux    • Arthritis    • Back pain    • Diabetes mellitus (CMS/HCC)    • Hydrocele in adult    • Hyperlipidemia    • Hypertension    • Psoriasis    • Wears glasses      Past Surgical History:   Procedure Laterality Date   • ANTERIOR CERVICAL DISCECTOMY W/ FUSION N/A 1/23/2020    Procedure: CERVICAL DISCECTOMY ANTERIOR WITH FUSION C5-6;  Surgeon: Santino Montano MD;  Location: [facility];  Service: Neurosurgery   • BACK SURGERY     • HERNIA REPAIR     • HYDROCELECTOMY Left 11/20/2017    Procedure: " HYDROCELECTOMY;  Surgeon: Neeraj Hurtado MD;  Location: [facility];  Service:      Family History   Problem Relation Age of Onset   • No Known Problems Father    • No Known Problems Mother      Social History     Socioeconomic History   • Marital status:      Spouse name: Not on file   • Number of children: Not on file   • Years of education: Not on file   • Highest education level: Not on file   Tobacco Use   • Smoking status: Current Every Day Smoker     Packs/day: 1.00     Years: 50.00     Pack years: 50.00     Types: Cigarettes   • Smokeless tobacco: Never Used   Vaping Use   • Vaping Use: Never used   Substance and Sexual Activity   • Alcohol use: No   • Drug use: No   • Sexual activity: Defer     Allergies   Allergen Reactions   • Sulfa Antibiotics Nausea Only     Current Outpatient Medications   Medication Sig Dispense Refill   • busPIRone (BUSPAR) 10 MG tablet Take 10 mg by mouth 2 (Two) Times a Day.     • citalopram (CeleXA) 20 MG tablet Take 20 mg by mouth Daily.     • EASY TOUCH PEN NEEDLES 31G X 5 MM misc      • insulin detemir (LEVEMIR) 100 UNIT/ML injection Inject 40 Units under the skin into the appropriate area as directed Every Night.  12   • losartan-hydrochlorothiazide (HYZAAR) 50-12.5 MG per tablet Take 1 tablet by mouth Daily.     • metFORMIN (GLUCOPHAGE) 1000 MG tablet Take 1 tablet by mouth 2 (Two) Times a Day With Meals.     • omeprazole (priLOSEC) 20 MG capsule Take 20 mg by mouth Daily.     • oxyCODONE-acetaminophen (PERCOCET) 7.5-325 MG per tablet Take 1 tablet by mouth Every 6 (Six) Hours As Needed for Moderate Pain . 120 tablet 0   • pregabalin (LYRICA) 200 MG capsule Take 1 capsule by mouth 3 (Three) Times a Day. 90 capsule 0   • simvastatin (ZOCOR) 40 MG tablet Take 40 mg by mouth Every Night.     • zolpidem (AMBIEN) 10 MG tablet Take 10 mg by mouth At Night As Needed for Sleep.       No current facility-administered medications for this visit.     Review of Systems    Constitutional: Negative for chills and fever.   HENT: Negative for congestion.    Respiratory: Negative for shortness of breath.    Cardiovascular: Positive for leg swelling. Negative for chest pain.   Gastrointestinal: Negative for constipation, diarrhea, nausea and vomiting.   Musculoskeletal: Positive for arthralgias.   Skin: Positive for color change and wound.        Ulcer left posterior heel   Neurological: Positive for numbness.       OBJECTIVE     Vitals:    07/19/21 1134   BP: 136/82   Pulse: 67   SpO2: 94%       PHYSICAL EXAM  GEN:   Accompanied by none.     Foot/Ankle Exam:       General:   Diabetic Foot Exam Performed    Appearance: appears stated age and healthy and obesity    Orientation: AAOx3    Affect: appropriate    Gait: unimpaired    Assistance: independent    Shoe Gear:  Diabetic shoes    VASCULAR      Right Foot Vascularity   Dorsalis pedis:  1+  Posterior tibial:  1+  Skin Temperature: warm    Edema Grading:  Pitting and 2+  CFT:  5  Pedal Hair Growth:  Absent  Varicosities: spider veins       Left Foot Vascularity   Dorsalis pedis:  1+  Posterior tibial:  1+  Skin Temperature: warm    Edema Grading:  Pitting and 2+  CFT:  5  Pedal Hair Growth:  Absent  Varicosities: spider veins        NEUROLOGIC     Right Foot Neurologic   Light touch sensation:  Diminished  Vibratory sensation:  Diminished  Hot/Cold sensation: diminished    Protective Sensation using [place]-Kanika Monofilament:  1     Left Foot Neurologic   Light touch sensation:  Diminished  Vibratory sensation:  Diminished  Hot/cold sensation: diminished    Protective Sensation using [place]-Kanika Monofilament:  0     MUSCULOSKELETAL      Right Foot Musculoskeletal   Ecchymosis:  None  Tenderness: toenails    Arch:  Normal  Claw toe:  Second toe, third toe, fourth toe and fifth toe  Hallux valgus: No       Left Foot Musculoskeletal   Ecchymosis:  None  Tenderness: toenails    Arch:  Normal  Claw toe:  Second toe, third toe,  fourth toe and fifth toe  Hallux valgus: No       MUSCLE STRENGTH     Right Foot Muscle Strength   Foot dorsiflexion:  5  Foot plantar flexion:  5  Foot inversion:  5  Foot eversion:  5     Left Foot Muscle Strength   Foot dorsiflexion:  5  Foot plantar flexion:  5  Foot inversion:  5  Foot eversion:  5     RANGE OF MOTION      Right Foot Range of Motion   Foot and ankle ROM within normal limits       Left Foot Range of Motion   Foot and ankle ROM within normal limits       DERMATOLOGIC     Right Foot Dermatologic   Skin: corn    Nails: onychomycosis, abnormally thick, subungual debris and dystrophic nails       Left Foot Dermatologic   Skin: skin changes and ulcer (posterior heel)    Skin: no left foot corn    Nails: onychomycosis, abnormally thick, subungual debris and dystrophic nails       Image:       RADIOLOGY/NUCLEAR:  No results found.    LABORATORY/CULTURE RESULTS:      PATHOLOGY RESULTS:       ASSESSMENT/PLAN     Diagnoses and all orders for this visit:    1. Non-pressure chronic ulcer of left heel and midfoot with fat layer exposed (CMS/HCC) (Primary)    2. Onychomycosis    3. Foot callus    4. Type 2 diabetes mellitus with diabetic neuropathy, with long-term current use of insulin (CMS/HCC)    5. Peripheral arterial disease (CMS/HCC)     6. Tobacco abuse    7. Morbidly obese (CMS/HCC)      Comprehensive lower extremity examination and evaluation was performed.  Discussed findings and treatment plan including risks, benefits, and treatment options with patient in detail. Patient agreed with treatment plan.  After verbal consent obtained, nail(s) x10 debrided of length and thickness with nail nipper without incidence  After verbal consent obtained, calluses x2 pared utilizing dermal curette and/or scalpel without incidence  Patient may maintain nails and calluses at home utilizing emery board or pumice stone between visits as needed  Reviewed at home diabetic foot care including daily foot checks  After  verbal consent obtained, excisional debridement performed to remove skin, slough, non-viable, and subQ tissue down to level of healthy bleeding tissue with dermal curette and/or sharp instrumentation. Hemostasis achieved with pressure.  Wound area debrided was entire measurement documented.   Referral to outpatient [facility] for eval and treatment of left heel ulceration.  Surgical shoe dispensed.  Continue diabetic monitoring and control under direction of PCP.  An After Visit Summary was printed and given to the patient at discharge, including (if requested) any available informative/educational handouts regarding diagnosis, treatment, or medications. All questions were answered to patient/family satisfaction. Should symptoms fail to improve or worsen they agree to call or return to clinic or to go to the Emergency Department. Discussed the importance of following up with any needed screening tests/labs/specialist appointments and any requested follow-up recommended by me today. Importance of maintaining follow-up discussed and patient accepts that missed appointments can delay diagnosis and potentially lead to worsening of conditions.  Return in about 3 months (around 10/19/2021)., or sooner if acute issues arise.    Lab Frequency Next Occurrence   Follow Anesthesia Guidelines / Standing Orders Once 01/28/2020   Provide NPO Instructions to Patient Once 01/28/2020   Chlorhexidine Skin Prep Once 01/28/2020   Type and screen Once 01/23/2020       This document has been electronically signed by MOHAMUD Lazo on July 19, 2021 12:02 CDT

## 2021-07-16 ENCOUNTER — TELEPHONE (OUTPATIENT)
Dept: PODIATRY | Facility: CLINIC | Age: 71
End: 2021-07-16

## 2021-07-19 ENCOUNTER — OFFICE VISIT (OUTPATIENT)
Dept: PODIATRY | Facility: CLINIC | Age: 71
End: 2021-07-19

## 2021-07-19 VITALS
HEIGHT: 71 IN | BODY MASS INDEX: 34.47 KG/M2 | DIASTOLIC BLOOD PRESSURE: 82 MMHG | HEART RATE: 67 BPM | WEIGHT: 246.2 LBS | SYSTOLIC BLOOD PRESSURE: 136 MMHG | OXYGEN SATURATION: 94 %

## 2021-07-19 DIAGNOSIS — Z79.4 TYPE 2 DIABETES MELLITUS WITH DIABETIC NEUROPATHY, WITH LONG-TERM CURRENT USE OF INSULIN (HCC): ICD-10-CM

## 2021-07-19 DIAGNOSIS — Z72.0 TOBACCO ABUSE: ICD-10-CM

## 2021-07-19 DIAGNOSIS — B35.1 ONYCHOMYCOSIS: ICD-10-CM

## 2021-07-19 DIAGNOSIS — E11.40 TYPE 2 DIABETES MELLITUS WITH DIABETIC NEUROPATHY, WITH LONG-TERM CURRENT USE OF INSULIN (HCC): ICD-10-CM

## 2021-07-19 DIAGNOSIS — E66.01 MORBIDLY OBESE (HCC): ICD-10-CM

## 2021-07-19 DIAGNOSIS — L84 FOOT CALLUS: ICD-10-CM

## 2021-07-19 DIAGNOSIS — L97.422 NON-PRESSURE CHRONIC ULCER OF LEFT HEEL AND MIDFOOT WITH FAT LAYER EXPOSED (HCC): Primary | ICD-10-CM

## 2021-07-19 DIAGNOSIS — I73.9 PERIPHERAL ARTERIAL DISEASE (HCC): ICD-10-CM

## 2021-07-19 PROCEDURE — 11056 PARNG/CUTG B9 HYPRKR LES 2-4: CPT | Performed by: NURSE PRACTITIONER

## 2021-07-19 PROCEDURE — 11721 DEBRIDE NAIL 6 OR MORE: CPT | Performed by: NURSE PRACTITIONER

## 2021-07-19 PROCEDURE — 99213 OFFICE O/P EST LOW 20 MIN: CPT | Performed by: NURSE PRACTITIONER

## 2021-07-19 PROCEDURE — 11042 DBRDMT SUBQ TIS 1ST 20SQCM/<: CPT | Performed by: NURSE PRACTITIONER

## 2021-07-21 ENCOUNTER — OFFICE VISIT (OUTPATIENT)
Dept: WOUND CARE | Facility: HOSPITAL | Age: 71
End: 2021-07-21

## 2021-07-21 DIAGNOSIS — E11.40 TYPE 2 DIABETES MELLITUS WITH DIABETIC NEUROPATHY, UNSPECIFIED WHETHER LONG TERM INSULIN USE (HCC): ICD-10-CM

## 2021-07-21 DIAGNOSIS — Z72.0 TOBACCO USE: ICD-10-CM

## 2021-07-21 DIAGNOSIS — I73.9 PERIPHERAL VASCULAR DISEASE, UNSPECIFIED (HCC): ICD-10-CM

## 2021-07-21 DIAGNOSIS — E11.621 TYPE 2 DIABETES MELLITUS WITH FOOT ULCER, UNSPECIFIED WHETHER LONG TERM INSULIN USE (HCC): ICD-10-CM

## 2021-07-21 DIAGNOSIS — L97.522 NON-PRESSURE CHRONIC ULCER OF OTHER PART OF LEFT FOOT WITH FAT LAYER EXPOSED (HCC): ICD-10-CM

## 2021-07-21 DIAGNOSIS — L97.509 TYPE 2 DIABETES MELLITUS WITH FOOT ULCER, UNSPECIFIED WHETHER LONG TERM INSULIN USE (HCC): ICD-10-CM

## 2021-07-21 PROCEDURE — 99214 OFFICE O/P EST MOD 30 MIN: CPT | Performed by: NURSE PRACTITIONER

## 2021-07-21 PROCEDURE — 11042 DBRDMT SUBQ TIS 1ST 20SQCM/<: CPT | Performed by: NURSE PRACTITIONER

## 2021-07-21 PROCEDURE — G0463 HOSPITAL OUTPT CLINIC VISIT: HCPCS

## 2021-07-22 ENCOUNTER — TRANSCRIBE ORDERS (OUTPATIENT)
Dept: ADMINISTRATIVE | Facility: HOSPITAL | Age: 71
End: 2021-07-22

## 2021-07-22 DIAGNOSIS — I73.9 PERIPHERAL VASCULAR DISEASE, UNSPECIFIED (HCC): Primary | ICD-10-CM

## 2021-07-22 DIAGNOSIS — M65.9 SAPHO SYNDROME (HCC): ICD-10-CM

## 2021-07-22 DIAGNOSIS — M85.80 SAPHO SYNDROME (HCC): ICD-10-CM

## 2021-07-22 DIAGNOSIS — L70.9 SAPHO SYNDROME (HCC): ICD-10-CM

## 2021-07-22 DIAGNOSIS — M86.9 SAPHO SYNDROME (HCC): ICD-10-CM

## 2021-07-22 DIAGNOSIS — L40.3 SAPHO SYNDROME (HCC): ICD-10-CM

## 2021-07-27 ENCOUNTER — HOSPITAL ENCOUNTER (OUTPATIENT)
Dept: GENERAL RADIOLOGY | Facility: HOSPITAL | Age: 71
Discharge: HOME | End: 2021-07-27

## 2021-07-27 ENCOUNTER — HOSPITAL ENCOUNTER (OUTPATIENT)
Dept: ULTRASOUND IMAGING | Facility: HOSPITAL | Age: 71
Discharge: HOME | End: 2021-07-27

## 2021-07-27 DIAGNOSIS — M65.9 SAPHO SYNDROME (HCC): ICD-10-CM

## 2021-07-27 DIAGNOSIS — M86.9 SAPHO SYNDROME (HCC): ICD-10-CM

## 2021-07-27 DIAGNOSIS — L70.9 SAPHO SYNDROME (HCC): ICD-10-CM

## 2021-07-27 DIAGNOSIS — I73.9 PERIPHERAL VASCULAR DISEASE, UNSPECIFIED (HCC): ICD-10-CM

## 2021-07-27 DIAGNOSIS — M85.80 SAPHO SYNDROME (HCC): ICD-10-CM

## 2021-07-27 DIAGNOSIS — L40.3 SAPHO SYNDROME (HCC): ICD-10-CM

## 2021-07-27 PROCEDURE — 93923 UPR/LXTR ART STDY 3+ LVLS: CPT

## 2021-07-27 PROCEDURE — 73630 X-RAY EXAM OF FOOT: CPT

## 2021-07-27 PROCEDURE — 93923 UPR/LXTR ART STDY 3+ LVLS: CPT | Performed by: SURGERY

## 2021-07-28 ENCOUNTER — APPOINTMENT (OUTPATIENT)
Dept: WOUND CARE | Facility: HOSPITAL | Age: 71
End: 2021-07-28

## 2021-08-02 ENCOUNTER — OFFICE VISIT (OUTPATIENT)
Dept: WOUND CARE | Facility: HOSPITAL | Age: 71
End: 2021-08-02

## 2021-08-02 DIAGNOSIS — E11.621 TYPE 2 DIABETES MELLITUS WITH FOOT ULCER, UNSPECIFIED WHETHER LONG TERM INSULIN USE (HCC): ICD-10-CM

## 2021-08-02 DIAGNOSIS — I73.9 PERIPHERAL VASCULAR DISEASE, UNSPECIFIED (HCC): ICD-10-CM

## 2021-08-02 DIAGNOSIS — Z72.0 TOBACCO USE: ICD-10-CM

## 2021-08-02 DIAGNOSIS — L97.509 TYPE 2 DIABETES MELLITUS WITH FOOT ULCER, UNSPECIFIED WHETHER LONG TERM INSULIN USE (HCC): ICD-10-CM

## 2021-08-02 DIAGNOSIS — E11.40 TYPE 2 DIABETES MELLITUS WITH DIABETIC NEUROPATHY, UNSPECIFIED WHETHER LONG TERM INSULIN USE (HCC): ICD-10-CM

## 2021-08-02 DIAGNOSIS — L97.522 NON-PRESSURE CHRONIC ULCER OF OTHER PART OF LEFT FOOT WITH FAT LAYER EXPOSED (HCC): ICD-10-CM

## 2021-08-02 PROCEDURE — G0463 HOSPITAL OUTPT CLINIC VISIT: HCPCS

## 2021-08-02 PROCEDURE — 99213 OFFICE O/P EST LOW 20 MIN: CPT | Performed by: NURSE PRACTITIONER

## 2021-08-02 PROCEDURE — 11042 DBRDMT SUBQ TIS 1ST 20SQCM/<: CPT | Performed by: NURSE PRACTITIONER

## 2021-08-10 ENCOUNTER — APPOINTMENT (OUTPATIENT)
Dept: WOUND CARE | Facility: HOSPITAL | Age: 71
End: 2021-08-10

## 2021-08-10 ENCOUNTER — TELEPHONE (OUTPATIENT)
Dept: VASCULAR SURGERY | Facility: CLINIC | Age: 71
End: 2021-08-10

## 2021-08-10 NOTE — TELEPHONE ENCOUNTER
Spoke with Mrs Gallegos reminding her of Mr Gallegos's appointment for Wednesday, August 11th, 2021 at 945 am with Mecca MALLORY.  Michael confirmed Mr gallegos would be here.

## 2021-08-11 ENCOUNTER — OFFICE VISIT (OUTPATIENT)
Dept: VASCULAR SURGERY | Facility: CLINIC | Age: 71
End: 2021-08-11

## 2021-08-11 VITALS
OXYGEN SATURATION: 97 % | SYSTOLIC BLOOD PRESSURE: 130 MMHG | HEIGHT: 71 IN | WEIGHT: 240 LBS | HEART RATE: 77 BPM | DIASTOLIC BLOOD PRESSURE: 76 MMHG | BODY MASS INDEX: 33.6 KG/M2

## 2021-08-11 DIAGNOSIS — Z79.02 ENCOUNTER FOR MONITORING ANTIPLATELET THERAPY: ICD-10-CM

## 2021-08-11 DIAGNOSIS — Z01.818 PREOP TESTING: ICD-10-CM

## 2021-08-11 DIAGNOSIS — Z72.0 TOBACCO ABUSE: ICD-10-CM

## 2021-08-11 DIAGNOSIS — E78.5 HYPERLIPIDEMIA, UNSPECIFIED HYPERLIPIDEMIA TYPE: ICD-10-CM

## 2021-08-11 DIAGNOSIS — I10 ESSENTIAL HYPERTENSION: ICD-10-CM

## 2021-08-11 DIAGNOSIS — I73.9 PAD (PERIPHERAL ARTERY DISEASE) (HCC): Primary | ICD-10-CM

## 2021-08-11 DIAGNOSIS — Z51.81 ENCOUNTER FOR MONITORING ANTIPLATELET THERAPY: ICD-10-CM

## 2021-08-11 PROCEDURE — 99214 OFFICE O/P EST MOD 30 MIN: CPT | Performed by: NURSE PRACTITIONER

## 2021-08-11 NOTE — PROGRESS NOTES
08/11/2021      Muriel Solorzano APRN  [address] [address]    John Rodriguez  1950    Chief Complaint   Patient presents with   • Establish Care     Referred over by Muriel MALLORY for Abnormal SHLIO with Absent Pulse of Left Lower Ext. Test [id number]  pad ankle / brach ind ext comp. Patient denies any stroke like symptoms.    • Smoker     Patient is a Current Everyday Smoker    • Med Management     Verbally verified medications with patient        Dear MOHAMUD Carr:      HPI  I had the pleasure of seeing your patient John Rodriguez in the office today.  Thank you kindly for this consultation.  As you recall, John Rodriguez is a 71 y.o.  male who you are currently following for wounds to the left lower extremity.  He reports he has had a heel wound present for months.  He reports his diabetes is under control however records from a wine a year ago show hemoglobin A1c of 12.2.  He denies any claudication.  Unfortunately, he is a 1 pack/day smoker.  He does have neuropathy to his lower extremities and takes Lyrica.  He is maintained on Zocor.  He did have noninvasive testing performed, which I did review in office.    Past Medical History:   Diagnosis Date   • Acid reflux    • Arthritis    • Back pain    • Diabetes mellitus (CMS/HCC)    • Hydrocele in adult    • Hyperlipidemia    • Hypertension    • Psoriasis    • Wears glasses        Past Surgical History:   Procedure Laterality Date   • ANTERIOR CERVICAL DISCECTOMY W/ FUSION N/A 1/23/2020    Procedure: CERVICAL DISCECTOMY ANTERIOR WITH FUSION C5-6;  Surgeon: Santino Montano MD;  Location:  [facility];  Service: Neurosurgery   • BACK SURGERY     • HERNIA REPAIR     • HYDROCELECTOMY Left 11/20/2017    Procedure: HYDROCELECTOMY;  Surgeon: Neeraj Hurtado MD;  Location:  [facility];  Service:        Family History   Problem Relation Age of Onset   • No Known Problems Father    • No Known Problems Mother   "      Social History     Socioeconomic History   • Marital status:      Spouse name: Not on file   • Number of children: Not on file   • Years of education: Not on file   • Highest education level: Not on file   Tobacco Use   • Smoking status: Current Every Day Smoker     Packs/day: 1.00     Years: 50.00     Pack years: 50.00     Types: Cigarettes   • Smokeless tobacco: Never Used   Vaping Use   • Vaping Use: Never used   Substance and Sexual Activity   • Alcohol use: No   • Drug use: No   • Sexual activity: Defer       Allergies   Allergen Reactions   • Sulfa Antibiotics Nausea Only       Current Outpatient Medications   Medication Instructions   • busPIRone (BUSPAR) 10 mg, Oral, 2 Times Daily   • citalopram (CELEXA) 20 mg, Oral, Daily   • EASY TOUCH PEN NEEDLES 31G X 5 MM misc No dose, route, or frequency recorded.   • insulin detemir (LEVEMIR) 40 Units, Subcutaneous, Nightly   • losartan-hydrochlorothiazide (HYZAAR) 50-12.5 MG per tablet 1 tablet, Oral, Daily   • metFORMIN (GLUCOPHAGE) 1,000 mg, Oral, 2 Times Daily With Meals   • omeprazole (PRILOSEC) 20 mg, Oral, Daily   • oxyCODONE-acetaminophen (PERCOCET) 7.5-325 MG per tablet 1 tablet, Oral, Every 6 Hours PRN   • pregabalin (LYRICA) 200 mg, Oral, 3 Times Daily   • simvastatin (ZOCOR) 40 mg, Oral, Nightly   • zolpidem (AMBIEN) 10 mg, Oral, Nightly PRN        Review of Systems   Constitutional: Negative.    HENT: Negative.    Eyes: Negative.    Respiratory: Negative.    Cardiovascular: Negative.    Gastrointestinal: Negative.    Endocrine: Negative.    Genitourinary: Negative.    Musculoskeletal: Positive for back pain.   Skin: Positive for wound.   Allergic/Immunologic: Negative.    Neurological: Positive for numbness.   Hematological: Negative.    Psychiatric/Behavioral: Negative.    All other systems reviewed and are negative.      /76 (BP Location: Right arm, Patient Position: Sitting, Cuff Size: Adult)   Pulse 77   Ht 180.3 cm (71\")   Wt " 109 kg (240 lb)   SpO2 97%   BMI 33.47 kg/m²   Physical Exam  Vitals and nursing note reviewed.   Constitutional:       General: He is not in acute distress.     Appearance: Normal appearance. He is well-developed. He is not diaphoretic.   HENT:      Head: Normocephalic and atraumatic.   Neck:      Vascular: No carotid bruit or JVD.   Cardiovascular:      Rate and Rhythm: Normal rate and regular rhythm.      Pulses:           Femoral pulses are 2+ on the right side and 2+ on the left side.       Popliteal pulses are 2+ on the right side and 2+ on the left side.        Dorsalis pedis pulses are 2+ on the right side and detected w/ Doppler on the left side.        Posterior tibial pulses are 2+ on the right side and detected w/ Doppler on the left side.      Heart sounds: Normal heart sounds, S1 normal and S2 normal. No murmur heard.   No friction rub. No gallop.    Pulmonary:      Effort: Pulmonary effort is normal.      Breath sounds: Normal breath sounds.   Abdominal:      General: Bowel sounds are normal. There is no abdominal bruit.      Palpations: Abdomen is soft.      Tenderness: There is no abdominal tenderness.   Musculoskeletal:         General: Normal range of motion.   Skin:     General: Skin is warm and dry.      Comments: Left heel wound   Neurological:      General: No focal deficit present.      Mental Status: He is alert and oriented to person, place, and time.      Cranial Nerves: No cranial nerve deficit.      Sensory: Sensory deficit present.   Psychiatric:         Mood and Affect: Mood normal.         Behavior: Behavior normal.         Thought Content: Thought content normal.         Judgment: Judgment normal.         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  Sclerosis and irregularity associated with an old nonunited fracture of the distal margin of the proximal phalanx of the left first toe.  Metatarsals show no acute abnormality.  The second through fifth toes are intact.  Normal midfoot.  Normal talus and calcaneus.  Summary: 1. Old fracture injury of the first toe. 2. No acute abnormality is seen. This report was finalized on 07/27/2021 16:13 by Dr. Oswald Chun MD.    US Ankle / Brachial Indices Extremity Complete    Result Date: 7/27/2021  Narrative:  History: [facility]  Comments: Bilateral lower extremity arterial with multi-level pulse volume recordings and segmental pressures were performed at rest and stress.  The right ankle/brachial index is 1.05. The waveforms are triphasic without dampening.These findings are consistent with no significant arterial insufficiency of the right lower extremity at rest.  The left ankle/brachial index is 0.74. The waveforms are biphasic without dampening. These findings are consistent with moderate arterial insufficiency of the left lower extremity at rest.    There is likely underlying disease in the distal SFA.      Impression: Impression: 1. No significant arterial insufficiency of the right lower extremity at rest. 2. Moderate arterial insufficiency of the left lower extremity at rest.   This report was finalized on 07/27/2021 15:12 by Dr. Andreas Medel MD.      Patient Active Problem List   Diagnosis   • Hydrocele   • Spinal cord compression due to degenerative disorder of spinal column   • Cervical spondylosis with myelopathy   • Tobacco abuse   • Type 2 diabetes mellitus with complication (CMS/[facility])   • Hyperlipidemia   • Essential hypertension   • GERD without esophagitis   • Medically noncompliant   • Peripheral neuropathy   • Altered mental status   • DDD (degenerative disc disease), lumbosacral   • Pain, neck    • S/P cervical spinal fusion   • Skin ulcer of sacrum, limited to breakdown of skin (CMS/[facility])   • Spinal stenosis of lumbar region without neurogenic claudication   • Weakness   • Polypharmacy   • Community acquired pneumonia of left lower lobe of lung   • Adult BMI 35.0-35.9 kg/sq m   • BMI 33.0-33.9,adult   • Morbidly obese (CMS/[facility])         ICD-10-CM ICD-9-CM   1. PAD (peripheral artery disease) (CMS/[facility])  I73.9 443.9   2. Preop testing  Z01.818 V72.84   3. Encounter for monitoring antiplatelet therapy  Z51.81 V58.83    Z79.02 V58.63   4. Essential hypertension  I10 401.9   5. Hyperlipidemia, unspecified hyperlipidemia type  E78.5 272.4   6. Tobacco abuse  Z72.0 305.1           Plan: After thoroughly evaluating John Rodriguez, I believe the best course of action is to proceed with a left lower extremity angiogram.  His testing does show mild arterial insufficiency to his left lower extremity.  He does have Doppler signals present and reports no claudication however has had a nonhealing wound for months.  He does report this is not improving.  This is likely to do with his uncontrolled diabetes however reports this is now under control but I do not have records that are recent to review.  1 year ago his hemoglobin A1c was 12.2.  I did discuss vascular risk factors as they pertain to the progression of vascular disease including controlling his hypertension, hyperlipidemia, diabetes mellitus, and smoking cessation.  His blood pressure stable on his current medication regimen.  He is maintained on Lipitor for his hyperlipidemia.  Previously his diabetes has been uncontrolled.  Unfortunately he does smoke about a pack of cigarettes per day and is not willing to quit smoking at this time despite risks of smoking.  Patient's Body mass index is 33.47 kg/m². indicating that he is obese (BMI >30). Obesity-related health conditions include the following: hypertension, diabetes mellitus, dyslipidemias and peripheral  vascular disease. Obesity is unchanged. BMI is is above average; BMI management plan is completed. We discussed portion control and increasing exercise.. The patient can continue taking their current medication regimen as previously planned.  This was all discussed in full with complete understanding.    Thank you for allowing me to participate in the care of your patient.  Please do not hesitate with any questions or concerns.  I will keep you aware of any further encounters with John Rodriguez.        Sincerely yours,         MOHAMUD Whyte

## 2021-08-12 ENCOUNTER — OFFICE VISIT (OUTPATIENT)
Dept: WOUND CARE | Facility: HOSPITAL | Age: 71
End: 2021-08-12

## 2021-08-12 DIAGNOSIS — L97.509 TYPE 2 DIABETES MELLITUS WITH FOOT ULCER, UNSPECIFIED WHETHER LONG TERM INSULIN USE (HCC): ICD-10-CM

## 2021-08-12 DIAGNOSIS — L97.522 NON-PRESSURE CHRONIC ULCER OF OTHER PART OF LEFT FOOT WITH FAT LAYER EXPOSED (HCC): ICD-10-CM

## 2021-08-12 DIAGNOSIS — E11.621 TYPE 2 DIABETES MELLITUS WITH FOOT ULCER, UNSPECIFIED WHETHER LONG TERM INSULIN USE (HCC): ICD-10-CM

## 2021-08-12 DIAGNOSIS — Z72.0 TOBACCO USE: ICD-10-CM

## 2021-08-12 DIAGNOSIS — E11.40 TYPE 2 DIABETES MELLITUS WITH DIABETIC NEUROPATHY, UNSPECIFIED WHETHER LONG TERM INSULIN USE (HCC): ICD-10-CM

## 2021-08-12 PROCEDURE — 11042 DBRDMT SUBQ TIS 1ST 20SQCM/<: CPT | Performed by: NURSE PRACTITIONER

## 2021-08-16 ENCOUNTER — TELEPHONE (OUTPATIENT)
Dept: VASCULAR SURGERY | Facility: CLINIC | Age: 71
End: 2021-08-16

## 2021-08-16 PROBLEM — Z01.818 PREOP TESTING: Status: ACTIVE | Noted: 2021-08-16

## 2021-08-16 PROBLEM — I73.9 PAD (PERIPHERAL ARTERY DISEASE) (HCC): Status: ACTIVE | Noted: 2021-08-16

## 2021-08-16 NOTE — TELEPHONE ENCOUNTER
Spoke with patient and advised of upcoming procedure.  Patient pre work is scheduled for 8/20/2021 at 1215 pm.  Patient procedure is scheduled for 8/27/2021 at 700 am.  Patient advised of Covid 19 testing and visitation.   Patient advised of location time and prep.  Patient expressed understanding for all that was discussed.

## 2021-08-17 ENCOUNTER — TELEPHONE (OUTPATIENT)
Dept: VASCULAR SURGERY | Facility: CLINIC | Age: 71
End: 2021-08-17

## 2021-08-17 NOTE — TELEPHONE ENCOUNTER
Spoke with patient wife and moved surgery from 8/27 to 9/1 per Dr. Medel request.  Patient arrival time is now 600 am on 9/1.  Pre work will remain on 8/20/2021 at 1215 pm.  Patient wife advised of covid testing and visitation.  She expressed understanding for all that was discussed.

## 2021-08-18 ENCOUNTER — OFFICE VISIT (OUTPATIENT)
Dept: WOUND CARE | Facility: HOSPITAL | Age: 71
End: 2021-08-18

## 2021-08-18 DIAGNOSIS — L97.522 NON-PRESSURE CHRONIC ULCER OF OTHER PART OF LEFT FOOT WITH FAT LAYER EXPOSED (HCC): ICD-10-CM

## 2021-08-18 DIAGNOSIS — L97.829 NON-PRESSURE CHRONIC ULCER OF OTHER PART OF LEFT LOWER LEG WITH UNSPECIFIED SEVERITY (HCC): ICD-10-CM

## 2021-08-18 DIAGNOSIS — L97.509 TYPE 2 DIABETES MELLITUS WITH FOOT ULCER, UNSPECIFIED WHETHER LONG TERM INSULIN USE (HCC): ICD-10-CM

## 2021-08-18 DIAGNOSIS — E11.621 TYPE 2 DIABETES MELLITUS WITH FOOT ULCER, UNSPECIFIED WHETHER LONG TERM INSULIN USE (HCC): ICD-10-CM

## 2021-08-18 DIAGNOSIS — E11.40 TYPE 2 DIABETES MELLITUS WITH DIABETIC NEUROPATHY, UNSPECIFIED WHETHER LONG TERM INSULIN USE (HCC): ICD-10-CM

## 2021-08-18 PROCEDURE — 11042 DBRDMT SUBQ TIS 1ST 20SQCM/<: CPT | Performed by: NURSE PRACTITIONER

## 2021-08-18 PROCEDURE — 97597 DBRDMT OPN WND 1ST 20 CM/<: CPT | Performed by: NURSE PRACTITIONER

## 2021-08-20 ENCOUNTER — PRE-ADMISSION TESTING (OUTPATIENT)
Dept: PREADMISSION TESTING | Facility: HOSPITAL | Age: 71
End: 2021-08-20

## 2021-08-20 ENCOUNTER — HOSPITAL ENCOUNTER (OUTPATIENT)
Dept: GENERAL RADIOLOGY | Facility: HOSPITAL | Age: 71
Discharge: HOME | End: 2021-08-20

## 2021-08-20 VITALS
RESPIRATION RATE: 20 BRPM | HEIGHT: 71 IN | HEART RATE: 74 BPM | BODY MASS INDEX: 33.18 KG/M2 | SYSTOLIC BLOOD PRESSURE: 106 MMHG | DIASTOLIC BLOOD PRESSURE: 57 MMHG | OXYGEN SATURATION: 98 % | WEIGHT: 236.99 LBS

## 2021-08-20 DIAGNOSIS — I73.9 PAD (PERIPHERAL ARTERY DISEASE) (HCC): ICD-10-CM

## 2021-08-20 DIAGNOSIS — Z79.02 ENCOUNTER FOR MONITORING ANTIPLATELET THERAPY: ICD-10-CM

## 2021-08-20 DIAGNOSIS — Z01.818 PREOP TESTING: ICD-10-CM

## 2021-08-20 DIAGNOSIS — Z51.81 ENCOUNTER FOR MONITORING ANTIPLATELET THERAPY: ICD-10-CM

## 2021-08-20 LAB
ANION GAP SERPL CALCULATED.3IONS-SCNC: 9 MMOL/L (ref 5–15)
APTT PPP: 39.4 SECONDS (ref 24.1–35)
BASOPHILS # BLD AUTO: 0.06 10*3/MM3 (ref 0–0.2)
BASOPHILS NFR BLD AUTO: 0.6 % (ref 0–1.5)
BUN SERPL-MCNC: 23 MG/DL (ref 8–23)
BUN/CREAT SERPL: 20 (ref 7–25)
CALCIUM SPEC-SCNC: 9.4 MG/DL (ref 8.6–10.5)
CHLORIDE SERPL-SCNC: 97 MMOL/L (ref 98–107)
CO2 SERPL-SCNC: 28 MMOL/L (ref 22–29)
CREAT SERPL-MCNC: 1.15 MG/DL (ref 0.76–1.27)
DEPRECATED RDW RBC AUTO: 42.2 FL (ref 37–54)
EOSINOPHIL # BLD AUTO: 0.25 10*3/MM3 (ref 0–0.4)
EOSINOPHIL NFR BLD AUTO: 2.5 % (ref 0.3–6.2)
ERYTHROCYTE [DISTWIDTH] IN BLOOD BY AUTOMATED COUNT: 12.8 % (ref 12.3–15.4)
GFR SERPL CREATININE-BSD FRML MDRD: 63 ML/MIN/1.73
GLUCOSE SERPL-MCNC: 253 MG/DL (ref 65–99)
HCT VFR BLD AUTO: 49.1 % (ref 37.5–51)
HGB BLD-MCNC: 16.2 G/DL (ref 13–17.7)
IMM GRANULOCYTES # BLD AUTO: 0.06 10*3/MM3 (ref 0–0.05)
IMM GRANULOCYTES NFR BLD AUTO: 0.6 % (ref 0–0.5)
INR PPP: 1.08 (ref 0.91–1.09)
LYMPHOCYTES # BLD AUTO: 3.05 10*3/MM3 (ref 0.7–3.1)
LYMPHOCYTES NFR BLD AUTO: 31.1 % (ref 19.6–45.3)
MCH RBC QN AUTO: 30.1 PG (ref 26.6–33)
MCHC RBC AUTO-ENTMCNC: 33 G/DL (ref 31.5–35.7)
MCV RBC AUTO: 91.3 FL (ref 79–97)
MONOCYTES # BLD AUTO: 0.73 10*3/MM3 (ref 0.1–0.9)
MONOCYTES NFR BLD AUTO: 7.4 % (ref 5–12)
NEUTROPHILS NFR BLD AUTO: 5.67 10*3/MM3 (ref 1.7–7)
NEUTROPHILS NFR BLD AUTO: 57.8 % (ref 42.7–76)
NRBC BLD AUTO-RTO: 0 /100 WBC (ref 0–0.2)
PLATELET # BLD AUTO: 253 10*3/MM3 (ref 140–450)
PMV BLD AUTO: 11.1 FL (ref 6–12)
POTASSIUM SERPL-SCNC: 4.7 MMOL/L (ref 3.5–5.2)
PROTHROMBIN TIME: 13.2 SECONDS (ref 11.5–13.4)
RBC # BLD AUTO: 5.38 10*6/MM3 (ref 4.14–5.8)
SODIUM SERPL-SCNC: 134 MMOL/L (ref 136–145)
WBC # BLD AUTO: 9.82 10*3/MM3 (ref 3.4–10.8)

## 2021-08-20 PROCEDURE — 93005 ELECTROCARDIOGRAM TRACING: CPT

## 2021-08-20 PROCEDURE — 36415 COLL VENOUS BLD VENIPUNCTURE: CPT

## 2021-08-20 PROCEDURE — 80048 BASIC METABOLIC PNL TOTAL CA: CPT

## 2021-08-20 PROCEDURE — 85610 PROTHROMBIN TIME: CPT

## 2021-08-20 PROCEDURE — 85730 THROMBOPLASTIN TIME PARTIAL: CPT

## 2021-08-20 PROCEDURE — 93010 ELECTROCARDIOGRAM REPORT: CPT | Performed by: INTERNAL MEDICINE

## 2021-08-20 PROCEDURE — 71046 X-RAY EXAM CHEST 2 VIEWS: CPT

## 2021-08-20 PROCEDURE — 85025 COMPLETE CBC W/AUTO DIFF WBC: CPT

## 2021-08-20 RX ORDER — CITALOPRAM 20 MG/1
20 TABLET ORAL DAILY
COMMUNITY

## 2021-08-20 RX ORDER — NABUMETONE 750 MG/1
750 TABLET, FILM COATED ORAL 2 TIMES DAILY
COMMUNITY
End: 2021-10-22

## 2021-08-20 NOTE — DISCHARGE INSTRUCTIONS
DAY OF SURGERY INSTRUCTIONS        YOUR SURGEON: Dr. Andreas Medel     PROCEDURE: Left Lower Extremity Angiogram- Left     DATE OF SURGERY: 09/01/2021    ARRIVAL TIME: AS DIRECTED BY OFFICE    YOU MAY TAKE THE FOLLOWING MEDICATION(S) THE MORNING OF SURGERY WITH A SIP OF WATER: OK to take Buspar, Percocet, and Lyrica morning of surgery; Hold losartan- htz combination 24 hour prior to surgery; Otherwise nothing to eat or drink after midnight       ALL OTHER HOME MEDICATION CHECK WITH YOUR PHYSICIAN      DO NOT TAKE ANY ERECTILE DYSFUNCTION MEDICATIONS (EX: CIALIS, VIAGRA) 24 HOURS PRIOR TO SURGERY                      MANAGING PAIN AFTER SURGERY    We know you are probably wondering what your pain will be like after surgery.  Following surgery it is unrealistic to expect you will not have pain.   Pain is how our bodies let us know that something is wrong or cautions us to be careful.  That said, our goal is to make your pain tolerable.    Methods we may use to treat your pain include (oral or IV medications, PCAs, epidurals, nerve blocks, etc.)   While some procedures require IV pain medications for a short time after surgery, transitioning to pain medications by mouth allows for better management of pain.   Your nurse will encourage you to take oral pain medications whenever possible.  IV medications work almost immediately, but only last a short while.  Taking medications by mouth allows for a more constant level of medication in your blood stream for a longer period of time.      Once your pain is out of control it is harder to get back under control.  It is important you are aware when your next dose of pain medication is due.  If you are admitted, your nurse may write the time of your next dose on the white board in your room to help you remember.      We are interested in your pain and encourage you to inform us about aggravating factors during your visit.   Many times a simple repositioning every few hours  can make a big difference.    If your physician says it is okay, do not let your pain prevent you from getting out of bed. Be sure to call your nurse for assistance prior to getting up so you do not fall.      Before surgery, please decide your tolerable pain goal.  These faces help describe the pain ratings we use on a 0-10 scale.   Be prepared to tell us your goal and whether or not you take pain or anxiety medications at home.          BEFORE YOU COME TO THE HOSPITAL  (Pre-op instructions)  • Do not eat, drink, smoke or chew gum after midnight the night before surgery.  This also includes no mints.  • Morning of surgery take only the medicines you have been instructed with a sip of water unless otherwise instructed  by your physician.  • Do not shave, wear makeup or dark nail polish.  • Remove all jewelry including rings.  • Leave anything you consider valuable at home.  • Leave your suitcase in the car until after your surgery.  • Bring the following with you if applicable:  o Picture ID and insurance, Medicare or Medicaid cards  o Co-pay/deductible required by insurance (cash, check, credit card)  o Copy of advance directive, living will or power-of- documents if not brought to PAT  o CPAP or BIPAP mask and tubing  o Relaxation aids ( book, magazine), etc.  o Hearing aids                        ON THE DAY OF SURGERY  · On the day of surgery check in at registration located at the main entrance of the hospital.   ? You will be registered and given a beeper with instructions where to wait in the main lobby.  ? When your beeper lights up and vibrates a member of the Outpatient Surgery staff will meet you at the double doors under the stair steps and escort you to your preoperative room.   · You may have cloth compression devices placed on your legs. These help to prevent blood clots and reduce swelling in your legs.  · An IV may be inserted into one of your veins.  · In the operating room, you may be given  "one or more of the following:  ? A medicine to help you relax (sedative).  ? A medicine to numb the area (local anesthetic).  ? A medicine to make you fall asleep (general anesthetic).  ? A medicine that is injected into an area of your body to numb everything below the injection site (regional anesthetic).  · Your surgical site will be marked or identified.  · You may be given an antibiotic through your IV to help prevent infection.  Contact a health care provider if you:  · Develop a fever of more than 100.4°F (38°C) or other feelings of illness during the 48 hours before your surgery.  · Have symptoms that get worse.  Have questions or concerns about your surgery    General Anesthesia/Surgery, Adult  General anesthesia is the use of medicines to make a person \"go to sleep\" (unconscious) for a medical procedure. General anesthesia must be used for certain procedures, and is often recommended for procedures that:  · Last a long time.  · Require you to be still or in an unusual position.  · Are major and can cause blood loss.  The medicines used for general anesthesia are called general anesthetics. As well as making you unconscious for a certain amount of time, these medicines:  · Prevent pain.  · Control your blood pressure.  · Relax your muscles.  Tell a health care provider about:  · Any allergies you have.  · All medicines you are taking, including vitamins, herbs, eye drops, creams, and over-the-counter medicines.  · Any problems you or family members have had with anesthetic medicines.  · Types of anesthetics you have had in the past.  · Any blood disorders you have.  · Any surgeries you have had.  · Any medical conditions you have.  · Any recent upper respiratory, chest, or ear infections.  · Any history of:  ? Heart or lung conditions, such as heart failure, sleep apnea, asthma, or chronic obstructive pulmonary disease (COPD).  ?  service.  ? Depression or anxiety.  · Any tobacco or drug use, " including marijuana or alcohol use.  · Whether you are pregnant or may be pregnant.  What are the risks?  Generally, this is a safe procedure. However, problems may occur, including:  · Allergic reaction.  · Lung and heart problems.  · Inhaling food or liquid from the stomach into the lungs (aspiration).  · Nerve injury.  · Air in the bloodstream, which can lead to stroke.  · Extreme agitation or confusion (delirium) when you wake up from the anesthetic.  · Waking up during your procedure and being unable to move. This is rare.  These problems are more likely to develop if you are having a major surgery or if you have an advanced or serious medical condition. You can prevent some of these complications by answering all of your health care provider's questions thoroughly and by following all instructions before your procedure.  General anesthesia can cause side effects, including:  · Nausea or vomiting.  · A sore throat from the breathing tube.  · Hoarseness.  · Wheezing or coughing.  · Shaking chills.  · Tiredness.  · Body aches.  · Anxiety.  · Sleepiness or drowsiness.  · Confusion or agitation.  RISKS AND COMPLICATIONS OF SURGERY  Your health care provider will discuss possible risks and complications with you before surgery. Common risks and complications include:    · Problems due to the use of anesthetics.  · Blood loss and replacement (does not apply to minor surgical procedures).  · Temporary increase in pain due to surgery.  · Uncorrected pain or problems that the surgery was meant to correct.  · Infection.  · New damage.    What happens before the procedure?    Medicines  Ask your health care provider about:  · Changing or stopping your regular medicines. This is especially important if you are taking diabetes medicines or blood thinners.  · Taking medicines such as aspirin and ibuprofen. These medicines can thin your blood. Do not take these medicines unless your health care provider tells you to take  them.  · Taking over-the-counter medicines, vitamins, herbs, and supplements. Do not take these during the week before your procedure unless your health care provider approves them.  General instructions  · Starting 3-6 weeks before the procedure, do not use any products that contain nicotine or tobacco, such as cigarettes and e-cigarettes. If you need help quitting, ask your health care provider.  · If you brush your teeth on the morning of the procedure, make sure to spit out all of the toothpaste.  · Tell your health care provider if you become ill or develop a cold, cough, or fever.  · If instructed by your health care provider, bring your sleep apnea device with you on the day of your surgery (if applicable).  · Ask your health care provider if you will be going home the same day, the following day, or after a longer hospital stay.  ? Plan to have someone take you home from the hospital or clinic.  ? Plan to have a responsible adult care for you for at least 24 hours after you leave the hospital or clinic. This is important.  What happens during the procedure?  · You will be given anesthetics through both of the following:  ? A mask placed over your nose and mouth.  ? An IV in one of your veins.  · You may receive a medicine to help you relax (sedative).  · After you are unconscious, a breathing tube may be inserted down your throat to help you breathe. This will be removed before you wake up.  · An anesthesia specialist will stay with you throughout your procedure. He or she will:  ? Keep you comfortable and safe by continuing to give you medicines and adjusting the amount of medicine that you get.  ? Monitor your blood pressure, pulse, and oxygen levels to make sure that the anesthetics do not cause any problems.  The procedure may vary among health care providers and hospitals.  What happens after the procedure?  · Your blood pressure, temperature, heart rate, breathing rate, and blood oxygen level will be  monitored until the medicines you were given have worn off.  · You will wake up in a recovery area. You may wake up slowly.  · If you feel anxious or agitated, you may be given medicine to help you calm down.  · If you will be going home the same day, your health care provider may check to make sure you can walk, drink, and urinate.  · Your health care provider will treat any pain or side effects you have before you go home.  · Do not drive for 24 hours if you were given a sedative.  Summary  · General anesthesia is used to keep you still and prevent pain during a procedure.  · It is important to tell your healthcare provider about your medical history and any surgeries you have had, and previous experience with anesthesia.  · Follow your healthcare provider’s instructions about when to stop eating, drinking, or taking certain medicines before your procedure.  · Plan to have someone take you home from the hospital or clinic.  This information is not intended to replace advice given to you by your health care provider. Make sure you discuss any questions you have with your health care provider.  Document Released: 03/26/2009 Document Revised: 08/03/2018 Document Reviewed: 08/03/2018  [employer] Interactive Patient Education © 2019 [employer]       Fall Prevention in Hospitals, Adult  As a hospital patient, your condition and the treatments you receive can increase your risk for falls. Some additional risk factors for falls in a hospital include:  · Being in an unfamiliar environment.  · Being on bed rest.  · Your surgery.  · Taking certain medicines.  · Your tubing requirements, such as intravenous (IV) therapy or catheters.  It is important that you learn how to decrease fall risks while at the hospital. Below are important tips that can help prevent falls.  SAFETY TIPS FOR PREVENTING FALLS  Talk about your risk of falling.  · Ask your health care provider why you are at risk for falling. Is it your medicine,  illness, tubing placement, or something else?  · Make a plan with your health care provider to keep you safe from falls.  · Ask your health care provider or pharmacist about side effects of your medicines. Some medicines can make you dizzy or affect your coordination.  Ask for help.  · Ask for help before getting out of bed. You may need to press your call button.  · Ask for assistance in getting safely to the toilet.  · Ask for a walker or cane to be put at your bedside. Ask that most of the side rails on your bed be placed up before your health care provider leaves the room.  · Ask family or friends to sit with you.  · Ask for things that are out of your reach, such as your glasses, hearing aids, telephone, bedside table, or call button.  Follow these tips to avoid falling:  · Stay lying or seated, rather than standing, while waiting for help.  · Wear rubber-soled slippers or shoes whenever you walk in the hospital.  · Avoid quick, sudden movements.  ¨ Change positions slowly.  ¨ Sit on the side of your bed before standing.  ¨ Stand up slowly and wait before you start to walk.  · Let your health care provider know if there is a spill on the floor.  · Pay careful attention to the medical equipment, electrical cords, and tubes around you.  · When you need help, use your call button by your bed or in the bathroom. Wait for one of your health care providers to help you.  · If you feel dizzy or unsure of your footing, return to bed and wait for assistance.  · Avoid being distracted by the TV, telephone, or another person in your room.  · Do not lean or support yourself on rolling objects, such as IV poles or bedside tables.     This information is not intended to replace advice given to you by your health care provider. Make sure you discuss any questions you have with your health care provider.     Document Released: 12/15/2001 Document Revised: 01/08/2016 Document Reviewed: 08/25/2013  [employer] Patient  Education ©2016 [employer]       [facility]  CHG 4% Patient Instruction Sheet    Chlorhexidine Before Surgery  Chlorhexidine gluconate (CHG) is a germ-killing (antiseptic) solution that is used to clean the skin. It gets rid of the bacteria that normally live on the skin. Cleaning your skin with CHG before surgery helps lower the risk for infection after surgery.    How to use CHG solution  · You will take 2 showers, one shower the night before surgery, the second shower the morning of surgery before coming to the hospital.  · Use CHG only as told by your health care provider, and follow the instructions on the label.  · Use CHG solution while taking a shower. Follow these steps when using CHG solution (unless your health care provider gives you different instructions):  1. Start the shower.  2. Use your normal soap and shampoo to wash your face and hair.  3. Turn off the shower or move out of the shower stream.  4. Pour the CHG onto a clean washcloth. Do not use any type of brush or rough-edged sponge.  5. Starting at your neck, lather your body down to your toes. Make sure you:  6. Pay special attention to the part of your body where you will be having surgery. Scrub this area for at least 1 minute.  7. Use the full amount of CHG as directed. Usually, this is one half bottle for each shower.  8. Do not use CHG on your head or face. If the solution gets into your ears or eyes, rinse them well with water.  9. Avoid your genital area.  10. Avoid any areas of skin that have broken skin, cuts, or scrapes.  11. Scrub your back and under your arms. Make sure to wash skin folds.  12. Let the lather sit on your skin for 1-2 minutes or as long as told by your health care  provider.  13. Thoroughly rinse your entire body in the shower. Make sure that all body creases and crevices are rinsed well.  14. Dry off with a clean towel. Do not put any substances on your body afterward, such as powder, lotion, or  perfume.  15. Put on clean clothes or pajamas.  16. If it is the night before your surgery, sleep in clean sheets.    What are the risks?  Risks of using CHG include:  · A skin reaction.  · Hearing loss, if CHG gets in your ears.  · Eye injury, if CHG gets in your eyes and is not rinsed out.  · The CHG product catching fire.  Make sure that you avoid smoking and flames after applying CHG to your skin.  Do not use CHG:  · If you have a chlorhexidine allergy or have previously reacted to chlorhexidine.  · On babies younger than 2 months of age.      On the day of surgery, when you are taken to your room in Outpatient Surgery you will be given a CHG prepackaged cloth to wipe the site for your surgery.  How to use CHG prepackaged cloths  · Follow the instructions on the label.  · Use the CHG cloth on clean, dry skin. Follow these steps when using a CHG cloth (unless your health care provider gives you different instructions):  1. Using the CHG cloth, vigorously scrub the part of your body where you will be having surgery. Scrub using a back-and-forth motion for 3 minutes. The area on your body should be completely wet with CHG when you are finished scrubbing.  2. Do not rinse. Discard the cloth and let the area air-dry for 1 minute. Do not put any substances on your body afterward, such as powder, lotion, or perfume.  Contact a health care provider if:  · Your skin gets irritated after scrubbing.  · You have questions about using your solution or cloth.  Get help right away if:  · Your eyes become very red or swollen.  · Your eyes itch badly.  · Your skin itches badly and is red or swollen.  · Your hearing changes.  · You have trouble seeing.  · You have swelling or tingling in your mouth or throat.  · You have trouble breathing.  · You swallow any chlorhexidine.  Summary  · Chlorhexidine gluconate (CHG) is a germ-killing (antiseptic) solution that is used to clean the skin. Cleaning your skin with CHG before surgery  helps lower the risk for infection after surgery.  · You may be given CHG to use at home. It may be in a bottle or in a prepackaged cloth to use on your skin. Carefully follow your health care provider's instructions and the instructions on the product label.  · Do not use CHG if you have a chlorhexidine allergy.  · Contact your health care provider if your skin gets irritated after scrubbing.  This information is not intended to replace advice given to you by your health care provider. Make sure you discuss any questions you have with your health care provider.  Document Released: 09/11/2013 Document Revised: 11/15/2018 Document Reviewed: 11/15/2018  [facility] Interactive Patient Education © 2019 [facility] [employer]          PATIENT/FAMILY/RESPONSIBLE PARTY VERBALIZES UNDERSTANDING OF ABOVE EDUCATION.  COPY OF PAIN SCALE GIVEN AND REVIEWED WITH VERBALIZED UNDERSTANDING.

## 2021-08-23 LAB
QT INTERVAL: 438 MS
QTC INTERVAL: 462 MS

## 2021-08-25 ENCOUNTER — OFFICE VISIT (OUTPATIENT)
Dept: WOUND CARE | Facility: HOSPITAL | Age: 71
End: 2021-08-25

## 2021-08-25 DIAGNOSIS — L97.522 NON-PRESSURE CHRONIC ULCER OF OTHER PART OF LEFT FOOT WITH FAT LAYER EXPOSED (HCC): ICD-10-CM

## 2021-08-25 DIAGNOSIS — E11.621 TYPE 2 DIABETES MELLITUS WITH FOOT ULCER, UNSPECIFIED WHETHER LONG TERM INSULIN USE (HCC): ICD-10-CM

## 2021-08-25 DIAGNOSIS — L97.829 NON-PRESSURE CHRONIC ULCER OF OTHER PART OF LEFT LOWER LEG WITH UNSPECIFIED SEVERITY (HCC): ICD-10-CM

## 2021-08-25 DIAGNOSIS — E11.40 TYPE 2 DIABETES MELLITUS WITH DIABETIC NEUROPATHY, UNSPECIFIED WHETHER LONG TERM INSULIN USE (HCC): ICD-10-CM

## 2021-08-25 DIAGNOSIS — L97.509 TYPE 2 DIABETES MELLITUS WITH FOOT ULCER, UNSPECIFIED WHETHER LONG TERM INSULIN USE (HCC): ICD-10-CM

## 2021-08-25 PROCEDURE — 11042 DBRDMT SUBQ TIS 1ST 20SQCM/<: CPT | Performed by: NURSE PRACTITIONER

## 2021-08-27 ENCOUNTER — TRANSCRIBE ORDERS (OUTPATIENT)
Dept: LAB | Facility: HOSPITAL | Age: 71
End: 2021-08-27

## 2021-08-27 DIAGNOSIS — Z11.59 SCREENING FOR VIRAL DISEASE: Primary | ICD-10-CM

## 2021-08-30 ENCOUNTER — LAB (OUTPATIENT)
Dept: LAB | Facility: HOSPITAL | Age: 71
End: 2021-08-30

## 2021-08-30 LAB — SARS-COV-2 ORF1AB RESP QL NAA+PROBE: NOT DETECTED

## 2021-08-30 PROCEDURE — U0005 INFEC AGEN DETEC AMPLI PROBE: HCPCS | Performed by: SURGERY

## 2021-08-30 PROCEDURE — C9803 HOPD COVID-19 SPEC COLLECT: HCPCS | Performed by: SURGERY

## 2021-08-30 PROCEDURE — U0004 COV-19 TEST NON-CDC HGH THRU: HCPCS | Performed by: SURGERY

## 2021-08-31 ENCOUNTER — TELEPHONE (OUTPATIENT)
Dept: VASCULAR SURGERY | Facility: CLINIC | Age: 71
End: 2021-08-31

## 2021-08-31 ENCOUNTER — OFFICE VISIT (OUTPATIENT)
Dept: WOUND CARE | Facility: HOSPITAL | Age: 71
End: 2021-08-31

## 2021-08-31 DIAGNOSIS — L97.522 NON-PRESSURE CHRONIC ULCER OF OTHER PART OF LEFT FOOT WITH FAT LAYER EXPOSED (HCC): ICD-10-CM

## 2021-08-31 DIAGNOSIS — E11.40 TYPE 2 DIABETES MELLITUS WITH DIABETIC NEUROPATHY, UNSPECIFIED WHETHER LONG TERM INSULIN USE (HCC): ICD-10-CM

## 2021-08-31 DIAGNOSIS — Z72.0 TOBACCO USE: ICD-10-CM

## 2021-08-31 DIAGNOSIS — L97.509 TYPE 2 DIABETES MELLITUS WITH FOOT ULCER, UNSPECIFIED WHETHER LONG TERM INSULIN USE (HCC): ICD-10-CM

## 2021-08-31 DIAGNOSIS — E11.621 TYPE 2 DIABETES MELLITUS WITH FOOT ULCER, UNSPECIFIED WHETHER LONG TERM INSULIN USE (HCC): ICD-10-CM

## 2021-08-31 PROCEDURE — 11042 DBRDMT SUBQ TIS 1ST 20SQCM/<: CPT | Performed by: NURSE PRACTITIONER

## 2021-08-31 NOTE — TELEPHONE ENCOUNTER
Spoke with patient and reminded of 600 am arrival time tomorrow for his procedure with Dr. Medel.  Patient expressed understanding for all that was discussed.

## 2021-09-01 ENCOUNTER — HOSPITAL ENCOUNTER (OUTPATIENT)
Facility: HOSPITAL | Age: 71
Setting detail: HOSPITAL OUTPATIENT SURGERY
Discharge: HOME | End: 2021-09-01
Attending: SURGERY | Admitting: SURGERY

## 2021-09-01 ENCOUNTER — APPOINTMENT (OUTPATIENT)
Dept: INTERVENTIONAL RADIOLOGY/VASCULAR | Facility: HOSPITAL | Age: 71
End: 2021-09-01

## 2021-09-01 VITALS
SYSTOLIC BLOOD PRESSURE: 113 MMHG | HEART RATE: 70 BPM | OXYGEN SATURATION: 97 % | TEMPERATURE: 97.2 F | DIASTOLIC BLOOD PRESSURE: 62 MMHG | RESPIRATION RATE: 16 BRPM

## 2021-09-01 DIAGNOSIS — Z01.818 PREOP TESTING: ICD-10-CM

## 2021-09-01 DIAGNOSIS — I73.9 PAD (PERIPHERAL ARTERY DISEASE) (HCC): ICD-10-CM

## 2021-09-01 LAB
ABO GROUP BLD: NORMAL
BLD GP AB SCN SERPL QL: NEGATIVE
GLUCOSE BLDC GLUCOMTR-MCNC: 218 MG/DL (ref 70–130)
RH BLD: POSITIVE
T&S EXPIRATION DATE: NORMAL

## 2021-09-01 PROCEDURE — 86900 BLOOD TYPING SEROLOGIC ABO: CPT | Performed by: NURSE PRACTITIONER

## 2021-09-01 PROCEDURE — 94799 UNLISTED PULMONARY SVC/PX: CPT

## 2021-09-01 PROCEDURE — 82962 GLUCOSE BLOOD TEST: CPT

## 2021-09-01 PROCEDURE — 86901 BLOOD TYPING SEROLOGIC RH(D): CPT | Performed by: NURSE PRACTITIONER

## 2021-09-01 PROCEDURE — G0463 HOSPITAL OUTPT CLINIC VISIT: HCPCS | Performed by: SURGERY

## 2021-09-01 PROCEDURE — 86850 RBC ANTIBODY SCREEN: CPT | Performed by: NURSE PRACTITIONER

## 2021-09-01 RX ORDER — SODIUM CHLORIDE 0.9 % (FLUSH) 0.9 %
3 SYRINGE (ML) INJECTION AS NEEDED
Status: DISCONTINUED | OUTPATIENT
Start: 2021-09-01 | End: 2021-09-01 | Stop reason: HOSPADM

## 2021-09-01 RX ORDER — SODIUM CHLORIDE, SODIUM LACTATE, POTASSIUM CHLORIDE, CALCIUM CHLORIDE 600; 310; 30; 20 MG/100ML; MG/100ML; MG/100ML; MG/100ML
1000 INJECTION, SOLUTION INTRAVENOUS CONTINUOUS
Status: DISCONTINUED | OUTPATIENT
Start: 2021-09-01 | End: 2021-09-01 | Stop reason: HOSPADM

## 2021-09-01 RX ORDER — BUPIVACAINE HCL/0.9 % NACL/PF 0.1 %
2 PLASTIC BAG, INJECTION (ML) EPIDURAL ONCE
Status: DISCONTINUED | OUTPATIENT
Start: 2021-09-01 | End: 2021-09-01 | Stop reason: HOSPADM

## 2021-09-01 RX ORDER — LIDOCAINE HYDROCHLORIDE 10 MG/ML
0.5 INJECTION, SOLUTION EPIDURAL; INFILTRATION; INTRACAUDAL; PERINEURAL ONCE AS NEEDED
Status: DISCONTINUED | OUTPATIENT
Start: 2021-09-01 | End: 2021-09-01 | Stop reason: HOSPADM

## 2021-09-01 RX ADMIN — SODIUM CHLORIDE, POTASSIUM CHLORIDE, SODIUM LACTATE AND CALCIUM CHLORIDE 1000 ML: 600; 310; 30; 20 INJECTION, SOLUTION INTRAVENOUS at 08:02

## 2021-09-01 NOTE — PROGRESS NOTES
The patient was seen/examined at the bedside in the outpatient center  The patient had difficulty with confirming a ride home after his procedure this afternoon.  This delayed his revascularization until this could be confirmed.  At this point he would like to reschedule because of having to wait around until noon.  I did have a lengthy discussion with him and I told him that my office would call him to reschedule appropriately.  This was all discussed in full with complete understanding.

## 2021-09-01 NOTE — INTERVAL H&P NOTE
Risks of angiogram were discussed.  These include, but are not limited to, bleeding, infection, vessel damage, nerve damage, embolus, and loss of limb.  The patient understands these risks and wishes to proceed with procedure.     H&P reviewed. The patient was examined and there are no changes to the H&P.

## 2021-09-03 ENCOUNTER — PREP FOR SURGERY (OUTPATIENT)
Dept: OTHER | Facility: HOSPITAL | Age: 71
End: 2021-09-03

## 2021-09-03 DIAGNOSIS — Z51.81 ENCOUNTER FOR MONITORING ANTIPLATELET THERAPY: ICD-10-CM

## 2021-09-03 DIAGNOSIS — Z79.02 ENCOUNTER FOR MONITORING ANTIPLATELET THERAPY: ICD-10-CM

## 2021-09-03 DIAGNOSIS — I73.9 PAD (PERIPHERAL ARTERY DISEASE) (HCC): Primary | ICD-10-CM

## 2021-09-03 DIAGNOSIS — Z01.818 PREOP TESTING: ICD-10-CM

## 2021-09-03 RX ORDER — BUPIVACAINE HCL/0.9 % NACL/PF 0.1 %
2 PLASTIC BAG, INJECTION (ML) EPIDURAL ONCE
Status: CANCELLED | OUTPATIENT
Start: 2021-09-03 | End: 2021-09-03

## 2021-09-08 ENCOUNTER — OFFICE VISIT (OUTPATIENT)
Dept: WOUND CARE | Facility: HOSPITAL | Age: 71
End: 2021-09-08

## 2021-09-08 DIAGNOSIS — Z72.0 TOBACCO USE: ICD-10-CM

## 2021-09-08 DIAGNOSIS — E11.621 TYPE 2 DIABETES MELLITUS WITH FOOT ULCER, UNSPECIFIED WHETHER LONG TERM INSULIN USE (HCC): ICD-10-CM

## 2021-09-08 DIAGNOSIS — L97.509 TYPE 2 DIABETES MELLITUS WITH FOOT ULCER, UNSPECIFIED WHETHER LONG TERM INSULIN USE (HCC): ICD-10-CM

## 2021-09-08 DIAGNOSIS — E11.40 TYPE 2 DIABETES MELLITUS WITH DIABETIC NEUROPATHY, UNSPECIFIED WHETHER LONG TERM INSULIN USE (HCC): ICD-10-CM

## 2021-09-08 DIAGNOSIS — L97.522 NON-PRESSURE CHRONIC ULCER OF OTHER PART OF LEFT FOOT WITH FAT LAYER EXPOSED (HCC): ICD-10-CM

## 2021-09-08 PROCEDURE — 11042 DBRDMT SUBQ TIS 1ST 20SQCM/<: CPT | Performed by: SURGERY

## 2021-09-09 ENCOUNTER — TELEPHONE (OUTPATIENT)
Dept: VASCULAR SURGERY | Facility: CLINIC | Age: 71
End: 2021-09-09

## 2021-09-09 NOTE — TELEPHONE ENCOUNTER
Called to advise patient of upcoming procedure.  Patient requested that it be moved out one month due to the loss of his wife last week.

## 2021-09-13 ENCOUNTER — OFFICE VISIT (OUTPATIENT)
Dept: WOUND CARE | Facility: HOSPITAL | Age: 71
End: 2021-09-13

## 2021-09-13 DIAGNOSIS — I73.9 PERIPHERAL VASCULAR DISEASE, UNSPECIFIED (HCC): ICD-10-CM

## 2021-09-13 DIAGNOSIS — Z72.0 TOBACCO USE: ICD-10-CM

## 2021-09-13 DIAGNOSIS — E11.621 TYPE 2 DIABETES MELLITUS WITH FOOT ULCER, UNSPECIFIED WHETHER LONG TERM INSULIN USE (HCC): ICD-10-CM

## 2021-09-13 DIAGNOSIS — L97.509 TYPE 2 DIABETES MELLITUS WITH FOOT ULCER, UNSPECIFIED WHETHER LONG TERM INSULIN USE (HCC): ICD-10-CM

## 2021-09-13 DIAGNOSIS — L97.422 NON-PRESSURE CHRONIC ULCER OF LEFT HEEL AND MIDFOOT WITH FAT LAYER EXPOSED (HCC): ICD-10-CM

## 2021-09-13 PROCEDURE — 11042 DBRDMT SUBQ TIS 1ST 20SQCM/<: CPT | Performed by: PODIATRIST

## 2021-09-20 ENCOUNTER — OFFICE VISIT (OUTPATIENT)
Dept: WOUND CARE | Facility: HOSPITAL | Age: 71
End: 2021-09-20

## 2021-09-23 ENCOUNTER — OFFICE VISIT (OUTPATIENT)
Dept: WOUND CARE | Facility: HOSPITAL | Age: 71
End: 2021-09-23

## 2021-09-23 DIAGNOSIS — L97.422 NON-PRESSURE CHRONIC ULCER OF LEFT HEEL AND MIDFOOT WITH FAT LAYER EXPOSED (HCC): ICD-10-CM

## 2021-09-23 DIAGNOSIS — I73.9 PERIPHERAL VASCULAR DISEASE, UNSPECIFIED (HCC): ICD-10-CM

## 2021-09-23 DIAGNOSIS — E11.40 TYPE 2 DIABETES MELLITUS WITH DIABETIC NEUROPATHY, UNSPECIFIED WHETHER LONG TERM INSULIN USE (HCC): ICD-10-CM

## 2021-09-23 DIAGNOSIS — Z72.0 TOBACCO USE: ICD-10-CM

## 2021-09-23 PROCEDURE — 11042 DBRDMT SUBQ TIS 1ST 20SQCM/<: CPT | Performed by: NURSE PRACTITIONER

## 2021-09-30 ENCOUNTER — OFFICE VISIT (OUTPATIENT)
Dept: WOUND CARE | Facility: HOSPITAL | Age: 71
End: 2021-09-30

## 2021-09-30 DIAGNOSIS — L97.509 TYPE 2 DIABETES MELLITUS WITH FOOT ULCER, UNSPECIFIED WHETHER LONG TERM INSULIN USE (HCC): ICD-10-CM

## 2021-09-30 DIAGNOSIS — E11.621 TYPE 2 DIABETES MELLITUS WITH FOOT ULCER, UNSPECIFIED WHETHER LONG TERM INSULIN USE (HCC): ICD-10-CM

## 2021-09-30 DIAGNOSIS — L97.422 NON-PRESSURE CHRONIC ULCER OF LEFT HEEL AND MIDFOOT WITH FAT LAYER EXPOSED (HCC): ICD-10-CM

## 2021-09-30 PROCEDURE — 11042 DBRDMT SUBQ TIS 1ST 20SQCM/<: CPT | Performed by: NURSE PRACTITIONER

## 2021-10-07 ENCOUNTER — OFFICE VISIT (OUTPATIENT)
Dept: WOUND CARE | Facility: HOSPITAL | Age: 71
End: 2021-10-07

## 2021-10-07 DIAGNOSIS — L97.509 TYPE 2 DIABETES MELLITUS WITH FOOT ULCER, UNSPECIFIED WHETHER LONG TERM INSULIN USE (HCC): ICD-10-CM

## 2021-10-07 DIAGNOSIS — L97.422 NON-PRESSURE CHRONIC ULCER OF LEFT HEEL AND MIDFOOT WITH FAT LAYER EXPOSED (HCC): ICD-10-CM

## 2021-10-07 DIAGNOSIS — E11.621 TYPE 2 DIABETES MELLITUS WITH FOOT ULCER, UNSPECIFIED WHETHER LONG TERM INSULIN USE (HCC): ICD-10-CM

## 2021-10-07 DIAGNOSIS — Z72.0 TOBACCO USE: ICD-10-CM

## 2021-10-07 DIAGNOSIS — I73.9 PERIPHERAL VASCULAR DISEASE, UNSPECIFIED (HCC): ICD-10-CM

## 2021-10-07 PROCEDURE — 11042 DBRDMT SUBQ TIS 1ST 20SQCM/<: CPT | Performed by: NURSE PRACTITIONER

## 2021-10-14 ENCOUNTER — OFFICE VISIT (OUTPATIENT)
Dept: WOUND CARE | Facility: HOSPITAL | Age: 71
End: 2021-10-14

## 2021-10-14 DIAGNOSIS — L97.422 NON-PRESSURE CHRONIC ULCER OF LEFT HEEL AND MIDFOOT WITH FAT LAYER EXPOSED (HCC): ICD-10-CM

## 2021-10-14 DIAGNOSIS — I73.9 PERIPHERAL VASCULAR DISEASE, UNSPECIFIED (HCC): ICD-10-CM

## 2021-10-14 DIAGNOSIS — Z72.0 TOBACCO USE: ICD-10-CM

## 2021-10-14 DIAGNOSIS — L97.509 TYPE 2 DIABETES MELLITUS WITH FOOT ULCER, UNSPECIFIED WHETHER LONG TERM INSULIN USE (HCC): ICD-10-CM

## 2021-10-14 DIAGNOSIS — E11.621 TYPE 2 DIABETES MELLITUS WITH FOOT ULCER, UNSPECIFIED WHETHER LONG TERM INSULIN USE (HCC): ICD-10-CM

## 2021-10-14 PROCEDURE — 11042 DBRDMT SUBQ TIS 1ST 20SQCM/<: CPT | Performed by: NURSE PRACTITIONER

## 2021-10-20 ENCOUNTER — TELEPHONE (OUTPATIENT)
Dept: PODIATRY | Facility: CLINIC | Age: 71
End: 2021-10-20

## 2021-10-20 NOTE — TELEPHONE ENCOUNTER
Called patient regarding appt on 10/22/2021. Left message for patient to return call if any questions or concerns arise.

## 2021-10-20 NOTE — PROGRESS NOTES
[facility] - PODIATRY    Today's Date: 10/22/21    Patient Name: John Rodriguez  MRN: [id number]  CSN: [id number]  PCP: Jt Monroy MD  Referring Provider: No ref. provider found    SUBJECTIVE     Chief Complaint   Patient presents with   • Follow-up     3 month fu diabetic nail care- pt states feet doing ok. has a wound on left heal being treated in wound care- pt denies pain at present- pt presents with long thick nails  callus build up on inside of both great toes   • Diabetes     hasnt checked     HPI: John Rodriguez, a 71 y.o.male, comes to clinic as a(n) established patient presenting for diabetic foot exam and complaining of thickened toenails and calluses. Patient has h/o acid reflux, arthritis, back pain, DM2, HLD, HTN, Psoriasis, Tobacco use. Patient is IDDM and unsure of last BG level.  Relates numbness and tingling in his feet.  Patient following with outpatient wound care center for left heel wound.  States that his toenails are long, thick, discolored and crumbly. Notes difficulty caring for his own feet.  Denies pain today.  Relates recurrence of calluses to first submet heads. Relates previous treatment(s) including foot care by podiatrist.  Denies any constitutional symptoms. No other pedal complaints at this time.    Past Medical History:   Diagnosis Date   • Acid reflux    • Arthritis    • Back pain    • Diabetes mellitus (HCC)    • Hydrocele in adult    • Hyperlipidemia    • Hypertension    • Psoriasis    • Wears glasses      Past Surgical History:   Procedure Laterality Date   • ANTERIOR CERVICAL DISCECTOMY W/ FUSION N/A 1/23/2020    Procedure: CERVICAL DISCECTOMY ANTERIOR WITH FUSION C5-6;  Surgeon: Santino Montano MD;  Location: [facility] OR;  Service: Neurosurgery   • BACK SURGERY     • COLONOSCOPY     • HERNIA REPAIR     • HYDROCELECTOMY Left 11/20/2017    Procedure: HYDROCELECTOMY;  Surgeon: Neeraj Hurtado MD;  Location: [facility] OR;  Service:    • TONSILLECTOMY        Family History   Problem Relation Age of Onset   • No Known Problems Father    • No Known Problems Mother      Social History     Socioeconomic History   • Marital status:    Tobacco Use   • Smoking status: Current Every Day Smoker     Packs/day: 1.00     Years: 50.00     Pack years: 50.00     Types: Cigarettes   • Smokeless tobacco: Never Used   Vaping Use   • Vaping Use: Never used   Substance and Sexual Activity   • Alcohol use: No   • Drug use: No   • Sexual activity: Defer     Allergies   Allergen Reactions   • Sulfa Antibiotics Nausea Only     Current Outpatient Medications   Medication Sig Dispense Refill   • busPIRone (BUSPAR) 10 MG tablet Take 10 mg by mouth 2 (Two) Times a Day.     • citalopram (CeleXA) 20 MG tablet Take 20 mg by mouth Daily.     • EASY TOUCH PEN NEEDLES 31G X 5 MM misc      • losartan-hydrochlorothiazide (HYZAAR) 50-12.5 MG per tablet Take 1 tablet by mouth Daily.     • metFORMIN (GLUCOPHAGE) 1000 MG tablet Take 1 tablet by mouth 2 (Two) Times a Day With Meals.     • omeprazole (priLOSEC) 20 MG capsule Take 20 mg by mouth Daily.     • oxyCODONE-acetaminophen (PERCOCET) 7.5-325 MG per tablet Take 1 tablet by mouth Every 6 (Six) Hours As Needed for Moderate Pain . 120 tablet 0   • pregabalin (LYRICA) 200 MG capsule Take 1 capsule by mouth 3 (Three) Times a Day. 90 capsule 0   • simvastatin (ZOCOR) 40 MG tablet Take 40 mg by mouth Every Night.     • zolpidem (AMBIEN) 10 MG tablet Take 10 mg by mouth At Night As Needed for Sleep.       No current facility-administered medications for this visit.     Review of Systems   Constitutional: Negative for chills and fever.   HENT: Negative for congestion.    Respiratory: Negative for shortness of breath.    Cardiovascular: Positive for leg swelling. Negative for chest pain.   Gastrointestinal: Negative for constipation, diarrhea, nausea and vomiting.   Musculoskeletal: Positive for arthralgias.   Skin: Positive for color change and wound.         Ulcer left posterior heel   Neurological: Positive for numbness.       OBJECTIVE     Vitals:    10/22/21 1126   BP: 132/64   Pulse: 75   SpO2: 96%       PHYSICAL EXAM  GEN:   Accompanied by none.     Foot/Ankle Exam:       General:   Diabetic Foot Exam Performed    Appearance: appears stated age and healthy and obesity    Orientation: AAOx3    Affect: appropriate    Gait: unimpaired    Assistance: independent    Shoe Gear:  Diabetic shoes and surgical shoe    VASCULAR      Right Foot Vascularity   Dorsalis pedis:  1+  Posterior tibial:  1+  Skin Temperature: warm    Edema Grading:  Pitting and 2+  CFT:  5  Pedal Hair Growth:  Absent  Varicosities: spider veins       Left Foot Vascularity   Dorsalis pedis:  1+  Posterior tibial:  1+  Skin Temperature: warm    Edema Grading:  Pitting and 2+  CFT:  5  Pedal Hair Growth:  Absent  Varicosities: spider veins        NEUROLOGIC     Right Foot Neurologic   Light touch sensation:  Diminished  Vibratory sensation:  Diminished  Hot/Cold sensation: diminished    Protective Sensation using [place] Monofilament:  1     Left Foot Neurologic   Light touch sensation:  Diminished  Vibratory sensation:  Diminished  Hot/cold sensation: diminished    Protective Sensation using [place] Monofilament:  0     MUSCULOSKELETAL      Right Foot Musculoskeletal   Ecchymosis:  None  Tenderness: toenails    Arch:  Normal  Claw toe:  Second toe, third toe, fourth toe and fifth toe  Hallux valgus: No       Left Foot Musculoskeletal   Ecchymosis:  None  Tenderness: toenails    Arch:  Normal  Claw toe:  Second toe, third toe, fourth toe and fifth toe  Hallux valgus: No       MUSCLE STRENGTH     Right Foot Muscle Strength   Foot dorsiflexion:  5  Foot plantar flexion:  5  Foot inversion:  5  Foot eversion:  5     Left Foot Muscle Strength   Foot dorsiflexion:  5  Foot plantar flexion:  5  Foot inversion:  5  Foot eversion:  5     RANGE OF MOTION      Right Foot Range of Motion    Foot and ankle ROM within normal limits       Left Foot Range of Motion   Foot and ankle ROM within normal limits       DERMATOLOGIC     Right Foot Dermatologic   Skin: corn    Nails: onychomycosis, abnormally thick, subungual debris and dystrophic nails       Left Foot Dermatologic   Skin: corn, skin changes and ulcer (posterior heel, dressing in place from wound care)    Nails: onychomycosis, abnormally thick, subungual debris and dystrophic nails       Image:       RADIOLOGY/NUCLEAR:  No results found.    LABORATORY/CULTURE RESULTS:      PATHOLOGY RESULTS:       ASSESSMENT/PLAN     Diagnoses and all orders for this visit:    1. Onychomycosis (Primary)    2. Foot callus    3. Type 2 diabetes mellitus with diabetic neuropathy, with long-term current use of insulin (HCC)    4. Peripheral arterial disease (CMS/HCC)     5. Tobacco abuse    6. 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  Discussed findings and treatment plan including risks, benefits, and treatment options with patient in detail. Patient agreed with treatment plan.  After verbal consent obtained, nail(s) x10 debrided of length and thickness with nail nipper without incidence  After verbal consent obtained, calluses x2 pared utilizing dermal curette and/or scalpel without incidence  Patient may maintain nails and calluses at home utilizing emery board or pumice stone between visits as needed  Reviewed at home diabetic foot care including daily foot checks   Continue to follow with outpatient wound care center for left heel wound.    Continue diabetic monitoring and control under direction of PCP.  An After Visit Summary was printed and given to the patient at discharge, including (if requested) any available informative/educational handouts regarding diagnosis, treatment, or medications. All questions were answered to patient/family  satisfaction. Should symptoms fail to improve or worsen they agree to call or return to clinic or to go to the Emergency Department. Discussed the importance of following up with any needed screening tests/labs/specialist appointments and any requested follow-up recommended by me today. Importance of maintaining follow-up discussed and patient accepts that missed appointments can delay diagnosis and potentially lead to worsening of conditions.  Return in about 3 months (around 1/22/2022)., or sooner if acute issues arise.    Lab Frequency Next Occurrence   Follow Anesthesia Guidelines / Standing Orders Once 01/28/2020   Provide NPO Instructions to Patient Once 01/28/2020   Chlorhexidine Skin Prep Once 01/28/2020   Type and screen Once 01/23/2020       This document has been electronically signed by MOHAMUD Lazo on October 22, 2021 12:45 CDT

## 2021-10-21 ENCOUNTER — OFFICE VISIT (OUTPATIENT)
Dept: WOUND CARE | Facility: HOSPITAL | Age: 71
End: 2021-10-21

## 2021-10-21 DIAGNOSIS — L97.422 NON-PRESSURE CHRONIC ULCER OF LEFT HEEL AND MIDFOOT WITH FAT LAYER EXPOSED (HCC): ICD-10-CM

## 2021-10-21 DIAGNOSIS — E11.621 TYPE 2 DIABETES MELLITUS WITH FOOT ULCER, UNSPECIFIED WHETHER LONG TERM INSULIN USE (HCC): ICD-10-CM

## 2021-10-21 DIAGNOSIS — L97.509 TYPE 2 DIABETES MELLITUS WITH FOOT ULCER, UNSPECIFIED WHETHER LONG TERM INSULIN USE (HCC): ICD-10-CM

## 2021-10-21 PROCEDURE — 11042 DBRDMT SUBQ TIS 1ST 20SQCM/<: CPT | Performed by: NURSE PRACTITIONER

## 2021-10-22 ENCOUNTER — OFFICE VISIT (OUTPATIENT)
Dept: PODIATRY | Facility: CLINIC | Age: 71
End: 2021-10-22

## 2021-10-22 VITALS
OXYGEN SATURATION: 96 % | BODY MASS INDEX: 31.22 KG/M2 | HEIGHT: 71 IN | DIASTOLIC BLOOD PRESSURE: 64 MMHG | HEART RATE: 75 BPM | SYSTOLIC BLOOD PRESSURE: 132 MMHG | WEIGHT: 223 LBS

## 2021-10-22 DIAGNOSIS — L84 FOOT CALLUS: ICD-10-CM

## 2021-10-22 DIAGNOSIS — Z72.0 TOBACCO ABUSE: ICD-10-CM

## 2021-10-22 DIAGNOSIS — I73.9 PERIPHERAL ARTERIAL DISEASE (HCC): ICD-10-CM

## 2021-10-22 DIAGNOSIS — Z79.4 TYPE 2 DIABETES MELLITUS WITH DIABETIC NEUROPATHY, WITH LONG-TERM CURRENT USE OF INSULIN (HCC): ICD-10-CM

## 2021-10-22 DIAGNOSIS — E11.40 TYPE 2 DIABETES MELLITUS WITH DIABETIC NEUROPATHY, WITH LONG-TERM CURRENT USE OF INSULIN (HCC): ICD-10-CM

## 2021-10-22 DIAGNOSIS — L97.422 NON-PRESSURE CHRONIC ULCER OF LEFT HEEL AND MIDFOOT WITH FAT LAYER EXPOSED (HCC): ICD-10-CM

## 2021-10-22 DIAGNOSIS — B35.1 ONYCHOMYCOSIS: Primary | ICD-10-CM

## 2021-10-22 PROCEDURE — 11721 DEBRIDE NAIL 6 OR MORE: CPT | Performed by: NURSE PRACTITIONER

## 2021-10-22 PROCEDURE — 11056 PARNG/CUTG B9 HYPRKR LES 2-4: CPT | Performed by: NURSE PRACTITIONER

## 2021-10-28 ENCOUNTER — APPOINTMENT (OUTPATIENT)
Dept: WOUND CARE | Facility: HOSPITAL | Age: 71
End: 2021-10-28

## 2021-12-15 ENCOUNTER — APPOINTMENT (OUTPATIENT)
Dept: CT IMAGING | Facility: HOSPITAL | Age: 71
End: 2021-12-15

## 2021-12-15 ENCOUNTER — APPOINTMENT (OUTPATIENT)
Dept: GENERAL RADIOLOGY | Facility: HOSPITAL | Age: 71
End: 2021-12-15

## 2021-12-15 ENCOUNTER — HOSPITAL ENCOUNTER (INPATIENT)
Facility: HOSPITAL | Age: 71
LOS: 4 days | Discharge: HOME HEALTH | End: 2021-12-20
Attending: EMERGENCY MEDICINE | Admitting: FAMILY MEDICINE

## 2021-12-15 DIAGNOSIS — M54.2 PAIN, NECK: ICD-10-CM

## 2021-12-15 DIAGNOSIS — Z78.9 DECREASED ACTIVITIES OF DAILY LIVING (ADL): ICD-10-CM

## 2021-12-15 DIAGNOSIS — E78.5 HYPERLIPIDEMIA, UNSPECIFIED HYPERLIPIDEMIA TYPE: ICD-10-CM

## 2021-12-15 DIAGNOSIS — Z98.1 S/P CERVICAL SPINAL FUSION: ICD-10-CM

## 2021-12-15 DIAGNOSIS — Z01.818 PREOP TESTING: ICD-10-CM

## 2021-12-15 DIAGNOSIS — E87.6 HYPOKALEMIA: ICD-10-CM

## 2021-12-15 DIAGNOSIS — I10 ESSENTIAL HYPERTENSION: ICD-10-CM

## 2021-12-15 DIAGNOSIS — N30.00 ACUTE CYSTITIS WITHOUT HEMATURIA: ICD-10-CM

## 2021-12-15 DIAGNOSIS — R41.82 ALTERED MENTAL STATUS, UNSPECIFIED ALTERED MENTAL STATUS TYPE: ICD-10-CM

## 2021-12-15 DIAGNOSIS — E83.39 HYPOPHOSPHATEMIA: ICD-10-CM

## 2021-12-15 DIAGNOSIS — K21.9 GERD WITHOUT ESOPHAGITIS: ICD-10-CM

## 2021-12-15 DIAGNOSIS — E87.1 HYPONATREMIA: ICD-10-CM

## 2021-12-15 DIAGNOSIS — R29.898 MUSCULAR DECONDITIONING: ICD-10-CM

## 2021-12-15 DIAGNOSIS — M47.10 SPINAL CORD COMPRESSION DUE TO DEGENERATIVE DISORDER OF SPINAL COLUMN: ICD-10-CM

## 2021-12-15 DIAGNOSIS — Z72.0 TOBACCO ABUSE: ICD-10-CM

## 2021-12-15 DIAGNOSIS — N43.3 HYDROCELE, UNSPECIFIED HYDROCELE TYPE: ICD-10-CM

## 2021-12-15 DIAGNOSIS — Z74.09 IMPAIRED MOBILITY: ICD-10-CM

## 2021-12-15 DIAGNOSIS — I87.2 ACUTE STASIS DERMATITIS: ICD-10-CM

## 2021-12-15 DIAGNOSIS — Z91.199 MEDICALLY NONCOMPLIANT: ICD-10-CM

## 2021-12-15 DIAGNOSIS — G62.9 PERIPHERAL POLYNEUROPATHY: ICD-10-CM

## 2021-12-15 DIAGNOSIS — I48.92 ATRIAL FLUTTER WITH RAPID VENTRICULAR RESPONSE (HCC): ICD-10-CM

## 2021-12-15 DIAGNOSIS — L24.9 IRRITANT DERMATITIS: ICD-10-CM

## 2021-12-15 DIAGNOSIS — E87.29 HIGH ANION GAP METABOLIC ACIDOSIS: Primary | ICD-10-CM

## 2021-12-15 DIAGNOSIS — L89.92 PRESSURE INJURY, STAGE 2, UNSPECIFIED LOCATION (HCC): ICD-10-CM

## 2021-12-15 DIAGNOSIS — D64.9 NORMOCYTIC ANEMIA: ICD-10-CM

## 2021-12-15 DIAGNOSIS — F10.10 ALCOHOL ABUSE: Chronic | ICD-10-CM

## 2021-12-15 DIAGNOSIS — Z79.899 POLYPHARMACY: Chronic | ICD-10-CM

## 2021-12-15 DIAGNOSIS — M51.37 DDD (DEGENERATIVE DISC DISEASE), LUMBOSACRAL: ICD-10-CM

## 2021-12-15 DIAGNOSIS — J18.9 COMMUNITY ACQUIRED PNEUMONIA OF LEFT LOWER LOBE OF LUNG: ICD-10-CM

## 2021-12-15 DIAGNOSIS — F10.920 ALCOHOLIC INTOXICATION WITHOUT COMPLICATION (HCC): ICD-10-CM

## 2021-12-15 DIAGNOSIS — E66.01 MORBIDLY OBESE (HCC): ICD-10-CM

## 2021-12-15 DIAGNOSIS — L98.421 SKIN ULCER OF SACRUM, LIMITED TO BREAKDOWN OF SKIN (HCC): ICD-10-CM

## 2021-12-15 DIAGNOSIS — R53.1 WEAKNESS: ICD-10-CM

## 2021-12-15 DIAGNOSIS — M48.061 SPINAL STENOSIS OF LUMBAR REGION WITHOUT NEUROGENIC CLAUDICATION: ICD-10-CM

## 2021-12-15 DIAGNOSIS — E11.65 TYPE 2 DIABETES MELLITUS WITH HYPERGLYCEMIA, WITHOUT LONG-TERM CURRENT USE OF INSULIN (HCC): ICD-10-CM

## 2021-12-15 DIAGNOSIS — I73.9 PAD (PERIPHERAL ARTERY DISEASE) (HCC): ICD-10-CM

## 2021-12-15 DIAGNOSIS — M47.12 CERVICAL SPONDYLOSIS WITH MYELOPATHY: ICD-10-CM

## 2021-12-15 LAB
ALBUMIN SERPL-MCNC: 3.2 G/DL (ref 3.5–5.2)
ALBUMIN/GLOB SERPL: 0.9 G/DL
ALP SERPL-CCNC: 167 U/L (ref 39–117)
ALT SERPL W P-5'-P-CCNC: 12 U/L (ref 1–41)
AMPHET+METHAMPHET UR QL: NEGATIVE
AMPHETAMINES UR QL: NEGATIVE
ANION GAP SERPL CALCULATED.3IONS-SCNC: 26 MMOL/L (ref 5–15)
APAP SERPL-MCNC: <5 MCG/ML (ref 0–30)
ARTERIAL PATENCY WRIST A: POSITIVE
AST SERPL-CCNC: 9 U/L (ref 1–40)
ATMOSPHERIC PRESS: 751 MMHG
BACTERIA UR QL AUTO: ABNORMAL /HPF
BARBITURATES UR QL SCN: NEGATIVE
BASE EXCESS BLDA CALC-SCNC: -9 MMOL/L (ref 0–2)
BASOPHILS # BLD AUTO: 0.04 10*3/MM3 (ref 0–0.2)
BASOPHILS NFR BLD AUTO: 0.3 % (ref 0–1.5)
BDY SITE: ABNORMAL
BENZODIAZ UR QL SCN: NEGATIVE
BILIRUB SERPL-MCNC: 0.6 MG/DL (ref 0–1.2)
BILIRUB UR QL STRIP: NEGATIVE
BODY TEMPERATURE: 37 C
BUN SERPL-MCNC: 26 MG/DL (ref 8–23)
BUN/CREAT SERPL: 28.9 (ref 7–25)
BUPRENORPHINE SERPL-MCNC: NEGATIVE NG/ML
CALCIUM SPEC-SCNC: 10 MG/DL (ref 8.6–10.5)
CANNABINOIDS SERPL QL: NEGATIVE
CHLORIDE SERPL-SCNC: 88 MMOL/L (ref 98–107)
CK SERPL-CCNC: 18 U/L (ref 20–200)
CLARITY UR: CLEAR
CO2 SERPL-SCNC: 17 MMOL/L (ref 22–29)
COCAINE UR QL: NEGATIVE
COLOR UR: YELLOW
CREAT SERPL-MCNC: 0.9 MG/DL (ref 0.76–1.27)
CRP SERPL-MCNC: 10.97 MG/DL (ref 0–0.5)
D-LACTATE SERPL-SCNC: 2.6 MMOL/L (ref 0.5–2)
DEPRECATED RDW RBC AUTO: 48.2 FL (ref 37–54)
EOSINOPHIL # BLD AUTO: 0.02 10*3/MM3 (ref 0–0.4)
EOSINOPHIL NFR BLD AUTO: 0.2 % (ref 0.3–6.2)
ERYTHROCYTE [DISTWIDTH] IN BLOOD BY AUTOMATED COUNT: 14.6 % (ref 12.3–15.4)
ETHANOL UR QL: 0.17 %
GFR SERPL CREATININE-BSD FRML MDRD: 83 ML/MIN/1.73
GLOBULIN UR ELPH-MCNC: 3.7 GM/DL
GLUCOSE SERPL-MCNC: 306 MG/DL (ref 65–99)
GLUCOSE UR STRIP-MCNC: ABNORMAL MG/DL
HCO3 BLDA-SCNC: 14.8 MMOL/L (ref 20–26)
HCT VFR BLD AUTO: 33.6 % (ref 37.5–51)
HGB BLD-MCNC: 11 G/DL (ref 13–17.7)
HGB UR QL STRIP.AUTO: NEGATIVE
HOLD SPECIMEN: NORMAL
HYALINE CASTS UR QL AUTO: ABNORMAL /LPF
IMM GRANULOCYTES # BLD AUTO: 0.31 10*3/MM3 (ref 0–0.05)
IMM GRANULOCYTES NFR BLD AUTO: 2.5 % (ref 0–0.5)
INHALED O2 CONCENTRATION: 21 %
INR PPP: 1.1 (ref 0.91–1.09)
KETONES UR QL STRIP: ABNORMAL
LEUKOCYTE ESTERASE UR QL STRIP.AUTO: NEGATIVE
LYMPHOCYTES # BLD AUTO: 0.99 10*3/MM3 (ref 0.7–3.1)
LYMPHOCYTES NFR BLD AUTO: 7.9 % (ref 19.6–45.3)
Lab: ABNORMAL
MAGNESIUM SERPL-MCNC: 1.9 MG/DL (ref 1.6–2.4)
MCH RBC QN AUTO: 30.1 PG (ref 26.6–33)
MCHC RBC AUTO-ENTMCNC: 32.7 G/DL (ref 31.5–35.7)
MCV RBC AUTO: 92.1 FL (ref 79–97)
METHADONE UR QL SCN: NEGATIVE
MODALITY: ABNORMAL
MONOCYTES # BLD AUTO: 0.97 10*3/MM3 (ref 0.1–0.9)
MONOCYTES NFR BLD AUTO: 7.7 % (ref 5–12)
NEUTROPHILS NFR BLD AUTO: 10.26 10*3/MM3 (ref 1.7–7)
NEUTROPHILS NFR BLD AUTO: 81.4 % (ref 42.7–76)
NITRITE UR QL STRIP: NEGATIVE
NRBC BLD AUTO-RTO: 0 /100 WBC (ref 0–0.2)
OPIATES UR QL: NEGATIVE
OSMOLALITY SERPL: 342 MOSM/KG (ref 280–301)
OXYCODONE UR QL SCN: NEGATIVE
PCO2 BLDA: 24.9 MM HG (ref 35–45)
PCO2 TEMP ADJ BLD: 24.9 MM HG (ref 35–45)
PCP UR QL SCN: NEGATIVE
PH BLDA: 7.38 PH UNITS (ref 7.35–7.45)
PH UR STRIP.AUTO: 5.5 [PH] (ref 5–8)
PH, TEMP CORRECTED: 7.38 PH UNITS (ref 7.35–7.45)
PLATELET # BLD AUTO: 480 10*3/MM3 (ref 140–450)
PMV BLD AUTO: 9.3 FL (ref 6–12)
PO2 BLDA: 85.9 MM HG (ref 83–108)
PO2 TEMP ADJ BLD: 85.9 MM HG (ref 83–108)
POTASSIUM SERPL-SCNC: 3 MMOL/L (ref 3.5–5.2)
PROCALCITONIN SERPL-MCNC: 0.18 NG/ML (ref 0–0.25)
PROPOXYPH UR QL: NEGATIVE
PROT SERPL-MCNC: 6.9 G/DL (ref 6–8.5)
PROT UR QL STRIP: ABNORMAL
PROTHROMBIN TIME: 13.8 SECONDS (ref 11.9–14.6)
RBC # BLD AUTO: 3.65 10*6/MM3 (ref 4.14–5.8)
RBC # UR STRIP: ABNORMAL /HPF
REF LAB TEST METHOD: ABNORMAL
SALICYLATES SERPL-MCNC: <0.3 MG/DL
SAO2 % BLDCOA: 97.4 % (ref 94–99)
SARS-COV-2 RNA PNL SPEC NAA+PROBE: NOT DETECTED
SODIUM SERPL-SCNC: 131 MMOL/L (ref 136–145)
SP GR UR STRIP: 1.02 (ref 1–1.03)
SQUAMOUS #/AREA URNS HPF: ABNORMAL /HPF
TRICYCLICS UR QL SCN: NEGATIVE
TROPONIN T SERPL-MCNC: <0.01 NG/ML (ref 0–0.03)
UROBILINOGEN UR QL STRIP: ABNORMAL
VENTILATOR MODE: ABNORMAL
WBC # UR STRIP: ABNORMAL /HPF
WBC NRBC COR # BLD: 12.59 10*3/MM3 (ref 3.4–10.8)
WHOLE BLOOD HOLD SPECIMEN: NORMAL
WHOLE BLOOD HOLD SPECIMEN: NORMAL

## 2021-12-15 PROCEDURE — 86140 C-REACTIVE PROTEIN: CPT | Performed by: EMERGENCY MEDICINE

## 2021-12-15 PROCEDURE — 84145 PROCALCITONIN (PCT): CPT | Performed by: EMERGENCY MEDICINE

## 2021-12-15 PROCEDURE — 80143 DRUG ASSAY ACETAMINOPHEN: CPT | Performed by: EMERGENCY MEDICINE

## 2021-12-15 PROCEDURE — 93010 ELECTROCARDIOGRAM REPORT: CPT | Performed by: INTERNAL MEDICINE

## 2021-12-15 PROCEDURE — 82077 ASSAY SPEC XCP UR&BREATH IA: CPT | Performed by: EMERGENCY MEDICINE

## 2021-12-15 PROCEDURE — 87635 SARS-COV-2 COVID-19 AMP PRB: CPT | Performed by: EMERGENCY MEDICINE

## 2021-12-15 PROCEDURE — 82550 ASSAY OF CK (CPK): CPT | Performed by: EMERGENCY MEDICINE

## 2021-12-15 PROCEDURE — 80053 COMPREHEN METABOLIC PANEL: CPT | Performed by: EMERGENCY MEDICINE

## 2021-12-15 PROCEDURE — 85610 PROTHROMBIN TIME: CPT | Performed by: EMERGENCY MEDICINE

## 2021-12-15 PROCEDURE — 83605 ASSAY OF LACTIC ACID: CPT | Performed by: EMERGENCY MEDICINE

## 2021-12-15 PROCEDURE — 84484 ASSAY OF TROPONIN QUANT: CPT | Performed by: EMERGENCY MEDICINE

## 2021-12-15 PROCEDURE — P9612 CATHETERIZE FOR URINE SPEC: HCPCS

## 2021-12-15 PROCEDURE — 80306 DRUG TEST PRSMV INSTRMNT: CPT | Performed by: EMERGENCY MEDICINE

## 2021-12-15 PROCEDURE — 87040 BLOOD CULTURE FOR BACTERIA: CPT | Performed by: EMERGENCY MEDICINE

## 2021-12-15 PROCEDURE — 36415 COLL VENOUS BLD VENIPUNCTURE: CPT

## 2021-12-15 PROCEDURE — 82803 BLOOD GASES ANY COMBINATION: CPT

## 2021-12-15 PROCEDURE — 83930 ASSAY OF BLOOD OSMOLALITY: CPT | Performed by: EMERGENCY MEDICINE

## 2021-12-15 PROCEDURE — 36600 WITHDRAWAL OF ARTERIAL BLOOD: CPT

## 2021-12-15 PROCEDURE — 99285 EMERGENCY DEPT VISIT HI MDM: CPT

## 2021-12-15 PROCEDURE — 81001 URINALYSIS AUTO W/SCOPE: CPT | Performed by: EMERGENCY MEDICINE

## 2021-12-15 PROCEDURE — 87086 URINE CULTURE/COLONY COUNT: CPT | Performed by: EMERGENCY MEDICINE

## 2021-12-15 PROCEDURE — 83036 HEMOGLOBIN GLYCOSYLATED A1C: CPT | Performed by: INTERNAL MEDICINE

## 2021-12-15 PROCEDURE — 71045 X-RAY EXAM CHEST 1 VIEW: CPT

## 2021-12-15 PROCEDURE — 83735 ASSAY OF MAGNESIUM: CPT | Performed by: EMERGENCY MEDICINE

## 2021-12-15 PROCEDURE — 80179 DRUG ASSAY SALICYLATE: CPT | Performed by: EMERGENCY MEDICINE

## 2021-12-15 PROCEDURE — 93005 ELECTROCARDIOGRAM TRACING: CPT | Performed by: EMERGENCY MEDICINE

## 2021-12-15 PROCEDURE — 85025 COMPLETE CBC W/AUTO DIFF WBC: CPT | Performed by: EMERGENCY MEDICINE

## 2021-12-15 RX ORDER — SODIUM CHLORIDE 0.9 % (FLUSH) 0.9 %
10 SYRINGE (ML) INJECTION AS NEEDED
Status: DISCONTINUED | OUTPATIENT
Start: 2021-12-15 | End: 2021-12-20 | Stop reason: HOSPADM

## 2021-12-15 RX ADMIN — SODIUM CHLORIDE, POTASSIUM CHLORIDE, SODIUM LACTATE AND CALCIUM CHLORIDE 1000 ML: 600; 310; 30; 20 INJECTION, SOLUTION INTRAVENOUS at 23:04

## 2021-12-16 ENCOUNTER — APPOINTMENT (OUTPATIENT)
Dept: CT IMAGING | Facility: HOSPITAL | Age: 71
End: 2021-12-16

## 2021-12-16 PROBLEM — F10.10 ALCOHOL ABUSE: Chronic | Status: ACTIVE | Noted: 2021-12-16

## 2021-12-16 PROBLEM — E87.6 HYPOKALEMIA: Status: ACTIVE | Noted: 2021-12-16

## 2021-12-16 PROBLEM — E87.1 HYPONATREMIA: Status: ACTIVE | Noted: 2021-12-16

## 2021-12-16 PROBLEM — R29.898 MUSCULAR DECONDITIONING: Status: ACTIVE | Noted: 2021-12-16

## 2021-12-16 PROBLEM — E87.29 HIGH ANION GAP METABOLIC ACIDOSIS: Status: ACTIVE | Noted: 2021-12-16

## 2021-12-16 PROBLEM — N30.00 ACUTE CYSTITIS: Status: ACTIVE | Noted: 2021-12-16

## 2021-12-16 PROBLEM — I48.92 ATRIAL FLUTTER WITH RAPID VENTRICULAR RESPONSE (HCC): Status: ACTIVE | Noted: 2021-12-16

## 2021-12-16 PROBLEM — I87.2 ACUTE STASIS DERMATITIS: Status: ACTIVE | Noted: 2021-12-16

## 2021-12-16 PROBLEM — E11.65 TYPE 2 DIABETES MELLITUS WITH HYPERGLYCEMIA, WITHOUT LONG-TERM CURRENT USE OF INSULIN (HCC): Status: ACTIVE | Noted: 2020-01-24

## 2021-12-16 LAB
ACETONE BLD QL: ABNORMAL
ALBUMIN SERPL-MCNC: 2.8 G/DL (ref 3.5–5.2)
ALBUMIN/GLOB SERPL: 0.8 G/DL
ALP SERPL-CCNC: 146 U/L (ref 39–117)
ALT SERPL W P-5'-P-CCNC: 9 U/L (ref 1–41)
ANION GAP SERPL CALCULATED.3IONS-SCNC: 23 MMOL/L (ref 5–15)
ANION GAP SERPL CALCULATED.3IONS-SCNC: 28 MMOL/L (ref 5–15)
ARTERIAL PATENCY WRIST A: POSITIVE
AST SERPL-CCNC: 9 U/L (ref 1–40)
ATMOSPHERIC PRESS: 751 MMHG
BASE EXCESS BLDA CALC-SCNC: -7.3 MMOL/L (ref 0–2)
BASOPHILS # BLD AUTO: 0.04 10*3/MM3 (ref 0–0.2)
BASOPHILS NFR BLD AUTO: 0.2 % (ref 0–1.5)
BDY SITE: ABNORMAL
BILIRUB SERPL-MCNC: 0.5 MG/DL (ref 0–1.2)
BODY TEMPERATURE: 37 C
BUN SERPL-MCNC: 25 MG/DL (ref 8–23)
BUN SERPL-MCNC: 26 MG/DL (ref 8–23)
BUN/CREAT SERPL: 26.6 (ref 7–25)
BUN/CREAT SERPL: 28.6 (ref 7–25)
CALCIUM SPEC-SCNC: 9.4 MG/DL (ref 8.6–10.5)
CALCIUM SPEC-SCNC: 9.7 MG/DL (ref 8.6–10.5)
CHLORIDE SERPL-SCNC: 89 MMOL/L (ref 98–107)
CHLORIDE SERPL-SCNC: 93 MMOL/L (ref 98–107)
CO2 SERPL-SCNC: 15 MMOL/L (ref 22–29)
CO2 SERPL-SCNC: 17 MMOL/L (ref 22–29)
CREAT SERPL-MCNC: 0.91 MG/DL (ref 0.76–1.27)
CREAT SERPL-MCNC: 0.94 MG/DL (ref 0.76–1.27)
D-LACTATE SERPL-SCNC: 1.6 MMOL/L (ref 0.5–2)
D-LACTATE SERPL-SCNC: 3 MMOL/L (ref 0.5–2)
DEPRECATED RDW RBC AUTO: 49.1 FL (ref 37–54)
EOSINOPHIL # BLD AUTO: 0.01 10*3/MM3 (ref 0–0.4)
EOSINOPHIL NFR BLD AUTO: 0.1 % (ref 0.3–6.2)
ERYTHROCYTE [DISTWIDTH] IN BLOOD BY AUTOMATED COUNT: 14.6 % (ref 12.3–15.4)
GFR SERPL CREATININE-BSD FRML MDRD: 79 ML/MIN/1.73
GFR SERPL CREATININE-BSD FRML MDRD: 82 ML/MIN/1.73
GLOBULIN UR ELPH-MCNC: 3.3 GM/DL
GLUCOSE BLDC GLUCOMTR-MCNC: 259 MG/DL (ref 70–130)
GLUCOSE BLDC GLUCOMTR-MCNC: 341 MG/DL (ref 70–130)
GLUCOSE BLDC GLUCOMTR-MCNC: 395 MG/DL (ref 70–130)
GLUCOSE SERPL-MCNC: 260 MG/DL (ref 65–99)
GLUCOSE SERPL-MCNC: 331 MG/DL (ref 65–99)
HBA1C MFR BLD: 8.3 % (ref 4.8–5.6)
HCO3 BLDA-SCNC: 16.3 MMOL/L (ref 20–26)
HCT VFR BLD AUTO: 29.8 % (ref 37.5–51)
HGB BLD-MCNC: 9.7 G/DL (ref 13–17.7)
HOLD SPECIMEN: NORMAL
IMM GRANULOCYTES # BLD AUTO: 0.51 10*3/MM3 (ref 0–0.05)
IMM GRANULOCYTES NFR BLD AUTO: 2.9 % (ref 0–0.5)
INHALED O2 CONCENTRATION: 21 %
LYMPHOCYTES # BLD AUTO: 0.58 10*3/MM3 (ref 0.7–3.1)
LYMPHOCYTES NFR BLD AUTO: 3.3 % (ref 19.6–45.3)
Lab: ABNORMAL
MCH RBC QN AUTO: 30.2 PG (ref 26.6–33)
MCHC RBC AUTO-ENTMCNC: 32.6 G/DL (ref 31.5–35.7)
MCV RBC AUTO: 92.8 FL (ref 79–97)
MODALITY: ABNORMAL
MONOCYTES # BLD AUTO: 1.44 10*3/MM3 (ref 0.1–0.9)
MONOCYTES NFR BLD AUTO: 8.3 % (ref 5–12)
NEUTROPHILS NFR BLD AUTO: 14.82 10*3/MM3 (ref 1.7–7)
NEUTROPHILS NFR BLD AUTO: 85.2 % (ref 42.7–76)
NRBC BLD AUTO-RTO: 0.1 /100 WBC (ref 0–0.2)
PCO2 BLDA: 26 MM HG (ref 35–45)
PCO2 TEMP ADJ BLD: 26 MM HG (ref 35–45)
PH BLDA: 7.41 PH UNITS (ref 7.35–7.45)
PH, TEMP CORRECTED: 7.41 PH UNITS (ref 7.35–7.45)
PLATELET # BLD AUTO: 449 10*3/MM3 (ref 140–450)
PMV BLD AUTO: 9.5 FL (ref 6–12)
PO2 BLDA: 76.5 MM HG (ref 83–108)
PO2 TEMP ADJ BLD: 76.5 MM HG (ref 83–108)
POTASSIUM SERPL-SCNC: 2.6 MMOL/L (ref 3.5–5.2)
POTASSIUM SERPL-SCNC: 2.9 MMOL/L (ref 3.5–5.2)
PROT SERPL-MCNC: 6.1 G/DL (ref 6–8.5)
QT INTERVAL: 370 MS
QTC INTERVAL: 484 MS
RBC # BLD AUTO: 3.21 10*6/MM3 (ref 4.14–5.8)
SAO2 % BLDCOA: 96.7 % (ref 94–99)
SODIUM SERPL-SCNC: 132 MMOL/L (ref 136–145)
SODIUM SERPL-SCNC: 133 MMOL/L (ref 136–145)
T4 FREE SERPL-MCNC: 1.1 NG/DL (ref 0.93–1.7)
TROPONIN T SERPL-MCNC: <0.01 NG/ML (ref 0–0.03)
TSH SERPL DL<=0.05 MIU/L-ACNC: 0.52 UIU/ML (ref 0.27–4.2)
VENTILATOR MODE: ABNORMAL
WBC NRBC COR # BLD: 17.4 10*3/MM3 (ref 3.4–10.8)

## 2021-12-16 PROCEDURE — 36600 WITHDRAWAL OF ARTERIAL BLOOD: CPT

## 2021-12-16 PROCEDURE — 84484 ASSAY OF TROPONIN QUANT: CPT | Performed by: EMERGENCY MEDICINE

## 2021-12-16 PROCEDURE — 25010000002 POTASSIUM CHLORIDE PER 2 MEQ: Performed by: EMERGENCY MEDICINE

## 2021-12-16 PROCEDURE — 97166 OT EVAL MOD COMPLEX 45 MIN: CPT | Performed by: OCCUPATIONAL THERAPIST

## 2021-12-16 PROCEDURE — 97162 PT EVAL MOD COMPLEX 30 MIN: CPT | Performed by: PHYSICAL THERAPIST

## 2021-12-16 PROCEDURE — 83605 ASSAY OF LACTIC ACID: CPT | Performed by: EMERGENCY MEDICINE

## 2021-12-16 PROCEDURE — 25010000002 SODIUM CHLORIDE 0.9 % WITH KCL 20 MEQ 20-0.9 MEQ/L-% SOLUTION: Performed by: INTERNAL MEDICINE

## 2021-12-16 PROCEDURE — 84443 ASSAY THYROID STIM HORMONE: CPT | Performed by: INTERNAL MEDICINE

## 2021-12-16 PROCEDURE — 25010000002 DIGOXIN PER 500 MCG: Performed by: INTERNAL MEDICINE

## 2021-12-16 PROCEDURE — 63710000001 INSULIN LISPRO (HUMAN) PER 5 UNITS: Performed by: INTERNAL MEDICINE

## 2021-12-16 PROCEDURE — 83605 ASSAY OF LACTIC ACID: CPT | Performed by: INTERNAL MEDICINE

## 2021-12-16 PROCEDURE — 80053 COMPREHEN METABOLIC PANEL: CPT | Performed by: INTERNAL MEDICINE

## 2021-12-16 PROCEDURE — 85025 COMPLETE CBC W/AUTO DIFF WBC: CPT | Performed by: INTERNAL MEDICINE

## 2021-12-16 PROCEDURE — 82962 GLUCOSE BLOOD TEST: CPT

## 2021-12-16 PROCEDURE — 82009 KETONE BODYS QUAL: CPT | Performed by: EMERGENCY MEDICINE

## 2021-12-16 PROCEDURE — 63710000001 INSULIN DETEMIR PER 5 UNITS: Performed by: INTERNAL MEDICINE

## 2021-12-16 PROCEDURE — 25010000002 ENOXAPARIN PER 10 MG: Performed by: INTERNAL MEDICINE

## 2021-12-16 PROCEDURE — 25010000002 LORAZEPAM PER 2 MG: Performed by: INTERNAL MEDICINE

## 2021-12-16 PROCEDURE — 25010000002 CEFTRIAXONE PER 250 MG: Performed by: INTERNAL MEDICINE

## 2021-12-16 PROCEDURE — 70450 CT HEAD/BRAIN W/O DYE: CPT

## 2021-12-16 PROCEDURE — 25010000002 MAGNESIUM SULFATE 2 GM/50ML SOLUTION: Performed by: INTERNAL MEDICINE

## 2021-12-16 PROCEDURE — 0 POTASSIUM CHLORIDE 10 MEQ/100ML SOLUTION: Performed by: INTERNAL MEDICINE

## 2021-12-16 PROCEDURE — 82803 BLOOD GASES ANY COMBINATION: CPT

## 2021-12-16 PROCEDURE — 25010000002 POTASSIUM CHLORIDE PER 2 MEQ: Performed by: INTERNAL MEDICINE

## 2021-12-16 PROCEDURE — 25010000002 CEFTRIAXONE PER 250 MG: Performed by: EMERGENCY MEDICINE

## 2021-12-16 PROCEDURE — 84439 ASSAY OF FREE THYROXINE: CPT | Performed by: INTERNAL MEDICINE

## 2021-12-16 RX ORDER — SIMVASTATIN 40 MG
40 TABLET ORAL NIGHTLY
COMMUNITY

## 2021-12-16 RX ORDER — DIGOXIN 0.25 MG/ML
250 INJECTION INTRAMUSCULAR; INTRAVENOUS ONCE
Status: COMPLETED | OUTPATIENT
Start: 2021-12-16 | End: 2021-12-16

## 2021-12-16 RX ORDER — OXYCODONE AND ACETAMINOPHEN 10; 325 MG/1; MG/1
1 TABLET ORAL EVERY 6 HOURS PRN
Status: ON HOLD | COMMUNITY
End: 2021-12-20 | Stop reason: SDUPTHER

## 2021-12-16 RX ORDER — ZOLPIDEM TARTRATE 10 MG/1
10 TABLET ORAL NIGHTLY PRN
COMMUNITY
End: 2021-12-20 | Stop reason: HOSPADM

## 2021-12-16 RX ORDER — SODIUM CHLORIDE 0.9 % (FLUSH) 0.9 %
10 SYRINGE (ML) INJECTION EVERY 12 HOURS SCHEDULED
Status: DISCONTINUED | OUTPATIENT
Start: 2021-12-16 | End: 2021-12-20 | Stop reason: HOSPADM

## 2021-12-16 RX ORDER — ONDANSETRON 4 MG/1
4 TABLET, FILM COATED ORAL EVERY 6 HOURS PRN
Status: DISCONTINUED | OUTPATIENT
Start: 2021-12-16 | End: 2021-12-20 | Stop reason: HOSPADM

## 2021-12-16 RX ORDER — LORAZEPAM 2 MG/ML
1 INJECTION INTRAMUSCULAR
Status: DISCONTINUED | OUTPATIENT
Start: 2021-12-16 | End: 2021-12-20 | Stop reason: HOSPADM

## 2021-12-16 RX ORDER — CITALOPRAM 20 MG/1
20 TABLET ORAL DAILY
Status: DISCONTINUED | OUTPATIENT
Start: 2021-12-16 | End: 2021-12-18

## 2021-12-16 RX ORDER — ACETAMINOPHEN 160 MG/5ML
650 SOLUTION ORAL EVERY 4 HOURS PRN
Status: DISCONTINUED | OUTPATIENT
Start: 2021-12-16 | End: 2021-12-20 | Stop reason: HOSPADM

## 2021-12-16 RX ORDER — ACETAMINOPHEN 650 MG/1
650 SUPPOSITORY RECTAL EVERY 4 HOURS PRN
Status: DISCONTINUED | OUTPATIENT
Start: 2021-12-16 | End: 2021-12-20 | Stop reason: HOSPADM

## 2021-12-16 RX ORDER — BUSPIRONE HYDROCHLORIDE 10 MG/1
10 TABLET ORAL EVERY 12 HOURS SCHEDULED
Status: DISCONTINUED | OUTPATIENT
Start: 2021-12-16 | End: 2021-12-20 | Stop reason: HOSPADM

## 2021-12-16 RX ORDER — SODIUM CHLORIDE AND POTASSIUM CHLORIDE 150; 900 MG/100ML; MG/100ML
50 INJECTION, SOLUTION INTRAVENOUS CONTINUOUS
Status: DISCONTINUED | OUTPATIENT
Start: 2021-12-16 | End: 2021-12-20 | Stop reason: HOSPADM

## 2021-12-16 RX ORDER — DIAZEPAM 5 MG/1
5 TABLET ORAL EVERY 8 HOURS SCHEDULED
Status: DISCONTINUED | OUTPATIENT
Start: 2021-12-16 | End: 2021-12-18

## 2021-12-16 RX ORDER — ONDANSETRON 2 MG/ML
4 INJECTION INTRAMUSCULAR; INTRAVENOUS EVERY 6 HOURS PRN
Status: DISCONTINUED | OUTPATIENT
Start: 2021-12-16 | End: 2021-12-20 | Stop reason: HOSPADM

## 2021-12-16 RX ORDER — DILTIAZEM HYDROCHLORIDE 5 MG/ML
10 INJECTION INTRAVENOUS ONCE
Status: COMPLETED | OUTPATIENT
Start: 2021-12-16 | End: 2021-12-16

## 2021-12-16 RX ORDER — POTASSIUM CHLORIDE 7.45 MG/ML
10 INJECTION INTRAVENOUS
Status: COMPLETED | OUTPATIENT
Start: 2021-12-16 | End: 2021-12-17

## 2021-12-16 RX ORDER — LORAZEPAM 2 MG/ML
0.5 INJECTION INTRAMUSCULAR
Status: DISCONTINUED | OUTPATIENT
Start: 2021-12-16 | End: 2021-12-20 | Stop reason: HOSPADM

## 2021-12-16 RX ORDER — POTASSIUM CHLORIDE 14.9 MG/ML
20 INJECTION INTRAVENOUS ONCE
Status: COMPLETED | OUTPATIENT
Start: 2021-12-16 | End: 2021-12-16

## 2021-12-16 RX ORDER — ATORVASTATIN CALCIUM 10 MG/1
20 TABLET, FILM COATED ORAL NIGHTLY
Status: DISCONTINUED | OUTPATIENT
Start: 2021-12-16 | End: 2021-12-20 | Stop reason: HOSPADM

## 2021-12-16 RX ORDER — ASPIRIN 81 MG/1
81 TABLET ORAL DAILY
Status: DISCONTINUED | OUTPATIENT
Start: 2021-12-16 | End: 2021-12-20 | Stop reason: HOSPADM

## 2021-12-16 RX ORDER — PREGABALIN 100 MG/1
200 CAPSULE ORAL 3 TIMES DAILY
Status: DISCONTINUED | OUTPATIENT
Start: 2021-12-16 | End: 2021-12-18

## 2021-12-16 RX ORDER — MAGNESIUM SULFATE HEPTAHYDRATE 40 MG/ML
2 INJECTION, SOLUTION INTRAVENOUS ONCE
Status: COMPLETED | OUTPATIENT
Start: 2021-12-16 | End: 2021-12-16

## 2021-12-16 RX ORDER — OXYCODONE AND ACETAMINOPHEN 7.5; 325 MG/1; MG/1
1 TABLET ORAL EVERY 6 HOURS PRN
Status: DISCONTINUED | OUTPATIENT
Start: 2021-12-16 | End: 2021-12-16

## 2021-12-16 RX ORDER — OXYCODONE AND ACETAMINOPHEN 10; 325 MG/1; MG/1
1 TABLET ORAL EVERY 6 HOURS PRN
Status: DISCONTINUED | OUTPATIENT
Start: 2021-12-16 | End: 2021-12-19

## 2021-12-16 RX ORDER — NICOTINE 21 MG/24HR
1 PATCH, TRANSDERMAL 24 HOURS TRANSDERMAL
Status: DISCONTINUED | OUTPATIENT
Start: 2021-12-16 | End: 2021-12-20 | Stop reason: HOSPADM

## 2021-12-16 RX ORDER — DEXTROSE MONOHYDRATE 25 G/50ML
25 INJECTION, SOLUTION INTRAVENOUS
Status: DISCONTINUED | OUTPATIENT
Start: 2021-12-16 | End: 2021-12-20 | Stop reason: HOSPADM

## 2021-12-16 RX ORDER — PANTOPRAZOLE SODIUM 40 MG/1
40 TABLET, DELAYED RELEASE ORAL DAILY
Status: DISCONTINUED | OUTPATIENT
Start: 2021-12-16 | End: 2021-12-20 | Stop reason: HOSPADM

## 2021-12-16 RX ORDER — LORAZEPAM 0.5 MG/1
0.5 TABLET ORAL
Status: DISCONTINUED | OUTPATIENT
Start: 2021-12-16 | End: 2021-12-20 | Stop reason: HOSPADM

## 2021-12-16 RX ORDER — LORAZEPAM 1 MG/1
1 TABLET ORAL
Status: DISCONTINUED | OUTPATIENT
Start: 2021-12-16 | End: 2021-12-20 | Stop reason: HOSPADM

## 2021-12-16 RX ORDER — NICOTINE POLACRILEX 4 MG
15 LOZENGE BUCCAL
Status: DISCONTINUED | OUTPATIENT
Start: 2021-12-16 | End: 2021-12-20 | Stop reason: HOSPADM

## 2021-12-16 RX ORDER — ACETAMINOPHEN 325 MG/1
650 TABLET ORAL EVERY 4 HOURS PRN
Status: DISCONTINUED | OUTPATIENT
Start: 2021-12-16 | End: 2021-12-20 | Stop reason: HOSPADM

## 2021-12-16 RX ORDER — SODIUM CHLORIDE 0.9 % (FLUSH) 0.9 %
10 SYRINGE (ML) INJECTION AS NEEDED
Status: DISCONTINUED | OUTPATIENT
Start: 2021-12-16 | End: 2021-12-20 | Stop reason: HOSPADM

## 2021-12-16 RX ADMIN — NICOTINE 1 PATCH: 21 PATCH, EXTENDED RELEASE TRANSDERMAL at 08:38

## 2021-12-16 RX ADMIN — BUSPIRONE HYDROCHLORIDE 10 MG: 10 TABLET ORAL at 08:38

## 2021-12-16 RX ADMIN — POTASSIUM CHLORIDE 10 MEQ: 7.46 INJECTION, SOLUTION INTRAVENOUS at 18:15

## 2021-12-16 RX ADMIN — INSULIN LISPRO 7 UNITS: 100 INJECTION, SOLUTION INTRAVENOUS; SUBCUTANEOUS at 12:09

## 2021-12-16 RX ADMIN — CITALOPRAM 20 MG: 20 TABLET, FILM COATED ORAL at 08:38

## 2021-12-16 RX ADMIN — CEFTRIAXONE SODIUM 2 G: 2 INJECTION, POWDER, FOR SOLUTION INTRAMUSCULAR; INTRAVENOUS at 00:59

## 2021-12-16 RX ADMIN — POTASSIUM CHLORIDE 10 MEQ: 7.46 INJECTION, SOLUTION INTRAVENOUS at 16:43

## 2021-12-16 RX ADMIN — POTASSIUM CHLORIDE AND SODIUM CHLORIDE 100 ML/HR: 900; 150 INJECTION, SOLUTION INTRAVENOUS at 10:28

## 2021-12-16 RX ADMIN — DILTIAZEM HYDROCHLORIDE 5 MG/HR: 5 INJECTION INTRAVENOUS at 12:20

## 2021-12-16 RX ADMIN — INSULIN LISPRO 8 UNITS: 100 INJECTION, SOLUTION INTRAVENOUS; SUBCUTANEOUS at 08:30

## 2021-12-16 RX ADMIN — LORAZEPAM 1 MG: 2 INJECTION INTRAMUSCULAR; INTRAVENOUS at 05:38

## 2021-12-16 RX ADMIN — POTASSIUM CHLORIDE 10 MEQ: 7.46 INJECTION, SOLUTION INTRAVENOUS at 20:51

## 2021-12-16 RX ADMIN — PREGABALIN 200 MG: 100 CAPSULE ORAL at 08:31

## 2021-12-16 RX ADMIN — MAGNESIUM SULFATE HEPTAHYDRATE 2 G: 2 INJECTION, SOLUTION INTRAVENOUS at 14:09

## 2021-12-16 RX ADMIN — INSULIN DETEMIR 15 UNITS: 100 INJECTION, SOLUTION SUBCUTANEOUS at 14:10

## 2021-12-16 RX ADMIN — POTASSIUM CHLORIDE 20 MEQ: 14.9 INJECTION, SOLUTION INTRAVENOUS at 04:08

## 2021-12-16 RX ADMIN — DILTIAZEM HYDROCHLORIDE 10 MG: 5 INJECTION INTRAVENOUS at 12:10

## 2021-12-16 RX ADMIN — ENOXAPARIN SODIUM 40 MG: 40 INJECTION SUBCUTANEOUS at 08:30

## 2021-12-16 RX ADMIN — THIAMINE HCL TAB 100 MG 100 MG: 100 TAB at 08:37

## 2021-12-16 RX ADMIN — LOSARTAN POTASSIUM: 50 TABLET, FILM COATED ORAL at 08:38

## 2021-12-16 RX ADMIN — SODIUM CHLORIDE 1 G: 900 INJECTION INTRAVENOUS at 20:51

## 2021-12-16 RX ADMIN — SODIUM CHLORIDE, POTASSIUM CHLORIDE, SODIUM LACTATE AND CALCIUM CHLORIDE 1000 ML: 600; 310; 30; 20 INJECTION, SOLUTION INTRAVENOUS at 01:33

## 2021-12-16 RX ADMIN — DIGOXIN 250 MCG: 0.25 INJECTION INTRAMUSCULAR; INTRAVENOUS at 14:48

## 2021-12-16 RX ADMIN — POTASSIUM CHLORIDE 20 MEQ: 14.9 INJECTION, SOLUTION INTRAVENOUS at 12:09

## 2021-12-16 RX ADMIN — PANTOPRAZOLE SODIUM 40 MG: 40 TABLET, DELAYED RELEASE ORAL at 08:31

## 2021-12-16 RX ADMIN — LORAZEPAM 1 MG: 1 TABLET ORAL at 08:37

## 2021-12-16 RX ADMIN — INSULIN LISPRO 6 UNITS: 100 INJECTION, SOLUTION INTRAVENOUS; SUBCUTANEOUS at 16:43

## 2021-12-16 RX ADMIN — POTASSIUM CHLORIDE 10 MEQ: 7.46 INJECTION, SOLUTION INTRAVENOUS at 23:19

## 2021-12-16 NOTE — PROGRESS NOTES
Admitted after midnight by Dr. Mcfadden.    His history and physical is reviewed and events in the patient's care recently are noted.    Discussed with his nurse, Nikki.     Currently he has gone into atrial flutter with rapid ventricular response.  Cardizem bolus and drip. Thyroid studies were normal when checked recently.  Obtain 2D echocardiogram.  Aspirin for now.  May benefit from close anticoagulation in the near future.    Replace electrolytes.  Continue to manage fluids.  Recheck BMP and lactate at 1500.    Wound nurse to evaluate.    Case management to be involved.    Active Hospital Problems    Diagnosis    • **High anion gap metabolic acidosis    • Acute stasis dermatitis    • Alcohol abuse    • Muscular deconditioning    • Atrial flutter with rapid ventricular response (HCC)    • Hypokalemia    • Hyponatremia    • Acute cystitis    • Essential hypertension    • Type 2 diabetes mellitus with hyperglycemia, without long-term current use of insulin (HCC)    • Tobacco abuse    • Cervical spondylosis with myelopathy        Electronically signed by Kailash Todd DO, 12/16/21, 11:55 AM CST.

## 2021-12-16 NOTE — PLAN OF CARE
Goal Outcome Evaluation:  Plan of Care Reviewed With: patient        Progress: no change  Outcome Summary: PT eval completed. Pt lethargic with decreased command following through session, followed less than 25% of 1 step commands. Pt reqd max Ax2 for sup to sit t/f, but once sitting EOB, pt able to maintain static sitting balance with varying levels of assist, min-max A with use of UE for balance with bedrails and pushing into mattress. Due to incontient urine episode, pt rolled multiple times with max Ax2 for hygiene and linen change. Pt with full PROM-AAROM in BLE, but unable to formally assess strength. Pt HR increased to 170 toward end of session and then decreased to 130. Nsg notified. Pt will benefit from skilled PT to improve fxl mobility, strength and independence. Will monitor progress closely. Recommend d/c SNF.

## 2021-12-16 NOTE — PLAN OF CARE
Goal Outcome Evaluation:              Outcome Summary: Pt admitted through the ED for alcohol intoxication, metabolic acidosis, and wound care. Upon reaching the floor, it was clear his IV was infiltrated and we were unable to get another one started. It is unclear how much of the potassium gtt made it into his vein. Pt was very agitated at one point and received 1mg ativan IM, which helped him calm down. Room air. Sinus/tach.

## 2021-12-16 NOTE — PLAN OF CARE
Goal Outcome Evaluation: Outcome Summary: NTN assessment for multiple areas of skin breakdown. Start Boost Glucose Control with all meals. May consider liberalizing diet if PO < 50% of meals and supplement. Will monitor appropriateness and readiness for nutrition education during LOS. If disposition SNF, MNT for pt's conditions can be managed by food/nutrition staff upon discharge. NTN following.

## 2021-12-16 NOTE — PLAN OF CARE
Goal Outcome Evaluation:  Plan of Care Reviewed With: patient           Outcome Summary: OT eval completed. Pt is very lethargic throughout eval, oriented to self only. He followed approx 25% of one step commands throughout session. He was found incontinent of bladder, required Max A for rolling L/R and supine <> sit at EOB for clean up. Dep for perineal hygiene, changing pad and clothing mgt during toileting. Able to sit at EOB approx 5 minutes with flucuating levels of assist required from Min-Max A. HR increased to 170 after sitting at EOB, RN notified. Formal assessments for MMT/ROM and sensation unable to be complete at this time d/t decreased commands following. He would benefit from skilled OT services to address these deficits, but will closely monitor pt progress and participation in therapy services. Anticipate need for skilled placement upon d/c from facility at [facility].

## 2021-12-16 NOTE — ED NOTES
Patient taken to [facility] room for bath     Taylor Garcia RN  12/16/21 0000

## 2021-12-16 NOTE — CASE MANAGEMENT/SOCIAL WORK
Continued Stay Note   [facility]     Patient Name: John Rodriguez  MRN: [id number]  Today's Date: 12/16/2021    Admit Date: 12/15/2021     Discharge Plan     Row Name 12/16/21 1326       Plan    Patient/Family in Agreement with Plan unable to assess    Plan Comments Pt would not awake to SW.  SW called step-daughterEffie at [phone].  Her voicemail was full.  SW will attempt later.               Discharge Codes    No documentation.                     BERNARD Schmidt

## 2021-12-16 NOTE — H&P
[facility] Medicine Services  HISTORY AND PHYSICAL    Date of Admission: 12/15/2021  Primary Care Physician: Jt Monroy MD    Subjective     Chief Complaint: Weakness    History of Present Illness  Patient is a 71-year-old white male past medical history hypertension, type 2 diabetes hyperlipidemia cervical myelopathy.  He presents to the emergency room after having had his wife passed away 2 months ago.  She was her primary caregiver since then he is becoming weaker not doing anything and sitting in his chair.  Apparently the last 2 weeks he has gotten much worse he essentially sat in his chair defecating and urinating on himself.  Friend is been bringing him food water and whiskey.  Patient does drink significantly.  Apparently came so a friend called 911 today EMS had to lift him out of the chair.  His but is extremely excoriated actually sticking to the sheets.  He apparently was cleaned off by the ER staff due to the caked on feces and urine moisture changes.  Labs show metabolic acidosis also evidence of a UTI.  Her glucose was high greater than 300 his sodium was slightly low and his potassium was low as well.  pH on his ABG was normal.  Patient denies any other symptoms blood alcohol level was elevated.  Patient realizes unable to care for himself he is willing to go to a nursing home.        Review of Systems   14 point review of systems negative except for as per HPI    Otherwise complete ROS reviewed and negative except as mentioned in the HPI.    Past Medical History:   Past Medical History:   Diagnosis Date   • Acid reflux    • Arthritis    • Back pain    • Diabetes mellitus (HCC)    • Hydrocele in adult    • Hyperlipidemia    • Hypertension    • Psoriasis    • Wears glasses      Past Surgical History:  Past Surgical History:   Procedure Laterality Date   • ANTERIOR CERVICAL DISCECTOMY W/ FUSION N/A 1/23/2020    Procedure: CERVICAL DISCECTOMY ANTERIOR WITH  FUSION C5-6;  Surgeon: Santino Montano MD;  Location: [facility] OR;  Service: Neurosurgery   • BACK SURGERY     • COLONOSCOPY     • HERNIA REPAIR     • HYDROCELECTOMY Left 11/20/2017    Procedure: HYDROCELECTOMY;  Surgeon: Neeraj Hurtado MD;  Location:  [facility] OR;  Service:    • TONSILLECTOMY       Social History:  reports that he has been smoking cigarettes. He has a 50.00 pack-year smoking history. He has never used smokeless tobacco. He reports that he does not drink alcohol and does not use drugs.    Family History: family history includes Hypertension in his father.       Allergies:  Allergies   Allergen Reactions   • Sulfa Antibiotics Nausea Only       Medications:  Prior to Admission medications    Medication Sig Start Date End Date Taking? Authorizing Provider   busPIRone (BUSPAR) 10 MG tablet Take 10 mg by mouth 2 (Two) Times a Day. 11/28/15   Vangie Juarez MD   citalopram (CeleXA) 20 MG tablet Take 20 mg by mouth Daily.    Vangie Juarez MD   EASY TOUCH PEN NEEDLES 31G X 5 MM misc  4/13/20   Vangie Juarez MD   losartan-hydrochlorothiazide (HYZAAR) 50-12.5 MG per tablet Take 1 tablet by mouth Daily.    Vangie Juarez MD   metFORMIN (GLUCOPHAGE) 1000 MG tablet Take 1 tablet by mouth 2 (Two) Times a Day With Meals. 1/27/20   Ryan Lou APRN   omeprazole (priLOSEC) 20 MG capsule Take 20 mg by mouth Daily.    Vangie Juarez MD   oxyCODONE-acetaminophen (PERCOCET) 7.5-325 MG per tablet Take 1 tablet by mouth Every 6 (Six) Hours As Needed for Moderate Pain . 1/27/20   Ryan Lou APRN   pregabalin (LYRICA) 200 MG capsule Take 1 capsule by mouth 3 (Three) Times a Day. 1/27/20   Ryan Lou APRN   simvastatin (ZOCOR) 40 MG tablet Take 40 mg by mouth Every Night.    Vangie Juarez MD   zolpidem (AMBIEN) 10 MG tablet Take 10 mg by mouth At Night As Needed for Sleep.    Vangie Juarez MD     I have utilized all available immediate  "resources to obtain, update, and review the patient's current medications.    Objective     Vital Signs: /59 (BP Location: Right arm, Patient Position: Sitting)   Pulse 73   Temp 97.7 °F (36.5 °C) (Oral)   Resp 18   Ht 188 cm (74.02\")   Wt 80.8 kg (178 lb 2 oz)   SpO2 100%   BMI 22.86 kg/m²   Physical Exam   Gen.: Chronically ill-appearing white male in no acute distress  HEENT: Atraumatic, normocephalic.  Pupils equal, round, and reactive to light. Extraocular movements intact.  Sclerae anicteric.  External ears negative.  Mucous membranes moist.  Neck is supple without lymphadenopathy.  No JVD is noted.  No carotid bruits are auscultated.  Oropharynx is without erythema or exudate.   Chest: Clear to auscultation and percussion.  CV: Regular rate and rhythm.  Normal S1-S2.  No gallops, murmurs. or rubs.  Abdomen: Soft, nontender, nondistended.  Active bowel sounds,  No hepatosplenomegaly.  No masses.  No hernias.  Extremities: No clubbing, edema, or cyanosis.  Capillary refill is normal.  Pulses are 2+ and symmetric at radial and dorsalis pedis.  Posterior tibial pulses are intact and 2+ palpable.  Neuro: Patient is awake, alert, and oriented ×3.  Cranial nerves II through XII are grossly intact.  Motor is 5 out of 5 bilaterally.  DTRs are 2+ and symmetric bilaterally. Sensory exam is nonfocal  Skin: Warm, dry, and intact.  Skin with stasis changes on his buttocks.  Sensorium: Normal thought and affect    Nursing notes and vital signs reviewed        Results Reviewed:  Lab Results (last 24 hours)     Procedure Component Value Units Date/Time    Acetone [[id number]]  (Abnormal) Collected: 12/16/21 0028    Specimen: Blood Updated: 12/16/21 0353     Acetone Small    [place] Draw [[id number]] Collected: 12/15/21 2216    Specimen: Blood Updated: 12/16/21 0230    Narrative:      The following orders were created for panel order [place] Draw.  Procedure                               Abnormality         Status   "                   ---------                               -----------         ------                     Green Top (Gel)[[id number]]                                  Final result               Lavender Top[[id number]]                                     Final result               Red Top[[id number]]                                          Final result               [place] Blood Culture Kee...[[id number]]                      Final result               Gray Top[[id number]]                                         Final result               Light Blue Top[[id number]]                                   Final result                 Please view results for these tests on the individual orders.    Gray Top [[id number]] Collected: 12/15/21 2216    Specimen: Blood Updated: 12/16/21 0230     Extra Tube Hold for add-ons.     Comment: Auto resulted.       STAT Lactic Acid, Reflex [[id number]]  (Abnormal) Collected: 12/16/21 0102    Specimen: Blood Updated: 12/16/21 0128     Lactate 3.0 mmol/L     Troponin [[id number]]  (Normal) Collected: 12/16/21 0028    Specimen: Blood Updated: 12/16/21 0051     Troponin T <0.010 ng/mL     Narrative:      Troponin T Reference Range:  <= 0.03 ng/mL-   Negative for AMI  >0.03 ng/mL-     Abnormal for myocardial necrosis.  Clinicians would have to utilize clinical acumen, EKG, Troponin and serial changes to determine if it is an Acute Myocardial Infarction or myocardial injury due to an underlying chronic condition.       Results may be falsely decreased if patient taking Biotin.      [place] Blood Culture Bottle Set [[id number]] Collected: 12/15/21 2216    Specimen: Blood from Arm, Right Updated: 12/15/21 2330     Extra Tube Hold for add-ons.     Comment: Auto resulted.       Red Top [[id number]] Collected: 12/15/21 2216    Specimen: Blood Updated: 12/15/21 2330     Extra Tube Hold for add-ons.     Comment: Auto resulted.       Light Blue Top [[id number]] Collected: 12/15/21 2216    Specimen: Blood Updated:  12/15/21 2330     Extra Tube hold for add-on     Comment: Auto resulted       Green Top (Gel) [[id number]] Collected: 12/15/21 2216    Specimen: Blood Updated: 12/15/21 2330     Extra Tube Hold for add-ons.     Comment: Auto resulted.       Lavender Top [[id number]] Collected: 12/15/21 2216    Specimen: Blood Updated: 12/15/21 2330     Extra Tube hold for add-on     Comment: Auto resulted       COVID PRE-OP / PRE-PROCEDURE SCREENING ORDER (NO ISOLATION) - Swab, Nasal Cavity [[id number]]  (Normal) Collected: 12/15/21 2240    Specimen: Swab from Nasal Cavity Updated: 12/15/21 2317    Narrative:      The following orders were created for panel order COVID PRE-OP / PRE-PROCEDURE SCREENING ORDER (NO ISOLATION) - Swab, Nasal Cavity.  Procedure                               Abnormality         Status                     ---------                               -----------         ------                     COVID-19,[facility] [employer] IN-LUIS...[[id number]]  Normal              Final result                 Please view results for these tests on the individual orders.    COVID-19,[facility] IN-HOUSE,Nasal Swab No Transport Media 3-4 HR TAT - Swab, Nasal Cavity [[id number]]  (Normal) Collected: 12/15/21 2240    Specimen: Swab from Nasal Cavity Updated: 12/15/21 2317     COVID19 Not Detected    Narrative:      Fact sheet for providers: https://www.fda.gov/media/925997/download     Fact sheet for patients: https://www.fda.gov/media/191388/download    Test performed by PCR.    Consider negative results in combination with clinical observations, patient history, and epidemiological information.    Procalcitonin [[id number]]  (Normal) Collected: 12/15/21 2216    Specimen: Blood Updated: 12/15/21 2256     Procalcitonin 0.18 ng/mL     Narrative:      As a Marker for Sepsis (Non-Neonates):     1. <0.5 ng/mL represents a low risk of severe sepsis and/or septic shock.  2. >2 ng/mL represents a high risk of severe sepsis and/or septic shock.    As a Marker  "for Lower Respiratory Tract Infections that require antibiotic therapy:  PCT on Admission     Antibiotic Therapy             6-12 Hrs later  >0.5                          Strongly Recommended            >0.25 - <0.5             Recommended  0.1 - 0.25                  Discouraged                       Remeasure/reassess PCT  <0.1                         Strongly Discouraged         Remeasure/reassess PCT      As 28 day mortality risk marker: \"Change in Procalcitonin Result\" (>80% or <=80%) if Day 0 (or Day 1) and Day 4 values are available. Refer to [email or web address]/    Change in PCT <=80 %   A decrease of PCT levels below or equal to 80% defines a positive change in PCT test result representing a higher risk for 28-day all-cause mortality of patients diagnosed with severe sepsis or septic shock.    Change in PCT >80 %   A decrease of PCT levels of more than 80% defines a negative change in PCT result representing a lower risk for 28-day all-cause mortality of patients diagnosed with severe sepsis or septic shock.                Salicylate Level [[id number]]  (Normal) Collected: 12/15/21 2216    Specimen: Blood Updated: 12/15/21 2254     Salicylate <0.3 mg/dL     C-reactive Protein [[id number]]  (Abnormal) Collected: 12/15/21 2216    Specimen: Blood Updated: 12/15/21 2254     C-Reactive Protein 10.97 mg/dL     Acetaminophen Level [[id number]]  (Normal) Collected: 12/15/21 2216    Specimen: Blood Updated: 12/15/21 2253     Acetaminophen <5.0 mcg/mL     Lactic Acid, Plasma [[id number]]  (Abnormal) Collected: 12/15/21 2216    Specimen: Blood Updated: 12/15/21 2252     Lactate 2.6 mmol/L     Comprehensive Metabolic Panel [[id number]]  (Abnormal) Collected: 12/15/21 2216    Specimen: Blood Updated: 12/15/21 2251     Glucose 306 mg/dL      BUN 26 mg/dL      Creatinine 0.90 mg/dL      Sodium 131 mmol/L      Potassium 3.0 mmol/L      Chloride 88 mmol/L      CO2 17.0 mmol/L      Calcium 10.0 mg/dL      Total " Protein 6.9 g/dL      Albumin 3.20 g/dL      ALT (SGPT) 12 U/L      AST (SGOT) 9 U/L      Alkaline Phosphatase 167 U/L      Total Bilirubin 0.6 mg/dL      eGFR Non African Amer 83 mL/min/1.73      Globulin 3.7 gm/dL      A/G Ratio 0.9 g/dL      BUN/Creatinine Ratio 28.9     Anion Gap 26.0 mmol/L     Narrative:      GFR Normal >60  Chronic Kidney Disease <60  Kidney Failure <15      CK [[id number]]  (Abnormal) Collected: 12/15/21 2216    Specimen: Blood Updated: 12/15/21 2251     Creatine Kinase 18 U/L     Osmolality, Serum [[id number]]  (Abnormal) Collected: 12/15/21 2216    Specimen: Blood Updated: 12/15/21 2249     Osmolality 342 mOsm/kg     Troponin [[id number]]  (Normal) Collected: 12/15/21 2216    Specimen: Blood Updated: 12/15/21 2249     Troponin T <0.010 ng/mL     Narrative:      Troponin T Reference Range:  <= 0.03 ng/mL-   Negative for AMI  >0.03 ng/mL-     Abnormal for myocardial necrosis.  Clinicians would have to utilize clinical acumen, EKG, Troponin and serial changes to determine if it is an Acute Myocardial Infarction or myocardial injury due to an underlying chronic condition.       Results may be falsely decreased if patient taking Biotin.      Ethanol [[id number]] Collected: 12/15/21 2216    Specimen: Blood Updated: 12/15/21 2247     Ethanol % 0.174 %     Narrative:      Not for legal purposes. Chain of Custody not followed.     Blood Gas, Arterial - [[id number]]  (Abnormal) Collected: 12/15/21 2240    Specimen: Arterial Blood Updated: 12/15/21 2246     Site Right Radial     Javid's Test Positive     pH, Arterial 7.381 pH units      pCO2, Arterial 24.9 mm Hg      Comment: 84 Value below reference range        pO2, Arterial 85.9 mm Hg      HCO3, Arterial 14.8 mmol/L      Comment: 84 Value below reference range        Base Excess, Arterial -9.0 mmol/L      Comment: 84 Value below reference range        O2 Saturation, Arterial 97.4 %      Temperature 37.0 C      Barometric Pressure for Blood Gas 751 mmHg       Modality Room Air     FIO2 21 %      Ventilator Mode NA     Collected by [id number]     Comment: Meter: [id number]     :  [id number]        pCO2, Temperature Corrected 24.9 mm Hg      pH, Temp Corrected 7.381 pH Units      pO2, Temperature Corrected 85.9 mm Hg     Magnesium [[id number]]  (Normal) Collected: 12/15/21 2216    Specimen: Blood Updated: 12/15/21 2246     Magnesium 1.9 mg/dL     Urine Drug Screen - Urine, Catheter In/Out [[id number]]  (Normal) Collected: 12/15/21 2220    Specimen: Urine, Catheter In/Out Updated: 12/15/21 2242     THC, Screen, Urine Negative     Phencyclidine (PCP), Urine Negative     Cocaine Screen, Urine Negative     Methamphetamine, Ur Negative     Opiate Screen Negative     Amphetamine Screen, Urine Negative     Benzodiazepine Screen, Urine Negative     Tricyclic Antidepressants Screen Negative     Methadone Screen, Urine Negative     Barbiturates Screen, Urine Negative     Oxycodone Screen, Urine Negative     Propoxyphene Screen Negative     Buprenorphine, Screen, Urine Negative    Narrative:      Cutoff For Drugs Screened:    Amphetamines               500 ng/ml  Barbiturates               200 ng/ml  Benzodiazepines            150 ng/ml  Cocaine                    150 ng/ml  Methadone                  200 ng/ml  Opiates                    100 ng/ml  Phencyclidine               25 ng/ml  THC                            50 ng/ml  Methamphetamine            500 ng/ml  Tricyclic Antidepressants  300 ng/ml  Oxycodone                  100 ng/ml  Propoxyphene               300 ng/ml  Buprenorphine               10 ng/ml    The normal value for all drugs tested is negative. This report includes unconfirmed screening results, with the cutoff values listed, to be used for medical treatment purposes only.  Unconfirmed results must not be used for non-medical purposes such as employment or legal testing.  Clinical consideration should be applied to any drug of abuse test, particularly  when unconfirmed results are used.      Protime-INR [[id number]]  (Abnormal) Collected: 12/15/21 2216    Specimen: Blood Updated: 12/15/21 2240     Protime 13.8 Seconds      INR 1.10    Urinalysis With Culture If Indicated - Urine, Catheter In/Out [[id number]]  (Abnormal) Collected: 12/15/21 2219    Specimen: Urine, Catheter In/Out Updated: 12/15/21 2237     Color, UA Yellow     Appearance, UA Clear     pH, UA 5.5     Specific Gravity, UA 1.022     Glucose, UA >=1000 mg/dL (3+)     Ketones, UA >=160 mg/dL (4+)     Bilirubin, UA Negative     Blood, UA Negative     Protein, UA 30 mg/dL (1+)     Leuk Esterase, UA Negative     Nitrite, UA Negative     Urobilinogen, UA 1.0 E.U./dL    Urinalysis, Microscopic Only - Urine, Catheter In/Out [[id number]]  (Abnormal) Collected: 12/15/21 2219    Specimen: Urine, Catheter In/Out Updated: 12/15/21 2237     RBC, UA 3-5 /HPF      WBC, UA 13-20 /HPF      Bacteria, UA 1+ /HPF      Squamous Epithelial Cells, UA 0-2 /HPF      Hyaline Casts, UA 3-6 /LPF      Methodology Automated Microscopy    Urine Culture - Urine, Urine, Catheter In/Out [[id number]] Collected: 12/15/21 2219    Specimen: Urine, Catheter In/Out Updated: 12/15/21 2237    CBC & Differential [[id number]]  (Abnormal) Collected: 12/15/21 2216    Specimen: Blood Updated: 12/15/21 2232    Narrative:      The following orders were created for panel order CBC & Differential.  Procedure                               Abnormality         Status                     ---------                               -----------         ------                     CBC Auto Differential[[id number]]        Abnormal            Final result                 Please view results for these tests on the individual orders.    CBC Auto Differential [[id number]]  (Abnormal) Collected: 12/15/21 2216    Specimen: Blood Updated: 12/15/21 2232     WBC 12.59 10*3/mm3      RBC 3.65 10*6/mm3      Hemoglobin 11.0 g/dL      Hematocrit 33.6 %      MCV 92.1 fL      MCH 30.1  pg      MCHC 32.7 g/dL      RDW 14.6 %      RDW-SD 48.2 fl      MPV 9.3 fL      Platelets 480 10*3/mm3      Neutrophil % 81.4 %      Lymphocyte % 7.9 %      Monocyte % 7.7 %      Eosinophil % 0.2 %      Basophil % 0.3 %      Immature Grans % 2.5 %      Neutrophils, Absolute 10.26 10*3/mm3      Lymphocytes, Absolute 0.99 10*3/mm3      Monocytes, Absolute 0.97 10*3/mm3      Eosinophils, Absolute 0.02 10*3/mm3      Basophils, Absolute 0.04 10*3/mm3      Immature Grans, Absolute 0.31 10*3/mm3      nRBC 0.0 /100 WBC     Blood Culture - Blood, Arm, Right [[id number]] Collected: 12/15/21 2216    Specimen: Blood from Arm, Right Updated: 12/15/21 2228    Blood Culture - Blood, Arm, Left [[id number]] Collected: 12/15/21 2220    Specimen: Blood from Arm, Left Updated: 12/15/21 2228        Imaging Results (Last 24 Hours)     Procedure Component Value Units Date/Time    CT Head Without Contrast [[id number]] Resulted: 12/16/21 0033     Updated: 12/16/21 0140    XR Chest 1 View [[id number]] Resulted: 12/15/21 2221     Updated: 12/15/21 2225        I have personally reviewed and interpreted the radiology studies and ECG obtained at time of admission.     Assessment / Plan     Assessment:   Active Hospital Problems    Diagnosis    • **High anion gap metabolic acidosis    • Acute stasis dermatitis    • Alcohol abuse    • Muscular deconditioning    • Essential hypertension    • Type 2 diabetes mellitus with complication (HCC)    • Tobacco abuse    • Cervical spondylosis with myelopathy      Added automatically from request for surgery [id number]     1.  High anion gap metabolic acidosis plans admit the patient hydrate aggressively with IV fluids correct his sugars monitor labs.  I do not believe he is in DKA probably more of starvation ketosis or alcoholic ketoacidosis.  2.  Acute stasis dermatitis consult wound care start Rocephin.  3.  UTI culture urine blood Rocephin will be given.  4.  Type 2 diabetes resume home medications appropriate  Accu-Chek sliding scale insulin as needed.  Check A1c.  4.  Hypokalemia replace potassium monitor labs.  5.  Muscular deconditioning consult PT and OT.  I believe patient will need to go to a nursing home he is agreeable.  Consult .  6.  Alcohol abuse CIWA protocol.  7.  Tobacco abuse encourage smoking cessation nicotine patch.  8.  Medications reviewed and resumed as appropriate.    Patient seen after midnight       Code Status/Advanced Care Plan: Full    The patient's surrogate decision maker is patient's friend that lives with him.     I discussed my findings and recommendations with the patient.    Estimated length of stay is to be determined.     The patient was seen and examined by me on December 16, 2021 at 330 AM.    Electronically signed by Francisco Mcfadden MD, 12/16/21, 05:00 CST.

## 2021-12-16 NOTE — ED PROVIDER NOTES
Subjective   71-year-old male presents to the emergency department with complaint of generalized weakness, lethargy.  He has a history of hypertension, hyperlipidemia, diabetes.  His wife passed away 2 months ago, since then patient reports feeling depressed but states is not suicidal.  He is apparently had progressive generalized weakness over the past couple weeks.  He states he has not gotten out of his chair in the past week.  He has a friend has been bringing in food and water and whiskey.  He has been drinking daily, has not gotten up to use the restroom and has been urinating and defecating on himself while in the chair.  He became so weak today that his friend called 911.  On arrival, patient was awake but drowsy/lethargic.  Admit to drinking some alcohol today.  Denies having chest pain, shortness of breath, abdominal pain, nausea or vomiting.      History provided by:  Patient      Review of Systems   All other systems reviewed and are negative.      Past Medical History:   Diagnosis Date   • Acid reflux    • Arthritis    • Back pain    • Diabetes mellitus (HCC)    • Hydrocele in adult    • Hyperlipidemia    • Hypertension    • Psoriasis    • Wears glasses        Allergies   Allergen Reactions   • Sulfa Antibiotics Nausea Only       Past Surgical History:   Procedure Laterality Date   • ANTERIOR CERVICAL DISCECTOMY W/ FUSION N/A 1/23/2020    Procedure: CERVICAL DISCECTOMY ANTERIOR WITH FUSION C5-6;  Surgeon: Santino Montano MD;  Location: [facility] OR;  Service: Neurosurgery   • BACK SURGERY     • COLONOSCOPY     • HERNIA REPAIR     • HYDROCELECTOMY Left 11/20/2017    Procedure: HYDROCELECTOMY;  Surgeon: Neeraj Hurtado MD;  Location: [facility] OR;  Service:    • TONSILLECTOMY         Family History   Problem Relation Age of Onset   • No Known Problems Father    • No Known Problems Mother        Social History     Socioeconomic History   • Marital status:    Tobacco Use   • Smoking status: Current  Every Day Smoker     Packs/day: 1.00     Years: 50.00     Pack years: 50.00     Types: Cigarettes   • Smokeless tobacco: Never Used   Vaping Use   • Vaping Use: Never used   Substance and Sexual Activity   • Alcohol use: No   • Drug use: No   • Sexual activity: Defer           Objective   Physical Exam  Vitals and nursing note reviewed.   Constitutional:       Comments: Disheveled appearance, soiled clothing, generally weak appearing but not in any distress   HENT:      Head: Normocephalic and atraumatic.      Nose: Nose normal. No congestion or rhinorrhea.      Mouth/Throat:      Mouth: Mucous membranes are dry.      Pharynx: Oropharynx is clear.   Eyes:      Extraocular Movements: Extraocular movements intact.      Conjunctiva/sclera: Conjunctivae normal.      Pupils: Pupils are equal, round, and reactive to light.   Cardiovascular:      Rate and Rhythm: Regular rhythm. Tachycardia present.      Heart sounds: Normal heart sounds. No murmur heard.      Pulmonary:      Effort: Pulmonary effort is normal.      Breath sounds: Normal breath sounds. No wheezing, rhonchi or rales.   Abdominal:      General: Abdomen is flat. Bowel sounds are normal.      Palpations: Abdomen is soft.   Genitourinary:     Comments: See skin exam  Musculoskeletal:         General: No swelling or tenderness.      Cervical back: Normal range of motion and neck supple. No tenderness.   Skin:     General: Skin is warm.      Capillary Refill: Capillary refill takes 2 to 3 seconds.      Comments: Patient has large area of stage I to breakdown on his buttocks, sacrum, testicles.  Also appears to be some irritant dermatitis secondary to sitting in urine and feces   Neurological:      General: No focal deficit present.      Mental Status: He is oriented to person, place, and time.      Sensory: No sensory deficit.      Motor: No weakness.   Psychiatric:      Comments: Depressed mood and a flat affect         Procedures         Lab Results (last 24  hours)     Procedure Component Value Units Date/Time    [place] Blood Culture Bottle Set [[id number]] Collected: 12/15/21 2216    Specimen: Blood from Arm, Right Updated: 12/15/21 2330     Extra Tube Hold for add-ons.     Comment: Auto resulted.       Troponin [[id number]]  (Normal) Collected: 12/15/21 2216    Specimen: Blood Updated: 12/15/21 2249     Troponin T <0.010 ng/mL     Narrative:      Troponin T Reference Range:  <= 0.03 ng/mL-   Negative for AMI  >0.03 ng/mL-     Abnormal for myocardial necrosis.  Clinicians would have to utilize clinical acumen, EKG, Troponin and serial changes to determine if it is an Acute Myocardial Infarction or myocardial injury due to an underlying chronic condition.       Results may be falsely decreased if patient taking Biotin.      Acetaminophen Level [[id number]]  (Normal) Collected: 12/15/21 2216    Specimen: Blood Updated: 12/15/21 2253     Acetaminophen <5.0 mcg/mL     C-reactive Protein [[id number]]  (Abnormal) Collected: 12/15/21 2216    Specimen: Blood Updated: 12/15/21 2254     C-Reactive Protein 10.97 mg/dL     CBC & Differential [[id number]]  (Abnormal) Collected: 12/15/21 2216    Specimen: Blood Updated: 12/15/21 2232    Narrative:      The following orders were created for panel order CBC & Differential.  Procedure                               Abnormality         Status                     ---------                               -----------         ------                     CBC Auto Differential[[id number]]        Abnormal            Final result                 Please view results for these tests on the individual orders.    CK [[id number]]  (Abnormal) Collected: 12/15/21 2216    Specimen: Blood Updated: 12/15/21 2251     Creatine Kinase 18 U/L     Comprehensive Metabolic Panel [[id number]]  (Abnormal) Collected: 12/15/21 2216    Specimen: Blood Updated: 12/15/21 2251     Glucose 306 mg/dL      BUN 26 mg/dL      Creatinine 0.90 mg/dL      Sodium 131 mmol/L       "Potassium 3.0 mmol/L      Chloride 88 mmol/L      CO2 17.0 mmol/L      Calcium 10.0 mg/dL      Total Protein 6.9 g/dL      Albumin 3.20 g/dL      ALT (SGPT) 12 U/L      AST (SGOT) 9 U/L      Alkaline Phosphatase 167 U/L      Total Bilirubin 0.6 mg/dL      eGFR Non African Amer 83 mL/min/1.73      Globulin 3.7 gm/dL      A/G Ratio 0.9 g/dL      BUN/Creatinine Ratio 28.9     Anion Gap 26.0 mmol/L     Narrative:      GFR Normal >60  Chronic Kidney Disease <60  Kidney Failure <15      Ethanol [[id number]] Collected: 12/15/21 2216    Specimen: Blood Updated: 12/15/21 2247     Ethanol % 0.174 %     Narrative:      Not for legal purposes. Chain of Custody not followed.     Lactic Acid, Plasma [[id number]]  (Abnormal) Collected: 12/15/21 2216    Specimen: Blood Updated: 12/15/21 2252     Lactate 2.6 mmol/L     Magnesium [[id number]]  (Normal) Collected: 12/15/21 2216    Specimen: Blood Updated: 12/15/21 2246     Magnesium 1.9 mg/dL     Osmolality, Serum [[id number]]  (Abnormal) Collected: 12/15/21 2216    Specimen: Blood Updated: 12/15/21 2249     Osmolality 342 mOsm/kg     Procalcitonin [[id number]]  (Normal) Collected: 12/15/21 2216    Specimen: Blood Updated: 12/15/21 2256     Procalcitonin 0.18 ng/mL     Narrative:      As a Marker for Sepsis (Non-Neonates):     1. <0.5 ng/mL represents a low risk of severe sepsis and/or septic shock.  2. >2 ng/mL represents a high risk of severe sepsis and/or septic shock.    As a Marker for Lower Respiratory Tract Infections that require antibiotic therapy:  PCT on Admission     Antibiotic Therapy             6-12 Hrs later  >0.5                          Strongly Recommended            >0.25 - <0.5             Recommended  0.1 - 0.25                  Discouraged                       Remeasure/reassess PCT  <0.1                         Strongly Discouraged         Remeasure/reassess PCT      As 28 day mortality risk marker: \"Change in Procalcitonin Result\" (>80% or <=80%) if Day 0 (or Day " 1) and Day 4 values are available. Refer to http://www.[facility]-pct-calculator.com/    Change in PCT <=80 %   A decrease of PCT levels below or equal to 80% defines a positive change in PCT test result representing a higher risk for 28-day all-cause mortality of patients diagnosed with severe sepsis or septic shock.    Change in PCT >80 %   A decrease of PCT levels of more than 80% defines a negative change in PCT result representing a lower risk for 28-day all-cause mortality of patients diagnosed with severe sepsis or septic shock.                Protime-INR [[id number]]  (Abnormal) Collected: 12/15/21 2216    Specimen: Blood Updated: 12/15/21 2240     Protime 13.8 Seconds      INR 1.10    Salicylate Level [[id number]]  (Normal) Collected: 12/15/21 2216    Specimen: Blood Updated: 12/15/21 2254     Salicylate <0.3 mg/dL     Blood Culture - Blood, Arm, Right [[id number]] Collected: 12/15/21 2216    Specimen: Blood from Arm, Right Updated: 12/15/21 2228    CBC Auto Differential [[id number]]  (Abnormal) Collected: 12/15/21 2216    Specimen: Blood Updated: 12/15/21 2232     WBC 12.59 10*3/mm3      RBC 3.65 10*6/mm3      Hemoglobin 11.0 g/dL      Hematocrit 33.6 %      MCV 92.1 fL      MCH 30.1 pg      MCHC 32.7 g/dL      RDW 14.6 %      RDW-SD 48.2 fl      MPV 9.3 fL      Platelets 480 10*3/mm3      Neutrophil % 81.4 %      Lymphocyte % 7.9 %      Monocyte % 7.7 %      Eosinophil % 0.2 %      Basophil % 0.3 %      Immature Grans % 2.5 %      Neutrophils, Absolute 10.26 10*3/mm3      Lymphocytes, Absolute 0.99 10*3/mm3      Monocytes, Absolute 0.97 10*3/mm3      Eosinophils, Absolute 0.02 10*3/mm3      Basophils, Absolute 0.04 10*3/mm3      Immature Grans, Absolute 0.31 10*3/mm3      nRBC 0.0 /100 WBC     Urinalysis With Culture If Indicated - Urine, Catheter In/Out [[id number]]  (Abnormal) Collected: 12/15/21 2219    Specimen: Urine, Catheter In/Out Updated: 12/15/21 7441     Color, UA Yellow     Appearance, UA Clear     pH,  UA 5.5     Specific Gravity, UA 1.022     Glucose, UA >=1000 mg/dL (3+)     Ketones, UA >=160 mg/dL (4+)     Bilirubin, UA Negative     Blood, UA Negative     Protein, UA 30 mg/dL (1+)     Leuk Esterase, UA Negative     Nitrite, UA Negative     Urobilinogen, UA 1.0 E.U./dL    Urinalysis, Microscopic Only - Urine, Catheter In/Out [[id number]]  (Abnormal) Collected: 12/15/21 2219    Specimen: Urine, Catheter In/Out Updated: 12/15/21 2237     RBC, UA 3-5 /HPF      WBC, UA 13-20 /HPF      Bacteria, UA 1+ /HPF      Squamous Epithelial Cells, UA 0-2 /HPF      Hyaline Casts, UA 3-6 /LPF      Methodology Automated Microscopy    Urine Culture - Urine, Urine, Catheter In/Out [[id number]] Collected: 12/15/21 2219    Specimen: Urine, Catheter In/Out Updated: 12/15/21 2237    Blood Culture - Blood, Arm, Left [[id number]] Collected: 12/15/21 2220    Specimen: Blood from Arm, Left Updated: 12/15/21 2228    Urine Drug Screen - Urine, Catheter In/Out [[id number]]  (Normal) Collected: 12/15/21 2220    Specimen: Urine, Catheter In/Out Updated: 12/15/21 2242     THC, Screen, Urine Negative     Phencyclidine (PCP), Urine Negative     Cocaine Screen, Urine Negative     Methamphetamine, Ur Negative     Opiate Screen Negative     Amphetamine Screen, Urine Negative     Benzodiazepine Screen, Urine Negative     Tricyclic Antidepressants Screen Negative     Methadone Screen, Urine Negative     Barbiturates Screen, Urine Negative     Oxycodone Screen, Urine Negative     Propoxyphene Screen Negative     Buprenorphine, Screen, Urine Negative    Narrative:      Cutoff For Drugs Screened:    Amphetamines               500 ng/ml  Barbiturates               200 ng/ml  Benzodiazepines            150 ng/ml  Cocaine                    150 ng/ml  Methadone                  200 ng/ml  Opiates                    100 ng/ml  Phencyclidine               25 ng/ml  THC                            50 ng/ml  Methamphetamine            500 ng/ml  Tricyclic  Antidepressants  300 ng/ml  Oxycodone                  100 ng/ml  Propoxyphene               300 ng/ml  Buprenorphine               10 ng/ml    The normal value for all drugs tested is negative. This report includes unconfirmed screening results, with the cutoff values listed, to be used for medical treatment purposes only.  Unconfirmed results must not be used for non-medical purposes such as employment or legal testing.  Clinical consideration should be applied to any drug of abuse test, particularly when unconfirmed results are used.      COVID PRE-OP / PRE-PROCEDURE SCREENING ORDER (NO ISOLATION) - Swab, Nasal Cavity [[id number]]  (Normal) Collected: 12/15/21 2240    Specimen: Swab from Nasal Cavity Updated: 12/15/21 2317    Narrative:      The following orders were created for panel order COVID PRE-OP / PRE-PROCEDURE SCREENING ORDER (NO ISOLATION) - Swab, Nasal Cavity.  Procedure                               Abnormality         Status                     ---------                               -----------         ------                     COVID-19,[facility]...[[id number]]  Normal              Final result                 Please view results for these tests on the individual orders.    COVID-19,[facility] IN-HOUSE,Nasal Swab No Transport Media 3-4 HR TAT - Swab, Nasal Cavity [[id number]]  (Normal) Collected: 12/15/21 2240    Specimen: Swab from Nasal Cavity Updated: 12/15/21 2317     COVID19 Not Detected    Narrative:      Fact sheet for providers: https://www.fda.gov/media/076905/download     Fact sheet for patients: https://www.fda.gov/media/375257/download    Test performed by PCR.    Consider negative results in combination with clinical observations, patient history, and epidemiological information.    Blood Gas, Arterial - [[id number]]  (Abnormal) Collected: 12/15/21 2240    Specimen: Arterial Blood Updated: 12/15/21 2246     Site Right Radial     Javid's Test Positive     pH, Arterial 7.381 pH units       pCO2, Arterial 24.9 mm Hg      Comment: 84 Value below reference range        pO2, Arterial 85.9 mm Hg      HCO3, Arterial 14.8 mmol/L      Comment: 84 Value below reference range        Base Excess, Arterial -9.0 mmol/L      Comment: 84 Value below reference range        O2 Saturation, Arterial 97.4 %      Temperature 37.0 C      Barometric Pressure for Blood Gas 751 mmHg      Modality Room Air     FIO2 21 %      Ventilator Mode NA     Collected by [id number]     Comment: Meter: [id number]     :  [id number]        pCO2, Temperature Corrected 24.9 mm Hg      pH, Temp Corrected 7.381 pH Units      pO2, Temperature Corrected 85.9 mm Hg     Troponin [[id number]]  (Normal) Collected: 12/16/21 0028    Specimen: Blood Updated: 12/16/21 0051     Troponin T <0.010 ng/mL     Narrative:      Troponin T Reference Range:  <= 0.03 ng/mL-   Negative for AMI  >0.03 ng/mL-     Abnormal for myocardial necrosis.  Clinicians would have to utilize clinical acumen, EKG, Troponin and serial changes to determine if it is an Acute Myocardial Infarction or myocardial injury due to an underlying chronic condition.       Results may be falsely decreased if patient taking Biotin.      STAT Lactic Acid, Reflex [[id number]]  (Abnormal) Collected: 12/16/21 0102    Specimen: Blood Updated: 12/16/21 0128     Lactate 3.0 mmol/L       No Radiology Exams Resulted Within Past 24 Hours  ED Course  ED Course as of 12/16/21 0320   Thu Dec 16, 2021   0259 71-year-old male with history of hypertension, hyperlipidemia, diabetes presents to the ER with generalized weakness and inability to ambulate.  Wife dad 2 months ago, sounds like patient's been dealing with a major depressive episode since.  Admits to drinking daily.  States he has not been able to get up out of his chair for the past week.  Had a friend bring him water and alcohol but he states he has not eaten in the past 3 to 4 days.    History is somewhat limited because the patient was  lethargic.  On exam he was very disheveled, clothes were soiled with feces and urine.  He had moderate skin breakdown and irritant dermatitis to his buttocks and scrotum from wearing soiled clothing for an extended period of time.      Patient did meet sepsis criteria based on leukocytosis and lactic acidosis.  Sepsis work-up initiated.  His lactate was less than 4 and he was not hypotensive so did not meet requirement for 30 mill per KG fluid bolus.  Lactate 2.6, CRP 10.97, procalcitonin was normal.    WBC 12.59, no bands.    CMP reveals an anion gap metabolic acidosis, likely colic ketoacidosis versus starvation ketoacidosis. Anion gap 26.  pH normal at 7.38.    Glucose is 306, bicarb 17.   CK total 18.    Acetaminophen and salicylate are negative.  Alcohol was elevated at 0.174.  Straight cath urine revealed 1+ bacteria and 13-20 WBCs but nitrites are negative.  We will go and cover with antibiotics but likely asymptomatic bacteriuria.    At this point patient has extreme muscular deconditioning from sitting in the chair for a week versus alcoholic myopathy.  He is unable to ambulate.  Repeat lactate after fluids increased to 3.0, will add additional IV fluids.    Patient will need admission for wound care of the skin breakdown on his bottom and scrotum, PT OT and correction of his anion gap metabolic acidosis. [AW]      ED Course User Index  [AW] Homero Powers MD                                                 MDM  Number of Diagnoses or Management Options  Alcoholic intoxication without complication (HCC): new and requires workup  High anion gap metabolic acidosis: new and requires workup  Irritant dermatitis: new and requires workup  Muscular deconditioning: new and requires workup  Pressure injury, stage 2, unspecified location (HCC): new and requires workup     Amount and/or Complexity of Data Reviewed  Clinical lab tests: reviewed  Tests in the radiology section of CPT®: reviewed    Risk of  Complications, Morbidity, and/or Mortality  Presenting problems: high  Diagnostic procedures: high  Management options: high    Patient Progress  Patient progress: stable      Final diagnoses:   High anion gap metabolic acidosis   Alcoholic intoxication without complication (HCC)   Irritant dermatitis   Pressure injury, stage 2, unspecified location (HCC)   Muscular deconditioning       ED Disposition  ED Disposition     ED Disposition Condition Comment    Decision to Admit  Level of Care: Remote Telemetry [26]   Diagnosis: High anion gap metabolic acidosis [652810]   Admitting Physician: RICO DE LOS SANTOS [[id number]]   Attending Physician: RICO DE LOS SANTOS [[id number]]   Isolate for COVID?: No [0]   Certification: I Certify That Inpatient Hospital Services Are Medically Necessary For Greater Than 2 Midnights            No follow-up provider specified.       Medication List      No changes were made to your prescriptions during this visit.          Homero Powers MD  12/16/21 5987

## 2021-12-16 NOTE — THERAPY EVALUATION
Patient Name: John Rodriguez  : 1950    MRN: [id number]                              Today's Date: 2021       Admit Date: 12/15/2021    Visit Dx:     ICD-10-CM ICD-9-CM   1. High anion gap metabolic acidosis  E87.2 276.2   2. Alcoholic intoxication without complication ([facility])  F10.920 305.00   3. Irritant dermatitis  L24.9 692.9   4. Pressure injury, stage 2, unspecified location ([facility])  L89.92 707.00     707.22   5. Muscular deconditioning  R29.898 781.99   6. Decreased activities of daily living (ADL)  Z78.9 V49.89     Patient Active Problem List   Diagnosis   • Hydrocele   • Spinal cord compression due to degenerative disorder of spinal column   • Cervical spondylosis with myelopathy   • Tobacco abuse   • Type 2 diabetes mellitus with hyperglycemia, without long-term current use of insulin ([facility])   • Hyperlipidemia   • Essential hypertension   • GERD without esophagitis   • Medically noncompliant   • Peripheral neuropathy   • Altered mental status   • DDD (degenerative disc disease), lumbosacral   • Pain, neck   • S/P cervical spinal fusion   • Skin ulcer of sacrum, limited to breakdown of skin ([facility])   • Spinal stenosis of lumbar region without neurogenic claudication   • Weakness   • Polypharmacy   • Community acquired pneumonia of left lower lobe of lung   • Adult BMI 35.0-35.9 kg/sq m   • BMI 33.0-33.9,adult   • Morbidly obese ([facility])   • PAD (peripheral artery disease) ([facility])   • Preop testing   • High anion gap metabolic acidosis   • Acute stasis dermatitis   • Alcohol abuse   • Muscular deconditioning   • Atrial flutter with rapid ventricular response ([facility])   • Hypokalemia   • Hyponatremia   • Acute cystitis     Past Medical History:   Diagnosis Date   • Acid reflux    • Arthritis    • Back pain    • Diabetes mellitus ([facility])    • Hydrocele in adult    • Hyperlipidemia    • Hypertension    • Psoriasis    • Wears glasses      Past Surgical History:   Procedure Laterality Date   • ANTERIOR CERVICAL  DISCECTOMY W/ FUSION N/A 1/23/2020    Procedure: CERVICAL DISCECTOMY ANTERIOR WITH FUSION C5-6;  Surgeon: Santino Montano MD;  Location: [facility] OR;  Service: Neurosurgery   • BACK SURGERY     • COLONOSCOPY     • HERNIA REPAIR     • HYDROCELECTOMY Left 11/20/2017    Procedure: HYDROCELECTOMY;  Surgeon: Neeraj Hurtado MD;  Location: [facility] OR;  Service:    • TONSILLECTOMY        General Information     Row Name 12/16/21 1320          OT Time and Intention    Document Type evaluation  presents with weakness; dx High anion gap metabolic acidosis; PMH past medical history hypertension, type 2 diabetes hyperlipidemia cervical myelopathy  -     Mode of Treatment occupational therapy  -     Row Name 12/16/21 1320          General Information    Patient Profile Reviewed yes  -     Prior Level of Function --  unsure d/t cognitive status and lethargia.  -     Barriers to Rehab medically complex; cognitive status  -     Row Name 12/16/21 1320          Living Environment    Lives With alone  -     Row Name 12/16/21 1320          Home Main Entrance    Number of Stairs, Main Entrance --  unable to obtain due to patient's lethargy and decreased responses  -     Row Name 12/16/21 1320          Cognition    Orientation Status (Cognition) --  per RN note, pt oriented x2.  -     Row Name 12/16/21 1320          Safety Issues, Functional Mobility    Impairments Affecting Function (Mobility) balance; endurance/activity tolerance; pain; cognition; strength  -           User Key  (r) = Recorded By, (t) = Taken By, (c) = Cosigned By    Initials Name Provider Type    J Margie Garza, ZAY/L, CSRS Occupational Therapist                 Mobility/ADL's     Row Name 12/16/21 1320          Bed Mobility    Bed Mobility supine-sit; sit-supine; scooting/bridging; rolling right; rolling left  -     Rolling Left [place] (Bed Mobility) maximum assist (25% patient effort); 2 person assist  -     Rolling Right  [place] (Bed Mobility) maximum assist (25% patient effort); 2 person assist  -JJ     Scooting/Bridging [place] (Bed Mobility) dependent (less than 25% patient effort); 2 person assist  -JJ     Supine-Sit [place] (Bed Mobility) maximum assist (25% patient effort); 2 person assist  -JJ     Sit-Supine [place] (Bed Mobility) maximum assist (25% patient effort); 2 person assist  -JJ     Bed Mobility, Safety Issues cognitive deficits limit understanding; decreased use of arms for pushing/pulling; decreased use of legs for bridging/pushing  -     Assistive Device (Bed Mobility) head of bed elevated; draw sheet; bed rails  -     Row Name 12/16/21 1320          Transfers    Comment (Transfers) unable to complete at this time d/t lethargia and weakness.  -     Row Name 12/16/21 1320          Functional Mobility    Functional Mobility- Comment unable to complete at this time d/t lethargia and weakness.  -     Row Name 12/16/21 1320          Activities of Daily Living    BADL Assessment/Intervention toileting  -     Row Name 12/16/21 1320          Toileting Assessment/Training    Craig Level (Toileting) adjust/manage clothing; change pad/brief; perform perineal hygiene; dependent (less than 25% patient effort)  x2  -JJ     Position (Toileting) supine  -JJ     Comment (Toileting) pt incontinent of bladder.  -           User Key  (r) = Recorded By, (t) = Taken By, (c) = Cosigned By    Initials Name Provider Type    JMargie Whiteside, MARYR/L, CSRS Occupational Therapist               Obj/Interventions     Row Name 12/16/21 1320          Range of Motion Comprehensive    General Range of Motion bilateral upper extremity ROM WFL  -     Row Name 12/16/21 1320          Strength Comprehensive (MMT)    Comment, General Manual Muscle Testing (MMT) Assessment B UE strength grossly 2+/5.  -     Row Name 12/16/21 1320          Balance    Balance Assessment sitting static balance  -     Static  Sitting Balance mild impairment; moderate impairment; supported; sitting, edge of bed  -JJ     Comment, Balance assist level ranged from Min-Max A for static sitting balance.  -JJ           User Key  (r) = Recorded By, (t) = Taken By, (c) = Cosigned By    Initials Name Provider Type    Margie Badillo OTR/L, CSRS Occupational Therapist               Goals/Plan     Row Name 12/16/21 1320          Transfer Goal 1 (OT)    Activity/Assistive Device (Transfer Goal 1, OT) shower chair; commode  -JJ     [place] Level/Cues Needed (Transfer Goal 1, OT) moderate assist (50-74% patient effort)  -JJ     Time Frame (Transfer Goal 1, OT) long term goal (LTG); by discharge  -JJ     Progress/Outcome (Transfer Goal 1, OT) goal ongoing  -     Row Name 12/16/21 1320          Dressing Goal 1 (OT)    Activity/Device (Dressing Goal 1, OT) upper body dressing  -JJ     [place]/Cues Needed (Dressing Goal 1, OT) minimum assist (75% or more patient effort)  -JJ     Time Frame (Dressing Goal 1, OT) long term goal (LTG); by discharge  -JJ     Progress/Outcome (Dressing Goal 1, OT) goal ongoing  -     Row Name 12/16/21 1320          Toileting Goal 1 (OT)    Activity/Device (Toileting Goal 1, OT) toileting skills, all  -JJ     [place] Level/Cues Needed (Toileting Goal 1, OT) minimum assist (75% or more patient effort)  -JJ     Time Frame (Toileting Goal 1, OT) long term goal (LTG); by discharge  -JJ     Progress/Outcome (Toileting Goal 1, OT) goal ongoing  -     Row Name 12/16/21 1320          Therapy Assessment/Plan (OT)    Planned Therapy Interventions (OT) activity tolerance training; BADL retraining; functional balance retraining; occupation/activity based interventions; patient/caregiver education/training; strengthening exercise; transfer/mobility retraining  -J           User Key  (r) = Recorded By, (t) = Taken By, (c) = Cosigned By    Initials Name Provider Type    Margie Badillo, OTR/L, CSRS  Occupational Therapist               Clinical Impression     Row Name 12/16/21 1320          Pain Assessment    Additional Documentation Pain Scale: FACES Pre/Post-Treatment (Group)  -     Row Name 12/16/21 1320          Pain Scale: Numbers Pre/Post-Treatment    Pain Intervention(s) Medication (See MAR); Repositioned; Ambulation/increased activity  -     Row Name 12/16/21 1320          Pain Scale: FACES Pre/Post-Treatment    Pain: FACES Scale, Pretreatment 2-->hurts little bit  -JJ     Posttreatment Pain Rating 2-->hurts little bit  -J     Pain Location - Orientation generalized  -     Pre/Posttreatment Pain Comment with movement  -     Row Name 12/16/21 1320          Plan of Care Review    Plan of Care Reviewed With patient  -     Outcome Summary OT eval completed. Pt is very lethargic throughout eval, oriented to self only. He followed approx 25% of one step commands throughout session. He was found incontinent of bladder, required Max A for rolling L/R and supine <> sit at EOB for clean up. Dep for perineal hygiene, changing pad and clothing mgt during toileting. Able to sit at EOB approx 5 minutes with flucuating levels of assist required from Min-Max A. HR increased to 170 after sitting at EOB, RN notified. Formal assessments for MMT/ROM and sensation unable to be complete at this time d/t decreased commands following. He would benefit from skilled OT services to address these deficits, but will closely monitor pt progress and participation in therapy services. Anticipate need for skilled placement upon d/c from facility at [facility].  -     Row Name 12/16/21 1320          Therapy Assessment/Plan (OT)    Patient/Family Therapy Goal Statement (OT) not stated.  -     Rehab Potential (OT) good, to achieve stated therapy goals  -     Criteria for Skilled Therapeutic Interventions Met (OT) yes; skilled treatment is necessary  -     Therapy Frequency (OT) 5 times/wk  -     Predicted Duration of Therapy  Intervention (OT) 10 days  -     Row Name 12/16/21 1320          Therapy Plan Review/Discharge Plan (OT)    Anticipated Discharge Disposition (OT) skilled nursing facility  -     Row Name 12/16/21 1320          Vital Signs    Pre Patient Position Supine  -J     Intra Patient Position Sitting  -JJ     Post Patient Position Supine  -     Row Name 12/16/21 1320          Positioning and Restraints    Pre-Treatment Position in bed  -JJ     Post Treatment Position bed  -JJ     In Bed notified nsg; fowlers; side lying right; call light within reach; encouraged to call for assist; exit alarm on; side rails up x3; pillow between legs  -           User Key  (r) = Recorded By, (t) = Taken By, (c) = Cosigned By    Initials Name Provider Type    Margie Badillo OTR/L, CSRS Occupational Therapist               Outcome Measures     Row Name 12/16/21 1320          How much help from another is currently needed...    Putting on and taking off regular lower body clothing? 1  -JJ     Bathing (including washing, rinsing, and drying) 1  -JJ     Toileting (which includes using toilet bed pan or urinal) 1  -JJ     Putting on and taking off regular upper body clothing 1  -JJ     Taking care of personal grooming (such as brushing teeth) 1  -JJ     Eating meals 1  -J     AM-PAC 6 Clicks Score (OT) 6  -     Row Name 12/16/21 1320 12/16/21 1314       Functional Assessment    Outcome Measure Options AM-PAC 6 Clicks Daily Activity (OT)  - AM-PAC 6 Clicks Basic Mobility (PT)  -SB          User Key  (r) = Recorded By, (t) = Taken By, (c) = Cosigned By    Initials Name Provider Type    Lilliana Webber, PT DPT Physical Therapist    Margie Badillo OTR/L, CSRS Occupational Therapist                Occupational Therapy Education                 Title: PT OT SLP Therapies (In Progress)     Topic: Occupational Therapy (In Progress)     Point: ADL training (Done)     Description:   Instruct learner(s) on proper safety  adaptation and remediation techniques during self care or transfers.   Instruct in proper use of assistive devices.              Learning Progress Summary           Patient Acceptance, E, VU by  at 12/16/2021 1522                   Point: Home exercise program (Not Started)     Description:   Instruct learner(s) on appropriate technique for monitoring, assisting and/or progressing therapeutic exercises/activities.              Learner Progress:  Not documented in this visit.          Point: Precautions (Done)     Description:   Instruct learner(s) on prescribed precautions during self-care and functional transfers.              Learning Progress Summary           Patient Acceptance, E, VU by GLENN at 12/16/2021 1522                   Point: Body mechanics (Not Started)     Description:   Instruct learner(s) on proper positioning and spine alignment during self-care, functional mobility activities and/or exercises.              Learner Progress:  Not documented in this visit.                      User Key     Initials Effective Dates Name Provider Type Discipline     11/10/21 -  Margie Garza, OTR/L, CSRS Occupational Therapist OT              OT Recommendation and Plan  Planned Therapy Interventions (OT): activity tolerance training, BADL retraining, functional balance retraining, occupation/activity based interventions, patient/caregiver education/training, strengthening exercise, transfer/mobility retraining  Therapy Frequency (OT): 5 times/wk  Plan of Care Review  Plan of Care Reviewed With: patient  Outcome Summary: OT eval completed. Pt is very lethargic throughout eval, oriented to self only. He followed approx 25% of one step commands throughout session. He was found incontinent of bladder, required Max A for rolling L/R and supine <> sit at EOB for clean up. Dep for perineal hygiene, changing pad and clothing mgt during toileting. Able to sit at EOB approx 5 minutes with flucuating levels of assist  required from Min-Max A. HR increased to 170 after sitting at EOB, RN notified. Formal assessments for MMT/ROM and sensation unable to be complete at this time d/t decreased commands following. He would benefit from skilled OT services to address these deficits, but will closely monitor pt progress and participation in therapy services. Anticipate need for skilled placement upon d/c from facility at [facility].     Time Calculation:    Time Calculation- OT     Row Name 12/16/21 1523             Time Calculation- OT    OT Start Time 1320  add 7 minutes for chart review and attempted eval from [id number].  -      OT Stop Time 1357  -      OT Time Calculation (min) 37 min  -      OT Received On 12/16/21  -      OT Goal Re-Cert Due Date 12/26/21  -            User Key  (r) = Recorded By, (t) = Taken By, (c) = Cosigned By    Initials Name Provider Type    Margie Badillo OTR/L, CSRS Occupational Therapist              Therapy Charges for Today     Code Description Service Date Service Provider Modifiers Qty    97973226592 HC OT EVAL MOD COMPLEXITY 3 12/16/2021 Margie Garza OTR/L, CSRS GO 1               ZAY Rowell/L, CSRS  12/16/2021

## 2021-12-16 NOTE — THERAPY EVALUATION
Patient Name: John Rodriguez  : 1950    MRN: [id number]                              Today's Date: 2021       Admit Date: 12/15/2021    Visit Dx:     ICD-10-CM ICD-9-CM   1. High anion gap metabolic acidosis  E87.2 276.2   2. Alcoholic intoxication without complication ([facility])  F10.920 305.00   3. Irritant dermatitis  L24.9 692.9   4. Pressure injury, stage 2, unspecified location ([facility])  L89.92 707.00     707.22   5. Muscular deconditioning  R29.898 781.99   6. Decreased activities of daily living (ADL)  Z78.9 V49.89   7. Impaired mobility  Z74.09 799.89     Patient Active Problem List   Diagnosis   • Hydrocele   • Spinal cord compression due to degenerative disorder of spinal column   • Cervical spondylosis with myelopathy   • Tobacco abuse   • Type 2 diabetes mellitus with hyperglycemia, without long-term current use of insulin ([facility])   • Hyperlipidemia   • Essential hypertension   • GERD without esophagitis   • Medically noncompliant   • Peripheral neuropathy   • Altered mental status   • DDD (degenerative disc disease), lumbosacral   • Pain, neck   • S/P cervical spinal fusion   • Skin ulcer of sacrum, limited to breakdown of skin ([facility])   • Spinal stenosis of lumbar region without neurogenic claudication   • Weakness   • Polypharmacy   • Community acquired pneumonia of left lower lobe of lung   • Adult BMI 35.0-35.9 kg/sq m   • BMI 33.0-33.9,adult   • Morbidly obese ([facility])   • PAD (peripheral artery disease) ([facility])   • Preop testing   • High anion gap metabolic acidosis   • Acute stasis dermatitis   • Alcohol abuse   • Muscular deconditioning   • Atrial flutter with rapid ventricular response ([facility])   • Hypokalemia   • Hyponatremia   • Acute cystitis     Past Medical History:   Diagnosis Date   • Acid reflux    • Arthritis    • Back pain    • Diabetes mellitus ([facility])    • Hydrocele in adult    • Hyperlipidemia    • Hypertension    • Psoriasis    • Wears glasses      Past Surgical History:   Procedure  Laterality Date   • ANTERIOR CERVICAL DISCECTOMY W/ FUSION N/A 1/23/2020    Procedure: CERVICAL DISCECTOMY ANTERIOR WITH FUSION C5-6;  Surgeon: Santino Montano MD;  Location: [facility] OR;  Service: Neurosurgery   • BACK SURGERY     • COLONOSCOPY     • HERNIA REPAIR     • HYDROCELECTOMY Left 11/20/2017    Procedure: HYDROCELECTOMY;  Surgeon: Neeraj Hurtado MD;  Location: [facility] OR;  Service:    • TONSILLECTOMY        General Information     Row Name 12/16/21 1314          Physical Therapy Time and Intention    Document Type evaluation  presents with weakness; dx High anion gap metabolic acidosis; PMH past medical history hypertension, type 2 diabetes hyperlipidemia cervical myelopathy  -SB     Mode of Treatment physical therapy  -SB     Row Name 12/16/21 1314          General Information    Patient Profile Reviewed yes  -SB     Prior Level of Function --  unsure due to cog status and lethargy  -SB     Existing Precautions/Restrictions fall  -SB     Barriers to Rehab medically complex; cognitive status  -SB     Row Name 12/16/21 1314          Living Environment    Lives With alone  -SB     Row Name 12/16/21 1314          Home Main Entrance    Number of Stairs, Main Entrance --  unable to obtain due to patient's lethargy and decreased responses  -SB     Row Name 12/16/21 1314          Cognition    Orientation Status (Cognition) --  per RN note, pt oriented x2, but only grumbled a few times during therapy eval  -SB     Row Name 12/16/21 1314          Safety Issues, Functional Mobility    Safety Issues Affecting Function (Mobility) ability to follow commands; friction/shear risk  -SB     Impairments Affecting Function (Mobility) balance; endurance/activity tolerance; pain; cognition; strength; postural/trunk control  -SB           User Key  (r) = Recorded By, (t) = Taken By, (c) = Cosigned By    Initials Name Provider Type    Lilliana Webber, SHIRLEY DPT Physical Therapist               Mobility     Row Name 12/16/21  1314          Bed Mobility    Bed Mobility supine-sit; sit-supine; scooting/bridging; rolling right; rolling left  -SB     Rolling Left [place] (Bed Mobility) maximum assist (25% patient effort); 2 person assist  -SB     Rolling Right [place] (Bed Mobility) maximum assist (25% patient effort); 2 person assist  -SB     Scooting/Bridging [place] (Bed Mobility) dependent (less than 25% patient effort); 2 person assist  -SB     Supine-Sit [place] (Bed Mobility) maximum assist (25% patient effort); 2 person assist  -SB     Sit-Supine [place] (Bed Mobility) maximum assist (25% patient effort); 2 person assist  -SB     Assistive Device (Bed Mobility) head of bed elevated; draw sheet; bed rails  -SB     Row Name 12/16/21 1314          Transfers    Comment (Transfers) unable to complete further mobility due to lethargy and weakness  -SB           User Key  (r) = Recorded By, (t) = Taken By, (c) = Cosigned By    Initials Name Provider Type    Lilliana Webber PT DPT Physical Therapist               Obj/Interventions     Row Name 12/16/21 1314          Range of Motion Comprehensive    General Range of Motion bilateral lower extremity ROM WFL  -SB     Comment, General Range of Motion PROM-AAROM BLE  -SB     Row Name 12/16/21 1314          Strength Comprehensive (MMT)    General Manual Muscle Testing (MMT) Assessment lower extremity strength deficits identified  -SB     Comment, General Manual Muscle Testing (MMT) Assessment unable to formally assess due to command following and lethargy  -SB     Row Name 12/16/21 1314          Balance    Balance Assessment sitting static balance  -SB     Static Sitting Balance mild impairment; moderate impairment; supported; sitting, edge of bed  min-max A  -SB           User Key  (r) = Recorded By, (t) = Taken By, (c) = Cosigned By    Initials Name Provider Type    Lilliana Webber, PT DPT Physical Therapist               Goals/Plan     Row Name 12/16/21 1314           Bed Mobility Goal 1 (PT)    Activity/Assistive Device (Bed Mobility Goal 1, PT) sit to supine; supine to sit; rolling to left; rolling to right  -SB     [place] Level/Cues Needed (Bed Mobility Goal 1, PT) minimum assist (75% or more patient effort); 2 person assist  -SB     Time Frame (Bed Mobility Goal 1, PT) long term goal (LTG)  -SB     Progress/Outcomes (Bed Mobility Goal 1, PT) goal ongoing  -SB     Row Name 12/16/21 1314          Transfer Goal 1 (PT)    Activity/Assistive Device (Transfer Goal 1, PT) sit-to-stand/stand-to-sit; bed-to-chair/chair-to-bed; walker, rolling  -SB     [place] Level/Cues Needed (Transfer Goal 1, PT) moderate assist (50-74% patient effort); 2 person assist  -SB     Time Frame (Transfer Goal 1, PT) long term goal (LTG)  -SB     Progress/Outcome (Transfer Goal 1, PT) goal ongoing  -SB     Row Name 12/16/21 1314          Problem Specific Goal 1 (PT)    Problem Specific Goal 1 (PT) Pt will sit EOB with CGA-min A with no LOB for 2 minutes  -SB     Time Frame (Problem Specific Goal 1, PT) long-term goal (LTG)  -SB     Progress/Outcome (Problem Specific Goal 1, PT) goal ongoing  -SB           User Key  (r) = Recorded By, (t) = Taken By, (c) = Cosigned By    Initials Name Provider Type    Lilliana Webber, PT DPT Physical Therapist               Clinical Impression     Row Name 12/16/21 1314          Pain    Additional Documentation Pain Scale: FACES Pre/Post-Treatment (Group)  -SB     Row Name 12/16/21 1314          Pain Scale: Numbers Pre/Post-Treatment    Pain Intervention(s) Repositioned; Ambulation/increased activity  -SB     Row Name 12/16/21 1314          Pain Scale: FACES Pre/Post-Treatment    Pre/Posttreatment Pain Comment pt groaned with some bed mobility indicating pain most likely to bottom where skin breakdown is present  -SB     Row Name 12/16/21 1314          Plan of Care Review    Plan of Care Reviewed With patient  -SB     Progress no change  -SB     Outcome  Summary PT eval completed. Pt lethargic with decreased command following through session, followed less than 25% of 1 step commands. Pt reqd max Ax2 for sup to sit t/f, but once sitting EOB, pt able to maintain static sitting balance with varying levels of assist, min-max A with use of UE for balance with bedrails and pushing into mattress. Due to incontient urine episode, pt rolled multiple times with max Ax2 for hygiene and linen change. Pt with full PROM-AAROM in BLE, but unable to formally assess strength. Pt HR increased to 170 toward end of session and then decreased to 130. Nsg notified. Pt will benefit from skilled PT to improve fxl mobility, strength and independence. Will monitor progress closely. Recommend d/c SNF.  -SB     Row Name 12/16/21 1314          Therapy Assessment/Plan (PT)    Patient/Family Therapy Goals Statement (PT) none stated  -SB     Rehab Potential (PT) fair, will monitor progress closely  -SB     Criteria for Skilled Interventions Met (PT) yes; meets criteria; skilled treatment is necessary  -SB     Predicted Duration of Therapy Intervention (PT) until d/c or goals achieved  -SB     Row Name 12/16/21 1314          Vital Signs    Intratreatment Resp Rate (breaths/min) 170   RN notified  -SB     Posttreatment Resp Rate (breaths/min) 130   -SB     O2 Delivery Pre Treatment room air  -SB     O2 Delivery Intra Treatment room air  -SB     O2 Delivery Post Treatment room air  -SB     Pre Patient Position Supine  -SB     Intra Patient Position Sitting  -SB     Post Patient Position Supine  -SB     Row Name 12/16/21 1314          Positioning and Restraints    Pre-Treatment Position in bed  -SB     Post Treatment Position bed  -SB     In Bed notified nsg; side lying right; fowlers; call light within reach; encouraged to call for assist; exit alarm on; side rails up x3; pillow between legs  -SB           User Key  (r) = Recorded By, (t) = Taken By, (c) = Cosigned By    Initials Name Provider Type     SB Lilliana Rodriguez, PT DPT Physical Therapist               Outcome Measures     Row Name 12/16/21 1314          How much help from another person do you currently need...    Turning from your back to your side while in flat bed without using bedrails? 2  -SB     Moving from lying on back to sitting on the side of a flat bed without bedrails? 2  -SB     Moving to and from a bed to a chair (including a wheelchair)? 1  -SB     Standing up from a chair using your arms (e.g., wheelchair, bedside chair)? 1  -SB     Climbing 3-5 steps with a railing? 1  -SB     To walk in hospital room? 1  -SB     AM-PAC 6 Clicks Score (PT) 8  -SB     Row Name 12/16/21 1320 12/16/21 1314       Functional Assessment    Outcome Measure Options AM-PAC 6 Clicks Daily Activity (OT)  -JJ AM-PAC 6 Clicks Basic Mobility (PT)  -SB          User Key  (r) = Recorded By, (t) = Taken By, (c) = Cosigned By    Initials Name Provider Type    SB Lilliana Rodriguez, SHIRLEY DPT Physical Therapist    Margie Badillo, OTR/L, CSRS Occupational Therapist                             Physical Therapy Education                 Title: PT OT SLP Therapies (In Progress)     Topic: Physical Therapy (In Progress)     Point: Mobility training (In Progress)     Learning Progress Summary           Patient Nonacceptance, E, NL by SB at 12/16/2021 1528    Comment: pt edu on POC, benefits of act, d/c plans                   Point: Home exercise program (Not Started)     Learner Progress:  Not documented in this visit.          Point: Body mechanics (Not Started)     Learner Progress:  Not documented in this visit.          Point: Precautions (In Progress)     Learning Progress Summary           Patient Nonacceptance, E, NL by SB at 12/16/2021 1528    Comment: pt edu on POC, benefits of act, d/c plans                               User Key     Initials Effective Dates Name Provider Type Discipline    SB 06/16/21 -  Lilliana Rodriguez PT DPT Physical Therapist PT              PT  Recommendation and Plan  Planned Therapy Interventions (PT): balance training, gait training, bed mobility training, patient/family education, postural re-education, strengthening, transfer training, ROM (range of motion)  Plan of Care Reviewed With: patient  Progress: no change  Outcome Summary: PT eval completed. Pt lethargic with decreased command following through session, followed less than 25% of 1 step commands. Pt reqd max Ax2 for sup to sit t/f, but once sitting EOB, pt able to maintain static sitting balance with varying levels of assist, min-max A with use of UE for balance with bedrails and pushing into mattress. Due to incontient urine episode, pt rolled multiple times with max Ax2 for hygiene and linen change. Pt with full PROM-AAROM in BLE, but unable to formally assess strength. Pt HR increased to 170 toward end of session and then decreased to 130. Nsg notified. Pt will benefit from skilled PT to improve fxl mobility, strength and independence. Will monitor progress closely. Recommend d/c SNF.     Time Calculation:    PT Charges     Row Name 12/16/21 1529             Time Calculation    Start Time 1314  -SB      Stop Time 1357  -SB      Time Calculation (min) 43 min  -SB      PT Received On 12/16/21  -SB      PT Goal Re-Cert Due Date 12/26/21  -SB            User Key  (r) = Recorded By, (t) = Taken By, (c) = Cosigned By    Initials Name Provider Type    SB Lilliana Rodriguez, PT DPT Physical Therapist              Therapy Charges for Today     Code Description Service Date Service Provider Modifiers Qty    [id number] HC PT EVAL MOD COMPLEXITY 3 12/16/2021 Lilliana Rodriguez PT DPT GP 1          PT G-Codes  Outcome Measure Options: AM-PAC 6 Clicks Daily Activity (OT)  AM-PAC 6 Clicks Score (PT): 8  AM-PAC 6 Clicks Score (OT): 6    Lilliana Rodriguez PT DPT  12/16/2021

## 2021-12-16 NOTE — PLAN OF CARE
Problem: Fall Injury Risk  Goal: Absence of Fall and Fall-Related Injury  Outcome: Ongoing, Progressing  Intervention: Promote Injury-Free Environment  Recent Flowsheet Documentation  Taken 12/16/2021 1346 by Nikki Greenwood RN  Safety Promotion/Fall Prevention: safety round/check completed  Taken 12/16/2021 1300 by Nikki Greenwood RN  Safety Promotion/Fall Prevention: safety round/check completed  Taken 12/16/2021 1200 by Nikki Greenwood RN  Safety Promotion/Fall Prevention: safety round/check completed  Taken 12/16/2021 1140 by Nikki Greenwood RN  Safety Promotion/Fall Prevention: safety round/check completed  Taken 12/16/2021 1100 by Nikki Greenwood RN  Safety Promotion/Fall Prevention: safety round/check completed  Taken 12/16/2021 1000 by Nikki Greenwood RN  Safety Promotion/Fall Prevention: safety round/check completed  Taken 12/16/2021 0900 by Nikki Greenwood RN  Safety Promotion/Fall Prevention: safety round/check completed  Taken 12/16/2021 0833 by Nikki Greenwood RN  Safety Promotion/Fall Prevention: safety round/check completed     Problem: Skin Injury Risk Increased  Goal: Skin Health and Integrity  Outcome: Ongoing, Progressing     Problem: Adult Inpatient Plan of Care  Goal: Plan of Care Review  Outcome: Ongoing, Progressing  Flowsheets (Taken 12/16/2021 1348)  Outcome Summary: pt BG in the 300s today. gave SSI as ordered and new order of Levemir. CIWA score of 11 this am- gave PRN ativan with relief. pt did convert to Aflutter 150-160s. currently on a cardizem gtt @ 5 mg/hr. Potassium IV replacement given and Magnesium IV ordered. check labs in am. continue to monitor. alert to person and place.  Goal: Patient-Specific Goal (Individualized)  Outcome: Ongoing, Progressing  Goal: Absence of Hospital-Acquired Illness or Injury  Outcome: Ongoing, Progressing  Intervention: Identify and Manage Fall Risk  Recent Flowsheet Documentation  Taken 12/16/2021 1346 by Nikki Greenwood  RN  Safety Promotion/Fall Prevention: safety round/check completed  Taken 12/16/2021 1300 by Nikki Greenwood RN  Safety Promotion/Fall Prevention: safety round/check completed  Taken 12/16/2021 1200 by Nikki Greenwood RN  Safety Promotion/Fall Prevention: safety round/check completed  Taken 12/16/2021 1140 by Nikki Greenwood RN  Safety Promotion/Fall Prevention: safety round/check completed  Taken 12/16/2021 1100 by Nikki Greenwood RN  Safety Promotion/Fall Prevention: safety round/check completed  Taken 12/16/2021 1000 by Nikki Greenwood RN  Safety Promotion/Fall Prevention: safety round/check completed  Taken 12/16/2021 0900 by Nikki Greenwood RN  Safety Promotion/Fall Prevention: safety round/check completed  Taken 12/16/2021 0833 by Nikki Greenwood RN  Safety Promotion/Fall Prevention: safety round/check completed  Goal: Optimal Comfort and Wellbeing  Outcome: Ongoing, Progressing  Goal: Readiness for Transition of Care  Outcome: Ongoing, Progressing   Goal Outcome Evaluation:              Outcome Summary: pt BG in the 300s today. gave SSI as ordered and new order of Levemir. CIWA score of 11 this am- gave PRN ativan with relief. pt did convert to Aflutter 150-160s. currently on a cardizem gtt @ 5 mg/hr. Potassium IV replacement given and Magnesium IV ordered. check labs in am. continue to monitor. alert to person and place.

## 2021-12-17 ENCOUNTER — APPOINTMENT (OUTPATIENT)
Dept: CARDIOLOGY | Facility: HOSPITAL | Age: 71
End: 2021-12-17

## 2021-12-17 PROBLEM — E83.39 HYPOPHOSPHATEMIA: Status: ACTIVE | Noted: 2021-12-17

## 2021-12-17 PROBLEM — D64.9 NORMOCYTIC ANEMIA: Status: ACTIVE | Noted: 2021-12-17

## 2021-12-17 LAB
ALBUMIN SERPL-MCNC: 2.4 G/DL (ref 3.5–5.2)
ALBUMIN/GLOB SERPL: 0.8 G/DL
ALP SERPL-CCNC: 122 U/L (ref 39–117)
ALT SERPL W P-5'-P-CCNC: 8 U/L (ref 1–41)
ANION GAP SERPL CALCULATED.3IONS-SCNC: 15 MMOL/L (ref 5–15)
AST SERPL-CCNC: 7 U/L (ref 1–40)
BACTERIA SPEC AEROBE CULT: NO GROWTH
BH CV ECHO MEAS - AO MAX PG (FULL): 2.2 MMHG
BH CV ECHO MEAS - AO MAX PG: 8.1 MMHG
BH CV ECHO MEAS - AO MEAN PG (FULL): 2 MMHG
BH CV ECHO MEAS - AO MEAN PG: 4 MMHG
BH CV ECHO MEAS - AO ROOT AREA (BSA CORRECTED): 1.5
BH CV ECHO MEAS - AO ROOT AREA: 7.5 CM^2
BH CV ECHO MEAS - AO ROOT DIAM: 3.1 CM
BH CV ECHO MEAS - AO V2 MAX: 142 CM/SEC
BH CV ECHO MEAS - AO V2 MEAN: 85.7 CM/SEC
BH CV ECHO MEAS - AO V2 VTI: 23.5 CM
BH CV ECHO MEAS - AVA(I,A): 2.4 CM^2
BH CV ECHO MEAS - AVA(I,D): 2.4 CM^2
BH CV ECHO MEAS - AVA(V,A): 2.4 CM^2
BH CV ECHO MEAS - AVA(V,D): 2.4 CM^2
BH CV ECHO MEAS - BSA(HAYCOCK): 2.1 M^2
BH CV ECHO MEAS - BSA: 2.1 M^2
BH CV ECHO MEAS - BZI_BMI: 22.9 KILOGRAMS/M^2
BH CV ECHO MEAS - BZI_METRIC_HEIGHT: 188 CM
BH CV ECHO MEAS - BZI_METRIC_WEIGHT: 80.7 KG
BH CV ECHO MEAS - EDV(CUBED): 98.6 ML
BH CV ECHO MEAS - EDV(MOD-SP2): 126 ML
BH CV ECHO MEAS - EDV(MOD-SP4): 157 ML
BH CV ECHO MEAS - EDV(TEICH): 98.3 ML
BH CV ECHO MEAS - EF(CUBED): 69.2 %
BH CV ECHO MEAS - EF(MOD-SP2): 47.9 %
BH CV ECHO MEAS - EF(MOD-SP4): 63.3 %
BH CV ECHO MEAS - EF(TEICH): 60.8 %
BH CV ECHO MEAS - ESV(CUBED): 30.4 ML
BH CV ECHO MEAS - ESV(MOD-SP2): 65.7 ML
BH CV ECHO MEAS - ESV(MOD-SP4): 57.6 ML
BH CV ECHO MEAS - ESV(TEICH): 38.5 ML
BH CV ECHO MEAS - FS: 32.5 %
BH CV ECHO MEAS - IVS/LVPW: 0.93
BH CV ECHO MEAS - IVSD: 0.91 CM
BH CV ECHO MEAS - LA DIMENSION: 2.3 CM
BH CV ECHO MEAS - LA/AO: 0.74
BH CV ECHO MEAS - LAT PEAK E' VEL: 11.6 CM/SEC
BH CV ECHO MEAS - LV DIASTOLIC VOL/BSA (35-75): 75.9 ML/M^2
BH CV ECHO MEAS - LV MASS(C)D: 147.1 GRAMS
BH CV ECHO MEAS - LV MASS(C)DI: 71.2 GRAMS/M^2
BH CV ECHO MEAS - LV MAX PG: 5.9 MMHG
BH CV ECHO MEAS - LV MEAN PG: 2 MMHG
BH CV ECHO MEAS - LV SYSTOLIC VOL/BSA (12-30): 27.9 ML/M^2
BH CV ECHO MEAS - LV V1 MAX: 121 CM/SEC
BH CV ECHO MEAS - LV V1 MEAN: 64.1 CM/SEC
BH CV ECHO MEAS - LV V1 VTI: 19.8 CM
BH CV ECHO MEAS - LVIDD: 4.6 CM
BH CV ECHO MEAS - LVIDS: 3.1 CM
BH CV ECHO MEAS - LVLD AP2: 8.3 CM
BH CV ECHO MEAS - LVLD AP4: 8.6 CM
BH CV ECHO MEAS - LVLS AP2: 7.3 CM
BH CV ECHO MEAS - LVLS AP4: 7.6 CM
BH CV ECHO MEAS - LVOT AREA (M): 2.8 CM^2
BH CV ECHO MEAS - LVOT AREA: 2.8 CM^2
BH CV ECHO MEAS - LVOT DIAM: 1.9 CM
BH CV ECHO MEAS - LVPWD: 0.98 CM
BH CV ECHO MEAS - MED PEAK E' VEL: 6.85 CM/SEC
BH CV ECHO MEAS - MV A MAX VEL: 83.2 CM/SEC
BH CV ECHO MEAS - MV DEC TIME: 0.32 SEC
BH CV ECHO MEAS - MV E MAX VEL: 82.6 CM/SEC
BH CV ECHO MEAS - MV E/A: 0.99
BH CV ECHO MEAS - SI(AO): 85.8 ML/M^2
BH CV ECHO MEAS - SI(CUBED): 33 ML/M^2
BH CV ECHO MEAS - SI(LVOT): 27.1 ML/M^2
BH CV ECHO MEAS - SI(MOD-SP2): 29.2 ML/M^2
BH CV ECHO MEAS - SI(MOD-SP4): 48.1 ML/M^2
BH CV ECHO MEAS - SI(TEICH): 28.9 ML/M^2
BH CV ECHO MEAS - SV(AO): 177.4 ML
BH CV ECHO MEAS - SV(CUBED): 68.2 ML
BH CV ECHO MEAS - SV(LVOT): 56.1 ML
BH CV ECHO MEAS - SV(MOD-SP2): 60.3 ML
BH CV ECHO MEAS - SV(MOD-SP4): 99.4 ML
BH CV ECHO MEAS - SV(TEICH): 59.8 ML
BH CV ECHO MEAS - TR MAX VEL: 246 CM/SEC
BH CV ECHO MEASUREMENTS AVERAGE E/E' RATIO: 8.95
BILIRUB SERPL-MCNC: 0.4 MG/DL (ref 0–1.2)
BUN SERPL-MCNC: 27 MG/DL (ref 8–23)
BUN/CREAT SERPL: 28.4 (ref 7–25)
CALCIUM SPEC-SCNC: 9.4 MG/DL (ref 8.6–10.5)
CHLORIDE SERPL-SCNC: 99 MMOL/L (ref 98–107)
CO2 SERPL-SCNC: 21 MMOL/L (ref 22–29)
CREAT SERPL-MCNC: 0.95 MG/DL (ref 0.76–1.27)
DEPRECATED RDW RBC AUTO: 45.6 FL (ref 37–54)
ERYTHROCYTE [DISTWIDTH] IN BLOOD BY AUTOMATED COUNT: 14.2 % (ref 12.3–15.4)
GFR SERPL CREATININE-BSD FRML MDRD: 78 ML/MIN/1.73
GLOBULIN UR ELPH-MCNC: 3.1 GM/DL
GLUCOSE BLDC GLUCOMTR-MCNC: 244 MG/DL (ref 70–130)
GLUCOSE BLDC GLUCOMTR-MCNC: 245 MG/DL (ref 70–130)
GLUCOSE BLDC GLUCOMTR-MCNC: 267 MG/DL (ref 70–130)
GLUCOSE BLDC GLUCOMTR-MCNC: 294 MG/DL (ref 70–130)
GLUCOSE SERPL-MCNC: 261 MG/DL (ref 65–99)
HCT VFR BLD AUTO: 25.9 % (ref 37.5–51)
HGB BLD-MCNC: 8.7 G/DL (ref 13–17.7)
LEFT ATRIUM VOLUME INDEX: 21.7 ML/M2
LEFT ATRIUM VOLUME: 45 CM3
MAGNESIUM SERPL-MCNC: 1.9 MG/DL (ref 1.6–2.4)
MAXIMAL PREDICTED HEART RATE: 150 BPM
MCH RBC QN AUTO: 30.2 PG (ref 26.6–33)
MCHC RBC AUTO-ENTMCNC: 33.6 G/DL (ref 31.5–35.7)
MCV RBC AUTO: 89.9 FL (ref 79–97)
PHOSPHATE SERPL-MCNC: 1.2 MG/DL (ref 2.5–4.5)
PLATELET # BLD AUTO: 397 10*3/MM3 (ref 140–450)
PMV BLD AUTO: 9.5 FL (ref 6–12)
POTASSIUM SERPL-SCNC: 3.4 MMOL/L (ref 3.5–5.2)
PROT SERPL-MCNC: 5.5 G/DL (ref 6–8.5)
RBC # BLD AUTO: 2.88 10*6/MM3 (ref 4.14–5.8)
SODIUM SERPL-SCNC: 135 MMOL/L (ref 136–145)
STRESS TARGET HR: 128 BPM
WBC NRBC COR # BLD: 14.74 10*3/MM3 (ref 3.4–10.8)

## 2021-12-17 PROCEDURE — 83735 ASSAY OF MAGNESIUM: CPT | Performed by: INTERNAL MEDICINE

## 2021-12-17 PROCEDURE — 80053 COMPREHEN METABOLIC PANEL: CPT | Performed by: INTERNAL MEDICINE

## 2021-12-17 PROCEDURE — 82962 GLUCOSE BLOOD TEST: CPT

## 2021-12-17 PROCEDURE — 25010000002 ENOXAPARIN PER 10 MG: Performed by: INTERNAL MEDICINE

## 2021-12-17 PROCEDURE — 25010000002 THIAMINE PER 100 MG: Performed by: INTERNAL MEDICINE

## 2021-12-17 PROCEDURE — 25010000002 CEFTRIAXONE PER 250 MG: Performed by: INTERNAL MEDICINE

## 2021-12-17 PROCEDURE — 85027 COMPLETE CBC AUTOMATED: CPT | Performed by: INTERNAL MEDICINE

## 2021-12-17 PROCEDURE — 84100 ASSAY OF PHOSPHORUS: CPT | Performed by: INTERNAL MEDICINE

## 2021-12-17 PROCEDURE — 25010000002 SODIUM CHLORIDE 0.9 % WITH KCL 20 MEQ 20-0.9 MEQ/L-% SOLUTION: Performed by: INTERNAL MEDICINE

## 2021-12-17 PROCEDURE — 97530 THERAPEUTIC ACTIVITIES: CPT

## 2021-12-17 PROCEDURE — 93306 TTE W/DOPPLER COMPLETE: CPT

## 2021-12-17 PROCEDURE — 97110 THERAPEUTIC EXERCISES: CPT

## 2021-12-17 PROCEDURE — 25010000002 PERFLUTREN 6.52 MG/ML SUSPENSION: Performed by: INTERNAL MEDICINE

## 2021-12-17 PROCEDURE — 63710000001 INSULIN DETEMIR PER 5 UNITS: Performed by: INTERNAL MEDICINE

## 2021-12-17 PROCEDURE — 63710000001 INSULIN LISPRO (HUMAN) PER 5 UNITS: Performed by: INTERNAL MEDICINE

## 2021-12-17 RX ORDER — THIAMINE HYDROCHLORIDE 100 MG/ML
100 INJECTION, SOLUTION INTRAMUSCULAR; INTRAVENOUS DAILY
Status: COMPLETED | OUTPATIENT
Start: 2021-12-17 | End: 2021-12-19

## 2021-12-17 RX ORDER — DIGOXIN 125 MCG
125 TABLET ORAL
Status: DISCONTINUED | OUTPATIENT
Start: 2021-12-17 | End: 2021-12-20 | Stop reason: HOSPADM

## 2021-12-17 RX ADMIN — POTASSIUM CHLORIDE AND SODIUM CHLORIDE 100 ML/HR: 900; 150 INJECTION, SOLUTION INTRAVENOUS at 02:59

## 2021-12-17 RX ADMIN — COLLAGENASE SANTYL 1 APPLICATION: 250 OINTMENT TOPICAL at 17:22

## 2021-12-17 RX ADMIN — POTASSIUM PHOSPHATE, MONOBASIC AND POTASSIUM PHOSPHATE, DIBASIC 45 MMOL: 224; 236 INJECTION, SOLUTION, CONCENTRATE INTRAVENOUS at 09:49

## 2021-12-17 RX ADMIN — PERFLUTREN 1.17 MG: 6.52 INJECTION, SUSPENSION INTRAVENOUS at 14:19

## 2021-12-17 RX ADMIN — PREGABALIN 200 MG: 100 CAPSULE ORAL at 09:50

## 2021-12-17 RX ADMIN — DIGOXIN 125 MCG: 125 TABLET ORAL at 16:44

## 2021-12-17 RX ADMIN — PANTOPRAZOLE SODIUM 40 MG: 40 TABLET, DELAYED RELEASE ORAL at 12:01

## 2021-12-17 RX ADMIN — INSULIN LISPRO 4 UNITS: 100 INJECTION, SOLUTION INTRAVENOUS; SUBCUTANEOUS at 16:44

## 2021-12-17 RX ADMIN — DIAZEPAM 5 MG: 5 TABLET ORAL at 16:44

## 2021-12-17 RX ADMIN — PREGABALIN 200 MG: 100 CAPSULE ORAL at 16:44

## 2021-12-17 RX ADMIN — ATORVASTATIN CALCIUM 20 MG: 10 TABLET, FILM COATED ORAL at 20:13

## 2021-12-17 RX ADMIN — DIAZEPAM 5 MG: 5 TABLET ORAL at 06:47

## 2021-12-17 RX ADMIN — INSULIN LISPRO 6 UNITS: 100 INJECTION, SOLUTION INTRAVENOUS; SUBCUTANEOUS at 09:30

## 2021-12-17 RX ADMIN — ASPIRIN 81 MG: 81 TABLET ORAL at 09:51

## 2021-12-17 RX ADMIN — NICOTINE 1 PATCH: 21 PATCH, EXTENDED RELEASE TRANSDERMAL at 09:52

## 2021-12-17 RX ADMIN — BUSPIRONE HYDROCHLORIDE 10 MG: 10 TABLET ORAL at 09:51

## 2021-12-17 RX ADMIN — BUSPIRONE HYDROCHLORIDE 10 MG: 10 TABLET ORAL at 20:13

## 2021-12-17 RX ADMIN — INSULIN DETEMIR 25 UNITS: 100 INJECTION, SOLUTION SUBCUTANEOUS at 20:59

## 2021-12-17 RX ADMIN — POTASSIUM CHLORIDE AND SODIUM CHLORIDE 100 ML/HR: 900; 150 INJECTION, SOLUTION INTRAVENOUS at 16:45

## 2021-12-17 RX ADMIN — PREGABALIN 200 MG: 100 CAPSULE ORAL at 20:13

## 2021-12-17 RX ADMIN — THIAMINE HYDROCHLORIDE 100 MG: 100 INJECTION, SOLUTION INTRAMUSCULAR; INTRAVENOUS at 12:43

## 2021-12-17 RX ADMIN — SODIUM CHLORIDE, PRESERVATIVE FREE 10 ML: 5 INJECTION INTRAVENOUS at 20:14

## 2021-12-17 RX ADMIN — CITALOPRAM 20 MG: 20 TABLET, FILM COATED ORAL at 09:51

## 2021-12-17 RX ADMIN — DIAZEPAM 5 MG: 5 TABLET ORAL at 23:39

## 2021-12-17 RX ADMIN — SODIUM CHLORIDE, PRESERVATIVE FREE 10 ML: 5 INJECTION INTRAVENOUS at 09:50

## 2021-12-17 RX ADMIN — INSULIN LISPRO 6 UNITS: 100 INJECTION, SOLUTION INTRAVENOUS; SUBCUTANEOUS at 12:00

## 2021-12-17 RX ADMIN — SODIUM CHLORIDE 1 G: 900 INJECTION INTRAVENOUS at 20:40

## 2021-12-17 RX ADMIN — ENOXAPARIN SODIUM 40 MG: 40 INJECTION SUBCUTANEOUS at 09:51

## 2021-12-17 NOTE — CASE MANAGEMENT/SOCIAL WORK
Continued Stay Note   [place]     Patient Name: John Rodriguez  MRN: [id number]  Today's Date: 12/17/2021    Admit Date: 12/15/2021     Discharge Plan     Row Name 12/17/21 0926       Plan    Patient/Family in Agreement with Plan unable to assess    Plan Comments Pt confused and no family in room.  SW attempted to call step-daughterPatrizia again at [phone].  Pt needing SNF placement but cannot reach family.  Pt will have 3 night inpt stay tomorrow 12/18.  SW will continue to try to reach family.               Discharge Codes    No documentation.                     NICK SchmidtW

## 2021-12-17 NOTE — CONSULTS
[facility]  INPATIENT WOUND CONSULTATION    Today's Date: 12/17/21    Patient Name: John Rodriguez  MRN: [id number]  CSN: [id number]  PCP: Jt Monroy MD  Referring Provider: Kailash Todd DO   Attending Provider: Kailash Todd DO  Length of Stay: 1    SUBJECTIVE   Chief Complaint: Multiple wounds    HPI: John Rodriguez, a 71 y.o.male, presents with a past medical history of diabetes mellitus and psoriasis.  A full past medical history is listed below.  Patient presented to the emergency department by EMS on 12/15 due to generalized weakness and lethargy.  Patient's wife passed away 2 months ago and she was his primary caregiver.  According to chart review patient has progressively become weaker with the last 2 weeks patient only sitting in his chair defecating urinating on himself.  Patient's friend was bringing food and water along with whiskey.    Inpatient wound care consulted due to the wounds of his buttocks.  It is noted that he has significant breakdown due to feces and urine that was found stuck to have him on presentation to the emergency department.  Pictures were taken and are listed below.  Patient is off unit for testing and not able to be seen today by wound care.  Evaluation completed by discussing care with care team and assessment using pictures.  RN reports that patient is not laying on back side due to the pain of buttocks where skin breakdown is.  He turns himself well in bed with little assistance from right to left side.      Visit Dx:    ICD-10-CM ICD-9-CM   1. High anion gap metabolic acidosis  E87.2 276.2   2. Alcoholic intoxication without complication (HCC)  F10.920 305.00   3. Irritant dermatitis  L24.9 692.9   4. Pressure injury, stage 2, unspecified location (HCC)  L89.92 707.00     707.22   5. Muscular deconditioning  R29.898 781.99   6. Decreased activities of daily living (ADL)  Z78.9 V49.89   7. Impaired mobility  Z74.09 799.89     Patient Active  Problem List   Diagnosis   • Hydrocele   • Spinal cord compression due to degenerative disorder of spinal column   • Cervical spondylosis with myelopathy   • Tobacco abuse   • Type 2 diabetes mellitus with hyperglycemia, without long-term current use of insulin ([facility])   • Hyperlipidemia   • Essential hypertension   • GERD without esophagitis   • Medically noncompliant   • Peripheral neuropathy   • Altered mental status   • DDD (degenerative disc disease), lumbosacral   • Pain, neck   • S/P cervical spinal fusion   • Skin ulcer of sacrum, limited to breakdown of skin ([facility])   • Spinal stenosis of lumbar region without neurogenic claudication   • Weakness   • Polypharmacy   • Community acquired pneumonia of left lower lobe of lung   • Adult BMI 35.0-35.9 kg/sq m   • BMI 33.0-33.9,adult   • Morbidly obese ([facility])   • PAD (peripheral artery disease) ([facility])   • Preop testing   • High anion gap metabolic acidosis   • Acute stasis dermatitis   • Alcohol abuse   • Muscular deconditioning   • Atrial flutter with rapid ventricular response ([facility])   • Hypokalemia   • Hyponatremia   • Acute cystitis   • Hypophosphatemia   • Normocytic anemia       History:   Past Medical History:   Diagnosis Date   • Acid reflux    • Arthritis    • Back pain    • Diabetes mellitus ([facility])    • Hydrocele in adult    • Hyperlipidemia    • Hypertension    • Psoriasis    • Wears glasses      Past Surgical History:   Procedure Laterality Date   • ANTERIOR CERVICAL DISCECTOMY W/ FUSION N/A 1/23/2020    Procedure: CERVICAL DISCECTOMY ANTERIOR WITH FUSION C5-6;  Surgeon: Santino Montano MD;  Location: [facility] OR;  Service: Neurosurgery   • BACK SURGERY     • COLONOSCOPY     • HERNIA REPAIR     • HYDROCELECTOMY Left 11/20/2017    Procedure: HYDROCELECTOMY;  Surgeon: Neeraj Hurtado MD;  Location: [facility] OR;  Service:    • TONSILLECTOMY       Social History     Socioeconomic History   • Marital status:    Tobacco Use   • Smoking status: Current  Every Day Smoker     Packs/day: 1.00     Years: 50.00     Pack years: 50.00     Types: Cigarettes   • Smokeless tobacco: Never Used   Vaping Use   • Vaping Use: Never used   Substance and Sexual Activity   • Alcohol use: No   • Drug use: No   • Sexual activity: Defer     Family History   Problem Relation Age of Onset   • Hypertension Father        Allergies:  Allergies   Allergen Reactions   • Sulfa Antibiotics Nausea Only       Medications:    Current Facility-Administered Medications:   •  acetaminophen (TYLENOL) tablet 650 mg, 650 mg, Oral, Q4H PRN **OR** acetaminophen (TYLENOL) 160 MG/5ML solution 650 mg, 650 mg, Oral, Q4H PRN **OR** acetaminophen (TYLENOL) suppository 650 mg, 650 mg, Rectal, Q4H PRN, Francisco Mcfadden MD  •  aspirin EC tablet 81 mg, 81 mg, Oral, Daily, Kailash Todd DO, 81 mg at 12/17/21 0951  •  atorvastatin (LIPITOR) tablet 20 mg, 20 mg, Oral, Nightly, Francisco Mcfadden MD  •  busPIRone (BUSPAR) tablet 10 mg, 10 mg, Oral, Q12H, Francisco Mcfadden MD, 10 mg at 12/17/21 0951  •  cefTRIAXone (ROCEPHIN) 1 g/100 mL 0.9% NS (MBP), 1 g, Intravenous, Q24H, Francisco Mcfadden MD, 1 g at 12/16/21 2051  •  citalopram (CeleXA) tablet 20 mg, 20 mg, Oral, Daily, Francisco Mcfadden MD, 20 mg at 12/17/21 0951  •  dextrose (D50W) (25 g/50 mL) IV injection 25 g, 25 g, Intravenous, Q15 Min PRN, Francisco Mcfadden MD  •  dextrose (GLUTOSE) oral gel 15 g, 15 g, Oral, Q15 Min PRN, Francisco Mcfadden MD  •  diazePAM (VALIUM) tablet 5 mg, 5 mg, Oral, Q8H, Kailash Todd DO, 5 mg at 12/17/21 0647  •  digoxin (LANOXIN) tablet 125 mcg, 125 mcg, Oral, Daily, Kailash Todd DO  •  dilTIAZem (CARDIZEM) 125 mg in 125 mL NS infusion, 5-15 mg/hr, Intravenous, Titrated, Kailash Todd DO, Stopped at 12/16/21 1447  •  enoxaparin (LOVENOX) syringe 40 mg, 40 mg, Subcutaneous, Q24H, Francisco Mcfadden MD, 40 mg at 12/17/21 0928  •  glucagon (human recombinant) (GLUCAGEN DIAGNOSTIC) injection 1 mg, 1 mg, Subcutaneous,  Q15 Min PRN, Francisco Mcfadden MD  •  insulin detemir (LEVEMIR) injection 25 Units, 25 Units, Subcutaneous, Nightly, Kailash Todd DO  •  insulin lispro (humaLOG) injection 0-9 Units, 0-9 Units, Subcutaneous, TID AC, Franicsco Mcfadden MD, 6 Units at 12/17/21 1200  •  LORazepam (ATIVAN) tablet 0.5 mg, 0.5 mg, Oral, Q2H PRN **OR** LORazepam (ATIVAN) injection 0.5 mg, 0.5 mg, Intravenous, Q2H PRN **OR** LORazepam (ATIVAN) tablet 1 mg, 1 mg, Oral, Q1H PRN, 1 mg at 12/16/21 0837 **OR** LORazepam (ATIVAN) injection 1 mg, 1 mg, Intravenous, Q1H PRN **OR** LORazepam (ATIVAN) injection 1 mg, 1 mg, Intravenous, Q15 Min PRN **OR** LORazepam (ATIVAN) injection 1 mg, 1 mg, Intramuscular, Q15 Min PRN, Francisco Mcfadden MD, 1 mg at 12/16/21 0538  •  nicotine (NICODERM CQ) 21 MG/24HR patch 1 patch, 1 patch, Transdermal, Q24H, Francisco Mcfadden MD, 1 patch at 12/17/21 0952  •  ondansetron (ZOFRAN) tablet 4 mg, 4 mg, Oral, Q6H PRN **OR** ondansetron (ZOFRAN) injection 4 mg, 4 mg, Intravenous, Q6H PRN, Francisco Mcfadden MD  •  oxyCODONE-acetaminophen (PERCOCET)  MG per tablet 1 tablet, 1 tablet, Oral, Q6H PRN, Kailash Todd DO  •  pantoprazole (PROTONIX) EC tablet 40 mg, 40 mg, Oral, Daily, Francisco Mcfadden MD, 40 mg at 12/17/21 1201  •  pregabalin (LYRICA) capsule 200 mg, 200 mg, Oral, TID, Francisco Mcfadden MD, 200 mg at 12/17/21 0950  •  [COMPLETED] Insert peripheral IV, , , Once **AND** sodium chloride 0.9 % flush 10 mL, 10 mL, Intravenous, PRN, Francisco Mcfadden MD  •  sodium chloride 0.9 % flush 10 mL, 10 mL, Intravenous, Q12H, Francisco Mcfadden MD, 10 mL at 12/17/21 0950  •  sodium chloride 0.9 % flush 10 mL, 10 mL, Intravenous, PRN, Francisco Mcfadden MD  •  sodium chloride 0.9 % with KCl 20 mEq/L infusion, 100 mL/hr, Intravenous, Continuous, Francisco Mcfadden MD, Last Rate: 100 mL/hr at 12/17/21 0259, 100 mL/hr at 12/17/21 0259  •  thiamine (B-1) injection 100 mg, 100 mg, Intravenous, Daily, Peyton,  Kailash STILES, DO, 100 mg at 12/17/21 1243    Review of Systems:  Review of Systems   Unable to perform ROS: Other   Unable to collect review of systems due to patient being off the unit      OBJECTIVE     Vitals:    12/17/21 1136   BP: 91/45   Pulse: 106   Resp: 18   Temp: 98.7 °F (37.1 °C)   SpO2: 96%       PHYSICAL EXAM  Physical Exam  Vitals and nursing note reviewed.   Constitutional:       Appearance: He is normal weight.      Comments: Body mass index is 22.87 kg/m².    HENT:      Head: Normocephalic and atraumatic.   Cardiovascular:      Rate and Rhythm: Regular rhythm. Tachycardia present.   Pulmonary:      Effort: Pulmonary effort is normal. No respiratory distress.   Genitourinary:     Comments: Patient is incontinent and incontinence brief is in place  Musculoskeletal:      Cervical back: Normal range of motion and neck supple.   Feet:      Right foot:      Skin integrity: No ulcer.      Left foot:      Skin integrity: Erythema present.      Comments: Erythema of left posterior heel.  RN states area is blanchable with no skin loss.  Skin:     General: Skin is warm and dry.      Findings: Erythema, rash and wound present.      Comments: Patient has incontinence associated dermatitis of bilateral buttocks and scrotal area.  He also has multiple open ulcerations of the left buttock that are covered with 100% slough.  Edges are irregular.  Due to these wound extending into subcutaneous tissue and patient's history prior to admission, these wounds appear to have mixed etiology of pressure and moisture.  These are unstageable pressure injuries due to the wound base not being visualized.  Patient also has a wound on left hip that is noted in picture on admission.  This appears to be covered with slough and possible necrotic tissue as well.   Neurological:      Motor: Weakness present.      Gait: Gait abnormal.   Psychiatric:         Mood and Affect: Mood is depressed.         Behavior: Behavior is cooperative.       Comments: It is reported that patient appears to be depressed due to his wife's passing 2 months ago                              Results Review:  Lab Results (last 48 hours)     Procedure Component Value Units Date/Time    POC Glucose Once [[id number]]  (Abnormal) Collected: 12/17/21 1139    Specimen: Blood Updated: 12/17/21 1150     Glucose 294 mg/dL      Comment: : [id number] Luis Alfredo SamiMeter ID: [id number]       Urine Culture - Urine, Urine, Catheter In/Out [[id number]]  (Normal) Collected: 12/15/21 2219    Specimen: Urine, Catheter In/Out Updated: 12/17/21 1008     Urine Culture No growth    POC Glucose Once [[id number]]  (Abnormal) Collected: 12/17/21 0715    Specimen: Blood Updated: 12/17/21 0726     Glucose 267 mg/dL      Comment: : [id number] Luis Alfredo SamiMeter ID: [id number]       Phosphorus [[id number]]  (Abnormal) Collected: 12/17/21 0459    Specimen: Blood Updated: 12/17/21 0628     Phosphorus 1.2 mg/dL     Magnesium [[id number]]  (Normal) Collected: 12/17/21 0459    Specimen: Blood Updated: 12/17/21 0559     Magnesium 1.9 mg/dL     Comprehensive Metabolic Panel [[id number]]  (Abnormal) Collected: 12/17/21 0459    Specimen: Blood Updated: 12/17/21 0559     Glucose 261 mg/dL      BUN 27 mg/dL      Creatinine 0.95 mg/dL      Sodium 135 mmol/L      Potassium 3.4 mmol/L      Chloride 99 mmol/L      CO2 21.0 mmol/L      Calcium 9.4 mg/dL      Total Protein 5.5 g/dL      Albumin 2.40 g/dL      ALT (SGPT) 8 U/L      AST (SGOT) 7 U/L      Alkaline Phosphatase 122 U/L      Total Bilirubin 0.4 mg/dL      eGFR Non African Amer 78 mL/min/1.73      Globulin 3.1 gm/dL      A/G Ratio 0.8 g/dL      BUN/Creatinine Ratio 28.4     Anion Gap 15.0 mmol/L     Narrative:      GFR Normal >60  Chronic Kidney Disease <60  Kidney Failure <15      CBC (No Diff) [[id number]]  (Abnormal) Collected: 12/17/21 0459    Specimen: Blood Updated: 12/17/21 0513     WBC 14.74 10*3/mm3      RBC 2.88 10*6/mm3      Hemoglobin 8.7 g/dL       Hematocrit 25.9 %      MCV 89.9 fL      MCH 30.2 pg      MCHC 33.6 g/dL      RDW 14.2 %      RDW-SD 45.6 fl      MPV 9.5 fL      Platelets 397 10*3/mm3     Blood Culture - Blood, Arm, Left [[id number]]  (Normal) Collected: 12/15/21 2220    Specimen: Blood from Arm, Left Updated: 12/16/21 2230     Blood Culture No growth at 24 hours    Blood Culture - Blood, Arm, Right [[id number]]  (Normal) Collected: 12/15/21 2216    Specimen: Blood from Arm, Right Updated: 12/16/21 2230     Blood Culture No growth at 24 hours    POC Glucose Once [[id number]]  (Abnormal) Collected: 12/16/21 2104    Specimen: Blood Updated: 12/16/21 2114     Glucose 259 mg/dL      Comment: : [id number] Francoise العليMeter ID: [id number]       Basic Metabolic Panel [[id number]]  (Abnormal) Collected: 12/16/21 1512    Specimen: Blood Updated: 12/16/21 1611     Glucose 260 mg/dL      BUN 25 mg/dL      Creatinine 0.94 mg/dL      Sodium 133 mmol/L      Potassium 2.6 mmol/L      Chloride 93 mmol/L      CO2 17.0 mmol/L      Calcium 9.4 mg/dL      eGFR Non African Amer 79 mL/min/1.73      BUN/Creatinine Ratio 26.6     Anion Gap 23.0 mmol/L     Narrative:      GFR Normal >60  Chronic Kidney Disease <60  Kidney Failure <15      Lactic Acid, Plasma [[id number]]  (Normal) Collected: 12/16/21 1512    Specimen: Blood Updated: 12/16/21 1602     Lactate 1.6 mmol/L     Blood Gas, Arterial - [[id number]]  (Abnormal) Collected: 12/16/21 1436    Specimen: Arterial Blood Updated: 12/16/21 1443     Site Right Radial     Javid's Test Positive     pH, Arterial 7.407 pH units      pCO2, Arterial 26.0 mm Hg      Comment: 84 Value below reference range        pO2, Arterial 76.5 mm Hg      Comment: 84 Value below reference range        HCO3, Arterial 16.3 mmol/L      Comment: 84 Value below reference range        Base Excess, Arterial -7.3 mmol/L      Comment: 84 Value below reference range        O2 Saturation, Arterial 96.7 %      Temperature 37.0 C      Barometric Pressure for  Blood Gas 751 mmHg      Modality Room Air     FIO2 21 %      Ventilator Mode NA     Collected by [id number]     Comment: Meter: [id number]     :  [id number]        pCO2, Temperature Corrected 26.0 mm Hg      pH, Temp Corrected 7.407 pH Units      pO2, Temperature Corrected 76.5 mm Hg     POC Glucose Once [[id number]]  (Abnormal) Collected: 12/16/21 1129    Specimen: Blood Updated: 12/16/21 1159     Glucose 341 mg/dL      Comment: : [id number] [place] KatieMeter ID: [id number]       POC Glucose Once [[id number]]  (Abnormal) Collected: 12/16/21 0814    Specimen: Blood Updated: 12/16/21 0826     Glucose 395 mg/dL      Comment: : [id number] Steve LisaMeter ID: [id number]       TSH [[id number]]  (Normal) Collected: 12/16/21 0028    Specimen: Blood Updated: 12/16/21 0802     TSH 0.520 uIU/mL     T4, Free [[id number]]  (Normal) Collected: 12/16/21 0028    Specimen: Blood Updated: 12/16/21 0802     Free T4 1.10 ng/dL     Narrative:      Results may be falsely increased if patient taking Biotin.      Comprehensive Metabolic Panel [[id number]]  (Abnormal) Collected: 12/16/21 0509    Specimen: Blood Updated: 12/16/21 0653     Glucose 331 mg/dL      BUN 26 mg/dL      Creatinine 0.91 mg/dL      Sodium 132 mmol/L      Potassium 2.9 mmol/L      Chloride 89 mmol/L      CO2 15.0 mmol/L      Calcium 9.7 mg/dL      Total Protein 6.1 g/dL      Albumin 2.80 g/dL      ALT (SGPT) 9 U/L      AST (SGOT) 9 U/L      Alkaline Phosphatase 146 U/L      Total Bilirubin 0.5 mg/dL      eGFR Non African Amer 82 mL/min/1.73      Globulin 3.3 gm/dL      A/G Ratio 0.8 g/dL      BUN/Creatinine Ratio 28.6     Anion Gap 28.0 mmol/L     Narrative:      GFR Normal >60  Chronic Kidney Disease <60  Kidney Failure <15      Hemoglobin A1c [[id number]]  (Abnormal) Collected: 12/15/21 2216    Specimen: Blood Updated: 12/16/21 0643     Hemoglobin A1C 8.30 %     Narrative:      Hemoglobin A1C Ranges:    Increased Risk for Diabetes  5.7% to  6.4%  Diabetes                     >= 6.5%  Diabetic Goal                < 7.0%    CBC Auto Differential [[id number]]  (Abnormal) Collected: 12/16/21 0509    Specimen: Blood Updated: 12/16/21 0614     WBC 17.40 10*3/mm3      RBC 3.21 10*6/mm3      Hemoglobin 9.7 g/dL      Hematocrit 29.8 %      MCV 92.8 fL      MCH 30.2 pg      MCHC 32.6 g/dL      RDW 14.6 %      RDW-SD 49.1 fl      MPV 9.5 fL      Platelets 449 10*3/mm3      Neutrophil % 85.2 %      Lymphocyte % 3.3 %      Monocyte % 8.3 %      Eosinophil % 0.1 %      Basophil % 0.2 %      Immature Grans % 2.9 %      Neutrophils, Absolute 14.82 10*3/mm3      Lymphocytes, Absolute 0.58 10*3/mm3      Monocytes, Absolute 1.44 10*3/mm3      Eosinophils, Absolute 0.01 10*3/mm3      Basophils, Absolute 0.04 10*3/mm3      Immature Grans, Absolute 0.51 10*3/mm3      nRBC 0.1 /100 WBC     Acetone [[id number]]  (Abnormal) Collected: 12/16/21 0028    Specimen: Blood Updated: 12/16/21 0353     Acetone Small    [place] Draw [[id number]] Collected: 12/15/21 2216    Specimen: Blood Updated: 12/16/21 0230    Narrative:      The following orders were created for panel order [place] Draw.  Procedure                               Abnormality         Status                     ---------                               -----------         ------                     Green Top (Gel)[[id number]]                                  Final result               Lavender Top[[id number]]                                     Final result               Red Top[[id number]]                                          Final result               [place] Blood Culture Kee...[[id number]]                      Final result               Gray Top[[id number]]                                         Final result               Light Blue Top[[id number]]                                   Final result                 Please view results for these tests on the individual orders.    Gray Top [[id number]] Collected: 12/15/21 2216     Specimen: Blood Updated: 12/16/21 0230     Extra Tube Hold for add-ons.     Comment: Auto resulted.       STAT Lactic Acid, Reflex [[id number]]  (Abnormal) Collected: 12/16/21 0102    Specimen: Blood Updated: 12/16/21 0128     Lactate 3.0 mmol/L     Troponin [[id number]]  (Normal) Collected: 12/16/21 0028    Specimen: Blood Updated: 12/16/21 0051     Troponin T <0.010 ng/mL     Narrative:      Troponin T Reference Range:  <= 0.03 ng/mL-   Negative for AMI  >0.03 ng/mL-     Abnormal for myocardial necrosis.  Clinicians would have to utilize clinical acumen, EKG, Troponin and serial changes to determine if it is an Acute Myocardial Infarction or myocardial injury due to an underlying chronic condition.       Results may be falsely decreased if patient taking Biotin.      [facility] Blood Culture Bottle Set [[id number]] Collected: 12/15/21 2216    Specimen: Blood from Arm, Right Updated: 12/15/21 2330     Extra Tube Hold for add-ons.     Comment: Auto resulted.       Red Top [[id number]] Collected: 12/15/21 2216    Specimen: Blood Updated: 12/15/21 2330     Extra Tube Hold for add-ons.     Comment: Auto resulted.       Light Blue Top [[id number]] Collected: 12/15/21 2216    Specimen: Blood Updated: 12/15/21 2330     Extra Tube hold for add-on     Comment: Auto resulted       Green Top (Gel) [[id number]] Collected: 12/15/21 2216    Specimen: Blood Updated: 12/15/21 2330     Extra Tube Hold for add-ons.     Comment: Auto resulted.       Lavender Top [[id number]] Collected: 12/15/21 2216    Specimen: Blood Updated: 12/15/21 2330     Extra Tube hold for add-on     Comment: Auto resulted       COVID PRE-OP / PRE-PROCEDURE SCREENING ORDER (NO ISOLATION) - Swab, Nasal Cavity [[id number]]  (Normal) Collected: 12/15/21 2240    Specimen: Swab from Nasal Cavity Updated: 12/15/21 2317    Narrative:      The following orders were created for panel order COVID PRE-OP / PRE-PROCEDURE SCREENING ORDER (NO ISOLATION) - Swab, Nasal  "Cavity.  Procedure                               Abnormality         Status                     ---------                               -----------         ------                     COVID-19,[facility] IN-LUIS...[[id number]]  Normal              Final result                 Please view results for these tests on the individual orders.    COVID-19,[facility] IN-HOUSE,Nasal Swab No Transport Media 3-4 HR TAT - Swab, Nasal Cavity [[id number]]  (Normal) Collected: 12/15/21 2240    Specimen: Swab from Nasal Cavity Updated: 12/15/21 2317     COVID19 Not Detected    Narrative:      Fact sheet for providers: https://www.fda.gov/media/193944/download     Fact sheet for patients: https://www.fda.gov/media/604522/download    Test performed by PCR.    Consider negative results in combination with clinical observations, patient history, and epidemiological information.    Procalcitonin [[id number]]  (Normal) Collected: 12/15/21 2216    Specimen: Blood Updated: 12/15/21 2256     Procalcitonin 0.18 ng/mL     Narrative:      As a Marker for Sepsis (Non-Neonates):     1. <0.5 ng/mL represents a low risk of severe sepsis and/or septic shock.  2. >2 ng/mL represents a high risk of severe sepsis and/or septic shock.    As a Marker for Lower Respiratory Tract Infections that require antibiotic therapy:  PCT on Admission     Antibiotic Therapy             6-12 Hrs later  >0.5                          Strongly Recommended            >0.25 - <0.5             Recommended  0.1 - 0.25                  Discouraged                       Remeasure/reassess PCT  <0.1                         Strongly Discouraged         Remeasure/reassess PCT      As 28 day mortality risk marker: \"Change in Procalcitonin Result\" (>80% or <=80%) if Day 0 (or Day 1) and Day 4 values are available. Refer to http://www.[employer]-pct-calculator.com/    Change in PCT <=80 %   A decrease of PCT levels below or equal to 80% defines a positive change in PCT test result representing " a higher risk for 28-day all-cause mortality of patients diagnosed with severe sepsis or septic shock.    Change in PCT >80 %   A decrease of PCT levels of more than 80% defines a negative change in PCT result representing a lower risk for 28-day all-cause mortality of patients diagnosed with severe sepsis or septic shock.                Salicylate Level [[id number]]  (Normal) Collected: 12/15/21 2216    Specimen: Blood Updated: 12/15/21 2254     Salicylate <0.3 mg/dL     C-reactive Protein [[id number]]  (Abnormal) Collected: 12/15/21 2216    Specimen: Blood Updated: 12/15/21 2254     C-Reactive Protein 10.97 mg/dL     Acetaminophen Level [[id number]]  (Normal) Collected: 12/15/21 2216    Specimen: Blood Updated: 12/15/21 2253     Acetaminophen <5.0 mcg/mL     Lactic Acid, Plasma [[id number]]  (Abnormal) Collected: 12/15/21 2216    Specimen: Blood Updated: 12/15/21 2252     Lactate 2.6 mmol/L     Comprehensive Metabolic Panel [[id number]]  (Abnormal) Collected: 12/15/21 2216    Specimen: Blood Updated: 12/15/21 2251     Glucose 306 mg/dL      BUN 26 mg/dL      Creatinine 0.90 mg/dL      Sodium 131 mmol/L      Potassium 3.0 mmol/L      Chloride 88 mmol/L      CO2 17.0 mmol/L      Calcium 10.0 mg/dL      Total Protein 6.9 g/dL      Albumin 3.20 g/dL      ALT (SGPT) 12 U/L      AST (SGOT) 9 U/L      Alkaline Phosphatase 167 U/L      Total Bilirubin 0.6 mg/dL      eGFR Non African Amer 83 mL/min/1.73      Globulin 3.7 gm/dL      A/G Ratio 0.9 g/dL      BUN/Creatinine Ratio 28.9     Anion Gap 26.0 mmol/L     Narrative:      GFR Normal >60  Chronic Kidney Disease <60  Kidney Failure <15      CK [[id number]]  (Abnormal) Collected: 12/15/21 2216    Specimen: Blood Updated: 12/15/21 2251     Creatine Kinase 18 U/L     Osmolality, Serum [[id number]]  (Abnormal) Collected: 12/15/21 2216    Specimen: Blood Updated: 12/15/21 2249     Osmolality 342 mOsm/kg     Troponin [[id number]]  (Normal) Collected: 12/15/21 4236    Specimen: Blood  Updated: 12/15/21 2249     Troponin T <0.010 ng/mL     Narrative:      Troponin T Reference Range:  <= 0.03 ng/mL-   Negative for AMI  >0.03 ng/mL-     Abnormal for myocardial necrosis.  Clinicians would have to utilize clinical acumen, EKG, Troponin and serial changes to determine if it is an Acute Myocardial Infarction or myocardial injury due to an underlying chronic condition.       Results may be falsely decreased if patient taking Biotin.      Ethanol [[id number]] Collected: 12/15/21 2216    Specimen: Blood Updated: 12/15/21 2247     Ethanol % 0.174 %     Narrative:      Not for legal purposes. Chain of Custody not followed.     Blood Gas, Arterial - [[id number]]  (Abnormal) Collected: 12/15/21 2240    Specimen: Arterial Blood Updated: 12/15/21 2246     Site Right Radial     Javid's Test Positive     pH, Arterial 7.381 pH units      pCO2, Arterial 24.9 mm Hg      Comment: 84 Value below reference range        pO2, Arterial 85.9 mm Hg      HCO3, Arterial 14.8 mmol/L      Comment: 84 Value below reference range        Base Excess, Arterial -9.0 mmol/L      Comment: 84 Value below reference range        O2 Saturation, Arterial 97.4 %      Temperature 37.0 C      Barometric Pressure for Blood Gas 751 mmHg      Modality Room Air     FIO2 21 %      Ventilator Mode NA     Collected by [id number]     Comment: Meter: [id number]     :  [id number]        pCO2, Temperature Corrected 24.9 mm Hg      pH, Temp Corrected 7.381 pH Units      pO2, Temperature Corrected 85.9 mm Hg     Magnesium [[id number]]  (Normal) Collected: 12/15/21 2216    Specimen: Blood Updated: 12/15/21 2246     Magnesium 1.9 mg/dL     Urine Drug Screen - Urine, Catheter In/Out [[id number]]  (Normal) Collected: 12/15/21 2220    Specimen: Urine, Catheter In/Out Updated: 12/15/21 2242     THC, Screen, Urine Negative     Phencyclidine (PCP), Urine Negative     Cocaine Screen, Urine Negative     Methamphetamine, Ur Negative     Opiate Screen Negative      Amphetamine Screen, Urine Negative     Benzodiazepine Screen, Urine Negative     Tricyclic Antidepressants Screen Negative     Methadone Screen, Urine Negative     Barbiturates Screen, Urine Negative     Oxycodone Screen, Urine Negative     Propoxyphene Screen Negative     Buprenorphine, Screen, Urine Negative    Narrative:      Cutoff For Drugs Screened:    Amphetamines               500 ng/ml  Barbiturates               200 ng/ml  Benzodiazepines            150 ng/ml  Cocaine                    150 ng/ml  Methadone                  200 ng/ml  Opiates                    100 ng/ml  Phencyclidine               25 ng/ml  THC                            50 ng/ml  Methamphetamine            500 ng/ml  Tricyclic Antidepressants  300 ng/ml  Oxycodone                  100 ng/ml  Propoxyphene               300 ng/ml  Buprenorphine               10 ng/ml    The normal value for all drugs tested is negative. This report includes unconfirmed screening results, with the cutoff values listed, to be used for medical treatment purposes only.  Unconfirmed results must not be used for non-medical purposes such as employment or legal testing.  Clinical consideration should be applied to any drug of abuse test, particularly when unconfirmed results are used.      Protime-INR [[id number]]  (Abnormal) Collected: 12/15/21 2216    Specimen: Blood Updated: 12/15/21 2240     Protime 13.8 Seconds      INR 1.10    Urinalysis With Culture If Indicated - Urine, Catheter In/Out [[id number]]  (Abnormal) Collected: 12/15/21 2219    Specimen: Urine, Catheter In/Out Updated: 12/15/21 2237     Color, UA Yellow     Appearance, UA Clear     pH, UA 5.5     Specific Gravity, UA 1.022     Glucose, UA >=1000 mg/dL (3+)     Ketones, UA >=160 mg/dL (4+)     Bilirubin, UA Negative     Blood, UA Negative     Protein, UA 30 mg/dL (1+)     Leuk Esterase, UA Negative     Nitrite, UA Negative     Urobilinogen, UA 1.0 E.U./dL    Urinalysis, Microscopic Only - Urine,  Catheter In/Out [[id number]]  (Abnormal) Collected: 12/15/21 2219    Specimen: Urine, Catheter In/Out Updated: 12/15/21 2237     RBC, UA 3-5 /HPF      WBC, UA 13-20 /HPF      Bacteria, UA 1+ /HPF      Squamous Epithelial Cells, UA 0-2 /HPF      Hyaline Casts, UA 3-6 /LPF      Methodology Automated Microscopy    CBC & Differential [[id number]]  (Abnormal) Collected: 12/15/21 2216    Specimen: Blood Updated: 12/15/21 2232    Narrative:      The following orders were created for panel order CBC & Differential.  Procedure                               Abnormality         Status                     ---------                               -----------         ------                     CBC Auto Differential[[id number]]        Abnormal            Final result                 Please view results for these tests on the individual orders.    CBC Auto Differential [[id number]]  (Abnormal) Collected: 12/15/21 2216    Specimen: Blood Updated: 12/15/21 2232     WBC 12.59 10*3/mm3      RBC 3.65 10*6/mm3      Hemoglobin 11.0 g/dL      Hematocrit 33.6 %      MCV 92.1 fL      MCH 30.1 pg      MCHC 32.7 g/dL      RDW 14.6 %      RDW-SD 48.2 fl      MPV 9.3 fL      Platelets 480 10*3/mm3      Neutrophil % 81.4 %      Lymphocyte % 7.9 %      Monocyte % 7.7 %      Eosinophil % 0.2 %      Basophil % 0.3 %      Immature Grans % 2.5 %      Neutrophils, Absolute 10.26 10*3/mm3      Lymphocytes, Absolute 0.99 10*3/mm3      Monocytes, Absolute 0.97 10*3/mm3      Eosinophils, Absolute 0.02 10*3/mm3      Basophils, Absolute 0.04 10*3/mm3      Immature Grans, Absolute 0.31 10*3/mm3      nRBC 0.0 /100 WBC         Imaging Results (Last 72 Hours)     Procedure Component Value Units Date/Time    SCANNED - IMAGING [[id number]] Resulted: 12/15/21     Updated: 12/17/21 0047    CT Head Without Contrast [[id number]] Collected: 12/16/21 0737     Updated: 12/16/21 0742    Narrative:      CT HEAD WO CONTRAST- 12/16/2021 12:32 AM CST     HISTORY: ams       COMPARISON: 2/13/2020     DOSE LENGTH PRODUCT: 778 mGy cm. Automated exposure control was also  utilized to decrease patient radiation dose.     TECHNIQUE: Axial images of brain obtained without contrast     FINDINGS:  Similar prominence of the sulci and ventricles favoring  moderate cerebral volume loss. No intracranial hemorrhage or mass  effect. No acute signs of ischemia. Mild chronic small vessel ischemia  considered. No extra-axial hematoma or subarachnoid hemorrhage.     The visible paranasal sinuses and mastoid air cells are well-aerated.  The bony calvarium appears intact.       Impression:      1. Moderate cerebral volume loss with no acute intracranial abnormality.     Comments: A preliminary report is issued to the ER by the [facility]  radiology service. I agree with this preliminary impression.  This report was finalized on 12/16/2021 07:39 by Dr. Margie Mahmood MD.    XR Chest 1 View [[id number]] Collected: 12/16/21 0737     Updated: 12/16/21 0741    Narrative:      EXAMINATION: Chest 1 view 12/15/2021     HISTORY: Altered mental status.     FINDINGS: Today's exam is compared to previous study of 8/20/2021. Lungs  are hypoventilated with bibasilar atelectasis. Lungs are otherwise  clear. No effusion or free air. The patient's undergone previous ACDF  within the lower cervical spine.       Impression:      1.. Bibasilar atelectasis. Lungs are otherwise clear.  This report was finalized on 12/16/2021 07:38 by Dr. Williams Velazco MD.             ASSESSMENT/PLAN       Examination and evaluation of wound(s) was performed.    DIAGNOSIS:   Pressure injury of left buttock, unstageable  Incontinence associated dermatitis  Increased risk for skin breakdown  High anion gap metabolic acidosis  Tobacco use  Type 2 diabetes mellitus with hyperglycemia, without long-term current use of insulin  Essential hypertension  Alcohol abuse  Muscular deconditioning      PLAN:     Start     Ordered    12/17/21 1600  Wound  Care  Every 12 Hours        Question Answer Comment   Wound Locations Left buttock and left hip    Wound Care Instructions Clean wounds with normal saline.  Apply nickel thick layer of Santyl to wound bed.  Cover with Vaseline gauze and ABD pads.  Secure with Medipore tape.    Cleanse Normal Saline    Intervention Santyl - Thick Layer    Intervention Vaseline Gauze    Dressing: Abdominal Pad        12/17/21 1413    12/17/21 1455  Patient is not to wear briefs while in bed or chair.  Order has been placed for external urinary catheter.  If unable to use external urinary catheter on this patient, may use absorbent pads.  Nursing Communication  Continuous        Comments: Patient is not to wear briefs while in bed or chair.  Order has been placed for external urinary catheter.  If unable to use external urinary catheter on this patient, may use absorbent pads.    12/17/21 1455    12/17/21 1454  Apply External Urinary Catheter  Once        Comments: May use condom catheter, Primofit, or men's [place].  Also have penis wraps available to use in conjunction with external catheter if needed   Question:  Release to patient  Answer:  Immediate    12/17/21 1455    12/17/21 1411  Elevate Heels Off of Bed  Until Discontinued         12/17/21 1413    12/17/21 1411  Turn Patient  Now Then Every 2 Hours         12/17/21 1413    12/17/21 1411  Use Repositioning Wedge to Position Patient  Continuous        Comments: Use repositioning sheet and wedges to position patient    12/17/21 1413    12/17/21 1411  Use Seat Cushion When Up In Chair  Continuous         12/17/21 1413    Unscheduled  Apply Moisture Barrier After Any Incontinence  As Needed      Comments: Calazime -apply Calazime to right buttock, scrotum, and any areas where dressing is not covering   Question:  Wound Care Instructions  Answer:  Apply Moisture Barrier After Any Incontinence    12/17/21 1413                Dietitian and diabetes education has already been  consulted.        This document has been electronically signed by MOHAMUD Winters on 12/17/2021 14:09 CST

## 2021-12-17 NOTE — PROGRESS NOTES
Nutrition Services    Patient Name:  John Rodriguez  YOB: 1950  MRN: [id number]  Admit Date:  12/15/2021    NTN following. Pt sleeping and did not wake up. Aware of HbA1c elevated. Will continue to monitor and provide edu PRN. If pt discharges to SNF, RD/nutrition/ can provide MNT appropriate for pt's condition(s).    Electronically signed by:  Selma Galvan RD  12/17/21 11:03 CST

## 2021-12-17 NOTE — THERAPY TREATMENT NOTE
Acute Care - Physical Therapy Treatment Note  [facility]     Patient Name: John Rodriguez  : 1950  MRN: [id number]  Today's Date: 2021      Visit Dx:     ICD-10-CM ICD-9-CM   1. High anion gap metabolic acidosis  E87.2 276.2   2. Alcoholic intoxication without complication ([facility])  F10.920 305.00   3. Irritant dermatitis  L24.9 692.9   4. Pressure injury, stage 2, unspecified location ([facility])  L89.92 707.00     707.22   5. Muscular deconditioning  R29.898 781.99   6. Decreased activities of daily living (ADL)  Z78.9 V49.89   7. Impaired mobility  Z74.09 799.89     Patient Active Problem List   Diagnosis   • Hydrocele   • Spinal cord compression due to degenerative disorder of spinal column   • Cervical spondylosis with myelopathy   • Tobacco abuse   • Type 2 diabetes mellitus with hyperglycemia, without long-term current use of insulin ([facility])   • Hyperlipidemia   • Essential hypertension   • GERD without esophagitis   • Medically noncompliant   • Peripheral neuropathy   • Altered mental status   • DDD (degenerative disc disease), lumbosacral   • Pain, neck   • S/P cervical spinal fusion   • Skin ulcer of sacrum, limited to breakdown of skin ([facility])   • Spinal stenosis of lumbar region without neurogenic claudication   • Weakness   • Polypharmacy   • Community acquired pneumonia of left lower lobe of lung   • Adult BMI 35.0-35.9 kg/sq m   • BMI 33.0-33.9,adult   • Morbidly obese ([facility])   • PAD (peripheral artery disease) ([facility])   • Preop testing   • High anion gap metabolic acidosis   • Acute stasis dermatitis   • Alcohol abuse   • Muscular deconditioning   • Atrial flutter with rapid ventricular response ([facility])   • Hypokalemia   • Hyponatremia   • Acute cystitis   • Hypophosphatemia   • Normocytic anemia     Past Medical History:   Diagnosis Date   • Acid reflux    • Arthritis    • Back pain    • Diabetes mellitus ([facility])    • Hydrocele in adult    • Hyperlipidemia    • Hypertension    • Psoriasis    • Wears  glasses      Past Surgical History:   Procedure Laterality Date   • ANTERIOR CERVICAL DISCECTOMY W/ FUSION N/A 1/23/2020    Procedure: CERVICAL DISCECTOMY ANTERIOR WITH FUSION C5-6;  Surgeon: Santino Montano MD;  Location: [facility] OR;  Service: Neurosurgery   • BACK SURGERY     • COLONOSCOPY     • HERNIA REPAIR     • HYDROCELECTOMY Left 11/20/2017    Procedure: HYDROCELECTOMY;  Surgeon: Neeraj Hurtado MD;  Location:  [facility] OR;  Service:    • TONSILLECTOMY       PT Assessment (last 12 hours)     PT Evaluation and Treatment     Row Name 12/17/21 1313          Physical Therapy Time and Intention    Subjective Information complains of; weakness; fatigue  Pt lathargic  -AE     Document Type therapy note (daily note)  -AE     Mode of Treatment physical therapy  -AE     Row Name 12/17/21 1313          General Information    Existing Precautions/Restrictions fall  moniter HR  -AE     Row Name 12/17/21 1313          Pain Scale: FACES Pre/Post-Treatment    Pain: FACES Scale, Pretreatment 0-->no hurt  -AE     Posttreatment Pain Rating 0-->no hurt  -AE     Row Name 12/17/21 1313          Bed Mobility    Rolling Right [place] (Bed Mobility) minimum assist (75% patient effort); verbal cues  -AE     Sidelying-Sit [place] (Bed Mobility) maximum assist (25% patient effort); 1 person assist  -AE     Assistive Device (Bed Mobility) head of bed elevated  -AE     Comment (Bed Mobility) --   Pt needed alot of encouragment and was slow to sit EOB  -AE     Row Name 12/17/21 1313          Motor Skills    Therapeutic Exercise --  ankle pumps and dorsiflexion x 20 reps.  -AE     Row Name             Wound 12/16/21 0359 Bilateral gluteal    Wound - Properties Group Placement Date: 12/16/21  -KD Placement Time: 0359 -KD Side: Bilateral  -KD Location: gluteal  -KD     Retired Wound - Properties Group Date first assessed: 12/16/21  -KD Time first assessed: 0359 -KD Side: Bilateral  -KD Location: gluteal  -KD     Row Name              Wound 12/16/21 0400 Bilateral other (see comments)    Wound - Properties Group Placement Date: 12/16/21  -KD Placement Time: 0400  -KD Side: Bilateral  -KD Location: other (see comments)  -KD, scrotum      Retired Wound - Properties Group Date first assessed: 12/16/21  -KD Time first assessed: 0400  -KD Side: Bilateral  -KD Location: other (see comments)  -KD, scrotum      Row Name             Wound 01/23/20 1420 Left  Diabetic Ulcer    Wound - Properties Group Placement Date: 01/23/20  -GC Placement Time: 1420  -GC Present on Hospital Admission: Y  -GC Side: Left  -GC Location: --  -GC, second toe   Primary Wound Type: Diabetic ulc  -HS     Retired Wound - Properties Group Date first assessed: 01/23/20  -GC Time first assessed: 1420  -GC Present on Hospital Admission: Y  -GC Side: Left  -GC Location: --  -GC, second toe   Primary Wound Type: Diabetic ulc  -HS     Row Name             Wound 01/23/20 1431 Left heel Diabetic Ulcer    Wound - Properties Group Placement Date: 01/23/20  -GC Placement Time: 1431  -GC Present on Hospital Admission: Y  -GC Side: Left  -GC Location: heel  -GC, Outer Left Heel  Primary Wound Type: Diabetic ulc  -HS     Retired Wound - Properties Group Date first assessed: 01/23/20  -GC Time first assessed: 1431  -GC Present on Hospital Admission: Y  -GC Side: Left  -GC Location: heel  -GC, Outer Left Heel  Primary Wound Type: Diabetic ulc  -HS     Row Name             Wound 01/23/20 1436 Bilateral groin MASD ([place] associated skin damage)    Wound - Properties Group Placement Date: 01/23/20  -GC Placement Time: 1436  -GC Present on Hospital Admission: Y  -GC Side: Bilateral  -GC Location: groin  -GC Primary Wound Type: MASD  -GC     Retired Wound - Properties Group Date first assessed: 01/23/20  -GC Time first assessed: 1436  -GC Present on Hospital Admission: Y  -GC Side: Bilateral  -GC Location: groin  -GC Primary Wound Type: MASD  -GC     Row Name 12/17/21 1313           Positioning and Restraints    Pre-Treatment Position in bed  -AE     Post Treatment Position bed  -AE     In Bed fowlers; call light within reach  -AE           User Key  (r) = Recorded By, (t) = Taken By, (c) = Cosigned By    Initials Name Provider Type    AE Celi Valles, PTA Physical Therapy Assistant    HS Natalie Oglesby, RN Registered Nurse    Yen Hernandez RN Registered Nurse    Taylor Connell RN Registered Nurse                Physical Therapy Education                 Title: PT OT SLP Therapies (In Progress)     Topic: Physical Therapy (In Progress)     Point: Mobility training (In Progress)     Learning Progress Summary           Patient Nonacceptance, E, NL by SB at 12/16/2021 1528    Comment: pt edu on POC, benefits of act, d/c plans                   Point: Home exercise program (Not Started)     Learner Progress:  Not documented in this visit.          Point: Body mechanics (Not Started)     Learner Progress:  Not documented in this visit.          Point: Precautions (In Progress)     Learning Progress Summary           Patient Nonacceptance, E, NL by SB at 12/16/2021 1528    Comment: pt edu on POC, benefits of act, d/c plans                               User Key     Initials Effective Dates Name Provider Type Discipline     06/16/21 -  Lilliana Rodriguez, PT DPT Physical Therapist PT              PT Recommendation and Plan     Plan of Care Reviewed With: patient  Progress: improving  Outcome Summary: Pt lathargic and difficult to arouse enough to work with PT. Pt was min x 1 to roll to R side and max x 1 sidelying to sit using badrail. Pt was SBA for sitting balance and was AROM for B LE EX'S in sitting. Pt was mod x 1 sit to sidelying and was max x 2 to scoot up in bed. Pt would benefit from continued rehab.       Time Calculation:    PT Charges     Row Name 12/17/21 141             Time Calculation    Start Time 1313  -AE      Stop Time 1352  -AE      Time Calculation (min) 39 min   -AE      PT Received On 12/17/21  -AE      PT Goal Re-Cert Due Date 12/26/21  -AE              Time Calculation- PT    Total Timed Code Minutes- PT 39 minute(s)  -AE              Timed Charges    90429 - PT Therapeutic Exercise Minutes 9  -AE      40250 - PT Therapeutic Activity Minutes 30  -AE              Total Minutes    Timed Charges Total Minutes 39  -AE       Total Minutes 39  -AE            User Key  (r) = Recorded By, (t) = Taken By, (c) = Cosigned By    Initials Name Provider Type    AE Celi Valles PTA Physical Therapy Assistant              Therapy Charges for Today     Code Description Service Date Service Provider Modifiers Qty    61883851317 HC PT THER PROC EA 15 MIN 12/17/2021 Celi Valles, ENEIDA GP 1    24169818817 HC PT THERAPEUTIC ACT EA 15 MIN 12/17/2021 Celi Valles PTA GP 2          PT G-Codes  Outcome Measure Options: AM-PAC 6 Clicks Daily Activity (OT)  AM-PAC 6 Clicks Score (PT): 8  AM-PAC 6 Clicks Score (OT): 6    Celi Valles PTA  12/17/2021

## 2021-12-17 NOTE — PROGRESS NOTES
[facility] Medicine Services  INPATIENT PROGRESS NOTE    Patient Name: John Rodriguez  Date of Admission: 12/15/2021  Today's Date: 12/17/21  Length of Stay: 1  Primary Care Physician: Jt Monroy MD    Subjective   Chief Complaint: Weakness.  HPI   He was very somnolent yesterday afternoon after I checked on him again with his nurse, Nikki.  He is readily awake today, but is being obstinant and wants to be left alone.  Nursing states that he has been refusing assistance and does not want them to place cream on his buttocks or scrotum.    I am concerned about a high likelihood of alcohol withdrawal if he presented legally intoxicated and drinks whiskey on a daily basis.  He is not displaying any signs of withdrawal right now.  Nursing has been concerned about administering scheduled Valium with him being sleepy at times.  I think that they need to go ahead and do it.    Review of Systems   All pertinent negatives and positives are as above. All other systems have been reviewed and are negative unless otherwise stated.     Objective    Temp:  [97.8 °F (36.6 °C)-98.7 °F (37.1 °C)] 98.3 °F (36.8 °C)  Heart Rate:  [103-160] 105  Resp:  [18-20] 18  BP: ()/(45-93) 90/62  Physical Exam  Constitutional:       Appearance: He is well-developed.      Comments: Nursing staff is working on changing him and applying cream, but he does not want them to.  No family present.  Seen and discussed with his nurse, Radha   HENT:      Head: Normocephalic and atraumatic.   Eyes:      Conjunctiva/sclera: Conjunctivae normal.      Pupils: Pupils are equal, round, and reactive to light.   Neck:      Vascular: No JVD.   Cardiovascular:      Rate and Rhythm: Normal rate and regular rhythm.      Heart sounds: Normal heart sounds. No murmur heard.  No friction rub. No gallop.       Comments: Currently in sinus rhythm in the 90s on the monitor.  Pulmonary:      Effort: Pulmonary effort is  normal. No respiratory distress.      Breath sounds: Normal breath sounds. No wheezing or rales.   Chest:      Chest wall: No tenderness.   Abdominal:      General: Bowel sounds are normal. There is no distension.      Palpations: Abdomen is soft.      Tenderness: There is no abdominal tenderness. There is no guarding or rebound.   Musculoskeletal:         General: No tenderness or deformity. Normal range of motion.      Cervical back: Neck supple.   Skin:     General: Skin is warm and dry.      Findings: Erythema (buttocks and scrotum excoriated) present. No rash.   Neurological:      General: No focal deficit present.      Mental Status: He is alert.      Cranial Nerves: No cranial nerve deficit.      Motor: Weakness present. No abnormal muscle tone.      Deep Tendon Reflexes: Reflexes normal.   Psychiatric:      Comments: Extremely flat affect.  Answers questions sharply and seems to want to be left alone.       Results Review:  I have reviewed the labs, radiology results, and diagnostic studies.    Laboratory Data:   Results from last 7 days   Lab Units 12/17/21  0459 12/16/21  0509 12/15/21  2216   WBC 10*3/mm3 14.74* 17.40* 12.59*   HEMOGLOBIN g/dL 8.7* 9.7* 11.0*   HEMATOCRIT % 25.9* 29.8* 33.6*   PLATELETS 10*3/mm3 397 449 480*     Results from last 7 days   Lab Units 12/17/21  0459 12/16/21  1512 12/16/21  0509 12/15/21  2216 12/15/21  2216   SODIUM mmol/L 135* 133* 132*   < > 131*   POTASSIUM mmol/L 3.4* 2.6* 2.9*   < > 3.0*   CHLORIDE mmol/L 99 93* 89*   < > 88*   CO2 mmol/L 21.0* 17.0* 15.0*   < > 17.0*   BUN mg/dL 27* 25* 26*   < > 26*   CREATININE mg/dL 0.95 0.94 0.91   < > 0.90   CALCIUM mg/dL 9.4 9.4 9.7   < > 10.0   BILIRUBIN mg/dL 0.4  --  0.5  --  0.6   ALK PHOS U/L 122*  --  146*  --  167*   ALT (SGPT) U/L 8  --  9  --  12   AST (SGOT) U/L 7  --  9  --  9   GLUCOSE mg/dL 261* 260* 331*   < > 306*    < > = values in this interval not displayed.     Results from last 7 days   Lab Units  12/17/21  0459 12/15/21  2216   MAGNESIUM mg/dL 1.9 1.9   PHOSPHORUS mg/dL 1.2*  --      I have reviewed the patient's current medications.     Assessment/Plan     Active Hospital Problems    Diagnosis    • **High anion gap metabolic acidosis    • Hypophosphatemia    • Normocytic anemia    • Acute stasis dermatitis    • Alcohol abuse    • Muscular deconditioning    • Atrial flutter with rapid ventricular response (HCC)    • Hypokalemia    • Hyponatremia    • Acute cystitis    • Essential hypertension    • Type 2 diabetes mellitus with hyperglycemia, without long-term current use of insulin (HCC)    • Tobacco abuse    • Cervical spondylosis with myelopathy      Plan:  The patient was admitted on 12/16 by Dr. Mcfadden.  He presented to the emergency department at from his home where he has not been taking care of himself very well for approximately 2 months.  His wife apparently passed away and she was primary caregiver.  Since this event, he has not been getting up very well or taking care of himself.  EMS found him to be covered in urine and feces.  Apparently, a friend has been bringing in food and alcohol.  The patient drinks whiskey on a daily basis.  His blood alcohol level was 0.174 at presentation.  He apparently had significant skin breakdown and had she had some sticking to him.    Shortly after presentation, he went into atrial flutter with rapid ventricular response.  He was given IV Cardizem and started on a Cardizem drip.  He also required IV digoxin.  He has ultimately converted back to sinus rhythm in the 90s.  Aspirin for now.  2D echocardiogram.  Thyroid studies normal.  Want to continue him on digoxin for now.  Blood pressure soft for calcium channel blockers.    We have been trying to replace his potassium and magnesium greater than 4 and 2 respectively. I gave him 2 g of magnesium on 12/16 and magnesium levels 1.9 today.  Getting potassium phosphorus replacement today as well.    Continue LISA  protocol and prophylactic vitamin replacement.  Scheduled Valium.    Continue ceftriaxone for possible UTI.    Working on an insulin regimen for him.  Titrate basal insulin and sliding scale insulin.  Still hyperglycemic.  Hemoglobin A1c 8.3.  Tabetic educator and nutrition consults.    Wound nurse practitioner to evaluate the dermatitis and excoriation of his buttocks and scrotum. Patient currently refusing local care.    PT/OT.    SCDs for DVT prophylaxis.    Discharge Planning: Indeterminate at present. SW trying to contact his daughter without success.    Electronically signed by Kailash Todd DO, 12/17/21, 09:48 CST.

## 2021-12-17 NOTE — CONSULTS
"Pt asleep.  Admitted with A1C of 8.3% and metabolic acidosis.  Pt awakens but would not turn over to talk and fell back asleep easily.  Pt did tell me he has felt \"bad\" for a while.  Alone in room.  Will continue to follow and provide education when more appropriate.  "

## 2021-12-17 NOTE — PLAN OF CARE
Goal Outcome Evaluation:              Outcome Summary: VSS. Pt complained of pain on his buttocks due to wounds and turns to his side on his own. Pt much more lucid than yesterday and able to have a conversation now. Disoriented to situation only. Afib 100-126. Room air.

## 2021-12-17 NOTE — PLAN OF CARE
Goal Outcome Evaluation:  Plan of Care Reviewed With: patient        Progress: improving  Outcome Summary: Pt lathargic and difficult to arouse enough to work with PT. Pt was min x 1 to roll to R side and max x 1 sidelying to sit using bedrail. Pt was SBA for sitting balance and was AROM for B LE EX'S in sitting. Pt was mod x 1 sit to sidelying and was max x 2 to scoot up in bed. Pt would benefit from continued rehab.

## 2021-12-17 NOTE — CASE MANAGEMENT/SOCIAL WORK
Discharge Planning Assessment  [facility]     Patient Name: John Rodriguez  MRN: [id number]  Today's Date: 12/17/2021    Admit Date: 12/15/2021     Discharge Needs Assessment     Row Name 12/17/21 1431       Living Environment    Lives With friend(s)    Current Living Arrangements home/apartment/condo    Primary Care Provided by self    Provides Primary Care For no one, unable/limited ability to care for self    Family Caregiver if Needed child(amaury), adult    Quality of Family Relationships helpful; involved; supportive       Resource/Environmental Concerns    Resource/Environmental Concerns none       Transition Planning    Patient/Family Anticipates Transition to home with family    Patient/Family Anticipated Services at Transition home health care    Transportation Anticipated family or friend will provide       Discharge Needs Assessment    Readmission Within the Last 30 Days no previous admission in last 30 days    Equipment Currently Used at Home lift device; hospital bed; walker, rolling; cane, quad; bath bench; commode; wheelchair; shower chair; glucometer; power chair,(recliner lift)  Gait belts    Concerns to be Addressed no discharge needs identified    Anticipated Changes Related to Illness none    Equipment Needed After Discharge none    Discharge Coordination/Progress SW spoke to pt's step-daughter, Patrizia [phone].  She stated pt resides with a friend.  SW spoke to her about SNF placement and she refused.  She stated her mother just passed in September and she had a bad experience in SNF.  I explained we could look at other facilities and she is not interested.  She stated she will be moving in with pt upon dc; however, she still works.  She is requesting [facility] upon dc.  She states pt was mobile prior to admission and is normally not confused.  ALICIA will follow to see if pt improves and can dc with hh.               Discharge Plan    No documentation.               Continued Care and Services -  Admitted Since 12/15/2021    Coordination has not been started for this encounter.          Demographic Summary    No documentation.                Functional Status    No documentation.                Psychosocial    No documentation.                Abuse/Neglect    No documentation.                Legal    No documentation.                Substance Abuse    No documentation.                Patient Forms    No documentation.                   NICK SchmidtW

## 2021-12-18 LAB
ANION GAP SERPL CALCULATED.3IONS-SCNC: 11 MMOL/L (ref 5–15)
BACTERIA UR QL AUTO: ABNORMAL /HPF
BILIRUB UR QL STRIP: ABNORMAL
BUN SERPL-MCNC: 20 MG/DL (ref 8–23)
BUN/CREAT SERPL: 26 (ref 7–25)
CALCIUM SPEC-SCNC: 10 MG/DL (ref 8.6–10.5)
CHLORIDE SERPL-SCNC: 99 MMOL/L (ref 98–107)
CLARITY UR: CLEAR
CO2 SERPL-SCNC: 26 MMOL/L (ref 22–29)
COLOR UR: ABNORMAL
CREAT SERPL-MCNC: 0.77 MG/DL (ref 0.76–1.27)
DEPRECATED RDW RBC AUTO: 48.5 FL (ref 37–54)
ERYTHROCYTE [DISTWIDTH] IN BLOOD BY AUTOMATED COUNT: 14.6 % (ref 12.3–15.4)
GFR SERPL CREATININE-BSD FRML MDRD: 100 ML/MIN/1.73
GLUCOSE BLDC GLUCOMTR-MCNC: 250 MG/DL (ref 70–130)
GLUCOSE BLDC GLUCOMTR-MCNC: 257 MG/DL (ref 70–130)
GLUCOSE BLDC GLUCOMTR-MCNC: 259 MG/DL (ref 70–130)
GLUCOSE BLDC GLUCOMTR-MCNC: 284 MG/DL (ref 70–130)
GLUCOSE SERPL-MCNC: 265 MG/DL (ref 65–99)
GLUCOSE UR STRIP-MCNC: ABNORMAL MG/DL
GRAN CASTS URNS QL MICRO: ABNORMAL /LPF
HCT VFR BLD AUTO: 28.8 % (ref 37.5–51)
HGB BLD-MCNC: 9.5 G/DL (ref 13–17.7)
HGB UR QL STRIP.AUTO: NEGATIVE
HYALINE CASTS UR QL AUTO: ABNORMAL /LPF
KETONES UR QL STRIP: ABNORMAL
LEUKOCYTE ESTERASE UR QL STRIP.AUTO: NEGATIVE
MAGNESIUM SERPL-MCNC: 1.7 MG/DL (ref 1.6–2.4)
MCH RBC QN AUTO: 30.7 PG (ref 26.6–33)
MCHC RBC AUTO-ENTMCNC: 33 G/DL (ref 31.5–35.7)
MCV RBC AUTO: 93.2 FL (ref 79–97)
NITRITE UR QL STRIP: NEGATIVE
PH UR STRIP.AUTO: 5.5 [PH] (ref 5–8)
PHOSPHATE SERPL-MCNC: 2.4 MG/DL (ref 2.5–4.5)
PLATELET # BLD AUTO: 416 10*3/MM3 (ref 140–450)
PMV BLD AUTO: 9.9 FL (ref 6–12)
POTASSIUM SERPL-SCNC: 3 MMOL/L (ref 3.5–5.2)
PROT UR QL STRIP: ABNORMAL
RBC # BLD AUTO: 3.09 10*6/MM3 (ref 4.14–5.8)
RBC # UR STRIP: ABNORMAL /HPF
REF LAB TEST METHOD: ABNORMAL
SODIUM SERPL-SCNC: 136 MMOL/L (ref 136–145)
SP GR UR STRIP: 1.03 (ref 1–1.03)
SQUAMOUS #/AREA URNS HPF: ABNORMAL /HPF
UROBILINOGEN UR QL STRIP: ABNORMAL
WBC # UR STRIP: ABNORMAL /HPF
WBC NRBC COR # BLD: 9.84 10*3/MM3 (ref 3.4–10.8)

## 2021-12-18 PROCEDURE — 25010000002 ENOXAPARIN PER 10 MG: Performed by: INTERNAL MEDICINE

## 2021-12-18 PROCEDURE — 25010000002 CEFTRIAXONE PER 250 MG: Performed by: INTERNAL MEDICINE

## 2021-12-18 PROCEDURE — 80048 BASIC METABOLIC PNL TOTAL CA: CPT | Performed by: INTERNAL MEDICINE

## 2021-12-18 PROCEDURE — 85027 COMPLETE CBC AUTOMATED: CPT | Performed by: INTERNAL MEDICINE

## 2021-12-18 PROCEDURE — 63710000001 INSULIN LISPRO (HUMAN) PER 5 UNITS: Performed by: INTERNAL MEDICINE

## 2021-12-18 PROCEDURE — 84100 ASSAY OF PHOSPHORUS: CPT | Performed by: INTERNAL MEDICINE

## 2021-12-18 PROCEDURE — 83735 ASSAY OF MAGNESIUM: CPT | Performed by: INTERNAL MEDICINE

## 2021-12-18 PROCEDURE — 25010000002 THIAMINE PER 100 MG: Performed by: INTERNAL MEDICINE

## 2021-12-18 PROCEDURE — 25010000002 SODIUM CHLORIDE 0.9 % WITH KCL 20 MEQ 20-0.9 MEQ/L-% SOLUTION: Performed by: INTERNAL MEDICINE

## 2021-12-18 PROCEDURE — 81001 URINALYSIS AUTO W/SCOPE: CPT | Performed by: FAMILY MEDICINE

## 2021-12-18 PROCEDURE — 97530 THERAPEUTIC ACTIVITIES: CPT

## 2021-12-18 PROCEDURE — 82962 GLUCOSE BLOOD TEST: CPT

## 2021-12-18 PROCEDURE — 63710000001 INSULIN DETEMIR PER 5 UNITS: Performed by: FAMILY MEDICINE

## 2021-12-18 PROCEDURE — 97110 THERAPEUTIC EXERCISES: CPT

## 2021-12-18 RX ORDER — POTASSIUM CHLORIDE 750 MG/1
40 CAPSULE, EXTENDED RELEASE ORAL 2 TIMES DAILY
Status: COMPLETED | OUTPATIENT
Start: 2021-12-18 | End: 2021-12-19

## 2021-12-18 RX ORDER — CITALOPRAM 10 MG/1
10 TABLET ORAL DAILY
Status: DISCONTINUED | OUTPATIENT
Start: 2021-12-19 | End: 2021-12-20 | Stop reason: HOSPADM

## 2021-12-18 RX ORDER — CHLORDIAZEPOXIDE HYDROCHLORIDE 5 MG/1
5 CAPSULE, GELATIN COATED ORAL EVERY 8 HOURS SCHEDULED
Status: DISCONTINUED | OUTPATIENT
Start: 2021-12-18 | End: 2021-12-20 | Stop reason: HOSPADM

## 2021-12-18 RX ORDER — PREGABALIN 75 MG/1
150 CAPSULE ORAL EVERY 8 HOURS SCHEDULED
Status: DISCONTINUED | OUTPATIENT
Start: 2021-12-18 | End: 2021-12-20 | Stop reason: HOSPADM

## 2021-12-18 RX ORDER — CHLORDIAZEPOXIDE HYDROCHLORIDE 5 MG/1
5 CAPSULE, GELATIN COATED ORAL EVERY 6 HOURS SCHEDULED
Status: DISCONTINUED | OUTPATIENT
Start: 2021-12-18 | End: 2021-12-18

## 2021-12-18 RX ADMIN — PREGABALIN 200 MG: 100 CAPSULE ORAL at 08:24

## 2021-12-18 RX ADMIN — ASPIRIN 81 MG: 81 TABLET ORAL at 08:24

## 2021-12-18 RX ADMIN — ENOXAPARIN SODIUM 40 MG: 40 INJECTION SUBCUTANEOUS at 08:25

## 2021-12-18 RX ADMIN — INSULIN LISPRO 6 UNITS: 100 INJECTION, SOLUTION INTRAVENOUS; SUBCUTANEOUS at 08:33

## 2021-12-18 RX ADMIN — DIAZEPAM 5 MG: 5 TABLET ORAL at 06:23

## 2021-12-18 RX ADMIN — DIGOXIN 125 MCG: 125 TABLET ORAL at 12:38

## 2021-12-18 RX ADMIN — SODIUM CHLORIDE, PRESERVATIVE FREE 10 ML: 5 INJECTION INTRAVENOUS at 20:27

## 2021-12-18 RX ADMIN — COLLAGENASE SANTYL 1 APPLICATION: 250 OINTMENT TOPICAL at 08:26

## 2021-12-18 RX ADMIN — THIAMINE HYDROCHLORIDE 100 MG: 100 INJECTION, SOLUTION INTRAMUSCULAR; INTRAVENOUS at 08:25

## 2021-12-18 RX ADMIN — BUSPIRONE HYDROCHLORIDE 10 MG: 10 TABLET ORAL at 20:26

## 2021-12-18 RX ADMIN — BUSPIRONE HYDROCHLORIDE 10 MG: 10 TABLET ORAL at 08:24

## 2021-12-18 RX ADMIN — CHLORDIAZEPOXIDE HYDROCHLORIDE 5 MG: 5 CAPSULE ORAL at 20:26

## 2021-12-18 RX ADMIN — CHLORDIAZEPOXIDE HYDROCHLORIDE 5 MG: 5 CAPSULE ORAL at 15:21

## 2021-12-18 RX ADMIN — ATORVASTATIN CALCIUM 20 MG: 10 TABLET, FILM COATED ORAL at 20:26

## 2021-12-18 RX ADMIN — PREGABALIN 150 MG: 75 CAPSULE ORAL at 15:21

## 2021-12-18 RX ADMIN — INSULIN LISPRO 6 UNITS: 100 INJECTION, SOLUTION INTRAVENOUS; SUBCUTANEOUS at 12:38

## 2021-12-18 RX ADMIN — POTASSIUM CHLORIDE 40 MEQ: 10 CAPSULE, COATED, EXTENDED RELEASE ORAL at 20:27

## 2021-12-18 RX ADMIN — NICOTINE 1 PATCH: 21 PATCH, EXTENDED RELEASE TRANSDERMAL at 08:25

## 2021-12-18 RX ADMIN — SODIUM CHLORIDE 1 G: 900 INJECTION INTRAVENOUS at 21:29

## 2021-12-18 RX ADMIN — POTASSIUM CHLORIDE AND SODIUM CHLORIDE 100 ML/HR: 900; 150 INJECTION, SOLUTION INTRAVENOUS at 06:24

## 2021-12-18 RX ADMIN — SODIUM CHLORIDE, PRESERVATIVE FREE 10 ML: 5 INJECTION INTRAVENOUS at 08:25

## 2021-12-18 RX ADMIN — PREGABALIN 150 MG: 75 CAPSULE ORAL at 20:26

## 2021-12-18 RX ADMIN — PANTOPRAZOLE SODIUM 40 MG: 40 TABLET, DELAYED RELEASE ORAL at 08:24

## 2021-12-18 RX ADMIN — CITALOPRAM 20 MG: 20 TABLET, FILM COATED ORAL at 08:24

## 2021-12-18 RX ADMIN — LINAGLIPTIN 5 MG: 5 TABLET, FILM COATED ORAL at 15:21

## 2021-12-18 RX ADMIN — OXYCODONE AND ACETAMINOPHEN 1 TABLET: 325; 10 TABLET ORAL at 08:33

## 2021-12-18 RX ADMIN — INSULIN DETEMIR 15 UNITS: 100 INJECTION, SOLUTION SUBCUTANEOUS at 20:27

## 2021-12-18 RX ADMIN — POTASSIUM CHLORIDE AND SODIUM CHLORIDE 100 ML/HR: 900; 150 INJECTION, SOLUTION INTRAVENOUS at 16:34

## 2021-12-18 RX ADMIN — INSULIN LISPRO 6 UNITS: 100 INJECTION, SOLUTION INTRAVENOUS; SUBCUTANEOUS at 17:43

## 2021-12-18 NOTE — NURSING NOTE
Pt agreed to go to SNF for rehab, he states that he is worried about going home and worried about his wounds healing.

## 2021-12-18 NOTE — PLAN OF CARE
Goal Outcome Evaluation:              Outcome Summary: Pt is lethargic but once aroused can answer questions, woundcare was completed on pt, heals propped up on pillow, external cath applied to pt per order hooked up to suction, will continue to monitor

## 2021-12-18 NOTE — PROGRESS NOTES
[facility] Medicine Services  INPATIENT PROGRESS NOTE    Length of Stay: 2  Date of Admission: 12/15/2021  Primary Care Physician: Jt Monroy MD    Subjective   Chief Complaint: Weakness.  Wound . alcohol abuse.  Failure to thrive.    HPI   Patient is denies any chest pain.  Patient not requiring oxygen.  Hypotensive.  Afebrile.  Patient's remains fairly weak.    Review of Systems   Constitutional: Positive for activity change, appetite change and fatigue. Negative for chills and fever.   HENT: Negative for hearing loss, nosebleeds, tinnitus and trouble swallowing.    Eyes: Negative for visual disturbance.   Respiratory: Positive for shortness of breath. Negative for cough, chest tightness and wheezing.    Cardiovascular: Negative for chest pain, palpitations and leg swelling.   Gastrointestinal: Negative for abdominal distention, abdominal pain, blood in stool, constipation, diarrhea, nausea and vomiting.   Endocrine: Negative for cold intolerance, heat intolerance, polydipsia, polyphagia and polyuria.   Genitourinary: Negative for decreased urine volume, difficulty urinating, dysuria, flank pain, frequency and hematuria.   Musculoskeletal: Positive for arthralgias, gait problem and myalgias. Negative for joint swelling.   Skin: Negative for rash.   Allergic/Immunologic: Negative for immunocompromised state.   Neurological: Positive for weakness. Negative for dizziness, syncope, light-headedness and headaches.   Hematological: Negative for adenopathy. Does not bruise/bleed easily.   Psychiatric/Behavioral: Positive for confusion. Negative for sleep disturbance. The patient is not nervous/anxious.           All pertinent negatives and positives are as above. All other systems have been reviewed and are negative unless otherwise stated.     Objective    Temp:  [97.4 °F (36.3 °C)-98.7 °F (37.1 °C)] 97.6 °F (36.4 °C)  Heart Rate:  [] 80  Resp:  [14-20] 18  BP:  ()/(40-59) 98/45    Intake/Output Summary (Last 24 hours) at 12/18/2021 0751  Last data filed at 12/18/2021 0624  Gross per 24 hour   Intake 120 ml   Output 800 ml   Net -680 ml     Physical Exam  Vitals and nursing note reviewed.   Constitutional:       Appearance: He is well-developed.   HENT:      Head: Normocephalic.   Eyes:      General: No scleral icterus.     Pupils: Pupils are equal, round, and reactive to light.   Neck:      Thyroid: No thyromegaly.      Vascular: No carotid bruit or JVD.      Trachea: No tracheal deviation.   Cardiovascular:      Rate and Rhythm: Normal rate and regular rhythm.      Heart sounds: No murmur heard.  No friction rub. No gallop.    Pulmonary:      Effort: No respiratory distress.      Breath sounds: No wheezing or rales.      Comments: Diminished breath of bilateral, clear.  Chest:      Chest wall: No tenderness.   Abdominal:      General: Bowel sounds are normal. There is no distension.      Palpations: Abdomen is soft.      Tenderness: There is no abdominal tenderness.   Musculoskeletal:      Cervical back: Normal range of motion and neck supple.   Skin:     General: Skin is warm and dry.      Capillary Refill: Capillary refill takes 2 to 3 seconds.      Findings: No rash.      Comments: Sacral wound.  Decubitus   Neurological:      Mental Status: He is alert and oriented to person, place, and time.      Cranial Nerves: No cranial nerve deficit.      Motor: Weakness present.      Coordination: Coordination abnormal.      Gait: Gait abnormal.   Psychiatric:         Behavior: Behavior normal.         Results Review:  Lab Results (last 24 hours)     Procedure Component Value Units Date/Time    POC Glucose Once [[id number]]  (Abnormal) Collected: 12/18/21 0729    Specimen: Blood Updated: 12/18/21 0739     Glucose 250 mg/dL      Comment: : [id number] [employer] CieraMeter ID: [id number]       Phosphorus [[id number]]  (Abnormal) Collected: 12/18/21 0320    Specimen: Blood  Updated: 12/18/21 0408     Phosphorus 2.4 mg/dL     Basic Metabolic Panel [[id number]]  (Abnormal) Collected: 12/18/21 0320    Specimen: Blood Updated: 12/18/21 0402     Glucose 265 mg/dL      BUN 20 mg/dL      Creatinine 0.77 mg/dL      Sodium 136 mmol/L      Potassium 3.0 mmol/L      Chloride 99 mmol/L      CO2 26.0 mmol/L      Calcium 10.0 mg/dL      eGFR Non African Amer 100 mL/min/1.73      BUN/Creatinine Ratio 26.0     Anion Gap 11.0 mmol/L     Narrative:      GFR Normal >60  Chronic Kidney Disease <60  Kidney Failure <15      Magnesium [[id number]]  (Normal) Collected: 12/18/21 0320    Specimen: Blood Updated: 12/18/21 0402     Magnesium 1.7 mg/dL     CBC (No Diff) [[id number]]  (Abnormal) Collected: 12/18/21 0320    Specimen: Blood Updated: 12/18/21 0346     WBC 9.84 10*3/mm3      RBC 3.09 10*6/mm3      Hemoglobin 9.5 g/dL      Hematocrit 28.8 %      MCV 93.2 fL      MCH 30.7 pg      MCHC 33.0 g/dL      RDW 14.6 %      RDW-SD 48.5 fl      MPV 9.9 fL      Platelets 416 10*3/mm3     Blood Culture - Blood, Arm, Left [[id number]]  (Normal) Collected: 12/15/21 2220    Specimen: Blood from Arm, Left Updated: 12/17/21 2230     Blood Culture No growth at 2 days    Blood Culture - Blood, Arm, Right [[id number]]  (Normal) Collected: 12/15/21 2216    Specimen: Blood from Arm, Right Updated: 12/17/21 2230     Blood Culture No growth at 2 days    POC Glucose Once [[id number]]  (Abnormal) Collected: 12/17/21 2055    Specimen: Blood Updated: 12/17/21 2106     Glucose 244 mg/dL      Comment: : [id number] [employer] OneillenMeter ID: [id number]       POC Glucose Once [[id number]]  (Abnormal) Collected: 12/17/21 1559    Specimen: Blood Updated: 12/17/21 1610     Glucose 245 mg/dL      Comment: : [id number] [employer] Alfredo SamiMeter ID: [id number]       POC Glucose Once [[id number]]  (Abnormal) Collected: 12/17/21 1139    Specimen: Blood Updated: 12/17/21 1150     Glucose 294 mg/dL      Comment: : [id number] Luis Alfredo Phillips ID:  [id number]       Urine Culture - Urine, Urine, Catheter In/Out [[id number]]  (Normal) Collected: 12/15/21 2219    Specimen: Urine, Catheter In/Out Updated: 12/17/21 1008     Urine Culture No growth           Cultures:  Blood Culture   Date Value Ref Range Status   12/15/2021 No growth at 2 days  Preliminary   12/15/2021 No growth at 2 days  Preliminary     Urine Culture   Date Value Ref Range Status   12/15/2021 No growth  Final       Radiology Data:    Imaging Results (Last 24 Hours)     ** No results found for the last 24 hours. **          Allergies   Allergen Reactions   • Sulfa Antibiotics Nausea Only       Scheduled meds:   aspirin, 81 mg, Oral, Daily  atorvastatin, 20 mg, Oral, Nightly  busPIRone, 10 mg, Oral, Q12H  cefTRIAXone, 1 g, Intravenous, Q24H  citalopram, 20 mg, Oral, Daily  collagenase, 1 application, Topical, Q24H  diazePAM, 5 mg, Oral, Q8H  digoxin, 125 mcg, Oral, Daily  enoxaparin, 40 mg, Subcutaneous, Q24H  insulin detemir, 25 Units, Subcutaneous, Nightly  insulin lispro, 0-9 Units, Subcutaneous, TID AC  nicotine, 1 patch, Transdermal, Q24H  pantoprazole, 40 mg, Oral, Daily  pregabalin, 200 mg, Oral, TID  sodium chloride, 10 mL, Intravenous, Q12H  thiamine, 100 mg, Intravenous, Daily        PRN meds:  •  acetaminophen **OR** acetaminophen **OR** acetaminophen  •  dextrose  •  dextrose  •  glucagon (human recombinant)  •  LORazepam **OR** LORazepam **OR** LORazepam **OR** LORazepam **OR** LORazepam **OR** LORazepam  •  ondansetron **OR** ondansetron  •  oxyCODONE-acetaminophen  •  [COMPLETED] Insert peripheral IV **AND** sodium chloride  •  sodium chloride    Assessment/Plan       High anion gap metabolic acidosis    Cervical spondylosis with myelopathy    Tobacco abuse    Type 2 diabetes mellitus with hyperglycemia, without long-term current use of insulin (HCC)    Essential hypertension    Acute stasis dermatitis    Alcohol abuse    Muscular deconditioning    Atrial flutter with rapid  ventricular response (HCC)    Hypokalemia    Hyponatremia    Acute cystitis    Hypophosphatemia    Normocytic anemia      Plan:  Atrial flutter with RVR/hypertension/hyperlipidemia.  Patient is currently in sinus rhythm.  Aspirin.  Lipitor.  Digoxin.  Lovenox prophylaxis.  Cardizem drip.  Echocardiogram-ejection fraction 56 to 60%, no significant valvular abnormality.    Depression/anxiety.  Wife has passed away 2 months ago.  Patient does nothing but sit in recliner, patient urinating/defecate himself.  BuSpar.  Celexa.    Alcohol abuse/anion gap metabolic acidosis.  Whiskey drinking daily.  Patient's friend enabling him.  Valium around-the-clock.  IV hydration.  Ciwa protocol.      Diabetes . increase Levemir . sliding scale.  Hemoglobin A1c 8.3.  Add Tradjenta.    Hypokalemia.  P.o. potassium.  Magnesium-normal.    Hyponatremia. Resolved    UTI.  Rocephin.    Reflux.  Protonix.  Zofran as needed.    Neuropathy. Lyrica.    Anemia.  Hemoglobin stable.  No sign of acute bleed.    Chronic smoker.  Nicotine patch.  Discussed patient cutting back and stopping.    Cervical spondylosis with myelopathy    Acute stasis dermatitis/coccyx wound/decubitus stage II.  Consult wound care.  Heel off bed.  Collagenase ointment .    Nutrition.  Regular/cardiac/consistent carb diet.    Deconditioning.  PT and OT consult.  Thiamine.      Blood culture-no growth in 2 days.  Urine culture-no growth.    Discharge Plannin-4 days.  Plan for social work for rehab placement.    Electronically signed by Jj Perkins MD, 21, 7:51 AM CST.

## 2021-12-18 NOTE — THERAPY TREATMENT NOTE
Acute Care - Physical Therapy Treatment Note  [facility]     Patient Name: John Rodriguez  : 1950  MRN: [id number]  Today's Date: 2021      Visit Dx:     ICD-10-CM ICD-9-CM   1. High anion gap metabolic acidosis  E87.2 276.2   2. Alcoholic intoxication without complication ([facility])  F10.920 305.00   3. Irritant dermatitis  L24.9 692.9   4. Pressure injury, stage 2, unspecified location ([facility])  L89.92 707.00     707.22   5. Muscular deconditioning  R29.898 781.99   6. Decreased activities of daily living (ADL)  Z78.9 V49.89   7. Impaired mobility  Z74.09 799.89     Patient Active Problem List   Diagnosis   • Hydrocele   • Spinal cord compression due to degenerative disorder of spinal column   • Cervical spondylosis with myelopathy   • Tobacco abuse   • Type 2 diabetes mellitus with hyperglycemia, without long-term current use of insulin ([facility])   • Hyperlipidemia   • Essential hypertension   • GERD without esophagitis   • Medically noncompliant   • Peripheral neuropathy   • Altered mental status   • DDD (degenerative disc disease), lumbosacral   • Pain, neck   • S/P cervical spinal fusion   • Skin ulcer of sacrum, limited to breakdown of skin ([facility])   • Spinal stenosis of lumbar region without neurogenic claudication   • Weakness   • Polypharmacy   • Community acquired pneumonia of left lower lobe of lung   • Adult BMI 35.0-35.9 kg/sq m   • BMI 33.0-33.9,adult   • Morbidly obese ([facility])   • PAD (peripheral artery disease) ([facility])   • Preop testing   • High anion gap metabolic acidosis   • Acute stasis dermatitis   • Alcohol abuse   • Muscular deconditioning   • Atrial flutter with rapid ventricular response ([facility])   • Hypokalemia   • Hyponatremia   • Acute cystitis   • Hypophosphatemia   • Normocytic anemia     Past Medical History:   Diagnosis Date   • Acid reflux    • Arthritis    • Back pain    • Diabetes mellitus ([facility])    • Hydrocele in adult    • Hyperlipidemia    • Hypertension    • Psoriasis    • Wears  glasses      Past Surgical History:   Procedure Laterality Date   • ANTERIOR CERVICAL DISCECTOMY W/ FUSION N/A 1/23/2020    Procedure: CERVICAL DISCECTOMY ANTERIOR WITH FUSION C5-6;  Surgeon: Santino Montano MD;  Location:  [facility] OR;  Service: Neurosurgery   • BACK SURGERY     • COLONOSCOPY     • HERNIA REPAIR     • HYDROCELECTOMY Left 11/20/2017    Procedure: HYDROCELECTOMY;  Surgeon: Neeraj Hurtado MD;  Location:  [facility] OR;  Service:    • TONSILLECTOMY       PT Assessment (last 12 hours)     PT Evaluation and Treatment     Row Name 12/18/21 1400 12/18/21 1102       Physical Therapy Time and Intention    Subjective Information complains of; pain  -AB --    Document Type therapy note (daily note)  -AB --    Mode of Treatment physical therapy  -AB physical therapy  -AB    Session Not Performed -- other (see comments)  -AB    Comment, Session Not Performed -- pt unable to stay awake  -AB    Row Name 12/18/21 1400          General Information    Existing Precautions/Restrictions fall  -AB     Row Name 12/18/21 1400          Bed Mobility    Supine-Sit [place] (Bed Mobility) moderate assist (50% patient effort); maximum assist (25% patient effort)  -AB     Sit-Supine [place] (Bed Mobility) moderate assist (50% patient effort); maximum assist (25% patient effort)  -AB     Row Name 12/18/21 1400          Motor Skills    Therapeutic Exercise aerobic  -AB     Row Name 12/18/21 1400          Aerobic Exercise    Comment, Aerobic Exercise (Therapeutic Exercise) Sitting AROM R LE and AA-AROM L LE  -AB     Row Name             Wound 12/16/21 0359 Bilateral gluteal    Wound - Properties Group Placement Date: 12/16/21  -KD Placement Time: 0359 -KD Side: Bilateral  -KD Location: gluteal  -KD     Retired Wound - Properties Group Date first assessed: 12/16/21  -KD Time first assessed: 0359  -KD Side: Bilateral  -KD Location: gluteal  -KD     Row Name             Wound 12/16/21 0400 Bilateral other (see comments)     Wound - Properties Group Placement Date: 12/16/21  -KD Placement Time: 0400  -KD Side: Bilateral  -KD Location: other (see comments)  -KD, scrotum      Retired Wound - Properties Group Date first assessed: 12/16/21  -KD Time first assessed: 0400  -KD Side: Bilateral  -KD Location: other (see comments)  -KD, scrotum      Row Name             Wound 01/23/20 1420 Left  Diabetic Ulcer    Wound - Properties Group Placement Date: 01/23/20  -GC Placement Time: 1420  -GC Present on Hospital Admission: Y  -GC Side: Left  -GC Location: --  -GC, second toe   Primary Wound Type: Diabetic ulc  -HS     Retired Wound - Properties Group Date first assessed: 01/23/20  -GC Time first assessed: 1420  -GC Present on Hospital Admission: Y  -GC Side: Left  -GC Location: --  -GC, second toe   Primary Wound Type: Diabetic ulc  -HS     Row Name             Wound 01/23/20 1431 Left heel Diabetic Ulcer    Wound - Properties Group Placement Date: 01/23/20  -GC Placement Time: 1431  -GC Present on Hospital Admission: Y  -GC Side: Left  -GC Location: heel  -GC, Outer Left Heel  Primary Wound Type: Diabetic ulc  -HS     Retired Wound - Properties Group Date first assessed: 01/23/20  -GC Time first assessed: 1431  -GC Present on Hospital Admission: Y  -GC Side: Left  -GC Location: heel  -GC, Outer Left Heel  Primary Wound Type: Diabetic ulc  -HS     Row Name             Wound 01/23/20 1436 Bilateral groin MASD (Moisutre associated skin damage)    Wound - Properties Group Placement Date: 01/23/20  -GC Placement Time: 1436  -GC Present on Hospital Admission: Y  -GC Side: Bilateral  -GC Location: groin  -GC Primary Wound Type: MASD  -GC     Retired Wound - Properties Group Date first assessed: 01/23/20  -GC Time first assessed: 1436  -GC Present on Hospital Admission: Y  -GC Side: Bilateral  -GC Location: groin  -GC Primary Wound Type: MASD  -GC     Row Name 12/18/21 1400          Positioning and Restraints    Pre-Treatment Position in bed  -AB      Post Treatment Position bed  -AB     In Bed fowlers; call light within reach  -AB           User Key  (r) = Recorded By, (t) = Taken By, (c) = Cosigned By    Initials Name Provider Type    AB Kelli Willams PTA Physical Therapy Assistant    Natalie Watters, RN Registered Nurse    Yen Hernandez RN Registered Nurse    Taylor Connell RN Registered Nurse                Physical Therapy Education                 Title: PT OT SLP Therapies (In Progress)     Topic: Physical Therapy (In Progress)     Point: Mobility training (In Progress)     Learning Progress Summary           Patient Nonacceptance, E, NL by SB at 12/16/2021 1528    Comment: pt edu on POC, benefits of act, d/c plans                   Point: Home exercise program (Not Started)     Learner Progress:  Not documented in this visit.          Point: Body mechanics (Not Started)     Learner Progress:  Not documented in this visit.          Point: Precautions (In Progress)     Learning Progress Summary           Patient Nonacceptance, E, NL by SB at 12/16/2021 1528    Comment: pt edu on POC, benefits of act, d/c plans                               User Key     Initials Effective Dates Name Provider Type Discipline     06/16/21 -  Lilliana Rodriguez, PT DPT Physical Therapist PT              PT Recommendation and Plan             Time Calculation:    PT Charges     Row Name 12/18/21 1400             Time Calculation    Start Time 1400  -AB      Stop Time 1426  -AB      Time Calculation (min) 26 min  -AB      PT Received On 12/18/21  -AB              Time Calculation- PT    Total Timed Code Minutes- PT 26 minute(s)  -AB            User Key  (r) = Recorded By, (t) = Taken By, (c) = Cosigned By    Initials Name Provider Type    Kelli Wei PTA Physical Therapy Assistant              Therapy Charges for Today     Code Description Service Date Service Provider Modifiers Qty    51014512207 HC PT THER PROC EA 15 MIN 12/18/2021 Kelli Willams  PTA GP 1    [id number]  PT THERAPEUTIC ACT EA 15 MIN 12/18/2021 Kelli Willams, PTA GP 1          PT G-Codes  Outcome Measure Options: AM-PAC 6 Clicks Daily Activity (OT)  AM-PAC 6 Clicks Score (PT): 8  AM-PAC 6 Clicks Score (OT): 6    Kelli Willams PTA  12/18/2021

## 2021-12-18 NOTE — PLAN OF CARE
Problem: Adult Inpatient Plan of Care  Goal: Plan of Care Review  Outcome: Ongoing, Progressing  Flowsheets (Taken 12/18/2021 0630)  Progress: no change  Outcome Summary: Pt is running SR HR: 75-89 on tele. Pt drowsy at the beginning of the shift. Pt much more alert this AM. Pt oriented x3, disoriented to situation. Pt c/o pain at times to buttocks, repositioning provided & barrier cream applied. Turning pt q 2 hours. Elevating heels off the bed. Wound care completed to buttocks as ordered. Safety maintained. Bed alarm on.

## 2021-12-19 LAB
ALBUMIN SERPL-MCNC: 2.3 G/DL (ref 3.5–5.2)
ALBUMIN/GLOB SERPL: 0.7 G/DL
ALP SERPL-CCNC: 148 U/L (ref 39–117)
ALT SERPL W P-5'-P-CCNC: 8 U/L (ref 1–41)
ANION GAP SERPL CALCULATED.3IONS-SCNC: 7 MMOL/L (ref 5–15)
AST SERPL-CCNC: 10 U/L (ref 1–40)
BASOPHILS # BLD AUTO: 0.05 10*3/MM3 (ref 0–0.2)
BASOPHILS NFR BLD AUTO: 0.6 % (ref 0–1.5)
BILIRUB SERPL-MCNC: 0.3 MG/DL (ref 0–1.2)
BUN SERPL-MCNC: 18 MG/DL (ref 8–23)
BUN/CREAT SERPL: 29.5 (ref 7–25)
CALCIUM SPEC-SCNC: 9.5 MG/DL (ref 8.6–10.5)
CHLORIDE SERPL-SCNC: 103 MMOL/L (ref 98–107)
CHOLEST SERPL-MCNC: 114 MG/DL (ref 0–200)
CO2 SERPL-SCNC: 28 MMOL/L (ref 22–29)
CREAT SERPL-MCNC: 0.61 MG/DL (ref 0.76–1.27)
DEPRECATED RDW RBC AUTO: 48.1 FL (ref 37–54)
EOSINOPHIL # BLD AUTO: 0.09 10*3/MM3 (ref 0–0.4)
EOSINOPHIL NFR BLD AUTO: 1.2 % (ref 0.3–6.2)
ERYTHROCYTE [DISTWIDTH] IN BLOOD BY AUTOMATED COUNT: 14.5 % (ref 12.3–15.4)
GFR SERPL CREATININE-BSD FRML MDRD: 131 ML/MIN/1.73
GLOBULIN UR ELPH-MCNC: 3.1 GM/DL
GLUCOSE BLDC GLUCOMTR-MCNC: 241 MG/DL (ref 70–130)
GLUCOSE BLDC GLUCOMTR-MCNC: 251 MG/DL (ref 70–130)
GLUCOSE BLDC GLUCOMTR-MCNC: 255 MG/DL (ref 70–130)
GLUCOSE BLDC GLUCOMTR-MCNC: 276 MG/DL (ref 70–130)
GLUCOSE SERPL-MCNC: 293 MG/DL (ref 65–99)
HCT VFR BLD AUTO: 24.5 % (ref 37.5–51)
HDLC SERPL-MCNC: 41 MG/DL (ref 40–60)
HGB BLD-MCNC: 8.2 G/DL (ref 13–17.7)
IMM GRANULOCYTES # BLD AUTO: 0.15 10*3/MM3 (ref 0–0.05)
IMM GRANULOCYTES NFR BLD AUTO: 1.9 % (ref 0–0.5)
LDLC SERPL CALC-MCNC: 44 MG/DL (ref 0–100)
LDLC/HDLC SERPL: 0.93 {RATIO}
LYMPHOCYTES # BLD AUTO: 1.05 10*3/MM3 (ref 0.7–3.1)
LYMPHOCYTES NFR BLD AUTO: 13.5 % (ref 19.6–45.3)
MCH RBC QN AUTO: 30.6 PG (ref 26.6–33)
MCHC RBC AUTO-ENTMCNC: 33.5 G/DL (ref 31.5–35.7)
MCV RBC AUTO: 91.4 FL (ref 79–97)
MONOCYTES # BLD AUTO: 0.49 10*3/MM3 (ref 0.1–0.9)
MONOCYTES NFR BLD AUTO: 6.3 % (ref 5–12)
NEUTROPHILS NFR BLD AUTO: 5.94 10*3/MM3 (ref 1.7–7)
NEUTROPHILS NFR BLD AUTO: 76.5 % (ref 42.7–76)
NRBC BLD AUTO-RTO: 0 /100 WBC (ref 0–0.2)
PLATELET # BLD AUTO: 381 10*3/MM3 (ref 140–450)
PMV BLD AUTO: 10.3 FL (ref 6–12)
POTASSIUM SERPL-SCNC: 4.2 MMOL/L (ref 3.5–5.2)
PROT SERPL-MCNC: 5.4 G/DL (ref 6–8.5)
RBC # BLD AUTO: 2.68 10*6/MM3 (ref 4.14–5.8)
SODIUM SERPL-SCNC: 138 MMOL/L (ref 136–145)
TRIGL SERPL-MCNC: 175 MG/DL (ref 0–150)
VLDLC SERPL-MCNC: 29 MG/DL (ref 5–40)
WBC NRBC COR # BLD: 7.77 10*3/MM3 (ref 3.4–10.8)

## 2021-12-19 PROCEDURE — 25010000002 CEFTRIAXONE PER 250 MG: Performed by: INTERNAL MEDICINE

## 2021-12-19 PROCEDURE — 80061 LIPID PANEL: CPT | Performed by: FAMILY MEDICINE

## 2021-12-19 PROCEDURE — 25010000002 ENOXAPARIN PER 10 MG: Performed by: INTERNAL MEDICINE

## 2021-12-19 PROCEDURE — 97110 THERAPEUTIC EXERCISES: CPT

## 2021-12-19 PROCEDURE — 82962 GLUCOSE BLOOD TEST: CPT

## 2021-12-19 PROCEDURE — 80053 COMPREHEN METABOLIC PANEL: CPT | Performed by: FAMILY MEDICINE

## 2021-12-19 PROCEDURE — 63710000001 INSULIN DETEMIR PER 5 UNITS: Performed by: FAMILY MEDICINE

## 2021-12-19 PROCEDURE — 25010000002 THIAMINE PER 100 MG: Performed by: INTERNAL MEDICINE

## 2021-12-19 PROCEDURE — 25010000002 SODIUM CHLORIDE 0.9 % WITH KCL 20 MEQ 20-0.9 MEQ/L-% SOLUTION: Performed by: INTERNAL MEDICINE

## 2021-12-19 PROCEDURE — 63710000001 INSULIN LISPRO (HUMAN) PER 5 UNITS: Performed by: INTERNAL MEDICINE

## 2021-12-19 PROCEDURE — 85025 COMPLETE CBC W/AUTO DIFF WBC: CPT | Performed by: FAMILY MEDICINE

## 2021-12-19 PROCEDURE — 25010000002 SODIUM CHLORIDE 0.9 % WITH KCL 20 MEQ 20-0.9 MEQ/L-% SOLUTION: Performed by: FAMILY MEDICINE

## 2021-12-19 RX ORDER — OXYCODONE HYDROCHLORIDE AND ACETAMINOPHEN 5; 325 MG/1; MG/1
1 TABLET ORAL EVERY 4 HOURS PRN
Status: DISCONTINUED | OUTPATIENT
Start: 2021-12-19 | End: 2021-12-20 | Stop reason: HOSPADM

## 2021-12-19 RX ADMIN — BUSPIRONE HYDROCHLORIDE 10 MG: 10 TABLET ORAL at 09:26

## 2021-12-19 RX ADMIN — INSULIN LISPRO 6 UNITS: 100 INJECTION, SOLUTION INTRAVENOUS; SUBCUTANEOUS at 09:27

## 2021-12-19 RX ADMIN — BUSPIRONE HYDROCHLORIDE 10 MG: 10 TABLET ORAL at 20:56

## 2021-12-19 RX ADMIN — SODIUM CHLORIDE, PRESERVATIVE FREE 10 ML: 5 INJECTION INTRAVENOUS at 20:59

## 2021-12-19 RX ADMIN — POTASSIUM CHLORIDE AND SODIUM CHLORIDE 50 ML/HR: 900; 150 INJECTION, SOLUTION INTRAVENOUS at 17:00

## 2021-12-19 RX ADMIN — SODIUM CHLORIDE 1 G: 900 INJECTION INTRAVENOUS at 20:56

## 2021-12-19 RX ADMIN — POTASSIUM CHLORIDE AND SODIUM CHLORIDE 100 ML/HR: 900; 150 INJECTION, SOLUTION INTRAVENOUS at 05:25

## 2021-12-19 RX ADMIN — THIAMINE HYDROCHLORIDE 100 MG: 100 INJECTION, SOLUTION INTRAMUSCULAR; INTRAVENOUS at 09:27

## 2021-12-19 RX ADMIN — CHLORDIAZEPOXIDE HYDROCHLORIDE 5 MG: 5 CAPSULE ORAL at 20:57

## 2021-12-19 RX ADMIN — INSULIN DETEMIR 15 UNITS: 100 INJECTION, SOLUTION SUBCUTANEOUS at 09:27

## 2021-12-19 RX ADMIN — COLLAGENASE SANTYL 1 APPLICATION: 250 OINTMENT TOPICAL at 02:27

## 2021-12-19 RX ADMIN — PREGABALIN 150 MG: 75 CAPSULE ORAL at 05:28

## 2021-12-19 RX ADMIN — SODIUM CHLORIDE, PRESERVATIVE FREE 10 ML: 5 INJECTION INTRAVENOUS at 10:34

## 2021-12-19 RX ADMIN — ASPIRIN 81 MG: 81 TABLET ORAL at 09:27

## 2021-12-19 RX ADMIN — CHLORDIAZEPOXIDE HYDROCHLORIDE 5 MG: 5 CAPSULE ORAL at 05:29

## 2021-12-19 RX ADMIN — NICOTINE 1 PATCH: 21 PATCH, EXTENDED RELEASE TRANSDERMAL at 09:37

## 2021-12-19 RX ADMIN — CITALOPRAM 10 MG: 10 TABLET, FILM COATED ORAL at 09:27

## 2021-12-19 RX ADMIN — PREGABALIN 150 MG: 75 CAPSULE ORAL at 20:56

## 2021-12-19 RX ADMIN — ATORVASTATIN CALCIUM 20 MG: 10 TABLET, FILM COATED ORAL at 20:56

## 2021-12-19 RX ADMIN — INSULIN DETEMIR 20 UNITS: 100 INJECTION, SOLUTION SUBCUTANEOUS at 20:58

## 2021-12-19 RX ADMIN — CHLORDIAZEPOXIDE HYDROCHLORIDE 5 MG: 5 CAPSULE ORAL at 14:47

## 2021-12-19 RX ADMIN — INSULIN LISPRO 6 UNITS: 100 INJECTION, SOLUTION INTRAVENOUS; SUBCUTANEOUS at 17:43

## 2021-12-19 RX ADMIN — ENOXAPARIN SODIUM 40 MG: 40 INJECTION SUBCUTANEOUS at 10:34

## 2021-12-19 RX ADMIN — LINAGLIPTIN 5 MG: 5 TABLET, FILM COATED ORAL at 09:27

## 2021-12-19 RX ADMIN — DIGOXIN 125 MCG: 125 TABLET ORAL at 12:41

## 2021-12-19 RX ADMIN — PREGABALIN 150 MG: 75 CAPSULE ORAL at 14:46

## 2021-12-19 RX ADMIN — POTASSIUM CHLORIDE 40 MEQ: 10 CAPSULE, COATED, EXTENDED RELEASE ORAL at 09:27

## 2021-12-19 RX ADMIN — PANTOPRAZOLE SODIUM 40 MG: 40 TABLET, DELAYED RELEASE ORAL at 09:27

## 2021-12-19 RX ADMIN — INSULIN LISPRO 6 UNITS: 100 INJECTION, SOLUTION INTRAVENOUS; SUBCUTANEOUS at 12:41

## 2021-12-19 RX ADMIN — COLLAGENASE SANTYL 1 APPLICATION: 250 OINTMENT TOPICAL at 09:28

## 2021-12-19 NOTE — PLAN OF CARE
Goal Outcome Evaluation:  Plan of Care Reviewed With: patient        Progress: no change  Outcome Summary: Dressing change to left buttock as directed.  Area pink and moist.  Right buttock covered with cream.  Patient incontinent of bladder.  No new injury noted.  No falls noted, patient has not attempted to get OOB.

## 2021-12-19 NOTE — PROGRESS NOTES
[facility] Medicine Services  INPATIENT PROGRESS NOTE    Length of Stay: 3  Date of Admission: 12/15/2021  Primary Care Physician: Jt Monroy MD    Subjective   Chief Complaint: Weakness.  Wound . alcohol abuse.  Failure to thrive.    HPI   Patient denies any chest pain. Patient not requiring oxygen. Blood pressures is improving. Patient is afebrile. Patient is extremely weak. Patient unable to sit in the chair and maintain his balance. Patient's unable to stand even with help.    Review of Systems   Constitutional: Positive for activity change, appetite change and fatigue. Negative for chills and fever.   HENT: Negative for hearing loss, nosebleeds, tinnitus and trouble swallowing.    Eyes: Negative for visual disturbance.   Respiratory: Denies any shortness of breath. Negative for cough, chest tightness and wheezing.    Cardiovascular: Negative for chest pain, palpitations and leg swelling.   Gastrointestinal: Negative for abdominal distention, abdominal pain, blood in stool, constipation, diarrhea, nausea and vomiting.   Endocrine: Negative for cold intolerance, heat intolerance, polydipsia, polyphagia and polyuria.   Genitourinary: Negative for decreased urine volume, difficulty urinating, dysuria, flank pain, frequency and hematuria.   Musculoskeletal: Positive for arthralgias, gait problem and myalgias. Negative for joint swelling.   Skin: Negative for rash.   Allergic/Immunologic: Negative for immunocompromised state.   Neurological: Positive for weakness. Negative for dizziness, syncope, light-headedness and headaches.   Hematological: Negative for adenopathy. Does not bruise/bleed easily.   Psychiatric/Behavioral: Positive for confusion. Negative for sleep disturbance. The patient is not nervous/anxious.           All pertinent negatives and positives are as above. All other systems have been reviewed and are negative unless otherwise stated.     Objective     Temp:  [96.9 °F (36.1 °C)-98.5 °F (36.9 °C)] 98 °F (36.7 °C)  Heart Rate:  [] 57  Resp:  [16-18] 18  BP: ()/(40-69) 108/50    Intake/Output Summary (Last 24 hours) at 12/19/2021 1451  Last data filed at 12/19/2021 1345  Gross per 24 hour   Intake 480 ml   Output 600 ml   Net -120 ml     Physical Exam  Vitals and nursing note reviewed.   Constitutional:       Appearance: He is well-developed.   HENT:      Head: Normocephalic.   Eyes:      General: No scleral icterus.     Pupils: Pupils are equal, round, and reactive to light.   Neck:      Thyroid: No thyromegaly.      Vascular: No carotid bruit or JVD.      Trachea: No tracheal deviation.   Cardiovascular:      Rate and Rhythm: Normal rate and regular rhythm.      Heart sounds: No murmur heard.  No friction rub. No gallop.    Pulmonary:      Effort: No respiratory distress.      Breath sounds: No wheezing or rales.      Comments: Diminished breath of bilateral, clear.  Chest:      Chest wall: No tenderness.   Abdominal:      General: Bowel sounds are normal. There is no distension.      Palpations: Abdomen is soft.      Tenderness: There is no abdominal tenderness.   Musculoskeletal:      Cervical back: Normal range of motion and neck supple.   Skin:     General: Skin is warm and dry.      Capillary Refill: Capillary refill takes 2 to 3 seconds.      Findings: No rash.      Comments: Sacral wound.  Decubitus   Neurological:      Mental Status: He is alert and oriented to person, place, and time.      Cranial Nerves: No cranial nerve deficit.      Motor: Weakness present.      Coordination: Coordination abnormal.      Gait: Gait abnormal.   Psychiatric:         Behavior: Behavior normal.   Results Review:  Lab Results (last 24 hours)     Procedure Component Value Units Date/Time    POC Glucose Once [[id number]]  (Abnormal) Collected: 12/19/21 1118    Specimen: Blood Updated: 12/19/21 1134     Glucose 276 mg/dL      Comment: : [id number] Tayla  CieraMeter ID: [id number]       POC Glucose Once [[id number]]  (Abnormal) Collected: 12/19/21 0741    Specimen: Blood Updated: 12/19/21 0752     Glucose 255 mg/dL      Comment: : [id number] Tayla CieraMeter ID: [id number]       Comprehensive Metabolic Panel [[id number]]  (Abnormal) Collected: 12/19/21 0250    Specimen: Blood Updated: 12/19/21 0439     Glucose 293 mg/dL      BUN 18 mg/dL      Creatinine 0.61 mg/dL      Sodium 138 mmol/L      Potassium 4.2 mmol/L      Comment: Slight hemolysis detected by analyzer. Results may be affected.        Chloride 103 mmol/L      CO2 28.0 mmol/L      Calcium 9.5 mg/dL      Total Protein 5.4 g/dL      Albumin 2.30 g/dL      ALT (SGPT) 8 U/L      AST (SGOT) 10 U/L      Comment: Slight hemolysis detected by analyzer. Results may be affected.        Alkaline Phosphatase 148 U/L      Total Bilirubin 0.3 mg/dL      eGFR Non African Amer 131 mL/min/1.73      Globulin 3.1 gm/dL      A/G Ratio 0.7 g/dL      BUN/Creatinine Ratio 29.5     Anion Gap 7.0 mmol/L     Narrative:      GFR Normal >60  Chronic Kidney Disease <60  Kidney Failure <15      Lipid Panel [[id number]]  (Abnormal) Collected: 12/19/21 0250    Specimen: Blood Updated: 12/19/21 0433     Total Cholesterol 114 mg/dL      Triglycerides 175 mg/dL      HDL Cholesterol 41 mg/dL      LDL Cholesterol  44 mg/dL      VLDL Cholesterol 29 mg/dL      LDL/HDL Ratio 0.93    Narrative:      Cholesterol Reference Ranges  (U.S. Department of Health and Human Services ATP III Classifications)    Desirable          <200 mg/dL  Borderline High    200-239 mg/dL  High Risk          >240 mg/dL      Triglyceride Reference Ranges  (U.S. Department of Health and Human Services ATP III Classifications)    Normal           <150 mg/dL  Borderline High  150-199 mg/dL  High             200-499 mg/dL  Very High        >500 mg/dL    HDL Reference Ranges  (U.S. Department of Health and Human Services ATP III Classifcations)    Low     <40 mg/dl (major  risk factor for CHD)  High    >60 mg/dl ('negative' risk factor for CHD)        LDL Reference Ranges  (U.S. Department of Health and Human Services ATP III Classifcations)    Optimal          <100 mg/dL  Near Optimal     100-129 mg/dL  Borderline High  130-159 mg/dL  High             160-189 mg/dL  Very High        >189 mg/dL    CBC & Differential [[id number]]  (Abnormal) Collected: 12/19/21 0250    Specimen: Blood Updated: 12/19/21 0410    Narrative:      The following orders were created for panel order CBC & Differential.  Procedure                               Abnormality         Status                     ---------                               -----------         ------                     CBC Auto Differential[[id number]]        Abnormal            Final result                 Please view results for these tests on the individual orders.    CBC Auto Differential [[id number]]  (Abnormal) Collected: 12/19/21 0250    Specimen: Blood Updated: 12/19/21 0410     WBC 7.77 10*3/mm3      RBC 2.68 10*6/mm3      Hemoglobin 8.2 g/dL      Hematocrit 24.5 %      MCV 91.4 fL      MCH 30.6 pg      MCHC 33.5 g/dL      RDW 14.5 %      RDW-SD 48.1 fl      MPV 10.3 fL      Platelets 381 10*3/mm3      Neutrophil % 76.5 %      Lymphocyte % 13.5 %      Monocyte % 6.3 %      Eosinophil % 1.2 %      Basophil % 0.6 %      Immature Grans % 1.9 %      Neutrophils, Absolute 5.94 10*3/mm3      Lymphocytes, Absolute 1.05 10*3/mm3      Monocytes, Absolute 0.49 10*3/mm3      Eosinophils, Absolute 0.09 10*3/mm3      Basophils, Absolute 0.05 10*3/mm3      Immature Grans, Absolute 0.15 10*3/mm3      nRBC 0.0 /100 WBC     SCANNED - LABS [[id number]] Resulted: 12/15/21     Updated: 12/19/21 0318    Blood Culture - Blood, Arm, Left [[id number]]  (Normal) Collected: 12/15/21 2220    Specimen: Blood from Arm, Left Updated: 12/18/21 2230     Blood Culture No growth at 3 days    Blood Culture - Blood, Arm, Right [[id number]]  (Normal) Collected: 12/15/21  2216    Specimen: Blood from Arm, Right Updated: 12/18/21 2230     Blood Culture No growth at 3 days    POC Glucose Once [[id number]]  (Abnormal) Collected: 12/18/21 1950    Specimen: Blood Updated: 12/18/21 2016     Glucose 284 mg/dL      Comment: : [id number] Ba Sultana GMeter ID: [id number]       Urinalysis, Microscopic Only - Urine, Clean Catch [[id number]]  (Abnormal) Collected: 12/18/21 1642    Specimen: Urine, Clean Catch Updated: 12/18/21 1715     RBC, UA None Seen /HPF      WBC, UA 0-2 /HPF      Bacteria, UA Trace /HPF      Squamous Epithelial Cells, UA 0-2 /HPF      Hyaline Casts, UA None Seen /LPF      Granular Casts, UA 0-2 /LPF      Methodology Manual Light Microscopy    Urinalysis With Culture If Indicated - Urine, Clean Catch [[id number]]  (Abnormal) Collected: 12/18/21 1642    Specimen: Urine, Clean Catch Updated: 12/18/21 1702     Color, UA Dark Yellow     Appearance, UA Clear     pH, UA 5.5     Specific Gravity, UA 1.028     Glucose, UA >=1000 mg/dL (3+)     Ketones, UA 15 mg/dL (1+)     Bilirubin, UA Moderate (2+)     Blood, UA Negative     Protein, UA 30 mg/dL (1+)     Leuk Esterase, UA Negative     Nitrite, UA Negative     Urobilinogen, UA 1.0 E.U./dL    POC Glucose Once [[id number]]  (Abnormal) Collected: 12/18/21 1649    Specimen: Blood Updated: 12/18/21 1700     Glucose 257 mg/dL      Comment: : [id number] Tayla CieraMeter ID: [id number]              Cultures:  Blood Culture   Date Value Ref Range Status   12/15/2021 No growth at 3 days  Preliminary   12/15/2021 No growth at 3 days  Preliminary     Urine Culture   Date Value Ref Range Status   12/15/2021 No growth  Final       Radiology Data:    Imaging Results (Last 24 Hours)     ** No results found for the last 24 hours. **          Allergies   Allergen Reactions   • Sulfa Antibiotics Nausea Only       Scheduled meds:   aspirin, 81 mg, Oral, Daily  atorvastatin, 20 mg, Oral, Nightly  busPIRone, 10 mg, Oral, Q12H  cefTRIAXone, 1 g,  Intravenous, Q24H  chlordiazePOXIDE, 5 mg, Oral, Q8H  citalopram, 10 mg, Oral, Daily  collagenase, 1 application, Topical, Q24H  digoxin, 125 mcg, Oral, Daily  enoxaparin, 40 mg, Subcutaneous, Q24H  insulin detemir, 15 Units, Subcutaneous, Q12H  insulin lispro, 0-9 Units, Subcutaneous, TID AC  linagliptin, 5 mg, Oral, Daily  nicotine, 1 patch, Transdermal, Q24H  pantoprazole, 40 mg, Oral, Daily  pregabalin, 150 mg, Oral, Q8H  sodium chloride, 10 mL, Intravenous, Q12H        PRN meds:  •  acetaminophen **OR** acetaminophen **OR** acetaminophen  •  dextrose  •  dextrose  •  glucagon (human recombinant)  •  LORazepam **OR** LORazepam **OR** LORazepam **OR** LORazepam **OR** LORazepam **OR** LORazepam  •  ondansetron **OR** ondansetron  •  oxyCODONE-acetaminophen  •  [COMPLETED] Insert peripheral IV **AND** sodium chloride  •  sodium chloride    Assessment/Plan       High anion gap metabolic acidosis    Cervical spondylosis with myelopathy    Tobacco abuse    Type 2 diabetes mellitus with hyperglycemia, without long-term current use of insulin ([facility])    Essential hypertension    Acute stasis dermatitis    Alcohol abuse    Muscular deconditioning    Atrial flutter with rapid ventricular response ([facility])    Hypokalemia    Hyponatremia    Acute cystitis    Hypophosphatemia    Normocytic anemia      Plan:  Atrial flutter with RVR/hypertension/hyperlipidemia.  Patient is currently in sinus rhythm.  Aspirin.  Lipitor.  Digoxin.  Lovenox prophylaxis.    Echocardiogram-ejection fraction 56 to 60%, no significant valvular abnormality.     Depression/anxiety.  Wife has passed away 2 months ago.  Patient does nothing but sit in recliner, patient urinating/defecate himself.  BuSpar.  Celexa.     Alcohol abuse/anion gap metabolic acidosis.  Whiskey drinking daily.  Patient's friend enabling him.  Valium around-the-clock.  IV hydration.  Ciwa protocol.       Diabetes . increase Levemir . sliding scale.  Hemoglobin A1c 8.3.  Cont  Tradjenta.     Hypokalemia. Resolved.  Magnesium-normal.     Hyponatremia. Resolved     UTI.  Rocephin.     Reflux.  Protonix.  Zofran as needed.     Neuropathy. Lyrica.     Anemia.  Hemoglobin stable.  No sign of acute bleed.     Chronic smoker.  Nicotine patch.  Discussed patient cutting back and stopping.     Cervical spondylosis with myelopathy/chronic pain. Percocet as needed.     Acute stasis dermatitis/coccyx wound/decubitus stage II.  Consult wound care.  Heel off bed.  Collagenase ointment .     Nutrition.  Regular/cardiac/consistent carb diet.     Deconditioning.  PT and OT consult.  Thiamine.   Patient is unable to stand or walk.     Blood culture-no growth in 3 days.  Urine culture-no growth.     Discharge Plannin-4 days.  Plan for social work for rehab placement.    Electronically signed by Jj Perkins MD, 21, 2:51 PM CST.

## 2021-12-19 NOTE — PLAN OF CARE
Problem: Adult Inpatient Plan of Care  Goal: Plan of Care Review  Flowsheets (Taken 12/19/2021 2879)  Progress: improving  Plan of Care Reviewed With: patient  Outcome Summary: Pt. unable to sit bedside at present, ue exs performed to increase his ability with maintaining midline at eob!   Goal Outcome Evaluation:  Plan of Care Reviewed With: patient        Progress: improving  Outcome Summary: Pt. unable to sit bedside at present, ue exs performed to increase his ability with maintaining midline at eob!

## 2021-12-19 NOTE — THERAPY TREATMENT NOTE
Acute Care - Occupational Therapy Treatment Note  [facility]     Patient Name: John Rodriguez  : 1950  MRN: [id number]  Today's Date: 2021             Admit Date: 12/15/2021       ICD-10-CM ICD-9-CM   1. High anion gap metabolic acidosis  E87.2 276.2   2. Alcoholic intoxication without complication ([facility])  F10.920 305.00   3. Irritant dermatitis  L24.9 692.9   4. Pressure injury, stage 2, unspecified location ([facility])  L89.92 707.00     707.22   5. Muscular deconditioning  R29.898 781.99   6. Decreased activities of daily living (ADL)  Z78.9 V49.89   7. Impaired mobility  Z74.09 799.89     Patient Active Problem List   Diagnosis   • Hydrocele   • Spinal cord compression due to degenerative disorder of spinal column   • Cervical spondylosis with myelopathy   • Tobacco abuse   • Type 2 diabetes mellitus with hyperglycemia, without long-term current use of insulin ([facility])   • Hyperlipidemia   • Essential hypertension   • GERD without esophagitis   • Medically noncompliant   • Peripheral neuropathy   • Altered mental status   • DDD (degenerative disc disease), lumbosacral   • Pain, neck   • S/P cervical spinal fusion   • Skin ulcer of sacrum, limited to breakdown of skin ([facility])   • Spinal stenosis of lumbar region without neurogenic claudication   • Weakness   • Polypharmacy   • Community acquired pneumonia of left lower lobe of lung   • Adult BMI 35.0-35.9 kg/sq m   • BMI 33.0-33.9,adult   • Morbidly obese ([facility])   • PAD (peripheral artery disease) ([facility])   • Preop testing   • High anion gap metabolic acidosis   • Acute stasis dermatitis   • Alcohol abuse   • Muscular deconditioning   • Atrial flutter with rapid ventricular response ([facility])   • Hypokalemia   • Hyponatremia   • Acute cystitis   • Hypophosphatemia   • Normocytic anemia     Past Medical History:   Diagnosis Date   • Acid reflux    • Arthritis    • Back pain    • Diabetes mellitus ([facility])    • Hydrocele in adult    • Hyperlipidemia    • Hypertension    •  Psoriasis    • Wears glasses      Past Surgical History:   Procedure Laterality Date   • ANTERIOR CERVICAL DISCECTOMY W/ FUSION N/A 1/23/2020    Procedure: CERVICAL DISCECTOMY ANTERIOR WITH FUSION C5-6;  Surgeon: Santino Montano MD;  Location:  [facility] OR;  Service: Neurosurgery   • BACK SURGERY     • COLONOSCOPY     • HERNIA REPAIR     • HYDROCELECTOMY Left 11/20/2017    Procedure: HYDROCELECTOMY;  Surgeon: Neeraj Hurtado MD;  Location:  [facility] OR;  Service:    • TONSILLECTOMY           OT ASSESSMENT FLOWSHEET (last 12 hours)     OT Evaluation and Treatment     Row Name 12/19/21 1343                   OT Time and Intention    Subjective Information complains of; weakness; fatigue  -        Document Type therapy note (daily note)  -        Mode of Treatment occupational therapy  -        Patient Effort adequate  -                  General Information    Existing Precautions/Restrictions fall  -                  Pain Assessment    Additional Documentation Pain Scale: Word Pre/Post-Treatment (Group)  -                  Pain Scale: Numbers Pre/Post-Treatment    Pain Intervention(s) Rest  -                  Pain Scale: Word Pre/Post-Treatment    Pain: Word Scale, Pretreatment 0 - no pain  -        Posttreatment Pain Rating 0 - no pain  -                  Bed Mobility    Comment (Bed Mobility) fowlers in bed!  -                  Motor Skills    Motor Skills therapeutic exercise  -        Therapeutic Exercise shoulder; elbow/forearm  -                  Shoulder (Therapeutic Exercise)    Shoulder (Therapeutic Exercise) AROM (active range of motion); strengthening exercise  -        Shoulder AROM (Therapeutic Exercise) bilateral; flexion; extension; sitting; 10 repetitions; 2 sets  -        Shoulder Strengthening (Therapeutic Exercise) bilateral; flexion; extension; sitting; 2 lb free weight; 3 lb free weight; 10 repetitions; 2 sets  -                  Elbow/Forearm (Therapeutic Exercise)     Elbow/Forearm (Therapeutic Exercise) AROM (active range of motion); strengthening exercise  -        Elbow/Forearm AROM (Therapeutic Exercise) bilateral; flexion; extension; sitting; 10 repetitions; 2 sets  -        Elbow/Forearm Strengthening (Therapeutic Exercise) bilateral; flexion; extension; sitting; 2 lb free weight; 3 lb free weight; 10 repetitions; 2 sets  -CJ                  Wound 12/16/21 0359 Bilateral gluteal    Wound - Properties Group Placement Date: 12/16/21  -KD Placement Time: 0359  -KD Side: Bilateral  -KD Location: gluteal  -KD        Retired Wound - Properties Group Date first assessed: 12/16/21  -KD Time first assessed: 0359  -KD Side: Bilateral  -KD Location: gluteal  -KD                  Wound 12/16/21 0400 Bilateral other (see comments)    Wound - Properties Group Placement Date: 12/16/21  -KD Placement Time: 0400  -KD Side: Bilateral  -KD Location: other (see comments)  -KD, scrotum         Retired Wound - Properties Group Date first assessed: 12/16/21  -KD Time first assessed: 0400  -KD Side: Bilateral  -KD Location: other (see comments)  -KD, scrotum                   Wound 01/23/20 1420 Left  Diabetic Ulcer    Wound - Properties Group Placement Date: 01/23/20  -GC Placement Time: 1420  -GC Present on Hospital Admission: Y  -GC Side: Left  -GC Location: --  -GC, second toe   Primary Wound Type: Diabetic ulc  -HS        Retired Wound - Properties Group Date first assessed: 01/23/20  -GC Time first assessed: 1420  -GC Present on Hospital Admission: Y  -GC Side: Left  -GC Location: --  -GC, second toe   Primary Wound Type: Diabetic ulc  -HS                  Wound 01/23/20 1431 Left heel Diabetic Ulcer    Wound - Properties Group Placement Date: 01/23/20  -GC Placement Time: 1431  -GC Present on Hospital Admission: Y  -GC Side: Left  -GC Location: heel  -GC, Outer Left Heel  Primary Wound Type: Diabetic ulc  -HS        Retired Wound - Properties Group Date first assessed: 01/23/20  -GC  Time first assessed: 1431 -GC Present on Hospital Admission: Y  -GC Side: Left  -GC Location: heel  -GC, Outer Left Heel  Primary Wound Type: Diabetic ulc  -HS                  Wound 01/23/20 1436 Bilateral groin MASD (Moisutre associated skin damage)    Wound - Properties Group Placement Date: 01/23/20  -GC Placement Time: 1436 -GC Present on Hospital Admission: Y  -GC Side: Bilateral  -GC Location: groin  -GC Primary Wound Type: MASD  -GC        Retired Wound - Properties Group Date first assessed: 01/23/20  -GC Time first assessed: 1436  -GC Present on Hospital Admission: Y  -GC Side: Bilateral  -GC Location: groin  -GC Primary Wound Type: MASD  -GC                  Positioning and Restraints    Pre-Treatment Position in bed  -CJ        Post Treatment Position bed  -CJ        In Bed fowlers; call light within reach; encouraged to call for assist; side rails up x2  -CJ                  Progress Summary (OT)    Progress Toward Functional Goals (OT) progress toward functional goals is fair  -CJ        Barriers to Overall Progress (OT) Fall!  -CJ        Impairments Still Limiting Function (OT) continue with ot poc!  -CJ              User Key  (r) = Recorded By, (t) = Taken By, (c) = Cosigned By    Initials Name Effective Dates    CJ Yon Ford COTA 06/16/21 -     HS Natalie Oglesby, JENNIFER 12/27/16 - 01/03/21    Yen Hernandez RN 11/06/19 - 07/16/20    Taylor Connell RN 12/28/20 -                  Occupational Therapy Education                 Title: PT OT SLP Therapies (In Progress)     Topic: Occupational Therapy (Done)     Point: ADL training (Done)     Description:   Instruct learner(s) on proper safety adaptation and remediation techniques during self care or transfers.   Instruct in proper use of assistive devices.              Learning Progress Summary           Patient Acceptance, E, VU by ZOHRA at 12/16/2021 1522                   Point: Home exercise program (Done)     Description:    Instruct learner(s) on appropriate technique for monitoring, assisting and/or progressing therapeutic exercises/activities.              Learning Progress Summary           Patient Acceptance, E,TB, VU,DU,NR by  at 12/19/2021 1343    Comment: Pt. performed ue exs to increase his ability with sitting eob and midline ability!                   Point: Precautions (Done)     Description:   Instruct learner(s) on prescribed precautions during self-care and functional transfers.              Learning Progress Summary           Patient Acceptance, E,TB, VU,DU,NR by  at 12/19/2021 1343    Comment: Pt. performed ue exs to increase his ability with sitting eob and midline ability!    Acceptance, E, VU by  at 12/16/2021 1522                   Point: Body mechanics (Done)     Description:   Instruct learner(s) on proper positioning and spine alignment during self-care, functional mobility activities and/or exercises.              Learning Progress Summary           Patient Acceptance, E,TB, VU,DU,NR by  at 12/19/2021 1343    Comment: Pt. performed ue exs to increase his ability with sitting eob and midline ability!                               User Key     Initials Effective Dates Name Provider Type Discipline     06/16/21 -  Yon Ford COTA Occupational Therapy Assistant OT     11/10/21 -  Margie Garza OTR/L, CSRS Occupational Therapist OT                  OT Recommendation and Plan     Progress Toward Functional Goals (OT): progress toward functional goals is fair  Plan of Care Review  Plan of Care Reviewed With: patient  Progress: improving  Outcome Summary: Pt. unable to sit bedside at present, ue exs performed to increase his ability with maintaining midline at eob!  Plan of Care Reviewed With: patient  Outcome Summary: Pt. unable to sit bedside at present, ue exs performed to increase his ability with maintaining midline at eob!     Outcome Measures     Row Name 12/19/21 1343             How  much help from another is currently needed...    Putting on and taking off regular lower body clothing? 1  -CJ      Bathing (including washing, rinsing, and drying) 1  -CJ      Toileting (which includes using toilet bed pan or urinal) 1  -CJ      Putting on and taking off regular upper body clothing 1  -CJ      Taking care of personal grooming (such as brushing teeth) 1  -CJ      Eating meals 1  -CJ      AM-PAC 6 Clicks Score (OT) 6  -CJ              Functional Assessment    Outcome Measure Options AM-PAC 6 Clicks Daily Activity (OT)  -CJ            User Key  (r) = Recorded By, (t) = Taken By, (c) = Cosigned By    Initials Name Provider Type    Yon Clemons COTA Occupational Therapy Assistant                Time Calculation:    Time Calculation- OT     Row Name 12/19/21 1343             Time Calculation- OT    OT Start Time 1343  -      OT Stop Time 1410  -      OT Time Calculation (min) 27 min  -      Total Timed Code Minutes- OT 27 minute(s)  -      OT Received On 12/19/21  -              Timed Charges    71962 - OT Therapeutic Exercise Minutes 27  -              Total Minutes    Timed Charges Total Minutes 27  -CJ       Total Minutes 27  -            User Key  (r) = Recorded By, (t) = Taken By, (c) = Cosigned By    Initials Name Provider Type    Yon Clemons COTA Occupational Therapy Assistant              Therapy Charges for Today     Code Description Service Date Service Provider Modifiers Qty    [id number]  OT THER PROC EA 15 MIN 12/19/2021 Yon Ford COTA GO 2               THEODORE Gaines  12/19/2021

## 2021-12-19 NOTE — PLAN OF CARE
Goal Outcome Evaluation:              Outcome Summary: Pt is alert x 3 and sometimes to situation, pt states that he wants to go SNF for rehab due to worry about his coccyx wounds, pt was straight cathed due to having no urine output, 400 out at that point, dressing on coccyz changed, will continue to monitor pt safety.

## 2021-12-20 ENCOUNTER — HOME HEALTH ADMISSION (OUTPATIENT)
Dept: HOME HEALTH SERVICES | Facility: HOME HEALTHCARE | Age: 71
End: 2021-12-20

## 2021-12-20 ENCOUNTER — READMISSION MANAGEMENT (OUTPATIENT)
Dept: CALL CENTER | Facility: HOSPITAL | Age: 71
End: 2021-12-20

## 2021-12-20 VITALS
RESPIRATION RATE: 16 BRPM | TEMPERATURE: 98.2 F | BODY MASS INDEX: 26.86 KG/M2 | WEIGHT: 209.3 LBS | HEART RATE: 85 BPM | HEIGHT: 74 IN | OXYGEN SATURATION: 98 % | DIASTOLIC BLOOD PRESSURE: 82 MMHG | SYSTOLIC BLOOD PRESSURE: 129 MMHG

## 2021-12-20 LAB
ALBUMIN SERPL-MCNC: 2.6 G/DL (ref 3.5–5.2)
ALBUMIN/GLOB SERPL: 1.2 G/DL
ALP SERPL-CCNC: 137 U/L (ref 39–117)
ALT SERPL W P-5'-P-CCNC: 8 U/L (ref 1–41)
ANION GAP SERPL CALCULATED.3IONS-SCNC: 5 MMOL/L (ref 5–15)
AST SERPL-CCNC: 8 U/L (ref 1–40)
BACTERIA SPEC AEROBE CULT: NORMAL
BACTERIA SPEC AEROBE CULT: NORMAL
BASOPHILS # BLD AUTO: 0.07 10*3/MM3 (ref 0–0.2)
BASOPHILS NFR BLD AUTO: 0.9 % (ref 0–1.5)
BILIRUB SERPL-MCNC: 0.2 MG/DL (ref 0–1.2)
BUN SERPL-MCNC: 14 MG/DL (ref 8–23)
BUN/CREAT SERPL: 25.5 (ref 7–25)
CALCIUM SPEC-SCNC: 9.1 MG/DL (ref 8.6–10.5)
CHLORIDE SERPL-SCNC: 104 MMOL/L (ref 98–107)
CO2 SERPL-SCNC: 28 MMOL/L (ref 22–29)
CREAT SERPL-MCNC: 0.55 MG/DL (ref 0.76–1.27)
DEPRECATED RDW RBC AUTO: 48.7 FL (ref 37–54)
EOSINOPHIL # BLD AUTO: 0.1 10*3/MM3 (ref 0–0.4)
EOSINOPHIL NFR BLD AUTO: 1.4 % (ref 0.3–6.2)
ERYTHROCYTE [DISTWIDTH] IN BLOOD BY AUTOMATED COUNT: 14.6 % (ref 12.3–15.4)
GFR SERPL CREATININE-BSD FRML MDRD: 147 ML/MIN/1.73
GLOBULIN UR ELPH-MCNC: 2.1 GM/DL
GLUCOSE BLDC GLUCOMTR-MCNC: 182 MG/DL (ref 70–130)
GLUCOSE BLDC GLUCOMTR-MCNC: 215 MG/DL (ref 70–130)
GLUCOSE SERPL-MCNC: 162 MG/DL (ref 65–99)
HCT VFR BLD AUTO: 26.2 % (ref 37.5–51)
HGB BLD-MCNC: 8.6 G/DL (ref 13–17.7)
IMM GRANULOCYTES # BLD AUTO: 0.17 10*3/MM3 (ref 0–0.05)
IMM GRANULOCYTES NFR BLD AUTO: 2.3 % (ref 0–0.5)
LYMPHOCYTES # BLD AUTO: 1.31 10*3/MM3 (ref 0.7–3.1)
LYMPHOCYTES NFR BLD AUTO: 17.7 % (ref 19.6–45.3)
MCH RBC QN AUTO: 30.3 PG (ref 26.6–33)
MCHC RBC AUTO-ENTMCNC: 32.8 G/DL (ref 31.5–35.7)
MCV RBC AUTO: 92.3 FL (ref 79–97)
MONOCYTES # BLD AUTO: 0.42 10*3/MM3 (ref 0.1–0.9)
MONOCYTES NFR BLD AUTO: 5.7 % (ref 5–12)
NEUTROPHILS NFR BLD AUTO: 5.32 10*3/MM3 (ref 1.7–7)
NEUTROPHILS NFR BLD AUTO: 72 % (ref 42.7–76)
NRBC BLD AUTO-RTO: 0.3 /100 WBC (ref 0–0.2)
PLATELET # BLD AUTO: 430 10*3/MM3 (ref 140–450)
PMV BLD AUTO: 10 FL (ref 6–12)
POTASSIUM SERPL-SCNC: 4.5 MMOL/L (ref 3.5–5.2)
PROT SERPL-MCNC: 4.7 G/DL (ref 6–8.5)
RBC # BLD AUTO: 2.84 10*6/MM3 (ref 4.14–5.8)
SODIUM SERPL-SCNC: 137 MMOL/L (ref 136–145)
WBC NRBC COR # BLD: 7.39 10*3/MM3 (ref 3.4–10.8)

## 2021-12-20 PROCEDURE — 63710000001 INSULIN LISPRO (HUMAN) PER 5 UNITS: Performed by: INTERNAL MEDICINE

## 2021-12-20 PROCEDURE — 85025 COMPLETE CBC W/AUTO DIFF WBC: CPT | Performed by: FAMILY MEDICINE

## 2021-12-20 PROCEDURE — 97110 THERAPEUTIC EXERCISES: CPT

## 2021-12-20 PROCEDURE — 82962 GLUCOSE BLOOD TEST: CPT

## 2021-12-20 PROCEDURE — 63710000001 INSULIN DETEMIR PER 5 UNITS: Performed by: FAMILY MEDICINE

## 2021-12-20 PROCEDURE — 25010000002 ENOXAPARIN PER 10 MG: Performed by: INTERNAL MEDICINE

## 2021-12-20 PROCEDURE — 80053 COMPREHEN METABOLIC PANEL: CPT | Performed by: FAMILY MEDICINE

## 2021-12-20 PROCEDURE — 99233 SBSQ HOSP IP/OBS HIGH 50: CPT | Performed by: NURSE PRACTITIONER

## 2021-12-20 RX ORDER — METFORMIN HYDROCHLORIDE 500 MG/1
500 TABLET, EXTENDED RELEASE ORAL
Qty: 90 TABLET | Refills: 0 | Status: SHIPPED | OUTPATIENT
Start: 2021-12-20

## 2021-12-20 RX ORDER — OXYCODONE AND ACETAMINOPHEN 10; 325 MG/1; MG/1
0.5 TABLET ORAL EVERY 6 HOURS PRN
Start: 2021-12-20

## 2021-12-20 RX ORDER — NICOTINE 21 MG/24HR
1 PATCH, TRANSDERMAL 24 HOURS TRANSDERMAL
Qty: 30 PATCH | Refills: 0 | Status: SHIPPED | OUTPATIENT
Start: 2021-12-21

## 2021-12-20 RX ADMIN — INSULIN LISPRO 2 UNITS: 100 INJECTION, SOLUTION INTRAVENOUS; SUBCUTANEOUS at 08:52

## 2021-12-20 RX ADMIN — COLLAGENASE SANTYL 1 APPLICATION: 250 OINTMENT TOPICAL at 08:55

## 2021-12-20 RX ADMIN — CHLORDIAZEPOXIDE HYDROCHLORIDE 5 MG: 5 CAPSULE ORAL at 04:56

## 2021-12-20 RX ADMIN — LINAGLIPTIN 5 MG: 5 TABLET, FILM COATED ORAL at 08:51

## 2021-12-20 RX ADMIN — ASPIRIN 81 MG: 81 TABLET ORAL at 08:51

## 2021-12-20 RX ADMIN — COLLAGENASE SANTYL 1 APPLICATION: 250 OINTMENT TOPICAL at 04:30

## 2021-12-20 RX ADMIN — ENOXAPARIN SODIUM 40 MG: 40 INJECTION SUBCUTANEOUS at 08:51

## 2021-12-20 RX ADMIN — NICOTINE 1 PATCH: 21 PATCH, EXTENDED RELEASE TRANSDERMAL at 08:53

## 2021-12-20 RX ADMIN — INSULIN LISPRO 4 UNITS: 100 INJECTION, SOLUTION INTRAVENOUS; SUBCUTANEOUS at 12:06

## 2021-12-20 RX ADMIN — DIGOXIN 125 MCG: 125 TABLET ORAL at 12:07

## 2021-12-20 RX ADMIN — CITALOPRAM 10 MG: 10 TABLET, FILM COATED ORAL at 08:51

## 2021-12-20 RX ADMIN — BUSPIRONE HYDROCHLORIDE 10 MG: 10 TABLET ORAL at 08:51

## 2021-12-20 RX ADMIN — SODIUM CHLORIDE, PRESERVATIVE FREE 10 ML: 5 INJECTION INTRAVENOUS at 08:54

## 2021-12-20 RX ADMIN — INSULIN DETEMIR 20 UNITS: 100 INJECTION, SOLUTION SUBCUTANEOUS at 08:53

## 2021-12-20 RX ADMIN — PREGABALIN 150 MG: 75 CAPSULE ORAL at 04:56

## 2021-12-20 RX ADMIN — PANTOPRAZOLE SODIUM 40 MG: 40 TABLET, DELAYED RELEASE ORAL at 08:54

## 2021-12-20 NOTE — PLAN OF CARE
Goal Outcome Evaluation:  Plan of Care Reviewed With: patient        Progress: improving  Outcome Summary: Patient more alert, but speech is slurred and difficult to understand.  Patient appeared to rest well overnight.  Fluids infusing.  No new skin issues needed.  Dressing per order.  No falls noted.  Patient has external cath, drainage to suction.  call light in reach.

## 2021-12-20 NOTE — PROGRESS NOTES
[facility]  INPATIENT WOUND CARE    PROGRESS NOTE    Today's Date: 12/20/21    Patient Name: John Rodriguez  MRN: [id number]  [facility]: [id number]  PCP: Jt Monroy MD  Referring Provider: Kailash Todd DO   Attending Provider: Jj Perkins MD  Length of Stay: 4    SUBJECTIVE   Chief Complaint: Skin breakdown of bilateral buttocks and left hip    HPI: John Rodriguez continues care in room 444.  Patient is sitting up in his bed and eating breakfast.  He is using his right arm dominantly.  Patient has external urinary catheter hooked up to suction.  Patient's main concern is going home and not going to rehab facility.  Discussed patient's skin breakdown with him and explained that the need for rehab was to get stronger in order to go home safely.  Patient confirms at that is what he wants as well but hopes that he can go home if possible.  Patient states the buttocks area is painful.        Visit Dx:    ICD-10-CM ICD-9-CM   1. High anion gap metabolic acidosis  E87.2 276.2   2. Alcoholic intoxication without complication ([facility])  F10.920 305.00   3. Irritant dermatitis  L24.9 692.9   4. Pressure injury, stage 2, unspecified location ([facility])  L89.92 707.00     707.22   5. Muscular deconditioning  R29.898 781.99   6. Decreased activities of daily living (ADL)  Z78.9 V49.89   7. Impaired mobility  Z74.09 799.89     Patient Active Problem List   Diagnosis   • Hydrocele   • Spinal cord compression due to degenerative disorder of spinal column   • Cervical spondylosis with myelopathy   • Tobacco abuse   • Type 2 diabetes mellitus with hyperglycemia, without long-term current use of insulin ([facility])   • Hyperlipidemia   • Essential hypertension   • GERD without esophagitis   • Medically noncompliant   • Peripheral neuropathy   • Altered mental status   • DDD (degenerative disc disease), lumbosacral   • Pain, neck   • S/P cervical spinal fusion   • Skin ulcer of sacrum, limited to breakdown of skin (HCC)   •  Spinal stenosis of lumbar region without neurogenic claudication   • Weakness   • Polypharmacy   • Community acquired pneumonia of left lower lobe of lung   • Adult BMI 35.0-35.9 kg/sq m   • BMI 33.0-33.9,adult   • Morbidly obese (HCC)   • PAD (peripheral artery disease) ([facility])   • Preop testing   • High anion gap metabolic acidosis   • Acute stasis dermatitis   • Alcohol abuse   • Muscular deconditioning   • Atrial flutter with rapid ventricular response ([facility])   • Hypokalemia   • Hyponatremia   • Acute cystitis   • Hypophosphatemia   • Normocytic anemia       History:   Past Medical History:   Diagnosis Date   • Acid reflux    • Arthritis    • Back pain    • Diabetes mellitus (HCC)    • Hydrocele in adult    • Hyperlipidemia    • Hypertension    • Psoriasis    • Wears glasses      Past Surgical History:   Procedure Laterality Date   • ANTERIOR CERVICAL DISCECTOMY W/ FUSION N/A 1/23/2020    Procedure: CERVICAL DISCECTOMY ANTERIOR WITH FUSION C5-6;  Surgeon: Santino Montano MD;  Location: [facility] OR;  Service: Neurosurgery   • BACK SURGERY     • COLONOSCOPY     • HERNIA REPAIR     • HYDROCELECTOMY Left 11/20/2017    Procedure: HYDROCELECTOMY;  Surgeon: Neeraj Hurtado MD;  Location: [facility] OR;  Service:    • TONSILLECTOMY       Social History     Socioeconomic History   • Marital status:    Tobacco Use   • Smoking status: Current Every Day Smoker     Packs/day: 1.00     Years: 50.00     Pack years: 50.00     Types: Cigarettes   • Smokeless tobacco: Never Used   Vaping Use   • Vaping Use: Never used   Substance and Sexual Activity   • Alcohol use: No   • Drug use: No   • Sexual activity: Defer       Allergies:  Allergies   Allergen Reactions   • Sulfa Antibiotics Nausea Only       Medications:    Current Facility-Administered Medications:   •  acetaminophen (TYLENOL) tablet 650 mg, 650 mg, Oral, Q4H PRN **OR** acetaminophen (TYLENOL) 160 MG/5ML solution 650 mg, 650 mg, Oral, Q4H PRN **OR** acetaminophen  (TYLENOL) suppository 650 mg, 650 mg, Rectal, Q4H PRN, Francisco Mcfadden MD  •  aspirin EC tablet 81 mg, 81 mg, Oral, Daily, Kailash Todd DO, 81 mg at 12/20/21 0851  •  atorvastatin (LIPITOR) tablet 20 mg, 20 mg, Oral, Nightly, Francisco Mcfadden MD, 20 mg at 12/19/21 2056  •  busPIRone (BUSPAR) tablet 10 mg, 10 mg, Oral, Q12H, Francisco Mcfadden MD, 10 mg at 12/20/21 0851  •  cefTRIAXone (ROCEPHIN) 1 g/100 mL 0.9% NS (MBP), 1 g, Intravenous, Q24H, Francisco Mcfadden MD, 1 g at 12/19/21 2056  •  chlordiazePOXIDE (LIBRIUM) capsule 5 mg, 5 mg, Oral, Q8H, Jj Perkins MD, 5 mg at 12/20/21 0456  •  citalopram (CeleXA) tablet 10 mg, 10 mg, Oral, Daily, Jj Perkins MD, 10 mg at 12/20/21 0851  •  collagenase ointment 1 application, 1 application, Topical, Q24H, Natalie Oglesby APRN, 1 application at 12/20/21 0855  •  dextrose (D50W) (25 g/50 mL) IV injection 25 g, 25 g, Intravenous, Q15 Min PRN, Francisco Mcfadden MD  •  dextrose (GLUTOSE) oral gel 15 g, 15 g, Oral, Q15 Min PRN, Francisco Mcfadden MD  •  digoxin (LANOXIN) tablet 125 mcg, 125 mcg, Oral, Daily, Kailash Todd DO, 125 mcg at 12/19/21 1241  •  enoxaparin (LOVENOX) syringe 40 mg, 40 mg, Subcutaneous, Q24H, Francisco Mcfadden MD, 40 mg at 12/20/21 0851  •  glucagon (human recombinant) (GLUCAGEN DIAGNOSTIC) injection 1 mg, 1 mg, Subcutaneous, Q15 Min PRN, Francisco Mcfadden MD  •  insulin detemir (LEVEMIR) injection 20 Units, 20 Units, Subcutaneous, Q12H, Jj Perkins MD, 20 Units at 12/20/21 0853  •  insulin lispro (humaLOG) injection 0-9 Units, 0-9 Units, Subcutaneous, TID AC, Francisco Mcfadden MD, 2 Units at 12/20/21 0852  •  linagliptin (TRADJENTA) tablet 5 mg, 5 mg, Oral, Daily, Jj Perkins MD, 5 mg at 12/20/21 0851  •  LORazepam (ATIVAN) tablet 0.5 mg, 0.5 mg, Oral, Q2H PRN **OR** LORazepam (ATIVAN) injection 0.5 mg, 0.5 mg, Intravenous, Q2H PRN **OR** LORazepam (ATIVAN) tablet 1 mg, 1 mg, Oral, Q1H PRN, 1 mg at 12/16/21 0837 **OR**  LORazepam (ATIVAN) injection 1 mg, 1 mg, Intravenous, Q1H PRN **OR** LORazepam (ATIVAN) injection 1 mg, 1 mg, Intravenous, Q15 Min PRN **OR** LORazepam (ATIVAN) injection 1 mg, 1 mg, Intramuscular, Q15 Min PRN, Francisco Mcfadden MD, 1 mg at 12/16/21 0538  •  nicotine (NICODERM CQ) 21 MG/24HR patch 1 patch, 1 patch, Transdermal, Q24H, Francisco Mcfadden MD, 1 patch at 12/20/21 0853  •  ondansetron (ZOFRAN) tablet 4 mg, 4 mg, Oral, Q6H PRN **OR** ondansetron (ZOFRAN) injection 4 mg, 4 mg, Intravenous, Q6H PRN, Francisco Mcfadden MD  •  oxyCODONE-acetaminophen (PERCOCET) 5-325 MG per tablet 1 tablet, 1 tablet, Oral, Q4H PRN, Jj Perkins MD  •  pantoprazole (PROTONIX) EC tablet 40 mg, 40 mg, Oral, Daily, Francisco Mcfadden MD, 40 mg at 12/20/21 0854  •  pregabalin (LYRICA) capsule 150 mg, 150 mg, Oral, Q8H, Jj Perkins MD, 150 mg at 12/20/21 0456  •  [COMPLETED] Insert peripheral IV, , , Once **AND** sodium chloride 0.9 % flush 10 mL, 10 mL, Intravenous, PRN, Francisco Mcfadden MD  •  sodium chloride 0.9 % flush 10 mL, 10 mL, Intravenous, Q12H, Francisco Mcfadden MD, 10 mL at 12/20/21 0854  •  sodium chloride 0.9 % flush 10 mL, 10 mL, Intravenous, PRN, Francisco Mcfadden MD  •  sodium chloride 0.9 % with KCl 20 mEq/L infusion, 50 mL/hr, Intravenous, Continuous, Jj Perkins MD, Last Rate: 50 mL/hr at 12/20/21 0717, 50 mL/hr at 12/20/21 0717    Review of Systems:  Review of Systems   Constitutional: Positive for fatigue. Negative for chills and fever.   HENT: Negative for rhinorrhea and sore throat.    Respiratory: Negative for cough and shortness of breath.    Cardiovascular: Negative for chest pain and palpitations.   Gastrointestinal: Negative for diarrhea, nausea and vomiting.   Genitourinary: Negative for frequency and urgency.   Musculoskeletal: Positive for arthralgias, gait problem and myalgias.   Skin: Positive for color change and wound.   Neurological: Positive for weakness. Negative for dizziness.    Psychiatric/Behavioral: Negative for agitation, behavioral problems and confusion.         OBJECTIVE     Vitals:    12/20/21 0730   BP: 131/75   Pulse: 81   Resp: 16   Temp: 98.5 °F (36.9 °C)   SpO2: 99%       PHYSICAL EXAM  Physical Exam  Vitals and nursing note reviewed.   Constitutional:       General: He is awake.      Appearance: He is ill-appearing.   HENT:      Head: Normocephalic and atraumatic.   Eyes:      General: Lids are normal. Gaze aligned appropriately.   Cardiovascular:      Rate and Rhythm: Normal rate and regular rhythm.   Pulmonary:      Effort: Pulmonary effort is normal. No respiratory distress.   Abdominal:      General: There is no distension.      Tenderness: There is no abdominal tenderness.   Musculoskeletal:      Cervical back: Normal range of motion and neck supple.   Skin:     General: Skin is warm and dry.      Findings: Erythema and wound present.      Comments: Unstageable pressure injury of the left posterior hip.  Wound base is covered with slough and black necrotic tissue.  Slight erythema of the periwound area.  Edges are able to be seen at this time.  There is a small amount of yellow drainage from the site.  Stage 3 pressure injury of the left buttock-wound base has approximately 50% slough and 50% red moist tissue.  Continue use of Santyl at this site over the weekend.  Edges are irregular and attached to the wound bed.  There is a small amount of yellow serous drainage.  Periwound area is slightly erythemic and blanchable.  Moisture associated skin damage covers bilateral buttocks.  This appears to be improved over the weekend with the use of zinc barrier cream.   Neurological:      Mental Status: He is alert and oriented to person, place, and time.      Motor: Weakness present.   Psychiatric:         Attention and Perception: Attention normal.         Mood and Affect: Mood normal.         Speech: Speech normal.         Behavior: Behavior is cooperative.            Results  Review:  Lab Results (last 48 hours)     Procedure Component Value Units Date/Time    Comprehensive Metabolic Panel [[id number]]  (Abnormal) Collected: 12/20/21 0715    Specimen: Blood Updated: 12/20/21 0816     Glucose 162 mg/dL      BUN 14 mg/dL      Creatinine 0.55 mg/dL      Sodium 137 mmol/L      Potassium 4.5 mmol/L      Chloride 104 mmol/L      CO2 28.0 mmol/L      Calcium 9.1 mg/dL      Total Protein 4.7 g/dL      Albumin 2.60 g/dL      ALT (SGPT) 8 U/L      AST (SGOT) 8 U/L      Alkaline Phosphatase 137 U/L      Total Bilirubin 0.2 mg/dL      eGFR Non African Amer 147 mL/min/1.73      Globulin 2.1 gm/dL      A/G Ratio 1.2 g/dL      BUN/Creatinine Ratio 25.5     Anion Gap 5.0 mmol/L     Narrative:      GFR Normal >60  Chronic Kidney Disease <60  Kidney Failure <15      CBC & Differential [[id number]]  (Abnormal) Collected: 12/20/21 0715    Specimen: Blood Updated: 12/20/21 0809    Narrative:      The following orders were created for panel order CBC & Differential.  Procedure                               Abnormality         Status                     ---------                               -----------         ------                     CBC Auto Differential[[id number]]        Abnormal            Final result                 Please view results for these tests on the individual orders.    CBC Auto Differential [[id number]]  (Abnormal) Collected: 12/20/21 0715    Specimen: Blood Updated: 12/20/21 0809     WBC 7.39 10*3/mm3      RBC 2.84 10*6/mm3      Hemoglobin 8.6 g/dL      Hematocrit 26.2 %      MCV 92.3 fL      MCH 30.3 pg      MCHC 32.8 g/dL      RDW 14.6 %      RDW-SD 48.7 fl      MPV 10.0 fL      Platelets 430 10*3/mm3      Neutrophil % 72.0 %      Lymphocyte % 17.7 %      Monocyte % 5.7 %      Eosinophil % 1.4 %      Basophil % 0.9 %      Immature Grans % 2.3 %      Neutrophils, Absolute 5.32 10*3/mm3      Lymphocytes, Absolute 1.31 10*3/mm3      Monocytes, Absolute 0.42 10*3/mm3      Eosinophils, Absolute  0.10 10*3/mm3      Basophils, Absolute 0.07 10*3/mm3      Immature Grans, Absolute 0.17 10*3/mm3      nRBC 0.3 /100 WBC     POC Glucose Once [[id number]]  (Abnormal) Collected: 12/20/21 0729    Specimen: Blood Updated: 12/20/21 0740     Glucose 182 mg/dL      Comment: : [id number] Denia MikiaMeter ID: [id number]       Blood Culture - Blood, Arm, Left [[id number]]  (Normal) Collected: 12/15/21 2220    Specimen: Blood from Arm, Left Updated: 12/19/21 2230     Blood Culture No growth at 4 days    Blood Culture - Blood, Arm, Right [[id number]]  (Normal) Collected: 12/15/21 2216    Specimen: Blood from Arm, Right Updated: 12/19/21 2230     Blood Culture No growth at 4 days    POC Glucose Once [[id number]]  (Abnormal) Collected: 12/19/21 2043    Specimen: Blood Updated: 12/19/21 2113     Glucose 241 mg/dL      Comment: : [id number] Blakemore DominiqueMeter ID: [id number]       POC Glucose Once [[id number]]  (Abnormal) Collected: 12/19/21 1705    Specimen: Blood Updated: 12/19/21 1716     Glucose 251 mg/dL      Comment: : [id number] Miltonamalia CieraMeter ID: [id number]       POC Glucose Once [[id number]]  (Abnormal) Collected: 12/19/21 1118    Specimen: Blood Updated: 12/19/21 1134     Glucose 276 mg/dL      Comment: : [id number] Joanakle CieraMeter ID: [id number]       POC Glucose Once [[id number]]  (Abnormal) Collected: 12/19/21 0741    Specimen: Blood Updated: 12/19/21 0752     Glucose 255 mg/dL      Comment: : [id number] Joanakle CieraMeter ID: [id number]       Comprehensive Metabolic Panel [[id number]]  (Abnormal) Collected: 12/19/21 0250    Specimen: Blood Updated: 12/19/21 0439     Glucose 293 mg/dL      BUN 18 mg/dL      Creatinine 0.61 mg/dL      Sodium 138 mmol/L      Potassium 4.2 mmol/L      Comment: Slight hemolysis detected by analyzer. Results may be affected.        Chloride 103 mmol/L      CO2 28.0 mmol/L      Calcium 9.5 mg/dL      Total Protein 5.4 g/dL      Albumin 2.30 g/dL      ALT (SGPT) 8  U/L      AST (SGOT) 10 U/L      Comment: Slight hemolysis detected by analyzer. Results may be affected.        Alkaline Phosphatase 148 U/L      Total Bilirubin 0.3 mg/dL      eGFR Non African Amer 131 mL/min/1.73      Globulin 3.1 gm/dL      A/G Ratio 0.7 g/dL      BUN/Creatinine Ratio 29.5     Anion Gap 7.0 mmol/L     Narrative:      GFR Normal >60  Chronic Kidney Disease <60  Kidney Failure <15      Lipid Panel [[id number]]  (Abnormal) Collected: 12/19/21 0250    Specimen: Blood Updated: 12/19/21 0433     Total Cholesterol 114 mg/dL      Triglycerides 175 mg/dL      HDL Cholesterol 41 mg/dL      LDL Cholesterol  44 mg/dL      VLDL Cholesterol 29 mg/dL      LDL/HDL Ratio 0.93    Narrative:      Cholesterol Reference Ranges  (U.S. Department of Health and Human Services ATP III Classifications)    Desirable          <200 mg/dL  Borderline High    200-239 mg/dL  High Risk          >240 mg/dL      Triglyceride Reference Ranges  (U.S. Department of Health and Human Services ATP III Classifications)    Normal           <150 mg/dL  Borderline High  150-199 mg/dL  High             200-499 mg/dL  Very High        >500 mg/dL    HDL Reference Ranges  (U.S. Department of Health and Human Services ATP III Classifcations)    Low     <40 mg/dl (major risk factor for CHD)  High    >60 mg/dl ('negative' risk factor for CHD)        LDL Reference Ranges  (U.S. Department of Health and Human Services ATP III Classifcations)    Optimal          <100 mg/dL  Near Optimal     100-129 mg/dL  Borderline High  130-159 mg/dL  High             160-189 mg/dL  Very High        >189 mg/dL    CBC & Differential [[id number]]  (Abnormal) Collected: 12/19/21 0250    Specimen: Blood Updated: 12/19/21 0410    Narrative:      The following orders were created for panel order CBC & Differential.  Procedure                               Abnormality         Status                     ---------                               -----------         ------                      CBC Auto Differential[[id number]]        Abnormal            Final result                 Please view results for these tests on the individual orders.    CBC Auto Differential [[id number]]  (Abnormal) Collected: 12/19/21 0250    Specimen: Blood Updated: 12/19/21 0410     WBC 7.77 10*3/mm3      RBC 2.68 10*6/mm3      Hemoglobin 8.2 g/dL      Hematocrit 24.5 %      MCV 91.4 fL      MCH 30.6 pg      MCHC 33.5 g/dL      RDW 14.5 %      RDW-SD 48.1 fl      MPV 10.3 fL      Platelets 381 10*3/mm3      Neutrophil % 76.5 %      Lymphocyte % 13.5 %      Monocyte % 6.3 %      Eosinophil % 1.2 %      Basophil % 0.6 %      Immature Grans % 1.9 %      Neutrophils, Absolute 5.94 10*3/mm3      Lymphocytes, Absolute 1.05 10*3/mm3      Monocytes, Absolute 0.49 10*3/mm3      Eosinophils, Absolute 0.09 10*3/mm3      Basophils, Absolute 0.05 10*3/mm3      Immature Grans, Absolute 0.15 10*3/mm3      nRBC 0.0 /100 WBC     SCANNED - LABS [[id number]] Resulted: 12/15/21     Updated: 12/19/21 0318    POC Glucose Once [[id number]]  (Abnormal) Collected: 12/18/21 1950    Specimen: Blood Updated: 12/18/21 2016     Glucose 284 mg/dL      Comment: : [id number] Ba Sultana  GMeter ID: [id number]       Urinalysis, Microscopic Only - Urine, Clean Catch [[id number]]  (Abnormal) Collected: 12/18/21 1642    Specimen: Urine, Clean Catch Updated: 12/18/21 1715     RBC, UA None Seen /HPF      WBC, UA 0-2 /HPF      Bacteria, UA Trace /HPF      Squamous Epithelial Cells, UA 0-2 /HPF      Hyaline Casts, UA None Seen /LPF      Granular Casts, UA 0-2 /LPF      Methodology Manual Light Microscopy    Urinalysis With Culture If Indicated - Urine, Clean Catch [[id number]]  (Abnormal) Collected: 12/18/21 1642    Specimen: Urine, Clean Catch Updated: 12/18/21 1702     Color, UA Dark Yellow     Appearance, UA Clear     pH, UA 5.5     Specific Gravity, UA 1.028     Glucose, UA >=1000 mg/dL (3+)     Ketones, UA 15 mg/dL (1+)     Bilirubin, UA Moderate  (2+)     Blood, UA Negative     Protein, UA 30 mg/dL (1+)     Leuk Esterase, UA Negative     Nitrite, UA Negative     Urobilinogen, UA 1.0 E.U./dL    POC Glucose Once [[id number]]  (Abnormal) Collected: 12/18/21 1649    Specimen: Blood Updated: 12/18/21 1700     Glucose 257 mg/dL      Comment: : [id number] [employer] CieraMeter ID: [id number]       POC Glucose Once [[id number]]  (Abnormal) Collected: 12/18/21 1145    Specimen: Blood Updated: 12/18/21 1155     Glucose 259 mg/dL      Comment: : [id number] Trinkle CieraMeter ID: [id number]           Imaging Results (Last 72 Hours)     ** No results found for the last 72 hours. **             ASSESSMENT/PLAN       Examination and evaluation of wound(s) was performed.    DIAGNOSIS:   Pressure injury of left hip, unstageable  Pressure injury of left buttock, stage 3  Incontinence associated dermatitis  Increased risk for skin breakdown  High anion gap metabolic acidosis  Tobacco use  Type 2 diabetes mellitus with hyperglycemia, without long-term current use of insulin  Essential hypertension  Alcohol abuse  Muscular deconditioning      PLAN:   Continue previous wound care orders.  Zinc paste at the bedside, patient is needing to use Calazime instead.    Patient is to utilize Dolphin Mattress during admission.  Dolphin Mattress is to be placed on Bailee frame.    - Call [facility] to order a Bailee bed frame  [phone].  o Only need [place] bed frame if a standard dolphin mattress is being ordered.  o Do not order bed frame if Bariatric Dolphin is being ordered.  - Nurse or Huc is to call [email or web address], to order the Dolphin Mattress       [phone].  o Will have to provide patient's name, room number, if patient is in isolation, and what is being ordered.  o Options: Standard Dolphin Mattress or Bariatric Dolphin Bed.  - If standard, [employer] provides mattress and pump only.  - If bariatric, [employer] provides mattress, pump, frame, and trapeze (if  needed).            Discussed findings and treatment plan including risks, benefits, and treatment options with patient in detail. Patient agreed with treatment plan.      This document has been electronically signed by MOHAMUD Winters on 12/20/2021 08:58 CST       Time spent in face-to-face evaluation, chart review, planning and education 35 minutes with greater than 50% of time spent with patient and in coordination of care.

## 2021-12-20 NOTE — PLAN OF CARE
Problem: Adult Inpatient Plan of Care  Goal: Plan of Care Review  Flowsheets (Taken 12/20/2021 0339)  Progress: improving  Plan of Care Reviewed With: patient  Outcome Summary: Pt. completes ue exs to increase his transfer and bed mobility!   Goal Outcome Evaluation:  Plan of Care Reviewed With: patient        Progress: improving  Outcome Summary: Pt. completes ue exs to increase his transfer and bed mobility!

## 2021-12-20 NOTE — THERAPY TREATMENT NOTE
Acute Care - Occupational Therapy Treatment Note  [facility]     Patient Name: John Rodriguez  : 1950  MRN: [id number]  Today's Date: 2021             Admit Date: 12/15/2021       ICD-10-CM ICD-9-CM   1. High anion gap metabolic acidosis  E87.2 276.2   2. Alcoholic intoxication without complication ([facility])  F10.920 305.00   3. Irritant dermatitis  L24.9 692.9   4. Pressure injury, stage 2, unspecified location ([facility])  L89.92 707.00     707.22   5. Muscular deconditioning  R29.898 781.99   6. Decreased activities of daily living (ADL)  Z78.9 V49.89   7. Impaired mobility  Z74.09 799.89     Patient Active Problem List   Diagnosis   • Hydrocele   • Spinal cord compression due to degenerative disorder of spinal column   • Cervical spondylosis with myelopathy   • Tobacco abuse   • Type 2 diabetes mellitus with hyperglycemia, without long-term current use of insulin ([facility])   • Hyperlipidemia   • Essential hypertension   • GERD without esophagitis   • Medically noncompliant   • Peripheral neuropathy   • Altered mental status   • DDD (degenerative disc disease), lumbosacral   • Pain, neck   • S/P cervical spinal fusion   • Skin ulcer of sacrum, limited to breakdown of skin ([facility])   • Spinal stenosis of lumbar region without neurogenic claudication   • Weakness   • Polypharmacy   • Community acquired pneumonia of left lower lobe of lung   • Adult BMI 35.0-35.9 kg/sq m   • BMI 33.0-33.9,adult   • Morbidly obese ([facility])   • PAD (peripheral artery disease) ([facility])   • Preop testing   • High anion gap metabolic acidosis   • Acute stasis dermatitis   • Alcohol abuse   • Muscular deconditioning   • Atrial flutter with rapid ventricular response ([facility])   • Hypokalemia   • Hyponatremia   • Acute cystitis   • Hypophosphatemia   • Normocytic anemia     Past Medical History:   Diagnosis Date   • Acid reflux    • Arthritis    • Back pain    • Diabetes mellitus ([facility])    • Hydrocele in adult    • Hyperlipidemia    • Hypertension    •  Psoriasis    • Wears glasses      Past Surgical History:   Procedure Laterality Date   • ANTERIOR CERVICAL DISCECTOMY W/ FUSION N/A 1/23/2020    Procedure: CERVICAL DISCECTOMY ANTERIOR WITH FUSION C5-6;  Surgeon: Santino Montano MD;  Location:  [facility] OR;  Service: Neurosurgery   • BACK SURGERY     • COLONOSCOPY     • HERNIA REPAIR     • HYDROCELECTOMY Left 11/20/2017    Procedure: HYDROCELECTOMY;  Surgeon: Neeraj Hurtado MD;  Location:  [facility] OR;  Service:    • TONSILLECTOMY           OT ASSESSMENT FLOWSHEET (last 12 hours)     OT Evaluation and Treatment     Row Name 12/20/21 0731                   OT Time and Intention    Subjective Information no complaints  -        Document Type therapy note (daily note)  -        Mode of Treatment occupational therapy  -        Patient Effort adequate  -                  General Information    Existing Precautions/Restrictions fall  -        Limitations/Impairments safety/cognitive  -                  Pain Assessment    Additional Documentation Pain Scale: Word Pre/Post-Treatment (Group)  -                  Pain Scale: Numbers Pre/Post-Treatment    Pain Intervention(s) Rest  -                  Pain Scale: Word Pre/Post-Treatment    Pain: Word Scale, Pretreatment 0 - no pain  -        Posttreatment Pain Rating 0 - no pain  -                  Bed Mobility    Comment (Bed Mobility) fowlers in bed!  -                  Motor Skills    Motor Skills therapeutic exercise  -        Therapeutic Exercise shoulder; elbow/forearm  -                  Shoulder (Therapeutic Exercise)    Shoulder (Therapeutic Exercise) AROM (active range of motion); strengthening exercise  -        Shoulder AROM (Therapeutic Exercise) bilateral; flexion; extension; sitting; 10 repetitions; 2 sets  -        Shoulder Strengthening (Therapeutic Exercise) bilateral; extension; flexion; sitting; 2 lb free weight; 3 lb free weight; 10 repetitions; 2 sets  -                   Elbow/Forearm (Therapeutic Exercise)    Elbow/Forearm (Therapeutic Exercise) AROM (active range of motion); strengthening exercise  -        Elbow/Forearm AROM (Therapeutic Exercise) bilateral; flexion; extension; sitting; 10 repetitions; 2 sets  -        Elbow/Forearm Strengthening (Therapeutic Exercise) bilateral; flexion; extension; sitting; 2 lb free weight; 3 lb free weight; 10 repetitions; 2 sets  -CJ                  Wound 12/16/21 0359 Bilateral gluteal    Wound - Properties Group Placement Date: 12/16/21  -KD Placement Time: 0359  -KD Side: Bilateral  -KD Location: gluteal  -KD        Retired Wound - Properties Group Date first assessed: 12/16/21  -KD Time first assessed: 0359  -KD Side: Bilateral  -KD Location: gluteal  -KD                  Wound 12/16/21 0400 Bilateral other (see comments)    Wound - Properties Group Placement Date: 12/16/21  -KD Placement Time: 0400  -KD Side: Bilateral  -KD Location: other (see comments)  -KD, scrotum         Retired Wound - Properties Group Date first assessed: 12/16/21  -KD Time first assessed: 0400  -KD Side: Bilateral  -KD Location: other (see comments)  -KD, scrotum                   Wound 01/23/20 1420 Left  Diabetic Ulcer    Wound - Properties Group Placement Date: 01/23/20  -GC Placement Time: 1420  -GC Present on Hospital Admission: Y  -GC Side: Left  -GC Location: --  -GC, second toe   Primary Wound Type: Diabetic ulc  -HS        Retired Wound - Properties Group Date first assessed: 01/23/20  -GC Time first assessed: 1420  -GC Present on Hospital Admission: Y  -GC Side: Left  -GC Location: --  -GC, second toe   Primary Wound Type: Diabetic ulc  -HS                  Wound 01/23/20 1431 Left heel Diabetic Ulcer    Wound - Properties Group Placement Date: 01/23/20  -GC Placement Time: 1431  -GC Present on Hospital Admission: Y  -GC Side: Left  -GC Location: heel  -GC, Outer Left Heel  Primary Wound Type: Diabetic ulc  -HS        Retired Wound - Properties  Group Date first assessed: 01/23/20  -GC Time first assessed: 1431  -GC Present on Hospital Admission: Y  -GC Side: Left  -GC Location: heel  -GC, Outer Left Heel  Primary Wound Type: Diabetic ulc  -HS                  Wound 01/23/20 1436 Bilateral groin MASD (Moisutre associated skin damage)    Wound - Properties Group Placement Date: 01/23/20  -GC Placement Time: 1436  -GC Present on Hospital Admission: Y  -GC Side: Bilateral  -GC Location: groin  -GC Primary Wound Type: MASD  -GC        Retired Wound - Properties Group Date first assessed: 01/23/20  -GC Time first assessed: 1436  -GC Present on Hospital Admission: Y  -GC Side: Bilateral  -GC Location: groin  -GC Primary Wound Type: MASD  -GC                  Positioning and Restraints    Pre-Treatment Position in bed  -CJ        Post Treatment Position bed  -CJ        In Bed fowlers; call light within reach; encouraged to call for assist; side rails up x2  -CJ                  Progress Summary (OT)    Progress Toward Functional Goals (OT) progress toward functional goals is fair  -CJ        Barriers to Overall Progress (OT) Fall/cognition!  -CJ        Impairments Still Limiting Function (OT) continue with ot poc!  -CJ              User Key  (r) = Recorded By, (t) = Taken By, (c) = Cosigned By    Initials Name Effective Dates    CJ Yon Ford COTA 06/16/21 -     HS Natalie Oglesby, JENNIFER 12/27/16 - 01/03/21    Yen Hernandez RN 11/06/19 - 07/16/20    Taylor Connell RN 12/28/20 -                  Occupational Therapy Education                 Title: PT OT SLP Therapies (In Progress)     Topic: Occupational Therapy (Done)     Point: ADL training (Done)     Description:   Instruct learner(s) on proper safety adaptation and remediation techniques during self care or transfers.   Instruct in proper use of assistive devices.              Learning Progress Summary           Patient Acceptance, E, VU by ZOHRA at 12/16/2021 1522                   Point: Home  exercise program (Done)     Description:   Instruct learner(s) on appropriate technique for monitoring, assisting and/or progressing therapeutic exercises/activities.              Learning Progress Summary           Patient Acceptance, E,TB, VU,DU,NR by  at 12/20/2021 0731    Comment: Pt. completed ue exs to increase his sit-stand/bed mobility!    Acceptance, E,TB, VU,DU,NR by  at 12/19/2021 1343    Comment: Pt. performed ue exs to increase his ability with sitting eob and midline ability!                   Point: Precautions (Done)     Description:   Instruct learner(s) on prescribed precautions during self-care and functional transfers.              Learning Progress Summary           Patient Acceptance, E,TB, VU,DU,NR by  at 12/20/2021 0731    Comment: Pt. completed ue exs to increase his sit-stand/bed mobility!    Acceptance, E,TB, VU,DU,NR by  at 12/19/2021 1343    Comment: Pt. performed ue exs to increase his ability with sitting eob and midline ability!    Acceptance, E, VU by  at 12/16/2021 1522                   Point: Body mechanics (Done)     Description:   Instruct learner(s) on proper positioning and spine alignment during self-care, functional mobility activities and/or exercises.              Learning Progress Summary           Patient Acceptance, E,TB, VU,DU,NR by  at 12/20/2021 0731    Comment: Pt. completed ue exs to increase his sit-stand/bed mobility!    Acceptance, E,TB, VU,DU,NR by  at 12/19/2021 1343    Comment: Pt. performed ue exs to increase his ability with sitting eob and midline ability!                               User Key     Initials Effective Dates Name Provider Type Discipline     06/16/21 -  Yon Ford COTA Occupational Therapy Assistant OT     11/10/21 -  Margie Garza OTR/L, CSRS Occupational Therapist OT                  OT Recommendation and Plan     Progress Toward Functional Goals (OT): progress toward functional goals is fair  Plan of Care  Review  Plan of Care Reviewed With: patient  Progress: improving  Outcome Summary: Pt. completes ue exs to increase his transfer and bed mobility!  Plan of Care Reviewed With: patient  Outcome Summary: Pt. completes ue exs to increase his transfer and bed mobility!     Outcome Measures     Row Name 12/20/21 0731 12/19/21 1343          How much help from another is currently needed...    Putting on and taking off regular lower body clothing? 1  - 1  -     Bathing (including washing, rinsing, and drying) 1  - 1  -CJ     Toileting (which includes using toilet bed pan or urinal) 1  - 1  -CJ     Putting on and taking off regular upper body clothing 1  - 1  -     Taking care of personal grooming (such as brushing teeth) 1  - 1  -CJ     Eating meals 1  -CJ 1  -CJ     AM-PAC 6 Clicks Score (OT) 6  - 6  -CJ            Functional Assessment    Outcome Measure Options AM-PAC 6 Clicks Daily Activity (OT)  - AM-PAC 6 Clicks Daily Activity (OT)  -           User Key  (r) = Recorded By, (t) = Taken By, (c) = Cosigned By    Initials Name Provider Type     Yon Ford COTA Occupational Therapy Assistant                Time Calculation:    Time Calculation- OT     Row Name 12/20/21 0731             Time Calculation- OT    OT Start Time 0731  -      OT Stop Time 0757  -      OT Time Calculation (min) 26 min  -      Total Timed Code Minutes- OT 26 minute(s)  -      OT Received On 12/20/21  -              Timed Charges    48427 - OT Therapeutic Exercise Minutes 26  -              Total Minutes    Timed Charges Total Minutes 26  -       Total Minutes 26  -            User Key  (r) = Recorded By, (t) = Taken By, (c) = Cosigned By    Initials Name Provider Type     Yon Ford COTA Occupational Therapy Assistant              Therapy Charges for Today     Code Description Service Date Service Provider Modifiers Qty    [id number]  OT THER PROC EA 15 MIN 12/19/2021 Yon Ford,  THEODORE TOTH 2    [id number]  OT THER PROC EA 15 MIN 12/20/2021 Yon Ford COTA GO 2               THEODORE Gaines  12/20/2021

## 2021-12-20 NOTE — NURSING NOTE
Spoke with patients daughter Patrizia regarding referral for home health services via phone call. Patrizia states patient is agreeable to services and confirms address and phone number on file is correct.  Phone number given to Patrizia for home care office to call with any question or concerns.

## 2021-12-20 NOTE — DISCHARGE SUMMARY
[facility] Medicine Services  DISCHARGE SUMMARY       Date of Admission: 12/15/2021  Date of Discharge:  12/20/2021  Primary Care Physician: Jt Monroy MD    Presenting Problem/History of Present Illness:  High anion gap metabolic acidosis [E87.2]     Final Discharge Diagnoses:  Active Hospital Problems    Diagnosis    • **High anion gap metabolic acidosis    • Hypophosphatemia    • Normocytic anemia    • Acute stasis dermatitis    • Alcohol abuse    • Muscular deconditioning    • Atrial flutter with rapid ventricular response (HCC)    • Hypokalemia    • Hyponatremia    • Acute cystitis    • Essential hypertension    • Type 2 diabetes mellitus with hyperglycemia, without long-term current use of insulin (HCC)    • Tobacco abuse    • Cervical spondylosis with myelopathy        Consults: Wound care.  Diabetic educator.    Pertinent Test Results:   Results for orders placed during the hospital encounter of 12/15/21    Adult Transthoracic Echo Complete W/ Cont if Necessary Per Protocol    Interpretation Summary  · Left ventricular systolic function is normal. Left ventricular ejection fraction appears to be 56 - 60%.  · Normal right ventricular cavity size and systolic function noted.  · No significant valvular abnormalities identified on this study.    .  Imaging Results (All)     Procedure Component Value Units Date/Time    SCANNED - IMAGING [[id number]] Resulted: 12/15/21     Updated: 12/17/21 0047    CT Head Without Contrast [[id number]] Collected: 12/16/21 0737     Updated: 12/16/21 0742    Narrative:      CT HEAD WO CONTRAST- 12/16/2021 12:32 AM CST     HISTORY: ams      COMPARISON: 2/13/2020     DOSE LENGTH PRODUCT: 778 mGy cm. Automated exposure control was also  utilized to decrease patient radiation dose.     TECHNIQUE: Axial images of brain obtained without contrast     FINDINGS:  Similar prominence of the sulci and ventricles favoring  moderate cerebral volume loss.  No intracranial hemorrhage or mass  effect. No acute signs of ischemia. Mild chronic small vessel ischemia  considered. No extra-axial hematoma or subarachnoid hemorrhage.     The visible paranasal sinuses and mastoid air cells are well-aerated.  The bony calvarium appears intact.       Impression:      1. Moderate cerebral volume loss with no acute intracranial abnormality.     Comments: A preliminary report is issued to the ER by the [facility]  radiology service. I agree with this preliminary impression.  This report was finalized on 12/16/2021 07:39 by Dr. Margie Mahmood MD.    XR Chest 1 View [[id number]] Collected: 12/16/21 0737     Updated: 12/16/21 0741    Narrative:      EXAMINATION: Chest 1 view 12/15/2021     HISTORY: Altered mental status.     FINDINGS: Today's exam is compared to previous study of 8/20/2021. Lungs  are hypoventilated with bibasilar atelectasis. Lungs are otherwise  clear. No effusion or free air. The patient's undergone previous ACDF  within the lower cervical spine.       Impression:      1.. Bibasilar atelectasis. Lungs are otherwise clear.  This report was finalized on 12/16/2021 07:38 by Dr. Williams Velazco MD.        Chief Complaint on Day of Discharge: none    History of Present Illness on Day of Discharge:   Patient is a 71-year-old white male past medical history hypertension, type 2 diabetes hyperlipidemia cervical myelopathy.  He presents to the emergency room after having had his wife passed away 2 months ago.  She was her primary caregiver since then he is becoming weaker not doing anything and sitting in his chair.  Apparently the last 2 weeks he has gotten much worse he essentially sat in his chair defecating and urinating on himself.  Friend is been bringing him food water and whiskey.  Patient does drink significantly.  Apparently came so a friend called 911 today EMS had to lift him out of the chair.  His but is extremely excoriated actually sticking to the sheets.  He  apparently was cleaned off by the ER staff due to the caked on feces and urine moisture changes.  Labs show metabolic acidosis also evidence of a UTI.  Her glucose was high greater than 300 his sodium was slightly low and his potassium was low as well.  pH on his ABG was normal.  Patient denies any other symptoms blood alcohol level was elevated.  Patient realizes unable to care for himself he is willing to go to a nursing home.     Hospital Course:  The patient is a 71 y.o. male who presented to [facility] with atrial flutter/alcohol abuse/failure to thrive/diabetes.      Atrial flutter with RVR/hypertension/hyperlipidemia.  Patient is currently in sinus rhythm.  Aspirin.  Lipitor.  Digoxin.  Lovenox prophylaxis.  No anticoagulation due to fall risk and alcohol abuse.  Echocardiogram-ejection fraction 56 to 60%, no significant valvular abnormality.     Depression/anxiety.  Wife has passed away 2 months ago.  Patient does nothing but sit in recliner, patient urinating/defecate himself.  BuSpar.  Celexa.     Alcohol abuse/anion gap metabolic acidosis.  Whiskey drinking daily.  Patient's friend enabling him.  Valium around-the-clock.  IV hydration.  Ciwa protocol.       Diabetes . increase Levemir . sliding scale.  Hemoglobin A1c 8.3.  Cont Tradjenta.     Hypokalemia. Resolved.  Magnesium-normal.     Hyponatremia. Resolved     UTI.  Rocephin.     Reflux.  Protonix.  Zofran as needed.     Neuropathy. Lyrica.     Anemia.  Hemoglobin stable.  No sign of acute bleed.     Chronic smoker.  Nicotine patch.  Discussed patient cutting back and stopping.     Cervical spondylosis with myelopathy/chronic pain. Percocet as needed.     Acute stasis dermatitis/coccyx wound/decubitus stage II.  Consult wound care.  Heel off bed.  Collagenase ointment .     Nutrition.  Regular/cardiac/consistent carb diet.     Deconditioning.  PT and OT consult.  Thiamine.   Patient is unable to stand or walk.     Blood culture-no growth  "in 4 days.  Urine culture-no growth.     Vital signs stable, afebrile.  Plan for social work for rehab placement.  Patient's daughter refused rehab placement.  Plan to discharge home with home health.  Follow with PCP 1 week.    Condition on Discharge: Stable.    Physical Exam on Discharge:  /82 (BP Location: Right arm, Patient Position: Lying)   Pulse 85   Temp 98.2 °F (36.8 °C) (Axillary)   Resp 16   Ht 188 cm (74\")   Wt 94.9 kg (209 lb 4.8 oz)   SpO2 98%   BMI 26.87 kg/m²   Physical Exam  Vitals and nursing note reviewed.   Constitutional:       Appearance: He is well-developed.   HENT:      Head: Normocephalic.   Eyes:      General: No scleral icterus.     Pupils: Pupils are equal, round, and reactive to light.   Neck:      Thyroid: No thyromegaly.      Vascular: No carotid bruit or JVD.      Trachea: No tracheal deviation.   Cardiovascular:      Rate and Rhythm: Normal rate and regular rhythm.      Heart sounds: No murmur heard.  No friction rub. No gallop.    Pulmonary:      Effort: No respiratory distress.      Breath sounds: No wheezing or rales.      Comments: Diminished breath of bilateral, clear.  Chest:      Chest wall: No tenderness.   Abdominal:      General: Bowel sounds are normal. There is no distension.      Palpations: Abdomen is soft.      Tenderness: There is no abdominal tenderness.   Musculoskeletal:      Cervical back: Normal range of motion and neck supple.   Skin:     General: Skin is warm and dry.      Capillary Refill: Capillary refill takes 2 to 3 seconds.      Findings: No rash.      Comments: Sacral wound.  Decubitus   Neurological:      Mental Status: He is alert and oriented to person, place, and time.      Cranial Nerves: No cranial nerve deficit.      Motor: Weakness present.      Coordination: Coordination abnormal.      Gait: Gait abnormal.   Psychiatric:         Behavior: Behavior normal.     Discharge Disposition: Discharge home with home health.  Daughter refused " rehab placement.  Home or Self Care    Discharge Medications:     Discharge Medications      New Medications      Instructions Start Date   collagenase 250 UNIT/GM ointment   1 application, Topical, Every 24 Hours Scheduled   Start Date: December 21, 2021     linagliptin 5 MG tablet tablet  Commonly known as: TRADJENTA   5 mg, Oral, Daily   Start Date: December 21, 2021     metFORMIN  MG 24 hr tablet  Commonly known as: GLUCOPHAGE-XR   500 mg, Oral, Daily With Breakfast      nicotine 21 MG/24HR patch  Commonly known as: NICODERM CQ   1 patch, Transdermal, Every 24 Hours Scheduled   Start Date: December 21, 2021        Changes to Medications      Instructions Start Date   oxyCODONE-acetaminophen  MG per tablet  Commonly known as: PERCOCET  What changed: how much to take   0.5 tablets, Oral, Every 6 Hours PRN         Continue These Medications      Instructions Start Date   citalopram 20 MG tablet  Commonly known as: CeleXA   20 mg, Oral, Daily      omeprazole 20 MG capsule  Commonly known as: priLOSEC   20 mg, Oral, Daily      simvastatin 40 MG tablet  Commonly known as: ZOCOR   40 mg, Oral, Nightly         Stop These Medications    losartan-hydrochlorothiazide 50-12.5 MG per tablet  Commonly known as: HYZAAR     zolpidem 10 MG tablet  Commonly known as: AMBIEN            Discharge Diet:   Diet Instructions     Advance Diet As Tolerated            Activity at Discharge:   Activity Instructions     Activity as Tolerated            Discharge Care Plan/Instructions: Discharge home with family.  Daughter refused rehab placement.  Patient go home with home health.    Follow-up Appointments:   Future Appointments   Date Time Provider Department Center   1/24/2022  9:45 AM Santino Batres APRN [facility]   Follow-up with PCP 1 week.    Electronically signed by Jj Perkins MD, 12/20/21, 13:43 CST.    Time: Greater than 30 minutes.

## 2021-12-20 NOTE — CASE MANAGEMENT/SOCIAL WORK
Continued Stay Note   [place]     Patient Name: John Rodriguez  MRN: [id number]  Today's Date: 12/20/2021    Admit Date: 12/15/2021     Discharge Plan     Row Name 12/20/21 1509       Plan    Plan Home with Home Health    Provided Post Acute Provider List? Yes    Post Acute Provider List Home Health    Provided Post Acute Provider Quality & Resource List? Yes    Post Acute Provider Quality and Resource List Home Health    Delivered To Support Person    Method of Delivery Telephone    Final Discharge Disposition Code 06 - home with home health care    Final Note Spoke with patient's daughter, Patrizia Ramirez, to inform that patient has been discharged today. Order received for Home Health and daughter chose [facility]. Called and made referral to Tanya with [facility]. No other needs identified at this time.               Discharge Codes    No documentation.               Expected Discharge Date and Time     Expected Discharge Date Expected Discharge Time    Dec 20, 2021             Judy Wade RN

## 2021-12-20 NOTE — PROGRESS NOTES
Continued Stay Note  [facility]     Patient Name: John Rodriguez  MRN: [id number]  Today's Date: 12/20/2021    Admit Date: 12/15/2021     Discharge Plan     Row Name 12/20/21 1027       Plan    Plan Home Health - Per Daughter    Patient/Family in Agreement with Plan yes    Plan Comments SW spoke to patient's daughter, Effie, at length re discharge planning. ALICIA asked if Effie was aware of patient's condition and aware of the type of care she will need to provide if she moves in with patient at discharge. Effie says she is aware and had been providing patient's care prior to admit. Effie is adamant that patient not go to a skilled nursing facility because her mother was in in a facility and had a bad experience. Effie's mother has passed away and patient now lives alone. Patient has a friend named Aravind Sanchez, Effie wanted to make it known that no medical information should be provided to Aravind Sanchez over the phone. Effie again confirmed she will move in with patient to provide care and wants him to have home health care at time of discharge.    Row Name 12/20/21 0812       Plan    Plan Needs SNF    Plan Comments Received consult for [facility]. Patient does not have a qualifying diagnosis for acute rehab and is also not functionally appropriate for acute inpatient rehab. Patient needs SNF but patient's daughter refuses.                         BERNARD Zepeda

## 2021-12-20 NOTE — PLAN OF CARE
Goal Outcome Evaluation:  Plan of Care Reviewed With: patient        Progress: improving  Outcome Summary: Patient more alert, but speech is slurred and difficult to understand.  Patient appeared to rest well overnight.  Fluids infusing.  No new skin issues needed.  Dressing per order.  No falls noted.  Patient has external cath, drainage to suction.  call light in reach.     NSR 80-87

## 2021-12-20 NOTE — PROGRESS NOTES
Continued Stay Note   [facility]     Patient Name: John Rodriguez  MRN: [id number]  Today's Date: 12/20/2021    Admit Date: 12/15/2021     Discharge Plan     Row Name 12/20/21 0812       Plan    Plan Needs SNF    Plan Comments Received consult for [facility]. Patient does not have a qualifying diagnosis for acute rehab and is also not functionally appropriate for acute inpatient rehab. Patient needs SNF but patient's daughter refuses.               NICK ZepedaW

## 2021-12-21 NOTE — THERAPY DISCHARGE NOTE
Acute Care - Physical Therapy Discharge Summary  [facility]       Patient Name: John Rodriguez  : 1950  MRN: [id number]    Today's Date: 2021                 Admit Date: 12/15/2021      PT Recommendation and Plan    Visit Dx:    ICD-10-CM ICD-9-CM   1. High anion gap metabolic acidosis  E87.2 276.2   2. Alcoholic intoxication without complication ([facility])  F10.920 305.00   3. Irritant dermatitis  L24.9 692.9   4. Pressure injury, stage 2, unspecified location ([facility])  L89.92 707.00     707.22   5. Muscular deconditioning  R29.898 781.99   6. Decreased activities of daily living (ADL)  Z78.9 V49.89   7. Impaired mobility  Z74.09 799.89   8. Normocytic anemia  D64.9 285.9   9. Hypophosphatemia  E83.39 275.3   10. Acute cystitis without hematuria  N30.00 595.0   11. Hyponatremia  E87.1 276.1   12. Hypokalemia  E87.6 276.8   13. Atrial flutter with rapid ventricular response ([facility])  I48.92 427.32   14. Alcohol abuse  F10.10 305.00   15. Acute stasis dermatitis  I87.2 454.1   16. Preop testing  Z01.818 V72.84   17. PAD (peripheral artery disease) ([facility])  I73.9 443.9   18. Morbidly obese ([facility])  E66.01 278.01   19. BMI 33.0-33.9,adult  Z68.33 V85.33   20. Adult BMI 35.0-35.9 kg/sq m  Z68.35 V85.35   21. Community acquired pneumonia of left lower lobe of lung  J18.9 486   22. Polypharmacy  Z79.899 V58.69   23. Weakness  R53.1 780.79   24. Spinal stenosis of lumbar region without neurogenic claudication  M48.061 724.02   25. Skin ulcer of sacrum, limited to breakdown of skin ([facility])  L98.421 707.8   26. S/P cervical spinal fusion  Z98.1 V45.4   27. Pain, neck  M54.2 723.1   28. DDD (degenerative disc disease), lumbosacral  M51.37 722.52   29. Altered mental status, unspecified altered mental status type  R41.82 780.97   30. Peripheral polyneuropathy  G62.9 356.9   31. Medically noncompliant  Z91.19 V15.81   32. GERD without esophagitis  K21.9 530.81   33. Essential hypertension  I10 401.9   34. Hyperlipidemia,  unspecified hyperlipidemia type  E78.5 272.4   35. Type 2 diabetes mellitus with hyperglycemia, without long-term current use of insulin (HCC)  E11.65 250.00     790.29   36. Tobacco abuse  Z72.0 305.1   37. Cervical spondylosis with myelopathy  M47.12 721.1   38. Spinal cord compression due to degenerative disorder of spinal column  M47.10 336.9     733.90   39. Hydrocele, unspecified hydrocele type  N43.3 603.9        Outcome Measures     Row Name 12/20/21 0731 12/19/21 1343          How much help from another is currently needed...    Putting on and taking off regular lower body clothing? 1  -CJ 1  -CJ     Bathing (including washing, rinsing, and drying) 1  -CJ 1  -CJ     Toileting (which includes using toilet bed pan or urinal) 1  -CJ 1  -CJ     Putting on and taking off regular upper body clothing 1  -CJ 1  -CJ     Taking care of personal grooming (such as brushing teeth) 1  -CJ 1  -CJ     Eating meals 1  -CJ 1  -CJ     AM-PAC 6 Clicks Score (OT) 6  -CJ 6  -CJ            Functional Assessment    Outcome Measure Options AM-PAC 6 Clicks Daily Activity (OT)  -CJ AM-PAC 6 Clicks Daily Activity (OT)  -CJ           User Key  (r) = Recorded By, (t) = Taken By, (c) = Cosigned By    Initials Name Provider Type    CJ Yon Ford COTA Occupational Therapy Assistant                     PT Rehab Goals     Row Name 12/21/21 1200             Bed Mobility Goal 1 (PT)    Activity/Assistive Device (Bed Mobility Goal 1, PT) sit to supine; supine to sit; rolling to left; rolling to right  -NW      [place] Level/Cues Needed (Bed Mobility Goal 1, PT) minimum assist (75% or more patient effort); 2 person assist  -NW      Time Frame (Bed Mobility Goal 1, PT) long term goal (LTG)  -NW      Progress/Outcomes (Bed Mobility Goal 1, PT) goal not met  -NW              Transfer Goal 1 (PT)    Activity/Assistive Device (Transfer Goal 1, PT) sit-to-stand/stand-to-sit; bed-to-chair/chair-to-bed; walker, rolling  -NW       [place] Level/Cues Needed (Transfer Goal 1, PT) moderate assist (50-74% patient effort); 2 person assist  -NW      Time Frame (Transfer Goal 1, PT) long term goal (LTG)  -NW      Progress/Outcome (Transfer Goal 1, PT) goal not met  -NW              Problem Specific Goal 1 (PT)    Problem Specific Goal 1 (PT) Pt will sit EOB with CGA-min A with no LOB for 2 minutes  -NW      Time Frame (Problem Specific Goal 1, PT) long-term goal (LTG)  -NW      Progress/Outcome (Problem Specific Goal 1, PT) goal not met  -NW            User Key  (r) = Recorded By, (t) = Taken By, (c) = Cosigned By    Initials Name Provider Type Discipline    NW Criss Mccabe, PTA Physical Therapy Assistant PT                    PT Discharge Summary  Anticipated Discharge Disposition (PT): home  Reason for Discharge: Discharge from facility  Outcomes Achieved: Refer to plan of care for updates on goals achieved  Discharge Destination: Home      Criss Mccabe PTA   12/21/2021

## 2021-12-21 NOTE — THERAPY DISCHARGE NOTE
Acute Care - Occupational Therapy Discharge Summary  [facility]     Patient Name: John Rodriguez  : 1950  MRN: [id number]    Today's Date: 2021                 Admit Date: 12/15/2021        OT Recommendation and Plan    Visit Dx:    ICD-10-CM ICD-9-CM   1. High anion gap metabolic acidosis  E87.2 276.2   2. Alcoholic intoxication without complication ([facility])  F10.920 305.00   3. Irritant dermatitis  L24.9 692.9   4. Pressure injury, stage 2, unspecified location ([facility])  L89.92 707.00     707.22   5. Muscular deconditioning  R29.898 781.99   6. Decreased activities of daily living (ADL)  Z78.9 V49.89   7. Impaired mobility  Z74.09 799.89   8. Normocytic anemia  D64.9 285.9   9. Hypophosphatemia  E83.39 275.3   10. Acute cystitis without hematuria  N30.00 595.0   11. Hyponatremia  E87.1 276.1   12. Hypokalemia  E87.6 276.8   13. Atrial flutter with rapid ventricular response ([facility])  I48.92 427.32   14. Alcohol abuse  F10.10 305.00   15. Acute stasis dermatitis  I87.2 454.1   16. Preop testing  Z01.818 V72.84   17. PAD (peripheral artery disease) ([facility])  I73.9 443.9   18. Morbidly obese ([facility])  E66.01 278.01   19. BMI 33.0-33.9,adult  Z68.33 V85.33   20. Adult BMI 35.0-35.9 kg/sq m  Z68.35 V85.35   21. Community acquired pneumonia of left lower lobe of lung  J18.9 486   22. Polypharmacy  Z79.899 V58.69   23. Weakness  R53.1 780.79   24. Spinal stenosis of lumbar region without neurogenic claudication  M48.061 724.02   25. Skin ulcer of sacrum, limited to breakdown of skin ([facility])  L98.421 707.8   26. S/P cervical spinal fusion  Z98.1 V45.4   27. Pain, neck  M54.2 723.1   28. DDD (degenerative disc disease), lumbosacral  M51.37 722.52   29. Altered mental status, unspecified altered mental status type  R41.82 780.97   30. Peripheral polyneuropathy  G62.9 356.9   31. Medically noncompliant  Z91.19 V15.81   32. GERD without esophagitis  K21.9 530.81   33. Essential hypertension  I10 401.9   34. Hyperlipidemia,  unspecified hyperlipidemia type  E78.5 272.4   35. Type 2 diabetes mellitus with hyperglycemia, without long-term current use of insulin (HCC)  E11.65 250.00     790.29   36. Tobacco abuse  Z72.0 305.1   37. Cervical spondylosis with myelopathy  M47.12 721.1   38. Spinal cord compression due to degenerative disorder of spinal column  M47.10 336.9     733.90   39. Hydrocele, unspecified hydrocele type  N43.3 603.9                OT Rehab Goals     Row Name 12/21/21 0700             Transfer Goal 1 (OT)    Activity/Assistive Device (Transfer Goal 1, OT) shower chair; commode  -TS      [place] Level/Cues Needed (Transfer Goal 1, OT) moderate assist (50-74% patient effort)  -TS      Time Frame (Transfer Goal 1, OT) long term goal (LTG); by discharge  -TS      Progress/Outcome (Transfer Goal 1, OT) goal not met  -TS              Dressing Goal 1 (OT)    Activity/Device (Dressing Goal 1, OT) upper body dressing  -TS      [place]/Cues Needed (Dressing Goal 1, OT) minimum assist (75% or more patient effort)  -TS      Time Frame (Dressing Goal 1, OT) long term goal (LTG); by discharge  -TS      Progress/Outcome (Dressing Goal 1, OT) goal not met  -TS              Toileting Goal 1 (OT)    Activity/Device (Toileting Goal 1, OT) toileting skills, all  -TS      [place] Level/Cues Needed (Toileting Goal 1, OT) minimum assist (75% or more patient effort)  -TS      Time Frame (Toileting Goal 1, OT) long term goal (LTG); by discharge  -TS      Progress/Outcome (Toileting Goal 1, OT) goal not met  -TS            User Key  (r) = Recorded By, (t) = Taken By, (c) = Cosigned By    Initials Name Provider Type Discipline    TS Aidee Conteh COTA Occupational Therapy Assistant OT                 Outcome Measures     Row Name 12/20/21 0731 12/19/21 1343          How much help from another is currently needed...    Putting on and taking off regular lower body clothing? 1  -CJ 1  -CJ     Bathing (including washing,  rinsing, and drying) 1  -CJ 1  -CJ     Toileting (which includes using toilet bed pan or urinal) 1  -CJ 1  -CJ     Putting on and taking off regular upper body clothing 1  -CJ 1  -CJ     Taking care of personal grooming (such as brushing teeth) 1  -CJ 1  -CJ     Eating meals 1  -CJ 1  -CJ     AM-PAC 6 Clicks Score (OT) 6  -CJ 6  -CJ            Functional Assessment    Outcome Measure Options AM-PAC 6 Clicks Daily Activity (OT)  - AM-PAC 6 Clicks Daily Activity (OT)  -           User Key  (r) = Recorded By, (t) = Taken By, (c) = Cosigned By    Initials Name Provider Type    Yon Clemons COTA Occupational Therapy Assistant                Timed Therapy Charges  Total Units: 2    Charges  Total Units: 2    Procedure Name Documented Minutes Units Code    HC OT THER PROC EA 15 MIN 26  2    80786 (CPT®)               Documented Minutes  Total Minutes: 26    Therapy Provided Minutes    57550 - OT Therapeutic Exercise Minutes 26                    OT Discharge Summary  Anticipated Discharge Disposition (OT): home with assist  Reason for Discharge: Discharge from facility  Outcomes Achieved: Refer to plan of care for updates on goals achieved  Discharge Destination: Home with assist      THEODORE Briones  12/21/2021

## 2021-12-21 NOTE — OUTREACH NOTE
Prep Survey      Responses   [facility] facility patient discharged from? [place]   Is LACE score < 7 ? No   Emergency Room discharge w/ pulse ox? No   Eligibility Not Eligible   What are the reasons patient is not eligible? Other  [alcohol abuse]   Does the patient have one of the following disease processes/diagnoses(primary or secondary)? Other   Prep survey completed? Yes          April Crooks RN

## 2021-12-22 ENCOUNTER — HOME CARE VISIT (OUTPATIENT)
Dept: HOME HEALTH SERVICES | Facility: CLINIC | Age: 71
End: 2021-12-22

## 2021-12-22 VITALS
HEART RATE: 84 BPM | DIASTOLIC BLOOD PRESSURE: 72 MMHG | RESPIRATION RATE: 16 BRPM | TEMPERATURE: 97.2 F | OXYGEN SATURATION: 99 % | SYSTOLIC BLOOD PRESSURE: 124 MMHG

## 2021-12-22 PROCEDURE — G0151 HHCP-SERV OF PT,EA 15 MIN: HCPCS

## 2021-12-23 NOTE — HOME HEALTH
70 yo male Primary Dx: Pressure ulcer of left hip, .Pt /psp report hospitalized per UTI / AMS 12-16- thru 12-20-21    ( noting prior to admit epsiode of depression/substance abuse  with decreased activity )     Pmhx :  L SANTIAGO 2019 - depression - ETOH abuse   PLOF : community amb -    Home environment : lives with family - 4 step entry   DME needs : roho cushion  Homebound status assessment : Pt presents with altered gait , balance , strength and endurance per dx

## 2021-12-24 ENCOUNTER — HOME CARE VISIT (OUTPATIENT)
Dept: HOME HEALTH SERVICES | Facility: CLINIC | Age: 71
End: 2021-12-24

## 2021-12-24 VITALS
TEMPERATURE: 98.1 F | OXYGEN SATURATION: 96 % | DIASTOLIC BLOOD PRESSURE: 60 MMHG | HEART RATE: 69 BPM | RESPIRATION RATE: 16 BRPM | SYSTOLIC BLOOD PRESSURE: 122 MMHG

## 2021-12-24 PROCEDURE — G0299 HHS/HOSPICE OF RN EA 15 MIN: HCPCS

## 2021-12-24 NOTE — CASE COMMUNICATION
The pt's daughter is concerned about the pt's roommate. The pt's roommate drinks alcohol all of the time, and tries to help the pt walk when the pt doesn't have enough strength to walk. The pt's daughter stated that the pt has fallen 3-4 times in the past few days, because of this. The pt's daughter stated that she has had to have EMS come help the pt get off of the floor. The pt's daughter stated that it has been since the pt's roommat e moved in that the pt has declined. The pt's daughter stated that she is concerned about the pt's safety at home. I talked with the pt and his daughter about going to a SNF for rehab. The pt stated he would be willing to go to a SNF for rehab. MSW order has been placed. I let the pt's daughter know that it would be next week before this would be addressed. Pt/cg v/u.

## 2021-12-24 NOTE — HOME HEALTH
Pt/cg agreeable to SN evaluation under the care of Dr. Jt Monroy. Medication reconciliation completed and no issues found. No upcoming insurance changes. Pt had no further questions regarding medication and/or plan of care. A&O X 4. No c/o pain during this visit. VSS. I performed wound care to the pt's left medial, middle, and lateral gluteal and right heel. I cleansed each area with NS, applied Santyl to the wound bed, and applied a foam border dressing. No S&S of infection. I educated the pt on fall and pressure injury precautions, medications, S&S of infection, infection prevention, diabetes, diabetic foot care, the importance of staying hydrated and maintaining a nutritional diet, wound care, and pain management via teachback. Pt/cg v/u.     The pt's daughter is concerned about the pt's roommate. The pt's roommate drinks alcohol all of the time, and tries to help the pt walk when the pt doesn't have enough strength to walk. The pt's daughter stated that the pt has fallen 3-4 times in the past few days, because of this. The pt's daughter stated that she has had to have EMS come help the pt get off of the floor. The pt's daughter stated that it has been since the pt's roommate moved in that the pt has declined. The pt's daughter stated that she is concerned about the pt's safety at home. I talked with the pt and his daughter about going to a SNF for rehab. The pt stated he would be willing to go to a SNF for rehab. I have contacted our  and gotten her involved. I let the pt's daughter know that it would be next week before this would be addressed. Pt/cg v/u.

## 2021-12-27 ENCOUNTER — HOME CARE VISIT (OUTPATIENT)
Dept: HOME HEALTH SERVICES | Facility: CLINIC | Age: 71
End: 2021-12-27

## 2021-12-27 VITALS
DIASTOLIC BLOOD PRESSURE: 58 MMHG | TEMPERATURE: 98.2 F | OXYGEN SATURATION: 90 % | SYSTOLIC BLOOD PRESSURE: 120 MMHG | RESPIRATION RATE: 16 BRPM | HEART RATE: 80 BPM

## 2021-12-27 VITALS — OXYGEN SATURATION: 93 %

## 2021-12-27 PROCEDURE — G0152 HHCP-SERV OF OT,EA 15 MIN: HCPCS

## 2021-12-27 PROCEDURE — G0157 HHC PT ASSISTANT EA 15: HCPCS

## 2021-12-27 NOTE — HOME HEALTH
OT EVALUATION John Rodriguez  Primary Dx: The patient is a 70 yo male referred for home health OT evaluation due to pressure ulcer of left hip, .Pt /psp report hospitalized per UTI / AMS 12-16- thru 12-20-21 ( noting prior to admit epsiode of depression/substance abuse with decreased activity ) Pmhx : L SANTIAGO 2019 - depression - ETOH abuse PLOF : community amb -  Home environment : lives with family - 4 step entry DME needs : rubeno cushion  Secondary dx: Alcohol intoxication, deconditioning, decreased ADL, impaired mobility, anemia, acute cystitis, hyponatremia, hypokalemia, atrial flutter, alcohol abuse, PAD, pneumonia, polypharmacy, spinal stenosis lumbar region, s/p cervical fusion, neck pain, DDD, GERD, HTN, DM2, cervical spondylosis with myelopathy, hydrocele (unspecified).  Surgical procedure: None this episode  Subjective: The patient has just awakened, daughter just arrived and is anxious about getting him cleaned up and dressing wound. The patient has had BM and urinated on himself; OTR assists patient in standing while daughter cleans patient up, applies medicine to wound and places new dressing. Patient demonstrates difficulty in standing and can only stand x 30 seconds for daughter to complete task. Daughter states she will be requesting SNF placement on Wednesday of this week at [facility].   Previous OT: Yes, in acute care hospital  Fall prevention education: Use non-skid footwear in standing adn transferring, use walker for all mobility/standing, have adequate lighting in the home 24/7.  Diabetic teaching: daughter checks patient's BS every morning. It is 297 this am; daughter applies insulin injection to patient.  Fall reported: None  Medication Changes: None  Medication teaching: Daughter and family friend assist in setting up patient's medications daily.  Insurance Changes: None  Reason for referral: The patient was recently hospitalized and may be needing more assist with ADL, ADL transfers and  "mobility.  Precautions: Fall risk; wound to sacrum; diabetic.  Equipment: Shower chair, grab bar in shower, hand held shower head, FWW, bariatric BSC (was his wife's and is too large for patient. Equipment needed: wheelchair, roho cushion, tub transfer bench, BSC.  Skilled Services Provided: OT evaluation, with patient/family education on needed DME of TTB, BSC and wheelchair 18' W x 18\" H. Called Dr Monroy's office to request order to be faxed for DME; office not open this date. Called [facility] (per daughters request) to inquire about needed DME. They have TTB and BSC, but 18 x 18 w/c would need to be ordered, as they only have 18W x 16H.   PLOF: Patient used FWW for mobility, daughter assisted him if needed with ADL tasks; patient's wife passed away in September of this year.  Patient Goals for therapy: To feel better and get around better.  Medical necessity: Skilled OT is reasonable and medically necessary to increase safety and independence with ADL/iADL tasks and return the patient to PLOF.  Next MD appointment: Unknown. Patient's daughter is trying to get him a bed at [facility], for daily therapy services and wound care.  Rehab potential: Fair. Good family support; motivated to return to PLOF.  D/C plan: To self/family, when goals met or highest functional level achieved.   Frequency: Pending SNF placement, 1 wk/4, beginning week of 1/3/22.  Plan for next visit: DME education/needs (followup); UE HEP, ADL transfers."

## 2021-12-28 ENCOUNTER — HOME CARE VISIT (OUTPATIENT)
Dept: HOME HEALTH SERVICES | Facility: CLINIC | Age: 71
End: 2021-12-28

## 2021-12-30 ENCOUNTER — HOME CARE VISIT (OUTPATIENT)
Dept: HOME HEALTH SERVICES | Facility: CLINIC | Age: 71
End: 2021-12-30

## 2021-12-30 VITALS
DIASTOLIC BLOOD PRESSURE: 58 MMHG | SYSTOLIC BLOOD PRESSURE: 104 MMHG | OXYGEN SATURATION: 97 % | RESPIRATION RATE: 18 BRPM | TEMPERATURE: 97.4 F | HEART RATE: 74 BPM

## 2021-12-30 PROCEDURE — G0300 HHS/HOSPICE OF LPN EA 15 MIN: HCPCS

## 2021-12-30 PROCEDURE — G0157 HHC PT ASSISTANT EA 15: HCPCS

## 2021-12-31 NOTE — CASE COMMUNICATION
Patient missed a MSW home visit from [facility] on 12/28/21     Reason: MSW spoke with patient's daughter and she requested patient be admitted to a SNF for rehab. Patient's admission to [facility] is pending for insurance approval. MSW to continue to assist patient's daughter over the phone as needed.     For your records only.   As per home health protocol, MD must be notified of missed/cancelled visits; therefore the prescribed  frequency was not met.

## 2022-01-03 ENCOUNTER — HOME CARE VISIT (OUTPATIENT)
Dept: HOME HEALTH SERVICES | Facility: CLINIC | Age: 72
End: 2022-01-03

## 2022-01-03 NOTE — HOME HEALTH
Patient seen for OT evaluation only. POC was developed. Patient has had payor source change, therefore [place] D/C completed.

## 2022-01-04 ENCOUNTER — NURSE TRIAGE (OUTPATIENT)
Dept: CALL CENTER | Facility: HOSPITAL | Age: 72
End: 2022-01-04

## 2022-01-05 ENCOUNTER — HOME CARE VISIT (OUTPATIENT)
Dept: HOME HEALTH SERVICES | Facility: HOME HEALTHCARE | Age: 72
End: 2022-01-05

## 2022-01-05 NOTE — TELEPHONE ENCOUNTER
His right hand is swollen.The skin looks fluffy like a water blister. He does have HH. He not running a fever. It is from elbow all the way down to the knuckles. It has been going on for 1 week. He was recently discharged on 12/20/2021 from [facility]. HH will be called. Per care advice needs to be seen in the Ed.     Reason for Disposition  • SEVERE hand swelling (e.g., swelling of entire hand and up into forearm)    Additional Information  • Negative: SEVERE difficulty breathing (e.g., struggling for each breath, speaks in single words)  • Negative: Sounds like a life-threatening emergency to the triager  • Negative: Followed a hand injury  • Negative: Swelling followed an insect bite to the area  • Negative: Swelling followed a bee, wasp, or other sting to the area  • Negative: Swelling of arm is main symptom  • Negative: Swelling of wrist is main symptom  • Negative: Cast problems or questions  • Negative: Pain, redness, swelling, or pus at IV Site  • Negative: Difficulty breathing at rest  • Negative: Looks like a broken bone or dislocated joint (e.g., crooked or deformed)  • Negative: Entire hand is cool or blue in comparison to other side  • Negative: [1] Can't use hand or can barely use hand AND [2] new-onset  • Negative: [1] Difficulty breathing with exertion (e.g., walking) AND [2] new-onset or worsening  • Negative: [1] Red area or streak AND [2] fever  • Negative: [1] Swelling is painful to touch AND [2] fever  • Negative: [1] Cast arm AND [2] now increased pain  • Negative: [1] Postpartum (from 0 to 6 weeks after delivery) AND [2] new blurred vision or vision changes  • Negative: [1] Postpartum (from 0 to 6 weeks after delivery) AND [2] face swelling  • Negative: Patient sounds very sick or weak to the triager  • Negative: [1] Pregnant 20 or more weeks AND [2] face swelling  • Negative: [1] Pregnant 20 or more weeks AND [2] new blurred vision or vision changes    Answer Assessment - Initial Assessment  "Questions  1. ONSET: \"When did the swelling start?\" (e.g., minutes, hours, days)      It has been getting progressively worse over 1 week.   2. LOCATION: \"What part of the hand is swollen?\"  \"Are both hands swollen or just one hand?\"      The right hand from the elbow down to the knuckles.   3. SEVERITY: \"How bad is the swelling?\" (e.g., localized; mild, moderate, severe)    - BALL OR LUMP: small ball or lump    - LOCALIZED: puffy or swollen area or patch of skin    - JOINT SWELLING: swelling of a joint    - MILD: puffiness or mild swelling of fingers or hand    - MODERATE: fingers and hand are swollen    - SEVERE: swelling of entire hand and up into forearm      Severe   4. REDNESS: \"Does the swelling look red or infected?\"      none  5. PAIN: \"Is the swelling painful to touch?\" If Yes, ask: \"How painful is it?\"   (Scale 1-10; mild, moderate or severe)      none  6. FEVER: \"Do you have a fever?\" If Yes, ask: \"What is it, how was it measured, and when did it start?\"       no  7. CAUSE: \"What do you think is causing the hand swelling?\" (e.g., heat, insect bite, pregnancy, recent injury)      unknown  8. MEDICAL HISTORY: \"Do you have a history of heart failure, kidney disease, liver failure, or cancer?\"      Yes   9. RECURRENT SYMPTOM: \"Have you had hand swelling before?\" If Yes, ask: \"When was the last time?\" \"What happened that time?\"      no  10. OTHER SYMPTOMS: \"Do you have any other symptoms?\" (e.g., blurred vision, difficulty breathing, headache)        no  11. PREGNANCY: \"Is there any chance you are pregnant?\" \"When was your last menstrual period?\"        no    Protocols used: HAND SWELLING-ADULT-AH      "

## 2022-01-08 ENCOUNTER — HOSPITAL ENCOUNTER (EMERGENCY)
Age: 72
Discharge: HOME | End: 2022-01-08
Attending: EMERGENCY MEDICINE
Payer: MEDICARE

## 2022-01-08 VITALS
TEMPERATURE: 98.3 F | SYSTOLIC BLOOD PRESSURE: 122 MMHG | OXYGEN SATURATION: 98 % | HEART RATE: 84 BPM | DIASTOLIC BLOOD PRESSURE: 84 MMHG | RESPIRATION RATE: 20 BRPM

## 2022-01-08 DIAGNOSIS — F10.11 HISTORY OF ALCOHOL ABUSE: Primary | ICD-10-CM

## 2022-01-08 DIAGNOSIS — R41.0 CONFUSION AND DISORIENTATION: ICD-10-CM

## 2022-01-08 LAB
ALBUMIN SERPL-MCNC: 2.5 G/DL (ref 3.5–5.2)
ALP BLD-CCNC: 242 U/L (ref 40–130)
ALT SERPL-CCNC: 15 U/L (ref 5–41)
AMMONIA: 25 UMOL/L (ref 16–60)
AMPHETAMINE SCREEN, URINE: NEGATIVE
ANION GAP SERPL CALCULATED.3IONS-SCNC: 9 MMOL/L (ref 7–19)
AST SERPL-CCNC: 12 U/L (ref 5–40)
BARBITURATE SCREEN URINE: NEGATIVE
BASOPHILS ABSOLUTE: 0 K/UL (ref 0–0.2)
BASOPHILS RELATIVE PERCENT: 0.5 % (ref 0–1)
BENZODIAZEPINE SCREEN, URINE: NEGATIVE
BILIRUB SERPL-MCNC: 0.4 MG/DL (ref 0.2–1.2)
BILIRUBIN URINE: NEGATIVE
BLOOD, URINE: NEGATIVE
BUN BLDV-MCNC: 10 MG/DL (ref 8–23)
CALCIUM SERPL-MCNC: 9 MG/DL (ref 8.8–10.2)
CANNABINOID SCREEN URINE: NEGATIVE
CHLORIDE BLD-SCNC: 101 MMOL/L (ref 98–111)
CLARITY: CLEAR
CO2: 25 MMOL/L (ref 22–29)
COCAINE METABOLITE SCREEN URINE: NEGATIVE
COLOR: YELLOW
CREAT SERPL-MCNC: 0.6 MG/DL (ref 0.5–1.2)
EOSINOPHILS ABSOLUTE: 0.1 K/UL (ref 0–0.6)
EOSINOPHILS RELATIVE PERCENT: 2.3 % (ref 0–5)
ETHANOL: <10 MG/DL (ref 0–0.08)
GFR AFRICAN AMERICAN: >59
GFR NON-AFRICAN AMERICAN: >60
GLUCOSE BLD-MCNC: 193 MG/DL (ref 74–109)
GLUCOSE URINE: 100 MG/DL
HCT VFR BLD CALC: 35 % (ref 42–52)
HEMOGLOBIN: 10.7 G/DL (ref 14–18)
IMMATURE GRANULOCYTES #: 0 K/UL
KETONES, URINE: NEGATIVE MG/DL
LEUKOCYTE ESTERASE, URINE: NEGATIVE
LYMPHOCYTES ABSOLUTE: 1.7 K/UL (ref 1.1–4.5)
LYMPHOCYTES RELATIVE PERCENT: 27.2 % (ref 20–40)
Lab: NORMAL
MAGNESIUM: 1.5 MG/DL (ref 1.6–2.4)
MCH RBC QN AUTO: 29.1 PG (ref 27–31)
MCHC RBC AUTO-ENTMCNC: 30.6 G/DL (ref 33–37)
MCV RBC AUTO: 95.1 FL (ref 80–94)
MONOCYTES ABSOLUTE: 0.6 K/UL (ref 0–0.9)
MONOCYTES RELATIVE PERCENT: 10.1 % (ref 0–10)
NEUTROPHILS ABSOLUTE: 3.7 K/UL (ref 1.5–7.5)
NEUTROPHILS RELATIVE PERCENT: 59.4 % (ref 50–65)
NITRITE, URINE: NEGATIVE
OPIATE SCREEN URINE: NEGATIVE
PDW BLD-RTO: 14.1 % (ref 11.5–14.5)
PH UA: 6.5 (ref 5–8)
PLATELET # BLD: 356 K/UL (ref 130–400)
PMV BLD AUTO: 10.3 FL (ref 9.4–12.4)
POTASSIUM REFLEX MAGNESIUM: 3.5 MMOL/L (ref 3.5–5)
PROTEIN UA: NEGATIVE MG/DL
RBC # BLD: 3.68 M/UL (ref 4.7–6.1)
SODIUM BLD-SCNC: 135 MMOL/L (ref 136–145)
SPECIFIC GRAVITY UA: 1 (ref 1–1.03)
TOTAL PROTEIN: 5.6 G/DL (ref 6.6–8.7)
UROBILINOGEN, URINE: 1 E.U./DL
WBC # BLD: 6.2 K/UL (ref 4.8–10.8)

## 2022-01-08 PROCEDURE — 83735 ASSAY OF MAGNESIUM: CPT

## 2022-01-08 PROCEDURE — 82077 ASSAY SPEC XCP UR&BREATH IA: CPT

## 2022-01-08 PROCEDURE — 36415 COLL VENOUS BLD VENIPUNCTURE: CPT

## 2022-01-08 PROCEDURE — 96374 THER/PROPH/DIAG INJ IV PUSH: CPT

## 2022-01-08 PROCEDURE — 80307 DRUG TEST PRSMV CHEM ANLYZR: CPT

## 2022-01-08 PROCEDURE — 2580000003 HC RX 258: Performed by: EMERGENCY MEDICINE

## 2022-01-08 PROCEDURE — 6360000002 HC RX W HCPCS: Performed by: EMERGENCY MEDICINE

## 2022-01-08 PROCEDURE — 99283 EMERGENCY DEPT VISIT LOW MDM: CPT

## 2022-01-08 PROCEDURE — 85025 COMPLETE CBC W/AUTO DIFF WBC: CPT

## 2022-01-08 PROCEDURE — 93005 ELECTROCARDIOGRAM TRACING: CPT | Performed by: EMERGENCY MEDICINE

## 2022-01-08 PROCEDURE — 81003 URINALYSIS AUTO W/O SCOPE: CPT

## 2022-01-08 PROCEDURE — 82140 ASSAY OF AMMONIA: CPT

## 2022-01-08 PROCEDURE — 80053 COMPREHEN METABOLIC PANEL: CPT

## 2022-01-08 RX ORDER — CHLORDIAZEPOXIDE HYDROCHLORIDE 25 MG/1
25 CAPSULE, GELATIN COATED ORAL 3 TIMES DAILY PRN
Qty: 12 CAPSULE | Refills: 0 | Status: SHIPPED | OUTPATIENT
Start: 2022-01-08 | End: 2022-01-15

## 2022-01-08 RX ORDER — SODIUM CHLORIDE, SODIUM LACTATE, POTASSIUM CHLORIDE, AND CALCIUM CHLORIDE .6; .31; .03; .02 G/100ML; G/100ML; G/100ML; G/100ML
500 INJECTION, SOLUTION INTRAVENOUS ONCE
Status: COMPLETED | OUTPATIENT
Start: 2022-01-08 | End: 2022-01-08

## 2022-01-08 RX ORDER — LORAZEPAM 2 MG/ML
0.5 INJECTION INTRAMUSCULAR ONCE
Status: COMPLETED | OUTPATIENT
Start: 2022-01-08 | End: 2022-01-08

## 2022-01-08 RX ADMIN — SODIUM CHLORIDE, POTASSIUM CHLORIDE, SODIUM LACTATE AND CALCIUM CHLORIDE 500 ML: 600; 310; 30; 20 INJECTION, SOLUTION INTRAVENOUS at 13:32

## 2022-01-08 RX ADMIN — LORAZEPAM 0.5 MG: 2 INJECTION INTRAMUSCULAR; INTRAVENOUS at 13:32

## 2022-01-08 NOTE — ED NOTES
Bed: 15  Expected date:   Expected time:   Means of arrival:   Comments:  Sheng Meyer RN  01/08/22 4690
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
## 2022-01-08 NOTE — ED NOTES
Called [facility] to get information on pharmacy     Carlos Ye RN  01/08/22 2003

## 2022-01-10 LAB
EKG P AXIS: 43 DEGREES
EKG P-R INTERVAL: 150 MS
EKG Q-T INTERVAL: 406 MS
EKG QRS DURATION: 118 MS
EKG QTC CALCULATION (BAZETT): 455 MS
EKG T AXIS: 27 DEGREES

## 2022-01-10 PROCEDURE — 93010 ELECTROCARDIOGRAM REPORT: CPT | Performed by: INTERNAL MEDICINE

## 2022-01-13 ENCOUNTER — APPOINTMENT (OUTPATIENT)
Dept: GENERAL RADIOLOGY | Age: 72
End: 2022-01-13
Payer: MEDICARE

## 2022-01-13 ENCOUNTER — APPOINTMENT (OUTPATIENT)
Dept: CT IMAGING | Age: 72
End: 2022-01-13
Payer: MEDICARE

## 2022-01-13 ENCOUNTER — HOSPITAL ENCOUNTER (EMERGENCY)
Age: 72
Discharge: HOME | End: 2022-01-13
Attending: EMERGENCY MEDICINE
Payer: MEDICARE

## 2022-01-13 ENCOUNTER — TELEPHONE (OUTPATIENT)
Dept: NEUROSURGERY | Age: 72
End: 2022-01-13

## 2022-01-13 VITALS
RESPIRATION RATE: 17 BRPM | TEMPERATURE: 98.6 F | DIASTOLIC BLOOD PRESSURE: 71 MMHG | HEART RATE: 80 BPM | OXYGEN SATURATION: 94 % | SYSTOLIC BLOOD PRESSURE: 137 MMHG

## 2022-01-13 DIAGNOSIS — F03.91 DEMENTIA WITH BEHAVIORAL DISTURBANCE, UNSPECIFIED DEMENTIA TYPE: ICD-10-CM

## 2022-01-13 DIAGNOSIS — R41.82 ALTERED MENTAL STATUS, UNSPECIFIED ALTERED MENTAL STATUS TYPE: ICD-10-CM

## 2022-01-13 DIAGNOSIS — S12.290A COMPRESSION FRACTURE OF C3 VERTEBRA, INITIAL ENCOUNTER (HCC): ICD-10-CM
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
## 2022-01-13 DIAGNOSIS — F10.11 HISTORY OF ALCOHOL ABUSE: ICD-10-CM

## 2022-01-13 DIAGNOSIS — M87.051 AVASCULAR NECROSIS OF BONE OF RIGHT HIP (HCC): ICD-10-CM

## 2022-01-13 LAB
ALBUMIN SERPL-MCNC: 2.3 G/DL (ref 3.5–5.2)
ALP BLD-CCNC: 152 U/L (ref 40–130)
ALT SERPL-CCNC: 10 U/L (ref 5–41)
AMPHETAMINE SCREEN, URINE: NEGATIVE
ANION GAP SERPL CALCULATED.3IONS-SCNC: 5 MMOL/L (ref 7–19)
APTT: 34.2 SEC (ref 26–36.2)
AST SERPL-CCNC: 11 U/L (ref 5–40)
BARBITURATE SCREEN URINE: NEGATIVE
BASOPHILS ABSOLUTE: 0 K/UL (ref 0–0.2)
BASOPHILS RELATIVE PERCENT: 0.5 % (ref 0–1)
BENZODIAZEPINE SCREEN, URINE: POSITIVE
BILIRUB SERPL-MCNC: 0.4 MG/DL (ref 0.2–1.2)
BILIRUBIN URINE: NEGATIVE
BLOOD, URINE: NEGATIVE
BUN BLDV-MCNC: 17 MG/DL (ref 8–23)
CALCIUM SERPL-MCNC: 8.8 MG/DL (ref 8.8–10.2)
CANNABINOID SCREEN URINE: NEGATIVE
CHLORIDE BLD-SCNC: 107 MMOL/L (ref 98–111)
CLARITY: ABNORMAL
CO2: 28 MMOL/L (ref 22–29)
COCAINE METABOLITE SCREEN URINE: NEGATIVE
COLOR: YELLOW
CREAT SERPL-MCNC: 0.6 MG/DL (ref 0.5–1.2)
EOSINOPHILS ABSOLUTE: 0.1 K/UL (ref 0–0.6)
EOSINOPHILS RELATIVE PERCENT: 0.9 % (ref 0–5)
ETHANOL: <10 MG/DL (ref 0–0.08)
GFR AFRICAN AMERICAN: >59
GFR NON-AFRICAN AMERICAN: >60
GLUCOSE BLD-MCNC: 212 MG/DL (ref 74–109)
GLUCOSE URINE: =>1000 MG/DL
HCT VFR BLD CALC: 35.9 % (ref 42–52)
HEMOGLOBIN: 10.5 G/DL (ref 14–18)
IMMATURE GRANULOCYTES #: 0 K/UL
INR BLD: 1.07 (ref 0.88–1.18)
KETONES, URINE: NEGATIVE MG/DL
LEUKOCYTE ESTERASE, URINE: NEGATIVE
LYMPHOCYTES ABSOLUTE: 1.1 K/UL (ref 1.1–4.5)
LYMPHOCYTES RELATIVE PERCENT: 14.7 % (ref 20–40)
Lab: ABNORMAL
MCH RBC QN AUTO: 28.2 PG (ref 27–31)
MCHC RBC AUTO-ENTMCNC: 29.2 G/DL (ref 33–37)
MCV RBC AUTO: 96.5 FL (ref 80–94)
MONOCYTES ABSOLUTE: 0.5 K/UL (ref 0–0.9)
MONOCYTES RELATIVE PERCENT: 6.5 % (ref 0–10)
NEUTROPHILS ABSOLUTE: 5.8 K/UL (ref 1.5–7.5)
NEUTROPHILS RELATIVE PERCENT: 76.9 % (ref 50–65)
NITRITE, URINE: NEGATIVE
OPIATE SCREEN URINE: NEGATIVE
PDW BLD-RTO: 13.7 % (ref 11.5–14.5)
PH UA: 6.5 (ref 5–8)
PLATELET # BLD: 344 K/UL (ref 130–400)
PMV BLD AUTO: 10.1 FL (ref 9.4–12.4)
POTASSIUM REFLEX MAGNESIUM: 3.9 MMOL/L (ref 3.5–5)
PROTEIN UA: ABNORMAL MG/DL
PROTHROMBIN TIME: 14.1 SEC (ref 12–14.6)
RBC # BLD: 3.72 M/UL (ref 4.7–6.1)
SODIUM BLD-SCNC: 140 MMOL/L (ref 136–145)
SPECIFIC GRAVITY UA: 1.02 (ref 1–1.03)
TOTAL PROTEIN: 5.3 G/DL (ref 6.6–8.7)
TROPONIN: <0.01 NG/ML (ref 0–0.03)
UROBILINOGEN, URINE: 1 E.U./DL
WBC # BLD: 7.6 K/UL (ref 4.8–10.8)

## 2022-01-13 PROCEDURE — 85610 PROTHROMBIN TIME: CPT

## 2022-01-13 PROCEDURE — 71045 X-RAY EXAM CHEST 1 VIEW: CPT

## 2022-01-13 PROCEDURE — 72125 CT NECK SPINE W/O DYE: CPT

## 2022-01-13 PROCEDURE — 72170 X-RAY EXAM OF PELVIS: CPT

## 2022-01-13 PROCEDURE — 82077 ASSAY SPEC XCP UR&BREATH IA: CPT

## 2022-01-13 PROCEDURE — 84484 ASSAY OF TROPONIN QUANT: CPT

## 2022-01-13 PROCEDURE — 85730 THROMBOPLASTIN TIME PARTIAL: CPT

## 2022-01-13 PROCEDURE — 80053 COMPREHEN METABOLIC PANEL: CPT

## 2022-01-13 PROCEDURE — 93005 ELECTROCARDIOGRAM TRACING: CPT

## 2022-01-13 PROCEDURE — 80307 DRUG TEST PRSMV CHEM ANLYZR: CPT

## 2022-01-13 PROCEDURE — 36415 COLL VENOUS BLD VENIPUNCTURE: CPT

## 2022-01-13 PROCEDURE — 99283 EMERGENCY DEPT VISIT LOW MDM: CPT

## 2022-01-13 PROCEDURE — 81003 URINALYSIS AUTO W/O SCOPE: CPT

## 2022-01-13 PROCEDURE — 85025 COMPLETE CBC W/AUTO DIFF WBC: CPT

## 2022-01-13 PROCEDURE — 72192 CT PELVIS W/O DYE: CPT

## 2022-01-13 PROCEDURE — 70450 CT HEAD/BRAIN W/O DYE: CPT

## 2022-01-13 NOTE — TELEPHONE ENCOUNTER
Marquita with [facility] called stating that Rico Hendricks was seen in the ED with a compression fracture of C3 vertebra and was sent back to [facility] in a soft collar. Marquita needs to know if the patient has any restrictions and how long he needs to wear soft collar and if he needs to follow up in our office.   Please advise
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
## 2022-01-14 NOTE — TELEPHONE ENCOUNTER
Called Marquita at [facility] to let her know that I spoke with Dr. Madhav Mccloud and patient is to not lift anything over 5 lbs, needs assistance when walking, standing. Patient is to stay in soft collar for 6 weeks and I have scheduled him an appointment to come to office on February 3 ,2022. Marquita at [facility] voiced understanding.

## 2022-01-17 LAB
EKG P AXIS: 69 DEGREES
EKG P-R INTERVAL: 168 MS
EKG Q-T INTERVAL: 430 MS
EKG QRS DURATION: 116 MS
EKG QTC CALCULATION (BAZETT): 458 MS
EKG T AXIS: 25 DEGREES

## 2022-01-19 ENCOUNTER — TELEPHONE (OUTPATIENT)
Dept: NEUROSURGERY | Age: 72
End: 2022-01-19

## 2022-01-19 NOTE — TELEPHONE ENCOUNTER
[facility] New Patient Questionnaire    1. Diagnosis/Reason for Referral?  Hospital follow up      2. Who is completing questionnaire? Patient   Caregiver x Family      3. Has the patient had any previous spinal/brain surgeries? yes        A. If yes, what is the name of the facility in which the surgery was performed? B. Procedure/Surgery performed? Cervical Discectomy Anterior with Fusion c5-6     C. Who was the surgeon? Dr. Jose Maria Romero. When was the surgery? 1-     E. Did the patient improve after the surgery? 4. Is this a second opinion? If yes, Dr. Krystina Ugalde would like to review patient first before making the appointment. 5. Have MRI Images been obtain within the last year? Yes x No      XR  CT x     If yes, [facility]   If yes, what part of the body? Lumbar  Cervical x Thoracic  Brain     If yes, when was it obtained? 1-    Note: if the scan was performed at a facility other than [facility], the disc will need to be brought to the appointment or we need to reach out to obtain the disc. A. Was the patient instructed to provide the disc? Yes   No      8. Has the patient had a NCV/EMG within the last year? Yes  No x     If yes, where was it performed and date? MM/YY  Location:      9. Has the patient been to Physical Therapy? Yes x No     If yes, what location, how long attended, and last visit? Location:  [facility]      Therapy Lasted:    Date of Last Visit:      8. Has the patient been to Pain Management? Yes  No x     If yes, what location and last visit     Location:   Last Visit:   Is it helping?

## 2022-01-21 ENCOUNTER — TELEPHONE (OUTPATIENT)
Dept: PODIATRY | Facility: CLINIC | Age: 72
End: 2022-01-21

## 2022-02-01 ENCOUNTER — LAB REQUISITION (OUTPATIENT)
Dept: LAB | Facility: HOSPITAL | Age: 72
End: 2022-02-01

## 2022-02-01 DIAGNOSIS — Z00.00 ENCOUNTER FOR GENERAL ADULT MEDICAL EXAMINATION WITHOUT ABNORMAL FINDINGS: ICD-10-CM

## 2022-02-01 LAB — SARS-COV-2 RNA PNL SPEC NAA+PROBE: NOT DETECTED

## 2022-02-01 PROCEDURE — 87635 SARS-COV-2 COVID-19 AMP PRB: CPT

## 2022-02-02 ENCOUNTER — LAB REQUISITION (OUTPATIENT)
Dept: LAB | Facility: HOSPITAL | Age: 72
End: 2022-02-02

## 2022-02-02 DIAGNOSIS — Z00.00 ENCOUNTER FOR GENERAL ADULT MEDICAL EXAMINATION WITHOUT ABNORMAL FINDINGS: ICD-10-CM

## 2022-02-02 LAB
ALBUMIN SERPL-MCNC: 3.5 G/DL (ref 3.5–5.2)
ALBUMIN/GLOB SERPL: 1.1 G/DL
ALP SERPL-CCNC: 218 U/L (ref 39–117)
ALT SERPL W P-5'-P-CCNC: 25 U/L (ref 1–41)
ANION GAP SERPL CALCULATED.3IONS-SCNC: 11 MMOL/L (ref 5–15)
AST SERPL-CCNC: 43 U/L (ref 1–40)
BASOPHILS # BLD AUTO: 0.02 10*3/MM3 (ref 0–0.2)
BASOPHILS NFR BLD AUTO: 0.4 % (ref 0–1.5)
BILIRUB SERPL-MCNC: 0.2 MG/DL (ref 0–1.2)
BUN SERPL-MCNC: 14 MG/DL (ref 8–23)
BUN/CREAT SERPL: 18.4 (ref 7–25)
CALCIUM SPEC-SCNC: 9.2 MG/DL (ref 8.6–10.5)
CHLORIDE SERPL-SCNC: 96 MMOL/L (ref 98–107)
CO2 SERPL-SCNC: 30 MMOL/L (ref 22–29)
CREAT SERPL-MCNC: 0.76 MG/DL (ref 0.76–1.27)
DEPRECATED RDW RBC AUTO: 43.3 FL (ref 37–54)
EOSINOPHIL # BLD AUTO: 0.02 10*3/MM3 (ref 0–0.4)
EOSINOPHIL NFR BLD AUTO: 0.4 % (ref 0.3–6.2)
ERYTHROCYTE [DISTWIDTH] IN BLOOD BY AUTOMATED COUNT: 13.7 % (ref 12.3–15.4)
GFR SERPL CREATININE-BSD FRML MDRD: 101 ML/MIN/1.73
GLOBULIN UR ELPH-MCNC: 3.1 GM/DL
GLUCOSE SERPL-MCNC: 141 MG/DL (ref 65–99)
HCT VFR BLD AUTO: 40.5 % (ref 37.5–51)
HGB BLD-MCNC: 12.6 G/DL (ref 13–17.7)
IMM GRANULOCYTES # BLD AUTO: 0.03 10*3/MM3 (ref 0–0.05)
IMM GRANULOCYTES NFR BLD AUTO: 0.6 % (ref 0–0.5)
LYMPHOCYTES # BLD AUTO: 1.1 10*3/MM3 (ref 0.7–3.1)
LYMPHOCYTES NFR BLD AUTO: 20.9 % (ref 19.6–45.3)
MCH RBC QN AUTO: 27 PG (ref 26.6–33)
MCHC RBC AUTO-ENTMCNC: 31.1 G/DL (ref 31.5–35.7)
MCV RBC AUTO: 86.9 FL (ref 79–97)
MONOCYTES # BLD AUTO: 0.56 10*3/MM3 (ref 0.1–0.9)
MONOCYTES NFR BLD AUTO: 10.6 % (ref 5–12)
NEUTROPHILS NFR BLD AUTO: 3.54 10*3/MM3 (ref 1.7–7)
NEUTROPHILS NFR BLD AUTO: 67.1 % (ref 42.7–76)
NRBC BLD AUTO-RTO: 0 /100 WBC (ref 0–0.2)
PLATELET # BLD AUTO: 316 10*3/MM3 (ref 140–450)
PMV BLD AUTO: 10.6 FL (ref 6–12)
POTASSIUM SERPL-SCNC: 3.7 MMOL/L (ref 3.5–5.2)
PROT SERPL-MCNC: 6.6 G/DL (ref 6–8.5)
RBC # BLD AUTO: 4.66 10*6/MM3 (ref 4.14–5.8)
SODIUM SERPL-SCNC: 137 MMOL/L (ref 136–145)
WBC NRBC COR # BLD: 5.27 10*3/MM3 (ref 3.4–10.8)

## 2022-02-02 PROCEDURE — 36415 COLL VENOUS BLD VENIPUNCTURE: CPT | Performed by: NURSE PRACTITIONER

## 2022-02-02 PROCEDURE — 80053 COMPREHEN METABOLIC PANEL: CPT | Performed by: NURSE PRACTITIONER

## 2022-02-02 PROCEDURE — 82306 VITAMIN D 25 HYDROXY: CPT | Performed by: NURSE PRACTITIONER

## 2022-02-02 PROCEDURE — 85025 COMPLETE CBC W/AUTO DIFF WBC: CPT | Performed by: NURSE PRACTITIONER

## 2022-02-03 LAB — 25(OH)D3 SERPL-MCNC: 35.7 NG/ML

## 2022-02-21 ENCOUNTER — TELEPHONE (OUTPATIENT)
Dept: PODIATRY | Facility: CLINIC | Age: 72
End: 2022-02-21

## 2022-02-21 NOTE — TELEPHONE ENCOUNTER
Both phone numbers on pt account have been disconnected.  If pt calls in please reschedule appt that was no show on 01/24/22 for Alex.

## 2022-03-02 ENCOUNTER — TELEPHONE (OUTPATIENT)
Dept: NEUROSURGERY | Age: 72
End: 2022-03-02

## 2022-03-02 NOTE — TELEPHONE ENCOUNTER
Spoke with Shannan at [facility] to have PT notes faxed to our office.  Provided Shannan with our fax number [phone]

## 2022-03-28 ENCOUNTER — LAB REQUISITION (OUTPATIENT)
Dept: LAB | Facility: HOSPITAL | Age: 72
End: 2022-03-28

## 2022-03-28 DIAGNOSIS — Z00.00 ENCOUNTER FOR GENERAL ADULT MEDICAL EXAMINATION WITHOUT ABNORMAL FINDINGS: ICD-10-CM

## 2022-03-28 LAB
ANION GAP SERPL CALCULATED.3IONS-SCNC: 11 MMOL/L (ref 5–15)
BUN SERPL-MCNC: 19 MG/DL (ref 8–23)
BUN/CREAT SERPL: 21.8 (ref 7–25)
CALCIUM SPEC-SCNC: 9.9 MG/DL (ref 8.6–10.5)
CHLORIDE SERPL-SCNC: 96 MMOL/L (ref 98–107)
CO2 SERPL-SCNC: 30 MMOL/L (ref 22–29)
CREAT SERPL-MCNC: 0.87 MG/DL (ref 0.76–1.27)
EGFRCR SERPLBLD CKD-EPI 2021: 92.8 ML/MIN/1.73
GLUCOSE SERPL-MCNC: 211 MG/DL (ref 65–99)
HBA1C MFR BLD: 9 % (ref 4.8–5.6)
POTASSIUM SERPL-SCNC: 4.3 MMOL/L (ref 3.5–5.2)
SODIUM SERPL-SCNC: 137 MMOL/L (ref 136–145)

## 2022-03-28 PROCEDURE — 80048 BASIC METABOLIC PNL TOTAL CA: CPT | Performed by: INTERNAL MEDICINE

## 2022-03-28 PROCEDURE — 36415 COLL VENOUS BLD VENIPUNCTURE: CPT | Performed by: INTERNAL MEDICINE

## 2022-03-28 PROCEDURE — 83036 HEMOGLOBIN GLYCOSYLATED A1C: CPT | Performed by: INTERNAL MEDICINE

## 2022-05-06 ENCOUNTER — LAB REQUISITION (OUTPATIENT)
Dept: LAB | Facility: HOSPITAL | Age: 72
End: 2022-05-06

## 2022-05-06 DIAGNOSIS — Z00.00 ENCOUNTER FOR GENERAL ADULT MEDICAL EXAMINATION WITHOUT ABNORMAL FINDINGS: ICD-10-CM

## 2022-05-06 LAB — HBA1C MFR BLD: 9 % (ref 4.8–5.6)

## 2022-05-06 PROCEDURE — 83036 HEMOGLOBIN GLYCOSYLATED A1C: CPT | Performed by: INTERNAL MEDICINE

## 2022-05-06 PROCEDURE — 36415 COLL VENOUS BLD VENIPUNCTURE: CPT | Performed by: INTERNAL MEDICINE

## 2022-06-03 ENCOUNTER — TRANSCRIBE ORDERS (OUTPATIENT)
Dept: HOME HEALTH SERVICES | Facility: HOME HEALTHCARE | Age: 72
End: 2022-06-03

## 2022-06-03 ENCOUNTER — HOME HEALTH ADMISSION (OUTPATIENT)
Dept: HOME HEALTH SERVICES | Facility: HOME HEALTHCARE | Age: 72
End: 2022-06-03

## 2022-06-03 DIAGNOSIS — S12.200D CLOSED DISPLACED FRACTURE OF THIRD CERVICAL VERTEBRA WITH ROUTINE HEALING, UNSPECIFIED FRACTURE MORPHOLOGY, SUBSEQUENT ENCOUNTER: Primary | ICD-10-CM

## 2022-09-19 LAB — HBA1C MFR BLD: 13.3 % (ref 4–6)

## 2022-09-20 ENCOUNTER — APPOINTMENT (OUTPATIENT)
Dept: GENERAL RADIOLOGY | Age: 72
End: 2022-09-20
Payer: MEDICARE

## 2022-09-20 ENCOUNTER — HOSPITAL ENCOUNTER (EMERGENCY)
Age: 72
Discharge: LEFT BEFORE DISCHARGE | End: 2022-09-20
Payer: MEDICARE

## 2022-09-20 VITALS
DIASTOLIC BLOOD PRESSURE: 68 MMHG | TEMPERATURE: 98.7 F | SYSTOLIC BLOOD PRESSURE: 111 MMHG | OXYGEN SATURATION: 96 % | HEART RATE: 63 BPM | RESPIRATION RATE: 16 BRPM

## 2022-09-20 DIAGNOSIS — R73.9 HYPERGLYCEMIA: ICD-10-CM

## 2022-09-20 DIAGNOSIS — R07.89 CHEST WALL PAIN: Primary | ICD-10-CM

## 2022-09-20 DIAGNOSIS — R62.7 FAILURE TO THRIVE IN ADULT: ICD-10-CM

## 2022-09-20 LAB
ALBUMIN SERPL-MCNC: 3.6 G/DL (ref 3.5–5.2)
ALP BLD-CCNC: 146 U/L (ref 40–130)
ALT SERPL-CCNC: 11 U/L (ref 5–41)
ANION GAP SERPL CALCULATED.3IONS-SCNC: 6 MMOL/L (ref 7–19)
ANION GAP SERPL CALCULATED.3IONS-SCNC: 8 MMOL/L (ref 7–19)
AST SERPL-CCNC: 13 U/L (ref 5–40)
BASOPHILS ABSOLUTE: 0.1 K/UL (ref 0–0.2)
BASOPHILS RELATIVE PERCENT: 0.8 % (ref 0–1)
BILIRUB SERPL-MCNC: 0.4 MG/DL (ref 0.2–1.2)
BUN BLDV-MCNC: 14 MG/DL (ref 8–23)
BUN BLDV-MCNC: 18 MG/DL (ref 8–23)
CALCIUM SERPL-MCNC: 9.7 MG/DL (ref 8.8–10.2)
CALCIUM SERPL-MCNC: 9.9 MG/DL (ref 8.8–10.2)
CHLORIDE BLD-SCNC: 97 MMOL/L (ref 98–111)
CHLORIDE BLD-SCNC: 97 MMOL/L (ref 98–111)
CO2: 28 MMOL/L (ref 22–29)
CO2: 29 MMOL/L (ref 22–29)
CREAT SERPL-MCNC: 0.8 MG/DL (ref 0.5–1.2)
CREAT SERPL-MCNC: 0.9 MG/DL (ref 0.5–1.2)
EOSINOPHILS ABSOLUTE: 0.2 K/UL (ref 0–0.6)
EOSINOPHILS RELATIVE PERCENT: 2.9 % (ref 0–5)
ETHANOL: <10 MG/DL (ref 0–0.08)
GFR AFRICAN AMERICAN: >59
GFR AFRICAN AMERICAN: >59
GFR NON-AFRICAN AMERICAN: >60
GFR NON-AFRICAN AMERICAN: >60
GLUCOSE BLD-MCNC: 367 MG/DL (ref 70–99)
GLUCOSE BLD-MCNC: 556 MG/DL (ref 74–109)
GLUCOSE BLD-MCNC: 583 MG/DL (ref 74–109)
HCT VFR BLD CALC: 47.3 % (ref 42–52)
HEMOGLOBIN: 15.5 G/DL (ref 14–18)
IMMATURE GRANULOCYTES #: 0 K/UL
LYMPHOCYTES ABSOLUTE: 1.8 K/UL (ref 1.1–4.5)
LYMPHOCYTES RELATIVE PERCENT: 22.5 % (ref 20–40)
MCH RBC QN AUTO: 27.7 PG (ref 27–31)
MCHC RBC AUTO-ENTMCNC: 32.8 G/DL (ref 33–37)
MCV RBC AUTO: 84.6 FL (ref 80–94)
MONOCYTES ABSOLUTE: 0.7 K/UL (ref 0–0.9)
MONOCYTES RELATIVE PERCENT: 8.7 % (ref 0–10)
NEUTROPHILS ABSOLUTE: 5.1 K/UL (ref 1.5–7.5)
NEUTROPHILS RELATIVE PERCENT: 64.7 % (ref 50–65)
PDW BLD-RTO: 15.3 % (ref 11.5–14.5)
PERFORMED ON: ABNORMAL
PLATELET # BLD: 209 K/UL (ref 130–400)
PMV BLD AUTO: 11.6 FL (ref 9.4–12.4)
POTASSIUM REFLEX MAGNESIUM: 4.5 MMOL/L (ref 3.5–5)
POTASSIUM SERPL-SCNC: 4.5 MMOL/L (ref 3.5–5)
PRO-BNP: 363 PG/ML (ref 0–900)
RBC # BLD: 5.59 M/UL (ref 4.7–6.1)
SODIUM BLD-SCNC: 132 MMOL/L (ref 136–145)
SODIUM BLD-SCNC: 133 MMOL/L (ref 136–145)
TOTAL PROTEIN: 6.6 G/DL (ref 6.6–8.7)
TROPONIN: <0.01 NG/ML (ref 0–0.03)
WBC # BLD: 7.8 K/UL (ref 4.8–10.8)

## 2022-09-20 PROCEDURE — 93005 ELECTROCARDIOGRAM TRACING: CPT | Performed by: PHYSICIAN ASSISTANT

## 2022-09-20 PROCEDURE — 80053 COMPREHEN METABOLIC PANEL: CPT

## 2022-09-20 PROCEDURE — 82077 ASSAY SPEC XCP UR&BREATH IA: CPT

## 2022-09-20 PROCEDURE — 82947 ASSAY GLUCOSE BLOOD QUANT: CPT

## 2022-09-20 PROCEDURE — 84484 ASSAY OF TROPONIN QUANT: CPT

## 2022-09-20 PROCEDURE — 99285 EMERGENCY DEPT VISIT HI MDM: CPT

## 2022-09-20 PROCEDURE — 96374 THER/PROPH/DIAG INJ IV PUSH: CPT

## 2022-09-20 PROCEDURE — 85025 COMPLETE CBC W/AUTO DIFF WBC: CPT

## 2022-09-20 PROCEDURE — 83880 ASSAY OF NATRIURETIC PEPTIDE: CPT

## 2022-09-20 PROCEDURE — 71045 X-RAY EXAM CHEST 1 VIEW: CPT | Performed by: RADIOLOGY

## 2022-09-20 PROCEDURE — 71045 X-RAY EXAM CHEST 1 VIEW: CPT

## 2022-09-20 PROCEDURE — 36415 COLL VENOUS BLD VENIPUNCTURE: CPT

## 2022-09-20 PROCEDURE — 6370000000 HC RX 637 (ALT 250 FOR IP): Performed by: PHYSICIAN ASSISTANT

## 2022-09-20 RX ORDER — NITROGLYCERIN 0.4 MG/1
0.4 TABLET SUBLINGUAL EVERY 5 MIN PRN
Status: DISCONTINUED | OUTPATIENT
Start: 2022-09-20 | End: 2022-09-20 | Stop reason: HOSPADM

## 2022-09-20 RX ADMIN — INSULIN HUMAN 10 UNITS: 100 INJECTION, SOLUTION PARENTERAL at 17:52

## 2022-09-20 ASSESSMENT — ENCOUNTER SYMPTOMS
EYE DISCHARGE: 0
COLOR CHANGE: 0
COUGH: 0
SHORTNESS OF BREATH: 0
APNEA: 0
BACK PAIN: 0
EYE ITCHING: 0
PHOTOPHOBIA: 0

## 2022-09-20 NOTE — ED NOTES
Pt friend Maggie López called back, [place] speaking with her at this time     Tabatha Campbell RN  09/20/22 4835

## 2022-09-20 NOTE — ED PROVIDER NOTES
[facility] EMERGENCY DEPT  eMERGENCYdEPARTMENT eNCOUnter      Pt Name: Ragini Alfonso  MRN: [id number]  [place] 1950  Date of evaluation: 9/20/2022  Provider:JAVIER Lechuga    CHIEF COMPLAINT       Chief Complaint   Patient presents with    Chest Pain     X1 month         HISTORY OF PRESENT ILLNESS  (Location/Symptom, Timing/Onset, Context/Setting, Quality, Duration, Modifying Factors, Severity.)   Ragini Alfonso is a 71 y.o. male who presents to the emergency department with complaints of chest pain worse in morning x 1 month. Aspirin taken out patient has helped. Currently 0/10 he was sent here by Dr Roberta Spears. He looks unkept dropped off by his daughter his POA will call to confirm mental status. He is alert and oriented for me. Staff says he urinated on himself in weight room. Smoker statin diabetic. No prior cardiac work up. He says worse in morning I feel his heart score will be moderate risk minimum I foresee potential AMA as he wants to leave to check on his cat. HPI    Nursing Notes were reviewed and I agree. REVIEW OF SYSTEMS    (2-9 systems for level 4, 10 or more for level 5)     Review of Systems   Constitutional:  Negative for activity change, appetite change, chills and fever. HENT:  Negative for congestion and dental problem. Eyes:  Negative for photophobia, discharge and itching. Respiratory:  Negative for apnea, cough and shortness of breath. Cardiovascular:  Positive for chest pain. Musculoskeletal:  Negative for arthralgias, back pain, gait problem, myalgias and neck pain. Skin:  Negative for color change, pallor and rash. Neurological:  Negative for dizziness, seizures and syncope. Psychiatric/Behavioral:  Negative for agitation. The patient is not nervous/anxious. Except as noted above the remainder of the review of systems was reviewed and negative.        PAST MEDICAL HISTORY     Past Medical History:   Diagnosis Date    Chronic back pain     Essential hypertension 1/28/2020    GERD (gastroesophageal reflux disease)     Hyperlipidemia     Low back pain 1/29/2016    Nail fungus     Neuropathy     Osteoarthritis          SURGICAL HISTORY       Past Surgical History:   Procedure Laterality Date    Mark Oliva Graft - done 2-3 yrs ago @ [facility],  unaware of dates    [address]       Previous Medications    BUSPIRONE (BUSPAR) 10 MG TABLET    Take 1 tablet by mouth 2 times daily    CELECOXIB (CELEBREX) 100 MG CAPSULE    Take 1 capsule by mouth daily    CITALOPRAM (CELEXA) 20 MG TABLET    Take 1 tablet by mouth daily    CYCLOBENZAPRINE (FLEXERIL) 10 MG TABLET    Take 1 tablet by mouth 3 times daily as needed for Muscle spasms    INSULIN DETEMIR (LEVEMIR) 100 UNIT/ML INJECTION VIAL    Inject 40 Units into the skin nightly    LOSARTAN-HYDROCHLOROTHIAZIDE (HYZAAR) 100-25 MG PER TABLET    Take 1 tablet by mouth daily    METFORMIN (GLUCOPHAGE) 500 MG TABLET    Take 2 tablets by mouth 2 times daily (with meals)    NICOTINE (NICODERM CQ) 14 MG/24HR    Place 1 patch onto the skin daily    OMEPRAZOLE (PRILOSEC) 20 MG DELAYED RELEASE CAPSULE    Take 1 capsule by mouth daily    PREGABALIN (LYRICA) 200 MG CAPSULE    Take 1 capsule by mouth 3 times daily for 30 days. May fill 07/03/18    SIMVASTATIN (ZOCOR) 40 MG TABLET    Take 1 tablet by mouth nightly       ALLERGIES     Sulfa antibiotics    FAMILY HISTORY       Family History   Problem Relation Age of Onset    High Blood Pressure Father           SOCIAL HISTORY       Social History     Socioeconomic History    Marital status:      Spouse name: None    Number of children: None    Years of education: None    Highest education level: None   Tobacco Use    Smoking status: Every Day     Packs/day: 2.00     Types: Cigarettes    Smokeless tobacco: Never   Substance and Sexual Activity    Alcohol use: No     Comment: just quit 6 months ago    Drug use:  No SCREENINGS    Prabhu Coma Scale  Eye Opening: Spontaneous  Best Verbal Response: Oriented  Best Motor Response: Obeys commands  Prabhu Coma Scale Score: 15      PHYSICAL EXAM    (up to 7 forlevel 4, 8 or more for level 5)     ED Triage Vitals [09/20/22 1606]   BP Temp Temp src Heart Rate Resp SpO2 Height Weight   106/74 98.7 °F (37.1 °C) -- 69 16 96 % -- --       Physical Exam  Vitals reviewed. Constitutional:       General: He is not in acute distress. Appearance: Normal appearance. He is well-developed. He is not diaphoretic. HENT:      Head: Normocephalic and atraumatic. Right Ear: External ear normal.      Left Ear: External ear normal.   Eyes:      Pupils: Pupils are equal, round, and reactive to light. Neck:      Trachea: No tracheal deviation. Cardiovascular:      Rate and Rhythm: Normal rate and regular rhythm. Pulses: Normal pulses. Heart sounds: Normal heart sounds. No murmur heard. Pulmonary:      Effort: Pulmonary effort is normal.      Breath sounds: Normal breath sounds. No stridor. No wheezing. Chest:      Chest wall: No tenderness. Abdominal:      General: Bowel sounds are normal. There is no distension. Palpations: Abdomen is soft. Tenderness: There is no abdominal tenderness. Musculoskeletal:         General: Normal range of motion. Cervical back: Normal range of motion and neck supple. Skin:     General: Skin is warm and dry. Capillary Refill: Capillary refill takes less than 2 seconds. Neurological:      General: No focal deficit present. Mental Status: He is alert and oriented to person, place, and time. Mental status is at baseline. Psychiatric:         Mood and Affect: Mood normal.         Behavior: Behavior normal.         Thought Content:  Thought content normal.         Judgment: Judgment normal.         DIAGNOSTIC RESULTS     RADIOLOGY:   Non-plain film images such as CT, Ultrasound and MRI are read by the radiologist. Plain radiographic images are visualized and preliminarilyinterpreted by No att. providers found with the below findings:      Interpretation per the Radiologist below, if available at the time of this note:    XR CHEST PORTABLE   Final Result   Mild increased interstitial markings with peribronchial wall thickening. Findings as can be seen in acute and a/or chronic bronchitis. Recommendation: Follow up as clinically indicated. Electronically Signed by Dorian Dance MD at 20-Sep-2022 06:10:42 PM                   LABS:  Labs Reviewed   CBC WITH AUTO DIFFERENTIAL - Abnormal; Notable for the following components:       Result Value    MCHC 32.8 (*)     RDW 15.3 (*)     All other components within normal limits   COMPREHENSIVE METABOLIC PANEL W/ REFLEX TO MG FOR LOW K - Abnormal; Notable for the following components:    Sodium 132 (*)     Chloride 97 (*)     Anion Gap 6 (*)     Glucose 556 (*)     Alkaline Phosphatase 146 (*)     All other components within normal limits    Narrative:     CALL  Khan  KLED tel. ,  Chemistry results called to and read back by JUNIOR LUGO [address], 09/20/2022 17:09,  by ENGJE   COVID-19, RAPID   TROPONIN   BRAIN NATRIURETIC PEPTIDE   ETHANOL       All other labs were within normal range or notreturned as of this dictation. RE-ASSESSMENT        EMERGENCY DEPARTMENT COURSE and DIFFERENTIAL DIAGNOSIS/MDM:   Vitals:    Vitals:    09/20/22 1606 09/20/22 1702 09/20/22 1732   BP: 106/74 112/64 111/68   Pulse: 69 62 63   Resp: 16     Temp: 98.7 °F (37.1 °C)     SpO2: 96%           MDM  I spoke with Atilano Bumpers who is one of his emergency contacts listed she is an old caretaker that still will bring him food and check on him from time to time. She gives me pretty good collateral sounds like he had concern for poorly controlled blood sugar when he stayed at [facility] from 6 to 7 months and about 2 months ago was discharged he signed out [facility].   The person listed is his power of  she tells me is not actually his daughter I cannot really confirm but this is what she tells me. Maggie Sanchez tells me that she (POA) tells people she is his daughter but is in fact also just a caretaker and  there is concerned that she might be bringing him food that is poor for his sugar and he has a history of alcoholism and abuse Ms. Hayward Sentsanam cannot confirm if that is not being given to him as well I have an EtOH pending. Troponin negative no acute findings on his EKG he has a heart score placing him under moderate risk category and no prior work-up that I can tell in his cardiology history in our system. I feel he should come in if anything else for a stress test and echocardiogram I feel this is also equally a social issue potential; possibly neglect. However he wants to leave he is alert oriented x3 with no cognitive defect have confirmed with my attending cannot hold him here. My evening charge nurse Paul Shrestha has had him sign AMA has taxi voucher and was able to to get a hold of his POA who confirms she will be at home waiting for him. I [facility]  [facility]. I I think a home visit indicated. PROCEDURES:    Procedures      FINAL IMPRESSION      1. Chest wall pain    2. Hyperglycemia    3. Failure to thrive in adult          DISPOSITION/PLAN   DISPOSITION Decision To Admit 09/20/2022 06:16:41 PM      PATIENT REFERRED TO:  No follow-up provider specified.     DISCHARGE MEDICATIONS:  New Prescriptions    No medications on file       (Please note that portions of this note were completed with a voice recognition program.  Efforts were made to edit the dictations but occasionallywords are mis-transcribed.)    Rama Ortiz, [address] [place] [address]  09/20/22 [address] [place], [place]  09/21/22 2411

## 2022-09-20 NOTE — ED NOTES
Pt approached nurses station, states he does not want to stay in the hospital. Sound[place] Ban, [place] aware and speaking with PT. States PT to go AMA. I contacted PT family Sriram Riggs and daughter Milagros Piña. Milagros Piña states she will meet PT at his residence if we can get PT a cab ride home.       Jaky Leary RN  09/20/22 [id number]

## 2022-09-20 NOTE — ED NOTES
Attempted to call pt daughter Fior Novak and friend Michael Johnson, no answer     Jasmina Hayes, [place]  09/20/22 5227

## 2022-09-21 ASSESSMENT — ENCOUNTER SYMPTOMS
PHOTOPHOBIA: 0
EYE ITCHING: 0
BACK PAIN: 0
APNEA: 0
SHORTNESS OF BREATH: 0
EYE DISCHARGE: 0
COLOR CHANGE: 0
COUGH: 0

## 2022-09-22 LAB
EKG P AXIS: 65 DEGREES
EKG P-R INTERVAL: 141 MS
EKG Q-T INTERVAL: 418 MS
EKG QRS DURATION: 142 MS
EKG QTC CALCULATION (BAZETT): 448 MS
EKG T AXIS: 45 DEGREES

## (undated) DEVICE — HALTR TRACT HD CERV STD UNIV

## (undated) DEVICE — ATHLETIC SUPPORTER LATEX FREE, LARGE

## (undated) DEVICE — PK TURNOVER RM ADV

## (undated) DEVICE — PROXIMATE RH ROTATING HEAD SKIN STAPLERS (35 WIDE) CONTAINS 35 STAINLESS STEEL STAPLES: Brand: PROXIMATE

## (undated) DEVICE — GLV SURG BIOGEL M LTX PF 7 1/2

## (undated) DEVICE — PK SPINE CERV ANT 30

## (undated) DEVICE — SUT GUT CHRM 2/0 SH 27IN G123H

## (undated) DEVICE — CLTH CLENS READYCLEANSE PERI CARE PK/5

## (undated) DEVICE — DRP C/ARMOR

## (undated) DEVICE — LIMB HOLDER, WRIST/ANKLE: Brand: DEROYAL

## (undated) DEVICE — GLV SURG BIOGEL LTX PF 8

## (undated) DEVICE — GLV SURG SENSICARE PI LF PF 8 GRN STRL

## (undated) DEVICE — CATH IV ANGIO FEP 12G 3IN LTBLU 10PK

## (undated) DEVICE — GAUZE,SPONGE,FLUFF,6"X6.75",STRL,10/TRAY: Brand: MEDLINE

## (undated) DEVICE — NEEDLE, QUINCKE, 18GX3.5": Brand: MEDLINE

## (undated) DEVICE — ELECTRD BLD EDGE/INSUL1P 2.4X5.1MM STRL

## (undated) DEVICE — SUT GUT CHRM 4/0 RB1 27IN U203H

## (undated) DEVICE — GLV SURG BIOGEL LTX PF 6 1/2

## (undated) DEVICE — PREP RAZOR: Brand: DEROYAL

## (undated) DEVICE — SUT MNCRYL 4/0 PS2 27IN UD MCP426H

## (undated) DEVICE — DISPOSABLE IRRIGATION CASSETTE: Brand: CORE

## (undated) DEVICE — TRY PREP SCRB VAG PVP

## (undated) DEVICE — ANTIBACTERIAL UNDYED BRAIDED (POLYGLACTIN 910), SYNTHETIC ABSORBABLE SUTURE: Brand: COATED VICRYL

## (undated) DEVICE — SUT GUT CHRM 2/0 STD TIES 54IN S113H

## (undated) DEVICE — SUT GUT CHRM 3/0 SH 27IN G122H

## (undated) DEVICE — CONN FLX BREATHE CIRCT

## (undated) DEVICE — PAD MINOR UNIVERSAL: Brand: MEDLINE INDUSTRIES, INC.

## (undated) DEVICE — SPNG DISSCT CHRRY 3/8IN STRL PK/5

## (undated) DEVICE — PACK,UNIVERSAL,NO GOWNS: Brand: MEDLINE

## (undated) DEVICE — 5.0MM BARREL STRAIGHT FLUTE

## (undated) DEVICE — DRSNG TELFA PAD NONADH STR 1S 3X8IN